# Patient Record
Sex: MALE | Race: WHITE | Employment: OTHER | URBAN - METROPOLITAN AREA
[De-identification: names, ages, dates, MRNs, and addresses within clinical notes are randomized per-mention and may not be internally consistent; named-entity substitution may affect disease eponyms.]

---

## 2017-01-10 ENCOUNTER — ALLIED HEALTH/NURSE VISIT (OUTPATIENT)
Dept: CARDIOLOGY | Facility: CLINIC | Age: 72
End: 2017-01-10
Payer: COMMERCIAL

## 2017-01-10 DIAGNOSIS — Z95.0 CARDIAC PACEMAKER IN SITU: Primary | ICD-10-CM

## 2017-01-10 DIAGNOSIS — I10 BENIGN HYPERTENSION: ICD-10-CM

## 2017-01-10 PROCEDURE — 93280 PM DEVICE PROGR EVAL DUAL: CPT | Performed by: INTERNAL MEDICINE

## 2017-01-10 RX ORDER — LISINOPRIL 20 MG/1
20 TABLET ORAL DAILY
Qty: 90 TABLET | Refills: 3 | Status: SHIPPED | OUTPATIENT
Start: 2017-01-10 | End: 2017-12-04

## 2017-01-10 NOTE — PROGRESS NOTES
Cobb Accolade (D) 6 weekPost Pacemaker Device Check  AP: 4 % : <1 %  Mode: DDD        Underlying Rhythm: SR  Heart Rate: Stable histogram with adequate distribution  Sensing: Stable    Pacing Threshold: Stable    Impedance: WNL  Battery Status: 15 years  Incision: has healed well  Atrial Arrhythmia: NONE  Ventricular Arrhythmia: NONE  Setting Change: NONE    Care Plan: Remote transmission in 3 months.

## 2017-02-01 DIAGNOSIS — E78.2 MIXED HYPERLIPIDEMIA: ICD-10-CM

## 2017-02-01 DIAGNOSIS — E61.1 IRON DEFICIENCY: ICD-10-CM

## 2017-02-01 LAB
ERYTHROCYTE [DISTWIDTH] IN BLOOD BY AUTOMATED COUNT: 17 % (ref 10–15)
HCT VFR BLD AUTO: 47.7 % (ref 40–53)
HGB BLD-MCNC: 16.2 G/DL (ref 13.3–17.7)
MCH RBC QN AUTO: 29.9 PG (ref 26.5–33)
MCHC RBC AUTO-ENTMCNC: 34 G/DL (ref 31.5–36.5)
MCV RBC AUTO: 88 FL (ref 78–100)
PLATELET # BLD AUTO: 179 10E9/L (ref 150–450)
RBC # BLD AUTO: 5.41 10E12/L (ref 4.4–5.9)
WBC # BLD AUTO: 8.1 10E9/L (ref 4–11)

## 2017-02-01 PROCEDURE — 82728 ASSAY OF FERRITIN: CPT | Performed by: INTERNAL MEDICINE

## 2017-02-01 PROCEDURE — 80053 COMPREHEN METABOLIC PANEL: CPT | Performed by: INTERNAL MEDICINE

## 2017-02-01 PROCEDURE — 85027 COMPLETE CBC AUTOMATED: CPT | Performed by: INTERNAL MEDICINE

## 2017-02-01 PROCEDURE — 36415 COLL VENOUS BLD VENIPUNCTURE: CPT | Performed by: INTERNAL MEDICINE

## 2017-02-01 PROCEDURE — 80061 LIPID PANEL: CPT | Performed by: INTERNAL MEDICINE

## 2017-02-02 DIAGNOSIS — R73.09 ELEVATED GLUCOSE: Primary | ICD-10-CM

## 2017-02-02 LAB
ALBUMIN SERPL-MCNC: 3.8 G/DL (ref 3.4–5)
ALP SERPL-CCNC: 58 U/L (ref 40–150)
ALT SERPL W P-5'-P-CCNC: 61 U/L (ref 0–70)
ANION GAP SERPL CALCULATED.3IONS-SCNC: 10 MMOL/L (ref 3–14)
AST SERPL W P-5'-P-CCNC: 28 U/L (ref 0–45)
BILIRUB SERPL-MCNC: 0.7 MG/DL (ref 0.2–1.3)
BUN SERPL-MCNC: 21 MG/DL (ref 7–30)
CALCIUM SERPL-MCNC: 9 MG/DL (ref 8.5–10.1)
CHLORIDE SERPL-SCNC: 109 MMOL/L (ref 94–109)
CHOLEST SERPL-MCNC: 151 MG/DL
CO2 SERPL-SCNC: 23 MMOL/L (ref 20–32)
CREAT SERPL-MCNC: 1.23 MG/DL (ref 0.66–1.25)
FERRITIN SERPL-MCNC: 48 NG/ML (ref 26–388)
GFR SERPL CREATININE-BSD FRML MDRD: 58 ML/MIN/1.7M2
GLUCOSE SERPL-MCNC: 203 MG/DL (ref 70–99)
HDLC SERPL-MCNC: 46 MG/DL
LDLC SERPL CALC-MCNC: 70 MG/DL
NONHDLC SERPL-MCNC: 105 MG/DL
POTASSIUM SERPL-SCNC: 4 MMOL/L (ref 3.4–5.3)
PROT SERPL-MCNC: 7.3 G/DL (ref 6.8–8.8)
SODIUM SERPL-SCNC: 142 MMOL/L (ref 133–144)
TRIGL SERPL-MCNC: 173 MG/DL

## 2017-02-03 NOTE — PROGRESS NOTES
Quick Note:    This is to inform you regarding your test result.    Ferritin which is iron stores in the body is improving compare to before.  Cholesterol numbers are improving.  Glucose is very high  Check fasting blood sugar and Hba1c   Orders are placed.  Make lab appointment   CBC result which includes white count Hemoglobin and Platelet Counts is normal.         Dr.Nasima Thom MD,FACP      ______

## 2017-02-07 DIAGNOSIS — R73.09 ELEVATED GLUCOSE: ICD-10-CM

## 2017-02-07 LAB
GLUCOSE SERPL-MCNC: 114 MG/DL (ref 70–99)
HBA1C MFR BLD: 6.2 % (ref 4.3–6)

## 2017-02-07 PROCEDURE — 36415 COLL VENOUS BLD VENIPUNCTURE: CPT | Performed by: INTERNAL MEDICINE

## 2017-02-07 PROCEDURE — 83036 HEMOGLOBIN GLYCOSYLATED A1C: CPT | Performed by: INTERNAL MEDICINE

## 2017-02-07 PROCEDURE — 82947 ASSAY GLUCOSE BLOOD QUANT: CPT | Performed by: INTERNAL MEDICINE

## 2017-02-08 NOTE — PROGRESS NOTES
Quick Note:    This is to inform you regarding your test result.    Glucose which is your blood sugar is elevated.  HbA1c which is average glucose during last 3 months is 6.2%.  You are considered prediabetic means at risk of developing diabetes.  Eat healthy diet and do regular physical activity. Avoid high sugar containing food.  If you want we can refer you to dietician      Dr.Nasima Thom MD,FACP      ______

## 2017-02-09 ENCOUNTER — OFFICE VISIT (OUTPATIENT)
Dept: FAMILY MEDICINE | Facility: CLINIC | Age: 72
End: 2017-02-09
Payer: COMMERCIAL

## 2017-02-09 VITALS
HEIGHT: 70 IN | TEMPERATURE: 98.6 F | BODY MASS INDEX: 33.41 KG/M2 | WEIGHT: 233.4 LBS | HEART RATE: 76 BPM | OXYGEN SATURATION: 93 % | DIASTOLIC BLOOD PRESSURE: 83 MMHG | SYSTOLIC BLOOD PRESSURE: 133 MMHG

## 2017-02-09 DIAGNOSIS — N52.9 ERECTILE DYSFUNCTION, UNSPECIFIED ERECTILE DYSFUNCTION TYPE: ICD-10-CM

## 2017-02-09 DIAGNOSIS — E78.5 HYPERLIPIDEMIA, UNSPECIFIED HYPERLIPIDEMIA TYPE: ICD-10-CM

## 2017-02-09 DIAGNOSIS — R73.09 ELEVATED HEMOGLOBIN A1C: ICD-10-CM

## 2017-02-09 DIAGNOSIS — E61.1 IRON DEFICIENCY: ICD-10-CM

## 2017-02-09 DIAGNOSIS — K22.70 BARRETT'S ESOPHAGUS WITHOUT DYSPLASIA: ICD-10-CM

## 2017-02-09 DIAGNOSIS — Z01.818 PREOP GENERAL PHYSICAL EXAM: Primary | ICD-10-CM

## 2017-02-09 PROCEDURE — 99215 OFFICE O/P EST HI 40 MIN: CPT | Performed by: INTERNAL MEDICINE

## 2017-02-09 RX ORDER — PANTOPRAZOLE SODIUM 40 MG/1
40 TABLET, DELAYED RELEASE ORAL DAILY
Qty: 90 TABLET | Refills: 3 | Status: SHIPPED | OUTPATIENT
Start: 2017-02-09 | End: 2017-06-02

## 2017-02-09 RX ORDER — SILDENAFIL CITRATE 20 MG/1
TABLET ORAL
Qty: 30 TABLET | Refills: 3 | Status: SHIPPED | OUTPATIENT
Start: 2017-02-09 | End: 2017-12-04

## 2017-02-09 RX ORDER — ATORVASTATIN CALCIUM 10 MG/1
10 TABLET, FILM COATED ORAL DAILY
Qty: 90 TABLET | Refills: 3 | Status: SHIPPED | OUTPATIENT
Start: 2017-02-09 | End: 2017-12-04

## 2017-02-09 NOTE — PROGRESS NOTES
[address] [address]  [phone]  Dept: [phone]    PRE-OP EVALUATION:  Today's date: 2017    Estrada Oseguera (: 1945) presents for pre-operative evaluation assessment as requested by Deondre Do MD.  He requires evaluation and anesthesia risk assessment prior to undergoing surgery/procedure for treatment of esophageal stricture, GERD, Jesus's esophagus.  Proposed procedure: COMBINED ESOPHAGOSCOPY, GASTROSCOPY, DUODENOSCOPY (EGD)    Date of Surgery/ Procedure: 3-2-2017  Time of Surgery/ Procedure: 8:30am  Hospital/Surgical Facility:     Primary Physician: Zoe Tomas  Type of Anesthesia Anticipated: Conscious sedation    Patient has a Health Care Directive or Living Will:  YES , will bring in to be scanned    1. NO - Do you have a history of heart attack, stroke, stent, bypass or surgery on an artery in the head, neck, heart or legs?  2. NO - Do you ever have any pain or discomfort in your chest?  3. NO - Do you have a history of  Heart Failure?  4. NO - Are you troubled by shortness of breath when: walking on the level, up a slight hill or at night?  5. NO - Do you currently have a cold, bronchitis or other respiratory infection?  6. NO - Do you have a cough, shortness of breath or wheezing?  7. NO - Do you sometimes get pains in the calves of your legs when you walk?  8. NO - Do you or anyone in your family have previous history of blood clots?  9. NO - Do you or does anyone in your family have a serious bleeding problem such as prolonged bleeding following surgeries or cuts?  10. yes - Have you ever had problems with anemia or been told to take iron pills? Yes, in the past,doing much better compared to previous, within normal range,  11. NO - Have you had any abnormal blood loss such as black, tarry or bloody stools, or abnormal vaginal bleeding?  12. NO - Have you ever had a blood transfusion?  13. NO - Have you or any of  your relatives ever had problems with anesthesia?  14. NO - Do you have sleep apnea, excessive snoring or daytime drowsiness?  15. NO - Do you have any prosthetic heart valves?  16. NO - Do you have prosthetic joints?  17. NO - Is there any chance that you may be pregnant?      HPI:                                                      Brief HPI related to upcoming procedure: Has chronic hx of GERD, Jesus's esophagus and esophageal stricture.  Had EGD and dilatation 3 months ago. Per GI, needed to repeat EGD in 3 months for retreatment.      See problem list for active medical problems.  Problems all longstanding and stable, except as noted/documented.  See ROS for pertinent symptoms related to these conditions.                                                                                                  .    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     Priority: Medium     seen on EGD dated 11/3/16       Chacon's esophagus without dysplasia 11/28/2016     Priority: Medium     seen on EGD dated 11/3/16       Cardiac pacemaker 11/28/2016     Priority: Medium     See note by cardiology dated 11/23/2016       Tubular adenoma 11/05/2016     Priority: Medium     Seen on colonoscopy dated 11/2016       S/p nephrectomy 10/27/2016     Priority: Medium     Benign hypertension      Priority: Medium     GERD (gastroesophageal reflux disease)      Priority: Medium     CKD (chronic kidney disease)      Priority: Medium     S/P colon resection      Priority: Medium     Solitary kidney, acquired 10/13/2016     Priority: Medium     Benign essential hypertension 10/13/2016     Priority: Medium     Chronic kidney disease, stage III  (moderate) 10/13/2016     Priority: Medium     Hiatal hernia 10/13/2016     Priority: Medium      Past Medical History   Diagnosis Date     Hypertension      GERD (gastroesophageal reflux disease)      CKD (chronic kidney disease)      Hiatal hernia      S/P colon resection      Pericarditis      Cancer (H)      skin     Past Surgical History   Procedure Laterality Date     Cholecystectomy       Combined colonoscopy, mucosal resection  1993     ?diverticulitis      Nephrectomy rt/lt       right     Colonoscopy       Esophagoscopy, gastroscopy, duodenoscopy (egd), combined N/A 11/3/2016     Procedure: COMBINED ESOPHAGOSCOPY, GASTROSCOPY, DUODENOSCOPY (EGD), BIOPSY SINGLE OR MULTIPLE;  Surgeon: Deondre Meek MD;  Location:  GI     Pace maker       Esophagoscopy, gastroscopy, duodenoscopy (egd), dilatation, combined N/A 12/1/2016     Procedure: COMBINED ESOPHAGOSCOPY, GASTROSCOPY, DUODENOSCOPY (EGD), DILATATION;  Surgeon: Deondre Meek MD;  Location:  GI     Current Outpatient Prescriptions   Medication Sig Dispense Refill     lisinopril (PRINIVIL/ZESTRIL) 20 MG tablet Take 1 tablet (20 mg) by mouth daily 90 tablet 3     atorvastatin (LIPITOR) 10 MG tablet Take 1 tablet (10 mg) by mouth daily 90 tablet 0     pantoprazole (PROTONIX) 40 MG EC tablet Take 1 tablet (40 mg) by mouth daily Take 30-60 minutes before a meal. 90 tablet 0     Ferrous Gluconate (IRON) 240 (27 FE) MG TABS Take 1 tablet by mouth daily       OTC products: None, except as noted above    No Known Allergies   Latex Allergy: NO    Social History   Substance Use Topics     Smoking status: Former Smoker     Types: Pipe     Smokeless tobacco: Never Used     Alcohol Use: 0.0 oz/week     0 Standard drinks or equivalent per week      Comment: 3-4 times a week     History   Drug Use No       REVIEW OF SYSTEMS:                                                    C: NEGATIVE for fever, chills, change in weight  E/M: NEGATIVE for ear, mouth  "and throat problems  R: NEGATIVE for significant cough or SOB  CV: NEGATIVE for chest pain, palpitations or peripheral edema  Drink plenty of fluids.  Take extra rest.  Use saline nasal spray.  Take Tylenol as needed for pain    This document serves as a record of the services and decisions personally performed and made by Zoe Tomas MD. It was created on her behalf by Ness Arshad, a trained medical scribe. The creation of this document is based the provider's statements to the medical scribe.    Scribe Ness Arshad 11:51 AM, February 9, 2017    EXAM:                                                    /83 mmHg  Pulse 76  Temp(Src) 98.6  F (37  C) (Oral)  Ht 5' 9.5\" (1.765 m)  Wt 233 lb 6.4 oz (105.87 kg)  BMI 33.98 kg/m2  SpO2 93%  GENERAL APPEARANCE: healthy, alert and no distress  HENT: ear canals and TM's normal and nose and mouth without ulcers or lesions  RESP: lungs clear to auscultation - no rales, rhonchi or wheezes.  CV: regular rate and rhythm, normal S1 S2, no S3 or S4 and no murmur, click or rub   ABDOMEN: soft, nontender, no HSM or masses and bowel sounds normal  NEURO: Normal strength and tone, sensory exam grossly normal, mentation intact and speech normal    DIAGNOSTICS:                                                    No labs or EKG required for low risk surgery (cataract, skin procedure, breast biopsy, etc)    Recent Labs   Lab Test  02/07/17   0734  02/01/17   1307  11/23/16   1134   HGB   --   16.2  14.0   PLT   --   179  203   INR   --    --   0.98   NA   --   142  142   POTASSIUM   --   4.0  4.2   CR   --   1.23  1.23   A1C  6.2*   --    --     Reviewed 2/7/17 labs    IMPRESSION:                                                    Reason for surgery/procedure: hiatal hernia + GERD  Diagnosis/reason for consult: preop clearance     The proposed surgical procedure is considered LOW risk.    REVISED CARDIAC RISK INDEX  The patient has the following serious cardiovascular " risks for perioperative complications such as (MI, PE, VFib and 3  AV Block):  No serious cardiac risks  INTERPRETATION: 1 risks: Class II (low risk - 0.9% complication rate)    The patient has the following additional risks for perioperative complications:  No identified additional risks    Estrada was seen today for pre-op exam.    Diagnoses and all orders for this visit:    Preop general physical exam  -     CBC with platelets; Future    Chacon's esophagus without dysplasia  Comments:  seen on EGD dated 11/3/16  Orders:  -     pantoprazole (PROTONIX) 40 MG EC tablet; Take 1 tablet (40 mg) by mouth daily Take 30-60 minutes before a meal.    Hyperlipidemia, unspecified hyperlipidemia type  Doing well with  medication  -     atorvastatin (LIPITOR) 10 MG tablet; Take 1 tablet (10 mg) by mouth daily    Elevated hemoglobin A1c  A1C was 6.2 2/7/2017  We had lengthy discussion about pre diabetes and appropriate measurements to prevent diabetes   Counseled over diet, exercise  He will see diabetes educator and dietitian  -     NUTRITION REFERRAL  -     Hemoglobin A1c; Future  -     Basic metabolic panel; Future    Iron deficiency  Improved.  -     Ferritin; Future    Erectile dysfunction, unspecified erectile dysfunction type  Needs refill. We discussed getting generic as it is more affordable.  -     sildenafil (REVATIO/VIAGRA) 20 MG tablet; Take 1-2 tablet (20 mg) by mouth half to one hour before sexual intercourse .  Never use with nitroglycerin, terazosin or doxazosin.      RECOMMENDATIONS:                                                    --Patient is to take all scheduled medications on the day of surgery EXCEPT for modifications listed below.    Optimal for the proposed procedure      The total visit time was 40 minutes more  than 50% was spent in counseling and coordination of care as discussed above.    The information in this document, created by the medical scribe for me, accurately reflects the services I  personally performed and the decisions made by me. I have reviewed and approved this document for accuracy prior to leaving the patient care area.  Zoe Tomas MD  12:01 PM, 02/09/2017    Signed Electronically by: Zoe Tomas MD    Copy of this evaluation report is provided to requesting physician.    [facility] Preop Guidelines

## 2017-02-09 NOTE — NURSING NOTE
"Chief Complaint   Patient presents with     Pre-Op Exam       Initial /83 mmHg  Pulse 76  Temp(Src) 98.6  F (37  C) (Oral)  Ht 5' 9.5\" (1.765 m)  Wt 233 lb 6.4 oz (105.87 kg)  BMI 33.98 kg/m2  SpO2 93% Estimated body mass index is 33.98 kg/(m^2) as calculated from the following:    Height as of this encounter: 5' 9.5\" (1.765 m).    Weight as of this encounter: 233 lb 6.4 oz (105.87 kg).  Medication Reconciliation: complete, Left arm, Large cuff  Sakshi Cross MA  "

## 2017-02-09 NOTE — PATIENT INSTRUCTIONS
Before Your Surgery      Call your surgeon if there is any change in your health. This includes signs of a cold or flu (such as a sore throat, runny nose, cough, rash or fever).    Do not smoke, drink alcohol or take over the counter medicine (unless your surgeon or primary care doctor tells you to) for the 24 hours before and after surgery.    If you take prescribed drugs: Follow your doctor s orders about which medicines to take and which to stop until after surgery.    Eating and drinking prior to surgery: follow the instructions from your surgeon    Take a shower or bath the night before surgery. Use the soap your surgeon gave you to gently clean your skin. If you do not have soap from your surgeon, use your regular soap. Do not shave  scrub the surgery site.  Wear clean pajamas and have clean sheets on your bed.   Discontinue iron 7-10 days before your procedure   Avoid aspirin 10 days before the surgery. Avoid nonsteroidal anti-inflammatory pain medication like ibuprofen, Motrin, or Aleve 3 days before the surgery.  Tylenol is okay to use for pain.  Avoid any OTC multivitamins or herbal supplement 7 days before surgery   Resume after surgery    Get future labs 2-3 days before the appt  Follow up in 6 months   Seek sooner medical attention if there is any new or worsening symptoms  Check with pharmacy about coverage for shingle vaccine.  Ok for the pharmacy to give shingle vaccine if it is covered.

## 2017-02-09 NOTE — MR AVS SNAPSHOT
After Visit Summary   2/9/2017    Estrada Oseguera    MRN: [id number]           Patient Information     Date Of Birth          1945        Visit Information        Provider Department      2/9/2017 11:30 AM Zoe Tomas MD [facility]        Today's Diagnoses     Preop general physical exam    -  1     Chacon's esophagus without dysplasia         Hyperlipidemia, unspecified hyperlipidemia type         Elevated hemoglobin A1c         Iron deficiency         Erectile dysfunction, unspecified erectile dysfunction type           Care Instructions      Before Your Surgery      Call your surgeon if there is any change in your health. This includes signs of a cold or flu (such as a sore throat, runny nose, cough, rash or fever).    Do not smoke, drink alcohol or take over the counter medicine (unless your surgeon or primary care doctor tells you to) for the 24 hours before and after surgery.    If you take prescribed drugs: Follow your doctor s orders about which medicines to take and which to stop until after surgery.    Eating and drinking prior to surgery: follow the instructions from your surgeon    Take a shower or bath the night before surgery. Use the soap your surgeon gave you to gently clean your skin. If you do not have soap from your surgeon, use your regular soap. Do not shave  scrub the surgery site.  Wear clean pajamas and have clean sheets on your bed.   Discontinue iron 7-10 days before your procedure   Avoid aspirin 10 days before the surgery. Avoid nonsteroidal anti-inflammatory pain medication like ibuprofen, Motrin, or Aleve 3 days before the surgery.  Tylenol is okay to use for pain.  Avoid any OTC multivitamins or herbal supplement 7 days before surgery   Resume after surgery    Get future labs 2-3 days before the appt  Follow up in 6 months   Seek sooner medical attention if there is any new or worsening symptoms  Check with pharmacy about coverage for shingle  vaccine.  Ok for the pharmacy to give shingle vaccine if it is covered.        Follow-ups after your visit        Additional Services     NUTRITION REFERRAL       Your provider has referred you to: [facility] [place] [phone]   [email or web address]    Please be aware that coverage of these services is subject to the terms and limitations of your health insurance plan.  Call member services at your health plan with any benefit or coverage questions.      Please bring the following with you to your appointment:    (1) This referral request  (2) Any documents given to you regarding this referral  (3) Any specific questions you have about diet and/or food choices                  Your next 10 appointments already scheduled     Mar 02, 2017   Procedure with Deondre Meek MD   [facility] ([facility])    [address] [address]   [phone]           [facility] is located at [address] KATHY Daniel            Mar 09, 2017 10:50 AM   [facility] EP RETURN with Cierra Stallings PA-C   [facility] [place] ([facility])    [address]  [place] [address] [phone]            Apr 18, 2017  1:30 PM   Remote PPM Check with HEARD TECH1   [facility] [place] ([facility])    [address]  [place] [address]   [phone]           This appointment is for a remote check of your pacemaker.  This is not an appointment at the office.              Future tests that were ordered for you today     Open Future Orders        Priority Expected Expires Ordered    CBC with platelets Routine 8/1/2017 12/29/2017 2/9/2017    Ferritin Routine 8/1/2017 12/29/2017 2/9/2017    Hemoglobin A1c Routine 8/1/2017 12/29/2017 2/9/2017    Basic metabolic panel Routine 8/1/2017 12/29/2017 2/9/2017            Who to  "contact     If you have questions or need follow up information about today's clinic visit or your schedule please contact [facility] directly at [phone].  Normal or non-critical lab and imaging results will be communicated to you by [employer], letter or phone within 4 business days after the clinic has received the results. If you do not hear from us within 7 days, please contact the clinic through [employer] or phone. If you have a critical or abnormal lab result, we will notify you by phone as soon as possible.  Submit refill requests through [employer] or call your pharmacy and they will forward the refill request to us. Please allow 3 business days for your refill to be completed.          Additional Information About Your Visit        [employer] Information     [employer] gives you secure access to your electronic health record. If you see a primary care provider, you can also send messages to your care team and make appointments. If you have questions, please call your primary care clinic.  If you do not have a primary care provider, please call [phone] and they will assist you.        Care EveryWhere ID     This is your Care EveryWhere ID. This could be used by other organizations to access your [facility] medical records  [id number]        Your Vitals Were     Pulse Temperature Height BMI (Body Mass Index) Pulse Oximetry       76 98.6  F (37  C) (Oral) 5' 9.5\" (1.765 m) 33.98 kg/m2 93%        Blood Pressure from Last 3 Encounters:   02/09/17 133/83   12/01/16 135/90   11/28/16 126/80    Weight from Last 3 Encounters:   02/09/17 233 lb 6.4 oz (105.87 kg)   11/28/16 231 lb (104.781 kg)   11/23/16 227 lb 12.8 oz (103.329 kg)              We Performed the Following     NUTRITION REFERRAL          Today's Medication Changes          These changes are accurate as of: 2/9/17 12:01 PM.  If you have any questions, ask your nurse or doctor.               Start taking these medicines.        Dose/Directions    " sildenafil 20 MG tablet   Commonly known as:  REVATIO/VIAGRA   Used for:  Erectile dysfunction, unspecified erectile dysfunction type   Started by:  Zoe Tomas MD        Take 1-2 tablet (20 mg) by mouth half to one hour before sexual intercourse .  Never use with nitroglycerin, terazosin or doxazosin.   Quantity:  30 tablet   Refills:  3            Where to get your medicines      These medications were sent to [facility] - [place], [place] - [address]  [address] [facility] [address]     Phone:  [phone]    - atorvastatin 10 MG tablet  - pantoprazole 40 MG EC tablet      Some of these will need a paper prescription and others can be bought over the counter.  Ask your nurse if you have questions.     Bring a paper prescription for each of these medications    - sildenafil 20 MG tablet             Primary Care Provider Office Phone # Fax #    Zoe Tomas [phone]       [facility]    [address]  [place] [facility] [place] [address]        Thank you!     Thank you for choosing [facility]  for your care. Our goal is always to provide you with excellent care. Hearing back from our patients is one way we can continue to improve our services. Please take a few minutes to complete the written survey that you may receive in the mail after your visit with us. Thank you!             Your Updated Medication List - Protect others around you: Learn how to safely use, store and throw away your medicines at www.disposemymeds.org.          This list is accurate as of: 2/9/17 12:01 PM.  Always use your most recent med list.                   Brand Name Dispense Instructions for use    atorvastatin 10 MG tablet    LIPITOR    90 tablet    Take 1 tablet (10 mg) by mouth daily       Iron 240 (27 FE) MG Tabs      Take 1 tablet by mouth daily       lisinopril 20 MG tablet    PRINIVIL/ZESTRIL    90 tablet    Take 1 tablet (20 mg) by mouth  daily       pantoprazole 40 MG EC tablet    PROTONIX    90 tablet    Take 1 tablet (40 mg) by mouth daily Take 30-60 minutes before a meal.       sildenafil 20 MG tablet    REVATIO/VIAGRA    30 tablet    Take 1-2 tablet (20 mg) by mouth half to one hour before sexual intercourse .  Never use with nitroglycerin, terazosin or doxazosin.

## 2017-02-23 DIAGNOSIS — E78.5 HYPERLIPIDEMIA, UNSPECIFIED HYPERLIPIDEMIA TYPE: ICD-10-CM

## 2017-02-23 RX ORDER — ATORVASTATIN CALCIUM 10 MG/1
TABLET, FILM COATED ORAL
Start: 2017-02-23

## 2017-03-09 ENCOUNTER — OFFICE VISIT (OUTPATIENT)
Dept: CARDIOLOGY | Facility: CLINIC | Age: 72
End: 2017-03-09
Attending: INTERNAL MEDICINE
Payer: COMMERCIAL

## 2017-03-09 VITALS
BODY MASS INDEX: 33.14 KG/M2 | HEIGHT: 70 IN | HEART RATE: 76 BPM | DIASTOLIC BLOOD PRESSURE: 74 MMHG | WEIGHT: 231.5 LBS | SYSTOLIC BLOOD PRESSURE: 120 MMHG

## 2017-03-09 DIAGNOSIS — I10 BENIGN HYPERTENSION: Primary | ICD-10-CM

## 2017-03-09 DIAGNOSIS — I10 BENIGN ESSENTIAL HYPERTENSION: ICD-10-CM

## 2017-03-09 DIAGNOSIS — Z95.0 CARDIAC PACEMAKER: ICD-10-CM

## 2017-03-09 DIAGNOSIS — I49.3 PVC'S (PREMATURE VENTRICULAR CONTRACTIONS): ICD-10-CM

## 2017-03-09 DIAGNOSIS — Z95.0 CARDIAC PACEMAKER IN SITU: ICD-10-CM

## 2017-03-09 PROCEDURE — 99214 OFFICE O/P EST MOD 30 MIN: CPT | Performed by: PHYSICIAN ASSISTANT

## 2017-03-09 NOTE — PATIENT INSTRUCTIONS
1. Pacemaker check in January looked great    2. No changes; continue to monitor for dizziness, lightheadedness, etc    3. Call my nurses if any issues: [phone]    4. See Dr. Valle in 1 year with Echo (see if pumping strength better), Holter (to quantify PVCs) and annual pacemaker check but call if need to be seen sooner!

## 2017-03-09 NOTE — LETTER
3/9/2017    Zoe Tomas MD  [facility]      [address]  [place] [address]    RE: Estrada Oseguera       Dear Colleague,    I had the pleasure of seeing Estrada today when he came accompanied by his wife for followup after recent pacemaker implantation.  He is a very pleasant 72-year-old who noted frequent episodes of dizziness in early October.  He went to the Emergency Department, and he was noted to have frequent PVCs.  He saw his primary care physician, who also recommended significant iron deficiency (though apparently hemoglobin never dropped dramatically).  He was started on iron, and a 30-day event monitor was placed.  This showed multiple episodes of sinus asystole with the longest being up to 7 seconds.  Most of the episodes were detected by sleep, but he did have some sinus bradycardia even during the day.  Finally, he also reviewed an echocardiogram done 10/19/2016 that showed an EF of just 45%-50%.      Dr. Ghosh saw him in November, at which time he recommended placement of a dual-chamber pacemaker given these significant abnormalities of his event monitor.      This was done on 11/23/2016 by Dr. Valle.  He had a dual-chamber [place] Accolade MRI-compatible pacemaker placed, set at 55/130.  He has had routine device checks through our office, the most recent being in 01/2017 that showed he was 4% A paced and less than 1% V paced with stable histogram and adequate distribution.  He had no significant atrial or ventricular arrhythmia.      He states that since his pacemaker was placed and he started taking iron, he has had no further episodes of dizziness, we reviewed his history of bleeding polyps in the stomach for which he has undergone 3 endoscopies.  He sees Dr. Meek for this from [facility].     Outpatient Encounter Prescriptions as of 3/9/2017   Medication Sig Dispense Refill     ranitidine (ZANTAC) 150 MG tablet Take by mouth At Bedtime        pantoprazole (PROTONIX) 40 MG EC tablet Take 1 tablet (40 mg) by mouth daily Take 30-60 minutes before a meal. 90 tablet 3     atorvastatin (LIPITOR) 10 MG tablet Take 1 tablet (10 mg) by mouth daily 90 tablet 3     sildenafil (REVATIO/VIAGRA) 20 MG tablet Take 1-2 tablet (20 mg) by mouth half to one hour before sexual intercourse .  Never use with nitroglycerin, terazosin or doxazosin. 30 tablet 3     lisinopril (PRINIVIL/ZESTRIL) 20 MG tablet Take 1 tablet (20 mg) by mouth daily 90 tablet 3     Ferrous Gluconate (IRON) 240 (27 FE) MG TABS Take 1 tablet by mouth daily       No facility-administered encounter medications on file as of 3/9/2017.       ASSESSMENT AND PLAN:     1.  Sinus node dysfunction.  He had episodes of 7-second pauses and is now status post a [employer] dual-chamber MRI compatible pacemaker.  He has had minimal use of this, but he is utilizing the pacing capabilities occasionally.      At this time, he will continue routine device checks.      2.  Cardiomyopathy.  EF was 45%-50%.  He does remain on lisinopril.  He is not fluid overloaded on exam.      At this time, we will have him continue lisinopril and repeat an echocardiogram in about 1 year to check on his ejection fraction.      3.  PVCs.  He had very frequent PVCs during his ER visit.  His most recent pacemaker check indicated he had had no ventricular arrhythmia, but his first check (7-day) on 11/29 showed he had had 7000 PVCs detected.  We will continue to monitor his PVCs through his device interrogation, as he may require beta blockade.  We will get a Holter monitor when he is seen again in a year.      They would like to stick with the implanting physician (Dr. Valle) for followup.  We will continue to monitor this carefully down the road.     Again, thank you for allowing me to participate in the care of your patient.      Sincerely,    Cierra Stallings PA-C     [facility]

## 2017-03-09 NOTE — MR AVS SNAPSHOT
After Visit Summary   3/9/2017    Estrada Oseguera    MRN: [id number]           Patient Information     Date Of Birth          1945        Visit Information        Provider Department      3/9/2017 10:50 AM Cierra Stallings PA-C [facility]        Today's Diagnoses     Benign hypertension    -  1    Cardiac pacemaker in situ        Benign essential hypertension        Cardiac pacemaker        PVC's (premature ventricular contractions)          Care Instructions    1. Pacemaker check in January looked great    2. No changes; continue to monitor for dizziness, lightheadedness, etc    3. Call my nurses if any issues: [phone]    4. See Dr. Valle in 1 year with Echo (see if pumping strength better), Holter (to quantify PVCs) and annual pacemaker check but call if need to be seen sooner!        Follow-ups after your visit        Additional Services     Follow-Up with Device Clinic           Follow-Up with Electrophysiologist           Follow-Up with Electrophysiologist                 Your next 10 appointments already scheduled     Apr 18, 2017  1:30 PM CDT   Remote PPM Check with HEARD TECH1   [facility] ([facility])    [address] [place] [id number] [phone]           This appointment is for a remote check of your pacemaker.  This is not an appointment at the office.              Future tests that were ordered for you today     Open Future Orders        Priority Expected Expires Ordered    Follow-Up with Electrophysiologist Routine 3/9/2018 7/22/2018 3/9/2017    Follow-Up with Device Clinic Routine 3/9/2017 3/9/2018 3/9/2017    Holter Monitor 24 hour - Adult Routine 3/9/2018 4/13/2018 3/9/2017    Echocardiogram Routine 3/9/2018 4/13/2018 3/9/2017    Follow-Up with Electrophysiologist Routine 3/9/2018 7/22/2018 3/9/2017            Who to contact     If you have questions  "or need follow up information about today's clinic visit or your schedule please contact [facility] [place] directly at [phone].  Normal or non-critical lab and imaging results will be communicated to you by MyChart, letter or phone within 4 business days after the clinic has received the results. If you do not hear from us within 7 days, please contact the clinic through MyChart or phone. If you have a critical or abnormal lab result, we will notify you by phone as soon as possible.  Submit refill requests through [employer] or call your pharmacy and they will forward the refill request to us. Please allow 3 business days for your refill to be completed.          Additional Information About Your Visit        [employer] Information     [employer] gives you secure access to your electronic health record. If you see a primary care provider, you can also send messages to your care team and make appointments. If you have questions, please call your primary care clinic.  If you do not have a primary care provider, please call [phone] and they will assist you.        Care EveryWhere ID     This is your Care EveryWhere ID. This could be used by other organizations to access your [facility] medical records  [id number]        Your Vitals Were     Pulse Height BMI (Body Mass Index)             76 1.765 m (5' 9.5\") 33.7 kg/m2          Blood Pressure from Last 3 Encounters:   03/09/17 120/74   03/02/17 132/85   02/09/17 133/83    Weight from Last 3 Encounters:   03/09/17 105 kg (231 lb 8 oz)   03/02/17 105.7 kg (233 lb)   02/09/17 105.9 kg (233 lb 6.4 oz)              We Performed the Following     Follow-Up with Cardiac Advanced Practice Provider        Primary Care Provider Office Phone # Fax #    oZe Tomas [phone]       [facility] [place]    [address]  [place]                [place] [address]        Thank you!     Thank you for choosing [facility]" [facility] AT [place]  for your care. Our goal is always to provide you with excellent care. Hearing back from our patients is one way we can continue to improve our services. Please take a few minutes to complete the written survey that you may receive in the mail after your visit with us. Thank you!             Your Updated Medication List - Protect others around you: Learn how to safely use, store and throw away your medicines at www.disposemymeds.org.          This list is accurate as of: 3/9/17 11:21 AM.  Always use your most recent med list.                   Brand Name Dispense Instructions for use    atorvastatin 10 MG tablet    LIPITOR    90 tablet    Take 1 tablet (10 mg) by mouth daily       Iron 240 (27 FE) MG Tabs      Take 1 tablet by mouth daily       lisinopril 20 MG tablet    PRINIVIL/ZESTRIL    90 tablet    Take 1 tablet (20 mg) by mouth daily       pantoprazole 40 MG EC tablet    PROTONIX    90 tablet    Take 1 tablet (40 mg) by mouth daily Take 30-60 minutes before a meal.       sildenafil 20 MG tablet    REVATIO/VIAGRA    30 tablet    Take 1-2 tablet (20 mg) by mouth half to one hour before sexual intercourse .  Never use with nitroglycerin, terazosin or doxazosin.       ZANTAC 150 MG tablet   Generic drug:  ranitidine      Take by mouth At Bedtime

## 2017-03-09 NOTE — PROGRESS NOTES
HPI and Plan:   See dictation #[id number]    Orders Placed This Encounter   Procedures     Follow-Up with Electrophysiologist     Follow-Up with Device Clinic     Follow-Up with Electrophysiologist     Holter Monitor 24 hour - Adult     Echocardiogram       Orders Placed This Encounter   Medications     ranitidine (ZANTAC) 150 MG tablet     Sig: Take by mouth At Bedtime       There are no discontinued medications.      Encounter Diagnoses   Name Primary?     Cardiac pacemaker in situ      Benign hypertension Yes     Benign essential hypertension      Cardiac pacemaker      PVC's (premature ventricular contractions)        CURRENT MEDICATIONS:  Current Outpatient Prescriptions   Medication Sig Dispense Refill     ranitidine (ZANTAC) 150 MG tablet Take by mouth At Bedtime       pantoprazole (PROTONIX) 40 MG EC tablet Take 1 tablet (40 mg) by mouth daily Take 30-60 minutes before a meal. 90 tablet 3     atorvastatin (LIPITOR) 10 MG tablet Take 1 tablet (10 mg) by mouth daily 90 tablet 3     sildenafil (REVATIO/VIAGRA) 20 MG tablet Take 1-2 tablet (20 mg) by mouth half to one hour before sexual intercourse .  Never use with nitroglycerin, terazosin or doxazosin. 30 tablet 3     lisinopril (PRINIVIL/ZESTRIL) 20 MG tablet Take 1 tablet (20 mg) by mouth daily 90 tablet 3     Ferrous Gluconate (IRON) 240 (27 FE) MG TABS Take 1 tablet by mouth daily         ALLERGIES   No Known Allergies    PAST MEDICAL HISTORY:  Past Medical History   Diagnosis Date     Cancer (H)      skin     CKD (chronic kidney disease)      GERD (gastroesophageal reflux disease)      Hiatal hernia      Hypertension      Pericarditis      S/P colon resection        PAST SURGICAL HISTORY:  Past Surgical History   Procedure Laterality Date     Cholecystectomy       Combined colonoscopy, mucosal resection  1993     ?diverticulitis      Nephrectomy rt/lt       right     Colonoscopy       Esophagoscopy, gastroscopy, duodenoscopy (egd), combined N/A 11/3/2016      Procedure: COMBINED ESOPHAGOSCOPY, GASTROSCOPY, DUODENOSCOPY (EGD), BIOPSY SINGLE OR MULTIPLE;  Surgeon: Deondre Meek MD;  Location:  GI     Pace maker       Esophagoscopy, gastroscopy, duodenoscopy (egd), dilatation, combined N/A 12/1/2016     Procedure: COMBINED ESOPHAGOSCOPY, GASTROSCOPY, DUODENOSCOPY (EGD), DILATATION;  Surgeon: Deondre Meek MD;  Location:  GI     Esophagoscopy, gastroscopy, duodenoscopy (egd), dilatation, combined N/A 3/2/2017     Procedure: COMBINED ESOPHAGOSCOPY, GASTROSCOPY, DUODENOSCOPY (EGD), DILATATION;  Surgeon: Deondre Meek MD;  Location:  GI       FAMILY HISTORY:  Family History   Problem Relation Age of Onset     Hyperlipidemia Mother      CEREBROVASCULAR DISEASE Father      Prostate Cancer Father      Pulmonary Hypertension Brother      Breast Cancer Sister      Parkinsonism Sister      Colon Cancer No family hx of        SOCIAL HISTORY:  Social History     Social History     Marital status:      Spouse name: N/A     Number of children: N/A     Years of education: N/A     Social History Main Topics     Smoking status: Former Smoker     Types: Pipe     Smokeless tobacco: Never Used     Alcohol use 0.0 oz/week     0 Standard drinks or equivalent per week      Comment: 3-4 times a week     Drug use: No     Sexual activity: Not Currently     Other Topics Concern     Special Diet No     Exercise No     Social History Narrative       Review of Systems:  Skin:  Negative       Eyes:  Positive for glasses    ENT:  Negative      Respiratory:  Negative for shortness of breath;dyspnea on exertion;cough     Cardiovascular:  Negative for;palpitations;chest pain;edema;syncope or near-syncope;lightheadedness;dizziness;fatigue      Gastroenterology: Positive for   has had gastric bleeding s/p endoscopy (Dr. Meek)  Genitourinary:  Negative      Musculoskeletal:  Negative      Neurologic:  Negative      Psychiatric:  Negative      Heme/Lymph/Imm:  Negative    "   Endocrine:  Negative        Physical Exam:  Vitals: /74  Pulse 76  Ht 1.765 m (5' 9.5\")  Wt 105 kg (231 lb 8 oz)  BMI 33.7 kg/m2    Constitutional:  cooperative, alert and oriented, well developed, well nourished, in no acute distress        Skin:  warm and dry to the touch, no apparent skin lesions or masses noted        Head:  normocephalic, no masses or lesions        Eyes:  pupils equal and round;conjunctivae and lids unremarkable;sclera white;no xanthalasma        ENT:  no pallor or cyanosis, dentition good        Neck:  JVP normal;no carotid bruit        Chest:  normal breath sounds, clear to auscultation, normal A-P diameter, normal symmetry, normal respiratory excursion, no use of accessory muscles   pacemaker incision in the right infraclavicular area was well-healed      Cardiac: regular rhythm;no murmurs, gallops or rubs detected                  Abdomen:  abdomen soft        Vascular: pulses full and equal                                        Extremities and Back:  no deformities, clubbing, cyanosis, erythema observed;no edema              Neurological:  affect appropriate, oriented to time, person and place                  "

## 2017-03-10 NOTE — PROGRESS NOTES
HISTORY OF PRESENT ILLNESS:  I had the pleasure of seeing Estrada today when he came accompanied by his wife for followup after recent pacemaker implantation.  He is a very pleasant 72-year-old who noted frequent episodes of dizziness in early October.  He went to the Emergency Department, and he was noted to have frequent PVCs.  He saw his primary care physician, who also recommended significant iron deficiency (though apparently hemoglobin never dropped dramatically).  He was started on iron, and a 30-day event monitor was placed.  This showed multiple episodes of sinus asystole with the longest being up to 7 seconds.  Most of the episodes were detected by sleep, but he did have some sinus bradycardia even during the day.  Finally, he also reviewed an echocardiogram done 10/19/2016 that showed an EF of just 45%-50%.      Dr. Ghosh saw him in November, at which time he recommended placement of a dual-chamber pacemaker given these significant abnormalities of his event monitor.      This was done on 11/23/2016 by Dr. Valle.  He had a dual-chamber [place] Accolade MRI-compatible pacemaker placed, set at 55/130.  He has had routine device checks through our office, the most recent being in 01/2017 that showed he was 4% A paced and less than 1% V paced with stable histogram and adequate distribution.  He had no significant atrial or ventricular arrhythmia.      He states that since his pacemaker was placed and he started taking iron, he has had no further episodes of dizziness, we reviewed his history of bleeding polyps in the stomach for which he has undergone 3 endoscopies.  He sees Dr. Meek for this from [facility].        ASSESSMENT AND PLAN:     1.  Sinus node dysfunction.  He had episodes of 7-second pauses and is now status post a [employer] dual-chamber MRI compatible pacemaker.  He has had minimal use of this, but he is utilizing the pacing capabilities occasionally.      At this time, he  will continue routine device checks.      2.  Cardiomyopathy.  EF was 45%-50%.  He does remain on lisinopril.  He is not fluid overloaded on exam.      At this time, we will have him continue lisinopril and repeat an echocardiogram in about 1 year to check on his ejection fraction.      3.  PVCs.  He had very frequent PVCs during his ER visit.  His most recent pacemaker check indicated he had had no ventricular arrhythmia, but his first check (7-day) on  showed he had had 7000 PVCs detected.  We will continue to monitor his PVCs through his device interrogation, as he may require beta blockade.  We will get a Holter monitor when he is seen again in a year.      They would like to stick with the implanting physician (Dr. Valle) for followup.  We will continue to monitor this carefully down the road.         MELLY MCKEE PA-C             D: 2017 11:40   T: 03/10/2017 00:18   MT: [id number]      Name:     FRANCISCO SUN   MRN:      [id number]        Account:      [id number]   :      1945           Service Date: 2017      Document: [id number]

## 2017-03-25 DIAGNOSIS — K22.70 BARRETT'S ESOPHAGUS WITHOUT DYSPLASIA: ICD-10-CM

## 2017-03-27 RX ORDER — PANTOPRAZOLE SODIUM 40 MG/1
TABLET, DELAYED RELEASE ORAL
Qty: 90 TABLET | Refills: 0
Start: 2017-03-27

## 2017-03-27 NOTE — TELEPHONE ENCOUNTER
Left message for patient to contact pharmacy as he has available refills on this script order that was just filled in in Feb 2017  María Cartagena CMA

## 2017-04-18 ENCOUNTER — ALLIED HEALTH/NURSE VISIT (OUTPATIENT)
Dept: CARDIOLOGY | Facility: CLINIC | Age: 72
End: 2017-04-18
Payer: COMMERCIAL

## 2017-04-18 ENCOUNTER — TELEPHONE (OUTPATIENT)
Dept: CARDIOLOGY | Facility: CLINIC | Age: 72
End: 2017-04-18

## 2017-04-18 DIAGNOSIS — Z95.0 CARDIAC PACEMAKER IN SITU: Primary | ICD-10-CM

## 2017-04-18 PROCEDURE — 93296 REM INTERROG EVL PM/IDS: CPT | Performed by: INTERNAL MEDICINE

## 2017-04-18 PROCEDURE — 93294 REM INTERROG EVL PM/LDLS PM: CPT | Performed by: INTERNAL MEDICINE

## 2017-04-18 NOTE — TELEPHONE ENCOUNTER
Lise,    Your patient has had 212,981 total PVCs since last check on 1/10/17.    Thanks,    Jessica

## 2017-04-18 NOTE — MR AVS SNAPSHOT
After Visit Summary   4/18/2017    Estrada Oseguera    MRN: [id number]           Patient Information     Date Of Birth          1945        Visit Information        Provider Department      4/18/2017 1:30 PM HEARD TECH1 [facility]        Today's Diagnoses     Cardiac pacemaker in situ    -  1       Follow-ups after your visit        Your next 10 appointments already scheduled     Apr 18, 2017  1:30 PM CDT   Remote PPM Check with HEARD TECH1   [facility] ([facility])    [address] [place] [address] [phone]           This appointment is for a remote check of your pacemaker.  This is not an appointment at the office.              Who to contact     If you have questions or need follow up information about today's clinic visit or your schedule please contact [facility] directly at [phone].  Normal or non-critical lab and imaging results will be communicated to you by [employer], letter or phone within 4 business days after the clinic has received the results. If you do not hear from us within 7 days, please contact the clinic through [employer] or phone. If you have a critical or abnormal lab result, we will notify you by phone as soon as possible.  Submit refill requests through [employer] or call your pharmacy and they will forward the refill request to us. Please allow 3 business days for your refill to be completed.          Additional Information About Your Visit        MyChart Information     [employer] gives you secure access to your electronic health record. If you see a primary care provider, you can also send messages to your care team and make appointments. If you have questions, please call your primary care clinic.  If you do not have a primary care provider, please call [phone] and they will assist you.        Care EveryWhere ID      This is your Care EveryWhere ID. This could be used by other organizations to access your [facility] medical records  [id number]         Blood Pressure from Last 3 Encounters:   03/09/17 120/74   03/02/17 132/85   02/09/17 133/83    Weight from Last 3 Encounters:   03/09/17 105 kg (231 lb 8 oz)   03/02/17 105.7 kg (233 lb)   02/09/17 105.9 kg (233 lb 6.4 oz)              We Performed the Following     INTERROGATION DEVICE EVAL REMOTE, PACER/ICD (37519)     PM DEVICE INTERROGATE REMOTE (79812)        Primary Care Provider Office Phone # Fax #    Zoe Tomas [phone]       [facility]    [address] [facility] [address]  [facility] [address]        Thank you!     Thank you for choosing [facility] [place]  for your care. Our goal is always to provide you with excellent care. Hearing back from our patients is one way we can continue to improve our services. Please take a few minutes to complete the written survey that you may receive in the mail after your visit with us. Thank you!             Your Updated Medication List - Protect others around you: Learn how to safely use, store and throw away your medicines at www.disposemymeds.org.          This list is accurate as of: 4/18/17  9:41 AM.  Always use your most recent med list.                   Brand Name Dispense Instructions for use    atorvastatin 10 MG tablet    LIPITOR    90 tablet    Take 1 tablet (10 mg) by mouth daily       Iron 240 (27 FE) MG Tabs      Take 1 tablet by mouth daily       lisinopril 20 MG tablet    PRINIVIL/ZESTRIL    90 tablet    Take 1 tablet (20 mg) by mouth daily       pantoprazole 40 MG EC tablet    PROTONIX    90 tablet    Take 1 tablet (40 mg) by mouth daily Take 30-60 minutes before a meal.       sildenafil 20 MG tablet    REVATIO/VIAGRA    30 tablet    Take 1-2 tablet (20 mg) by mouth half to one hour before sexual intercourse .  Never use with nitroglycerin,  terazosin or doxazosin.       ZANTAC 150 MG tablet   Generic drug:  ranitidine      Take by mouth At Bedtime

## 2017-04-18 NOTE — TELEPHONE ENCOUNTER
212.981 PVCs in 90 days is roughly 2300 PVCs daily. Will not plan to start BB or get Holter earlier, but please continue to let me know PVC burden at his checks.    Thx!

## 2017-04-18 NOTE — PROGRESS NOTES
[employer] Accolade (D) Remote PPM Device Check  AP: 10 % : 0 %  Mode: DDD        Presenting Rhythm: AP/VS  Heart Rate: Adequate rates per histogram  Sensing: Stable    Pacing Threshold: Stable    Impedance: Stable  Battery Status: 14.5 years  Atrial Arrhythmia: 1 brief mode switch episode, EGM shows As=Vs for PAT lasting 10 seconds, average ventricular rate 170bpm.   Ventricular Arrhythmia: 14 ventricular high rates. 6 EGMs show As=Vs for PAT lasting 19 seconds to 3 minutes. Average ventricular rates in the 160s. Reviewed with MONICO Beckman.       Care Plan: F/u PPM Latitude q 3 months. LM with results. BRANDEE AsherT

## 2017-04-23 DIAGNOSIS — E78.5 HYPERLIPIDEMIA, UNSPECIFIED HYPERLIPIDEMIA TYPE: ICD-10-CM

## 2017-04-24 RX ORDER — ATORVASTATIN CALCIUM 10 MG/1
TABLET, FILM COATED ORAL
Start: 2017-04-24

## 2017-04-24 NOTE — TELEPHONE ENCOUNTER
Denied   Refill request too early; Rx sent 2/9/2017 for 1 year supply  Minnie MONTAÑO RN    Pending Prescriptions:                       Disp   Refills    atorvastatin (LIPITOR) 10 MG tablet [Pharm*90 tab*0        Sig: TAKE 1 TABLET EVERY DAY         Last Written Prescription Date: 2/9/2017  Last Fill Quantity: 90, # refills: 3    Last Office Visit with [facility] or [facility] prescribing provider:     Future Office Visit:      Cholesterol   Date Value Ref Range Status   02/01/2017 151 <200 mg/dL Final     HDL Cholesterol   Date Value Ref Range Status   02/01/2017 46 >39 mg/dL Final     LDL Cholesterol Calculated   Date Value Ref Range Status   02/01/2017 70 <100 mg/dL Final     Comment:     Desirable:       <100 mg/dl     Triglycerides   Date Value Ref Range Status   02/01/2017 173 (H) <150 mg/dL Final     Comment:     Borderline high:  150-199 mg/dl   High:             200-499 mg/dl   Very high:       >499 mg/dl   Non Fasting       ALT   Date Value Ref Range Status   02/01/2017 61 0 - 70 U/L Final

## 2017-06-02 ENCOUNTER — TELEPHONE (OUTPATIENT)
Dept: FAMILY MEDICINE | Facility: CLINIC | Age: 72
End: 2017-06-02

## 2017-06-02 DIAGNOSIS — K22.70 BARRETT'S ESOPHAGUS WITHOUT DYSPLASIA: ICD-10-CM

## 2017-06-02 RX ORDER — PANTOPRAZOLE SODIUM 40 MG/1
TABLET, DELAYED RELEASE ORAL
Qty: 90 TABLET | Refills: 1 | Status: SHIPPED | OUTPATIENT
Start: 2017-06-02 | End: 2017-06-02

## 2017-06-02 RX ORDER — PANTOPRAZOLE SODIUM 40 MG/1
TABLET, DELAYED RELEASE ORAL
Qty: 90 TABLET | Refills: 0 | Status: SHIPPED | OUTPATIENT
Start: 2017-06-02 | End: 2017-12-04

## 2017-06-02 NOTE — TELEPHONE ENCOUNTER
Called pt to clarify. Sending 90 days to [facility], and 180 days to [facility] on profile.  Iris Dennis RN

## 2017-06-02 NOTE — TELEPHONE ENCOUNTER
pantoprazole (PROTONIX) 40 MG EC tablet 90 tablet 3 2/9/2017        Last Written Prescription Date: 2-9-2017  Last Fill Quantity: 90,  # refills: 3   Last Office Visit with [facility], P or [facility] prescribing provider: 2-9-2017                                             Patient requesting refill at [facility] [address] as he was late requesting this from [facility] and will be out of meds.  Sakshi Cross MA

## 2017-06-02 NOTE — TELEPHONE ENCOUNTER
Reason for Call:  Medication or medication refill:    Do you use a [facility]?  Name of the pharmacy and phone number for the current request:       [id number] [place] [facility], [address]      Name of the medication requested: pantoprazole (PROTONIX) 40 MG EC tablet    Other request: pt forgot to call [employer] to get it in time. Pt has one med left.    Can we leave a detailed message on this number? YES    Phone number patient can be reached at: Home number on file [phone] (home)    Best Time: any    Call taken on 6/2/2017 at 9:30 AM by Dali Santana

## 2017-07-25 ENCOUNTER — ALLIED HEALTH/NURSE VISIT (OUTPATIENT)
Dept: CARDIOLOGY | Facility: CLINIC | Age: 72
End: 2017-07-25
Payer: COMMERCIAL

## 2017-07-25 ENCOUNTER — TELEPHONE (OUTPATIENT)
Dept: CARDIOLOGY | Facility: CLINIC | Age: 72
End: 2017-07-25

## 2017-07-25 DIAGNOSIS — Z95.0 CARDIAC PACEMAKER IN SITU: Primary | ICD-10-CM

## 2017-07-25 PROCEDURE — 93294 REM INTERROG EVL PM/LDLS PM: CPT | Performed by: INTERNAL MEDICINE

## 2017-07-25 PROCEDURE — 93296 REM INTERROG EVL PM/IDS: CPT | Performed by: INTERNAL MEDICINE

## 2017-07-25 NOTE — TELEPHONE ENCOUNTER
Lise,     Your patient has had 577, 910 total PVCs since last in clinic check on 1/10/17.     Thanks,     Jessica

## 2017-07-25 NOTE — MR AVS SNAPSHOT
After Visit Summary   7/25/2017    Estrada Oseguera    MRN: [id number]           Patient Information     Date Of Birth          1945        Visit Information        Provider Department      7/25/2017 1:15 PM FÉLIX BLAIR [facility]        Today's Diagnoses     Cardiac pacemaker in situ    -  1       Follow-ups after your visit        Your next 10 appointments already scheduled     Jul 25, 2017  1:15 PM CDT   Remote PPM Check with HEARD TECH1   [facility] ([facility])    [address] [address] [phone]           This appointment is for a remote check of your pacemaker.  This is not an appointment at the office.            Aug 15, 2017  3:00 PM CDT   Office Visit with Zoe Tomas MD   [facility]    [address] [facility] [address] [phone]           Bring a current list of meds and any records pertaining to this visit.  For Physicals, please bring immunization records and any forms needing to be filled out.  Please arrive 10 minutes early to complete paperwork.              Who to contact     If you have questions or need follow up information about today's clinic visit or your schedule please contact [facility] directly at [phone].  Normal or non-critical lab and imaging results will be communicated to you by MyChart, letter or phone within 4 business days after the clinic has received the results. If you do not hear from us within 7 days, please contact the clinic through MyChart or phone. If you have a critical or abnormal lab result, we will notify you by phone as soon as possible.  Submit refill requests through [employer] or call your pharmacy and they will forward the refill request to us. Please allow 3 business days for your refill to be completed.          Additional  Information About Your Visit        MyChart Information     [employer] gives you secure access to your electronic health record. If you see a primary care provider, you can also send messages to your care team and make appointments. If you have questions, please call your primary care clinic.  If you do not have a primary care provider, please call [phone] and they will assist you.        Care EveryWhere ID     This is your Care EveryWhere ID. This could be used by other organizations to access your [place] medical records  [id number]         Blood Pressure from Last 3 Encounters:   03/09/17 120/74   03/02/17 132/85   02/09/17 133/83    Weight from Last 3 Encounters:   03/09/17 105 kg (231 lb 8 oz)   03/02/17 105.7 kg (233 lb)   02/09/17 105.9 kg (233 lb 6.4 oz)              We Performed the Following     INTERROGATION DEVICE EVAL REMOTE, PACER/ICD (98570)     PM DEVICE INTERROGATE REMOTE (98023)        Primary Care Provider Office Phone # Fax #    Zoe GAUTAM MD Thom [phone]       [facility]    [address]  [place]                [place] [address]        Equal Access to Services     OMEGA DODGE AH: Hadii aad ku hadlandono Ryland, waaxda luqadaha, qaybta kaalmada adeegyada, mckenzie valdivia. So [facility] [phone].    ATENCIÓN: Si habla español, tiene a allison disposición servicios gratuitos de asistencia lingüística. Llame al [phone].    We comply with applicable federal civil rights laws and [place] laws. We do not discriminate on the basis of race, color, national origin, age, disability sex, sexual orientation or gender identity.            Thank you!     Thank you for choosing [facility] [place]  for your care. Our goal is always to provide you with excellent care. Hearing back from our patients is one way we can continue to improve our services. Please take a few minutes to complete the written survey that you may  receive in the mail after your visit with us. Thank you!             Your Updated Medication List - Protect others around you: Learn how to safely use, store and throw away your medicines at www.disposemymeds.org.          This list is accurate as of: 7/25/17  8:36 AM.  Always use your most recent med list.                   Brand Name Dispense Instructions for use Diagnosis    atorvastatin 10 MG tablet    LIPITOR    90 tablet    Take 1 tablet (10 mg) by mouth daily    Hyperlipidemia, unspecified hyperlipidemia type       Iron 240 (27 FE) MG Tabs      Take 1 tablet by mouth daily        lisinopril 20 MG tablet    PRINIVIL/ZESTRIL    90 tablet    Take 1 tablet (20 mg) by mouth daily    Benign hypertension       pantoprazole 40 MG EC tablet    PROTONIX    90 tablet    TAKE 1 TABLET EVERY DAY  30  TO  60  MINUTES BEFORE A MEAL    Chacon's esophagus without dysplasia       sildenafil 20 MG tablet    REVATIO/VIAGRA    30 tablet    Take 1-2 tablet (20 mg) by mouth half to one hour before sexual intercourse .  Never use with nitroglycerin, terazosin or doxazosin.    Erectile dysfunction, unspecified erectile dysfunction type       ZANTAC 150 MG tablet   Generic drug:  ranitidine      Take by mouth At Bedtime

## 2017-07-25 NOTE — TELEPHONE ENCOUNTER
Reviewed.     577,910 PVCs January -July 2017  Of these 212, 981 were from January -April (last device check)    Therefore, 364,929 PVCs seen from April - July.  This is roughly 4100 PVCS/day.    [place]ener still low and as asx'c, will not make changes. Has EP FU, echo and Holter coming in March but will assess PVC burden at Oct Device check as well.

## 2017-07-25 NOTE — PROGRESS NOTES
[employer] (D) Remote PPM Device Check  AP: 10 % : 0 %  Mode: DDD        Presenting Rhythm: AP/VS, AS/VS  Heart Rate: Adequate rates per histogram  Sensing: Stable    Pacing Threshold: Stable    Impedance: Stable  Battery Status: 14.5 years  Atrial Arrhythmia: None  Ventricular Arrhythmia: 8 ventricular high rates. 7 EGMs show As=Vs for PAT lasting 12 seconds to 1 minute 34 seconds. Ventricular rates in the 160's. Reviewed with MONICO Reyes.      Care Plan: F/u PPM [place] q 3 months. Sent letter with results. CHUN Asher

## 2017-08-11 DIAGNOSIS — Z01.818 PREOP GENERAL PHYSICAL EXAM: ICD-10-CM

## 2017-08-11 DIAGNOSIS — E61.1 IRON DEFICIENCY: ICD-10-CM

## 2017-08-11 DIAGNOSIS — R73.09 ELEVATED HEMOGLOBIN A1C: ICD-10-CM

## 2017-08-11 LAB
ANION GAP SERPL CALCULATED.3IONS-SCNC: 8 MMOL/L (ref 3–14)
BUN SERPL-MCNC: 21 MG/DL (ref 7–30)
CALCIUM SERPL-MCNC: 9.1 MG/DL (ref 8.5–10.1)
CHLORIDE SERPL-SCNC: 108 MMOL/L (ref 94–109)
CO2 SERPL-SCNC: 26 MMOL/L (ref 20–32)
CREAT SERPL-MCNC: 1.33 MG/DL (ref 0.66–1.25)
ERYTHROCYTE [DISTWIDTH] IN BLOOD BY AUTOMATED COUNT: 13.1 % (ref 10–15)
FERRITIN SERPL-MCNC: 25 NG/ML (ref 26–388)
GFR SERPL CREATININE-BSD FRML MDRD: 53 ML/MIN/1.7M2
GLUCOSE SERPL-MCNC: 114 MG/DL (ref 70–99)
HBA1C MFR BLD: 6.2 % (ref 4.3–6)
HCT VFR BLD AUTO: 45 % (ref 40–53)
HGB BLD-MCNC: 15 G/DL (ref 13.3–17.7)
MCH RBC QN AUTO: 31.1 PG (ref 26.5–33)
MCHC RBC AUTO-ENTMCNC: 33.3 G/DL (ref 31.5–36.5)
MCV RBC AUTO: 93 FL (ref 78–100)
PLATELET # BLD AUTO: 187 10E9/L (ref 150–450)
POTASSIUM SERPL-SCNC: 4.5 MMOL/L (ref 3.4–5.3)
RBC # BLD AUTO: 4.83 10E12/L (ref 4.4–5.9)
SODIUM SERPL-SCNC: 142 MMOL/L (ref 133–144)
WBC # BLD AUTO: 6.1 10E9/L (ref 4–11)

## 2017-08-11 PROCEDURE — 82728 ASSAY OF FERRITIN: CPT | Performed by: INTERNAL MEDICINE

## 2017-08-11 PROCEDURE — 36415 COLL VENOUS BLD VENIPUNCTURE: CPT | Performed by: INTERNAL MEDICINE

## 2017-08-11 PROCEDURE — 80048 BASIC METABOLIC PNL TOTAL CA: CPT | Performed by: INTERNAL MEDICINE

## 2017-08-11 PROCEDURE — 83036 HEMOGLOBIN GLYCOSYLATED A1C: CPT | Performed by: INTERNAL MEDICINE

## 2017-08-11 PROCEDURE — 85027 COMPLETE CBC AUTOMATED: CPT | Performed by: INTERNAL MEDICINE

## 2017-08-12 NOTE — PROGRESS NOTES
This is to inform you regarding your test result.    Ferritin which is iron stores in the body is low.  We want Ferritin level greater than 50  Take OTC Ferrous Sulfate 325 mg po once or twice daily if you tolerate.  Take with vit C as vit C helps to absorb iron.  Iron can make you constipated so take stool softener.  Recheck ferritin in 4 months  Basic metabolic panel which includes electrolytes and kidney fucntion is satisfactory .  CBC result which includes Hemoglobin and  Platelet Counts is satisfactory.  HbA1c which is average glucose during last 3 months is 6.2%.        Dr.Nasima Thom MD,FACP

## 2017-08-15 ENCOUNTER — OFFICE VISIT (OUTPATIENT)
Dept: FAMILY MEDICINE | Facility: CLINIC | Age: 72
End: 2017-08-15
Payer: COMMERCIAL

## 2017-08-15 VITALS
WEIGHT: 230.5 LBS | SYSTOLIC BLOOD PRESSURE: 128 MMHG | BODY MASS INDEX: 33 KG/M2 | HEIGHT: 70 IN | OXYGEN SATURATION: 96 % | HEART RATE: 55 BPM | DIASTOLIC BLOOD PRESSURE: 82 MMHG | TEMPERATURE: 97.6 F

## 2017-08-15 DIAGNOSIS — N18.30 STAGE 3 CHRONIC KIDNEY DISEASE (H): ICD-10-CM

## 2017-08-15 DIAGNOSIS — I10 BENIGN ESSENTIAL HYPERTENSION: ICD-10-CM

## 2017-08-15 DIAGNOSIS — I49.3 PVC (PREMATURE VENTRICULAR CONTRACTION): Primary | ICD-10-CM

## 2017-08-15 DIAGNOSIS — E61.1 IRON DEFICIENCY: ICD-10-CM

## 2017-08-15 DIAGNOSIS — Z13.6 SCREENING FOR AAA (ABDOMINAL AORTIC ANEURYSM): ICD-10-CM

## 2017-08-15 DIAGNOSIS — Z90.5 SOLITARY KIDNEY, ACQUIRED: ICD-10-CM

## 2017-08-15 PROCEDURE — 99214 OFFICE O/P EST MOD 30 MIN: CPT | Performed by: INTERNAL MEDICINE

## 2017-08-15 NOTE — MR AVS SNAPSHOT
After Visit Summary   8/15/2017    Estrada Oseguera    MRN: [id number]           Patient Information     Date Of Birth          1945        Visit Information        Provider Department      8/15/2017 8:00 AM Zoe Tomas MD [facility]        Today's Diagnoses     PVC (premature ventricular contraction)    -  1    Iron deficiency        Screening for AAA (abdominal aortic aneurysm)        Benign essential hypertension        Solitary kidney, acquired          Care Instructions    Follow up in 4 months  Labs 2-3 days before the appointment   Please call [place] radiology to schedule your ultrasound for aneurysm screening [phone]    Iron-Deficiency Anemia (Adult)  Red blood cells carry oxygen to the tissues of your body. Anemia is a condition in which you have too few red blood cells. You need iron to make red cells. Anemia makes you feel tired and run down. When anemia becomes severe, your skin becomes pale. You may feel short of breath after physical activity. Other symptoms include:    Headaches    Dizziness    Leg cramps with physical activity    Drowsiness    Restless legs  Your anemia is caused by not having enough iron in your body. This may be because of:    Loss of blood. This can be caused by heavy menstrual periods. It can also be caused by bleeding from the stomach or intestines.    Poor diet. You may not be eating enough foods that contain iron.    Inability to absorb iron from the foods you eat    Pregnancy  If your blood count is low enough, your healthcare provider may prescribe an iron supplement. It usually takes about 2 to 3 months of treatment with iron supplements to correct anemia. Severe cases of anemia need a blood transfusion to quickly ease symptoms and deliver more oxygen to the cells.  Home care  Follow these guidelines when caring for yourself at home:    Eat foods high in iron. This will boost the amount of iron stored in your body. It is a  natural way to build up the number of blood cells. Good sources of iron include beef, liver, spinach and other dark green leafy vegetables, whole grains, beans, and nuts.    Do not overexert yourself.    Talk with your healthcare provider before traveling by air or traveling to high altitudes.  Follow-up care  Follow up with your healthcare provider in 2 months, or as advised. This is to have another red blood cell count to be sure your anemia has been fixed.  When to seek medical advice  Call your healthcare provider right away if any of these occur:    Shortness of breath or chest pain    Dizziness or fainting    Vomiting blood or passing red or black-colored stool   Date Last Reviewed: 2/25/2016 2000-2017 [employer]. [address] [place] [address], [place] [address]. All rights reserved. This information is not intended as a substitute for professional medical care. Always follow your healthcare professional's instructions.                Follow-ups after your visit        Your next 10 appointments already scheduled     Oct 31, 2017  1:15 PM CDT   Remote PPM Check with HEARD TECH1   [facility] [place] ([facility])    [address] [place] [address] [phone]           This appointment is for a remote check of your pacemaker.  This is not an appointment at the office.              Future tests that were ordered for you today     Open Future Orders        Priority Expected Expires Ordered    US abdominal aorta limited Routine  8/15/2018 8/15/2017            Who to contact     If you have questions or need follow up information about today's clinic visit or your schedule please contact [facility] directly at [phone].  Normal or non-critical lab and imaging results will be communicated to you by MyChart, letter or phone within 4 business days after the clinic has received the results. If you do not hear from us within 7 days,  "please contact the clinic through [employer] or phone. If you have a critical or abnormal lab result, we will notify you by phone as soon as possible.  Submit refill requests through [employer] or call your pharmacy and they will forward the refill request to us. Please allow 3 business days for your refill to be completed.          Additional Information About Your Visit        [employer] Information     [employer] gives you secure access to your electronic health record. If you see a primary care provider, you can also send messages to your care team and make appointments. If you have questions, please call your primary care clinic.  If you do not have a primary care provider, please call [phone] and they will assist you.        Care EveryWhere ID     This is your Care EveryWhere ID. This could be used by other organizations to access your [place] medical records  [id number]        Your Vitals Were     Pulse Temperature Height Pulse Oximetry BMI (Body Mass Index)       55 97.6  F (36.4  C) (Tympanic) 5' 9.5\" (1.765 m) 96% 33.55 kg/m2        Blood Pressure from Last 3 Encounters:   08/15/17 128/82   03/09/17 120/74   03/02/17 132/85    Weight from Last 3 Encounters:   08/15/17 230 lb 8 oz (104.6 kg)   03/09/17 231 lb 8 oz (105 kg)   03/02/17 233 lb (105.7 kg)               Primary Care Provider Office Phone # Fax #    Zoe GAUTAM MD Thom [phone] [address] [address]        Equal Access to Services     [facility] FarmDAIN : Hadii jessica poole hadasho Ryland, waaxda luqadaha, qaybta kaalmada reynaldo, mckenzie valdivia. So [facility] [phone].    ATENCIÓN: Si habla español, tiene a allison disposición servicios gratuitos de asistencia lingüística. Llame al [phone].    We comply with applicable federal civil rights laws and [place] laws. We do not discriminate on the basis of race, color, national origin, age, disability sex, sexual orientation or " gender identity.            Thank you!     Thank you for choosing [facility]  for your care. Our goal is always to provide you with excellent care. Hearing back from our patients is one way we can continue to improve our services. Please take a few minutes to complete the written survey that you may receive in the mail after your visit with us. Thank you!             Your Updated Medication List - Protect others around you: Learn how to safely use, store and throw away your medicines at www.disposemymeds.org.          This list is accurate as of: 8/15/17  8:28 AM.  Always use your most recent med list.                   Brand Name Dispense Instructions for use Diagnosis    atorvastatin 10 MG tablet    LIPITOR    90 tablet    Take 1 tablet (10 mg) by mouth daily    Hyperlipidemia, unspecified hyperlipidemia type       Iron 240 (27 FE) MG Tabs      Take 1 tablet by mouth daily        lisinopril 20 MG tablet    PRINIVIL/ZESTRIL    90 tablet    Take 1 tablet (20 mg) by mouth daily    Benign hypertension       pantoprazole 40 MG EC tablet    PROTONIX    90 tablet    TAKE 1 TABLET EVERY DAY  30  TO  60  MINUTES BEFORE A MEAL    Chacon's esophagus without dysplasia       sildenafil 20 MG tablet    REVATIO/VIAGRA    30 tablet    Take 1-2 tablet (20 mg) by mouth half to one hour before sexual intercourse .  Never use with nitroglycerin, terazosin or doxazosin.    Erectile dysfunction, unspecified erectile dysfunction type       ZANTAC 150 MG tablet   Generic drug:  ranitidine      Take by mouth At Bedtime

## 2017-08-15 NOTE — PATIENT INSTRUCTIONS
Follow up in 4 months  Labs 2-3 days before the appointment   Please call [place] radiology to schedule your ultrasound for aneurysm screening [phone]    Iron-Deficiency Anemia (Adult)  Red blood cells carry oxygen to the tissues of your body. Anemia is a condition in which you have too few red blood cells. You need iron to make red cells. Anemia makes you feel tired and run down. When anemia becomes severe, your skin becomes pale. You may feel short of breath after physical activity. Other symptoms include:    Headaches    Dizziness    Leg cramps with physical activity    Drowsiness    Restless legs  Your anemia is caused by not having enough iron in your body. This may be because of:    Loss of blood. This can be caused by heavy menstrual periods. It can also be caused by bleeding from the stomach or intestines.    Poor diet. You may not be eating enough foods that contain iron.    Inability to absorb iron from the foods you eat    Pregnancy  If your blood count is low enough, your healthcare provider may prescribe an iron supplement. It usually takes about 2 to 3 months of treatment with iron supplements to correct anemia. Severe cases of anemia need a blood transfusion to quickly ease symptoms and deliver more oxygen to the cells.  Home care  Follow these guidelines when caring for yourself at home:    Eat foods high in iron. This will boost the amount of iron stored in your body. It is a natural way to build up the number of blood cells. Good sources of iron include beef, liver, spinach and other dark green leafy vegetables, whole grains, beans, and nuts.    Do not overexert yourself.    Talk with your healthcare provider before traveling by air or traveling to high altitudes.  Follow-up care  Follow up with your healthcare provider in 2 months, or as advised. This is to have another red blood cell count to be sure your anemia has been fixed.  When to seek medical advice  Call your healthcare provider  right away if any of these occur:    Shortness of breath or chest pain    Dizziness or fainting    Vomiting blood or passing red or black-colored stool   Date Last Reviewed: 2/25/2016 2000-2017 [employer]. [address] [place] [address], [place] [address]. All rights reserved. This information is not intended as a substitute for professional medical care. Always follow your healthcare professional's instructions.

## 2017-08-15 NOTE — PROGRESS NOTES
SUBJECTIVE:                                                    Estrada Oseguera is a 72 year old male who presents to clinic today for the following health issues:      Hyperlipidemia Follow-Up      Rate your low fat/cholesterol diet?: good    Taking statin?  Yes, no muscle aches from statin    Other lipid medications/supplements?:  none    Hypertension Follow-up      Outpatient blood pressures are not being checked.    Low Salt Diet: no added salt    S/p pacemaker implantation   Reviewed previous cardiology notes  Per note, his routine device checks show frequent PVC's   Has EP f/up, echo and Holter monitoring scheduled for March 2018, also, cardio will reassess PVC in October routine device check.    Hx. Of Iron deficiency  Patient discontinued oral iron due to constipation   His labs showed some improvement while he was taking it regularly but then levels went back down  He did not try taking stool softener at the time       Amount of exercise or physical activity: 6-7 days/week for an average of 30-45 minutes    Problems taking medications regularly: No    Medication side effects: none    Diet: regular (no restrictions)      Problem list and histories reviewed & adjusted, as indicated.  Additional history: as documented    Patient Active Problem List   Diagnosis     Solitary kidney, acquired     Benign essential hypertension     Chronic kidney disease, stage III (moderate)     Hiatal hernia     Benign hypertension     GERD (gastroesophageal reflux disease)     CKD (chronic kidney disease)     S/P colon resection     S/p nephrectomy     Tubular adenoma     Iron deficiency     Stricture esophagus     Chacon's esophagus without dysplasia     Cardiac pacemaker     Mixed hyperlipidemia     Past Surgical History:   Procedure Laterality Date     CHOLECYSTECTOMY       COLONOSCOPY       COMBINED COLONOSCOPY, MUCOSAL RESECTION  1993    ?diverticulitis      ESOPHAGOSCOPY, GASTROSCOPY, DUODENOSCOPY (EGD), COMBINED N/A  11/3/2016    Procedure: COMBINED ESOPHAGOSCOPY, GASTROSCOPY, DUODENOSCOPY (EGD), BIOPSY SINGLE OR MULTIPLE;  Surgeon: Deondre Meek MD;  Location:  GI     ESOPHAGOSCOPY, GASTROSCOPY, DUODENOSCOPY (EGD), DILATATION, COMBINED N/A 12/1/2016    Procedure: COMBINED ESOPHAGOSCOPY, GASTROSCOPY, DUODENOSCOPY (EGD), DILATATION;  Surgeon: Deondre Meek MD;  Location:  GI     ESOPHAGOSCOPY, GASTROSCOPY, DUODENOSCOPY (EGD), DILATATION, COMBINED N/A 3/2/2017    Procedure: COMBINED ESOPHAGOSCOPY, GASTROSCOPY, DUODENOSCOPY (EGD), DILATATION;  Surgeon: Deondre Meek MD;  Location:  GI     NEPHRECTOMY RT/LT      right     pace maker         Social History   Substance Use Topics     Smoking status: Former Smoker     Types: Pipe     Smokeless tobacco: Never Used     Alcohol use 0.0 oz/week     0 Standard drinks or equivalent per week      Comment: 3-4 times a week     Family History   Problem Relation Age of Onset     Hyperlipidemia Mother      CEREBROVASCULAR DISEASE Father      Prostate Cancer Father      Pulmonary Hypertension Brother      Breast Cancer Sister      Parkinsonism Sister      Colon Cancer No family hx of          Current Outpatient Prescriptions   Medication Sig Dispense Refill     pantoprazole (PROTONIX) 40 MG EC tablet TAKE 1 TABLET EVERY DAY  30  TO  60  MINUTES BEFORE A MEAL 90 tablet 0     ranitidine (ZANTAC) 150 MG tablet Take by mouth At Bedtime       atorvastatin (LIPITOR) 10 MG tablet Take 1 tablet (10 mg) by mouth daily 90 tablet 3     sildenafil (REVATIO/VIAGRA) 20 MG tablet Take 1-2 tablet (20 mg) by mouth half to one hour before sexual intercourse .  Never use with nitroglycerin, terazosin or doxazosin. 30 tablet 3     lisinopril (PRINIVIL/ZESTRIL) 20 MG tablet Take 1 tablet (20 mg) by mouth daily 90 tablet 3     Ferrous Gluconate (IRON) 240 (27 FE) MG TABS Take 1 tablet by mouth daily       No Known Allergies      Reviewed and updated as needed this visit by clinical  "staff     Reviewed and updated as needed this visit by Provider         ROS:  Constitutional, neuro, ENT, endocrine, pulmonary, cardiac, gastrointestinal, genitourinary, musculoskeletal, integument and psychiatric systems are negative, except as otherwise noted.    This document serves as a record of the services and decisions personally performed and made by Zoe Tomas MD. It was created on her behalf by Ness Arshad, a trained medical scribe. The creation of this document is based the provider's statements to the medical scribe.    Scribcarlos Arshad 8:26 AM, August 15, 2017    OBJECTIVE:                                                    /82 (BP Location: Left arm, Patient Position: Chair, Cuff Size: Adult Large)  Pulse 55  Temp 97.6  F (36.4  C) (Tympanic)  Ht 5' 9.5\" (1.765 m)  Wt 230 lb 8 oz (104.6 kg)  SpO2 96%  BMI 33.55 kg/m2  Body mass index is 33.55 kg/(m^2).    GENERAL APPEARANCE: healthy, alert and no distress  EYES: Eyes grossly normal to inspection, PERRL and conjunctivae and sclerae normal  HENT: ear canals and TM's normal and nose and mouth without ulcers or lesions  NECK: no adenopathy  RESP: lungs clear to auscultation - no rales, rhonchi or wheezes  CV: regular rates and rhythm, normal S1 S2, no S3    Reviewed labs with patient      ASSESSMENT/PLAN:                                                    Estrada was seen today for hypertension, lipids and glucose.    Diagnoses and all orders for this visit:    PVC (premature ventricular contraction)  S/p pacemaker implantation   Routine device checks show frequent PVC's   Has EP f/up, echo and Holter monitoring scheduled for March 2018, also, cardio will reassess PVC in October routine device check  At present patient has low burden of PVC's and ay    Iron deficiency  Has not been taking oral iron due to constipation  Offered IV iron but patient declined for now   He will try taking stool softener   If constipation persists or " levels fail to improve, he will consider IV iron  He had work up done    Screening for AAA (abdominal aortic aneurysm)  -     US abdominal aorta limited; Future  Discussed with patient and he agreed.    Benign essential hypertension  Well controlled   Continue present management    Solitary kidney, acquired  Stable  Will continue to monitor creatinine       Patient Instructions   Follow up in 4 months  Labs 2-3 days before the appointment   Please call [facility] to schedule your ultrasound for aneurysm screening [phone]    Zoe Tomas MD  [facility]

## 2017-08-15 NOTE — NURSING NOTE
"Chief Complaint   Patient presents with     Hypertension     Lipids     Glucose        Initial /82 (BP Location: Left arm, Patient Position: Chair, Cuff Size: Adult Large)  Pulse 55  Temp 97.6  F (36.4  C) (Tympanic)  Ht 5' 9.5\" (1.765 m)  Wt 230 lb 8 oz (104.6 kg)  SpO2 96%  BMI 33.55 kg/m2 Estimated body mass index is 33.55 kg/(m^2) as calculated from the following:    Height as of this encounter: 5' 9.5\" (1.765 m).    Weight as of this encounter: 230 lb 8 oz (104.6 kg)..    BP completed using cuff size: regular  MEDICATIONS REVIEWED  SOCIAL AND FAMILY HX REVIEWED  Chantal Poole CMA  "

## 2017-09-20 ENCOUNTER — OFFICE VISIT (OUTPATIENT)
Dept: URGENT CARE | Facility: URGENT CARE | Age: 72
End: 2017-09-20
Payer: COMMERCIAL

## 2017-09-20 VITALS
OXYGEN SATURATION: 95 % | SYSTOLIC BLOOD PRESSURE: 139 MMHG | HEART RATE: 59 BPM | DIASTOLIC BLOOD PRESSURE: 85 MMHG | TEMPERATURE: 99.2 F

## 2017-09-20 DIAGNOSIS — H69.92 DYSFUNCTION OF EUSTACHIAN TUBE, LEFT: ICD-10-CM

## 2017-09-20 DIAGNOSIS — H66.002 ACUTE SUPPURATIVE OTITIS MEDIA OF LEFT EAR WITHOUT SPONTANEOUS RUPTURE OF TYMPANIC MEMBRANE, RECURRENCE NOT SPECIFIED: Primary | ICD-10-CM

## 2017-09-20 PROCEDURE — 99213 OFFICE O/P EST LOW 20 MIN: CPT | Performed by: PHYSICIAN ASSISTANT

## 2017-09-20 RX ORDER — AMOXICILLIN 875 MG
875 TABLET ORAL 2 TIMES DAILY
Qty: 20 TABLET | Refills: 0 | Status: SHIPPED | OUTPATIENT
Start: 2017-09-20 | End: 2017-12-04

## 2017-09-20 RX ORDER — FLUTICASONE PROPIONATE 50 MCG
1-2 SPRAY, SUSPENSION (ML) NASAL DAILY
Qty: 1 BOTTLE | Refills: 0 | Status: SHIPPED | OUTPATIENT
Start: 2017-09-20 | End: 2017-12-04

## 2017-09-20 NOTE — PROGRESS NOTES
SUBJECTIVE:  Estrada Oseguera is a 72 year old male who presents with left ear pain, hearing loss and pressure for 1.5 week(s).   Severity: moderate   Timing:sudden onset and still present  Additional symptoms include congestion.      History of recurrent otitis: no    Patient states that he is feels very congestion on the left side and when he pulls the skin behind his ear he is able to relieve congestion.     Past Medical History:   Diagnosis Date     Cancer (H)     skin     CKD (chronic kidney disease)      GERD (gastroesophageal reflux disease)      Hiatal hernia      Hypertension      Pericarditis      S/P colon resection      Current Outpatient Prescriptions   Medication Sig Dispense Refill     amoxicillin (AMOXIL) 875 MG tablet Take 1 tablet (875 mg) by mouth 2 times daily 20 tablet 0     fluticasone (FLONASE) 50 MCG/ACT spray Spray 1-2 sprays into both nostrils daily 1 Bottle 0     pantoprazole (PROTONIX) 40 MG EC tablet TAKE 1 TABLET EVERY DAY  30  TO  60  MINUTES BEFORE A MEAL 90 tablet 0     ranitidine (ZANTAC) 150 MG tablet Take by mouth At Bedtime       atorvastatin (LIPITOR) 10 MG tablet Take 1 tablet (10 mg) by mouth daily 90 tablet 3     sildenafil (REVATIO/VIAGRA) 20 MG tablet Take 1-2 tablet (20 mg) by mouth half to one hour before sexual intercourse .  Never use with nitroglycerin, terazosin or doxazosin. 30 tablet 3     lisinopril (PRINIVIL/ZESTRIL) 20 MG tablet Take 1 tablet (20 mg) by mouth daily 90 tablet 3     Ferrous Gluconate (IRON) 240 (27 FE) MG TABS Take 1 tablet by mouth daily       Social History   Substance Use Topics     Smoking status: Former Smoker     Types: Pipe     Smokeless tobacco: Never Used     Alcohol use 0.0 oz/week     0 Standard drinks or equivalent per week      Comment: 3-4 times a week       ROS:   CONSTITUTIONAL:NEGATIVE for fever, chills, change in weight  INTEGUMENTARY/SKIN: NEGATIVE for worrisome rashes, moles or lesions  EYES: NEGATIVE for vision changes or  irritation  ENT/MOUTH: POSITIVE for earache left and congestion  RESP:NEGATIVE for significant cough or SOB  CV: NEGATIVE for chest pain, palpitations or peripheral edema  GI: NEGATIVE for nausea, abdominal pain, heartburn, or change in bowel habits  MUSCULOSKELETAL: NEGATIVE for significant arthralgias or myalgia    OBJECTIVE:  /85 (BP Location: Left arm, Cuff Size: Adult Large)  Pulse 59  Temp 99.2  F (37.3  C) (Oral)  SpO2 95%   EXAM:  The right TM is normal: no effusions, no erythema, and normal landmarks     The right auditory canal is normal and without drainage, edema or erythema  The left TM is erythematous, obscured landmarks withOUT bulging and purulence noted behind TM.   The left auditory canal is normal and without drainage, edema or erythema  Oropharynx exam is normal: no lesions, erythema, adenopathy or exudate.  GENERAL: no acute distress  EYES: EOMI,  PERRL, conjunctiva clear  NECK: supple, non-tender to palpation, no adenopathy noted  RESP: lungs clear to auscultation - no rales, rhonchi or wheezes  CV: regular rates and rhythm, normal S1 S2, no murmur noted  SKIN: no suspicious lesions or rashes     ASSESSMENT/PLAN:  1. Acute suppurative otitis media of left ear without spontaneous rupture of tympanic membrane, recurrence not specified  TM is erythematous with purulence noted behind the drum, landmarks not visible. NO bulging. Mild infection compared with R ear.   If ear remains plugged following course of ABX, follow up with audiologist.   - amoxicillin (AMOXIL) 875 MG tablet; Take 1 tablet (875 mg) by mouth 2 times daily  Dispense: 20 tablet; Refill: 0    2. Dysfunction of eustachian tube, left  - fluticasone (FLONASE) 50 MCG/ACT spray; Spray 1-2 sprays into both nostrils daily  Dispense: 1 Bottle; Refill: 0    Karen Phoenix PA-C

## 2017-09-20 NOTE — MR AVS SNAPSHOT
After Visit Summary   9/20/2017    Estrada Oseguera    MRN: [id number]           Patient Information     Date Of Birth          1945        Visit Information        Provider Department      9/20/2017 5:05 PM Karen Phoenix PA-C [facility]        Today's Diagnoses     Acute suppurative otitis media of left ear without spontaneous rupture of tympanic membrane, recurrence not specified    -  1    Dysfunction of eustachian tube, left          Care Instructions      Otitis Media (Middle-Ear Infection) in Adults  Otitis media is another name for a middle-ear infection. It means an infection behind your eardrum. This kind of ear infection can happen after any condition that keeps fluid from draining from the middle ear. These conditions include allergies, a cold, a sore throat, or a respiratory infection.  Middle-ear infections are common in children, but they can also happen in adults. An ear infection in an adult may mean a more serious problem than in a child. So you may need additional tests. If you have an ear infection, you should see your health care provider for treatment.  What are the types of middle-ear infections?  Infections can affect the middle ear in several ways. They are:    Acute otitis media. This middle-ear infection occurs suddenly. It causes swelling and redness. Fluid and mucus become trapped inside the ear. You can have a fever and ear pain.    Otitis media with effusion. Fluid (effusion) and mucus build up in the middle ear after the infection goes away. You may feel like your middle ear is full. This can continue for months and may affect your hearing.    Chronic otitis media with effusion. Fluid (effusion) remains in the middle ear for a long time. Or it builds up again and again, even though there is no infection. This type of middle-ear infection may be hard to treat. It may also affect your hearing.  Who is more likely to get a middle-ear  infection?  You are more likely to get an ear infection if you:    Smoke or are around someone who smokes    Have seasonal or year-round allergy symptoms    Have a cold or other upper respiratory infection  What causes a middle-ear infection?  The middle ear connects to the throat by a canal called the eustachian tube. This tube helps even out the pressure between the outer ear and the inner ear. A cold or allergy can irritate the tube or cause the area around it to swell. This can keep fluid from draining from the middle ear. The fluid builds up behind the eardrum. Bacteria and viruses can grow in this fluid. The bacteria and viruses cause the middle-ear infection.  What are the symptoms of a middle-ear infection?  Common symptoms of a middle-ear infection in adults are:    Pain in 1 or both ears    Drainage from the ear    Muffled hearing    Sore throat   You may also have a fever. Rarely, your balance can be affected.  These symptoms may be the same as for other conditions. It s important to talk with your health care provider if you think you have a middle-ear infection. If you have a high fever, severe pain behind your ear, or paralysis in your face, see your provider as soon as you can.  How is a middle-ear infection diagnosed?  Your health care provider will take a medical history and do a physical exam. He or she will look at the outer ear and eardrum with an otoscope. The otoscope is a lighted tool that lets your provider see inside the ear. A pneumatic otoscope blows a puff of air into the ear to check how well your eardrum moves. If you eardrum doesn t move well, it may mean you have fluid behind it.  Your provider may also do a test called tympanometry. This test tells how well the middle ear is working. It can find any changes in pressure in the middle ear. Your provider may test your hearing with a tuning fork.  How is a middle-ear infection treated?  A middle-ear infection may be treated  with:    Antibiotics, taken by mouth or as ear drops    Medication for pain    Decongestants, antihistamines, or nasal steroids  Your health care provider may also have you try autoinsufflation. This helps adjust the air pressure in your ear. For this, you pinch your nose and gently exhale. This forces air back through the eustachian tube.  The exact treatment for your ear infection will depend on the type of infection you have. In general, if your symptoms don t get better in 48 to 72 hours, contact your health care provider.  Middle-ear infections can cause long-term problems if not treated. They can lead to:    Infection in other parts of the head    Permanent hearing loss    Paralysis of a nerve in your face  If you have a middle-ear infection that doesn t get better, you may need to see an ear, nose, and throat specialist (otolaryngologist). You may need a CT scan or MRI to check for head and neck cancer.  Ear tubes  Sometimes fluid stays in the middle ear even after you take antibiotics and the infection goes away. In this case, your health care provider may suggest that a small tube be placed in your ear. The tube is put at the opening of the eardrum. The tube keeps fluid from building up and relieves pressure in the middle ear. It can also help you hear better. This surgery is called myringotomy. It is not often done in adults.  The tubes usually fall out on their own after 6 months to a year.    8368-2239 [employer]. [address] [place] [address]. All rights reserved. This information is not intended as a substitute for professional medical care. Always follow your healthcare professional's instructions.                Follow-ups after your visit        Your next 10 appointments already scheduled     Oct 31, 2017  1:15 PM CDT   Remote PPM Check with HEARD TECH1   [facility] PHYSICIANS [facility] AT [place] ([facility])    [address]  [id number] [phone]           This appointment is for a remote check of your pacemaker.  This is not an appointment at the office.            Dec 15, 2017  8:30 AM CST   [facility]    [address] [facility] [address] [phone]           Bring a current list of meds and any records pertaining to this visit. For Physicals, please bring immunization records and any forms needing to be filled out. Please arrive 10 minutes early to complete paperwork.              Who to contact     If you have questions or need follow up information about today's clinic visit or your schedule please contact [facility] directly at [phone].  Normal or non-critical lab and imaging results will be communicated to you by [employer], letter or phone within 4 business days after the clinic has received the results. If you do not hear from us within 7 days, please contact the clinic through [employer] or phone. If you have a critical or abnormal lab result, we will notify you by phone as soon as possible.  Submit refill requests through [email or web address] or call your pharmacy and they will forward the refill request to us. Please allow 3 business days for your refill to be completed.          Additional Information About Your Visit        [employer] Information     [email or web address] gives you secure access to your electronic health record. If you see a primary care provider, you can also send messages to your care team and make appointments. If you have questions, please call your primary care clinic.  If you do not have a primary care provider, please call [phone] and they will assist you.        Care EveryWhere ID     This is your Care EveryWhere ID. This could be used by other organizations to access your [place] medical records  [id number]        Your Vitals Were     Pulse Temperature Pulse Oximetry             59 99.2  F (37.3  C)  (Oral) 95%          Blood Pressure from Last 3 Encounters:   09/20/17 139/85   08/15/17 128/82   03/09/17 120/74    Weight from Last 3 Encounters:   08/15/17 230 lb 8 oz (104.6 kg)   03/09/17 231 lb 8 oz (105 kg)   03/02/17 233 lb (105.7 kg)              Today, you had the following     No orders found for display         Today's Medication Changes          These changes are accurate as of: 9/20/17  5:31 PM.  If you have any questions, ask your nurse or doctor.               Start taking these medicines.        Dose/Directions    amoxicillin 875 MG tablet   Commonly known as:  AMOXIL   Used for:  Acute suppurative otitis media of left ear without spontaneous rupture of tympanic membrane, recurrence not specified   Started by:  Karen Phoenix PA-C        Dose:  875 mg   Take 1 tablet (875 mg) by mouth 2 times daily   Quantity:  20 tablet   Refills:  0       fluticasone 50 MCG/ACT spray   Commonly known as:  FLONASE   Used for:  Dysfunction of eustachian tube, left   Started by:  Karen Phoenix PA-C        Dose:  1-2 spray   Spray 1-2 sprays into both nostrils daily   Quantity:  1 Bottle   Refills:  0            Where to get your medicines      These medications were sent to [facility] [address] [facility] [place] - [address]  [address] [address]     Phone:  [phone]     amoxicillin 875 MG tablet    fluticasone 50 MCG/ACT spray                Primary Care Provider Office Phone # Fax #    Zoe Tomas [phone] [address] [facility] [address]  [facility] [address]        Equal Access to Services     [facility] : Hadjojo Marcelino, rodger dupont, mckenzie heard. So [facility] [phone].    ATENCIÓN: Si habla español, tiene a allison disposición servicios gratuitos de asistencia lingüística. Llame al [phone].    We comply with applicable federal civil rights laws and [place] laws. We do not  discriminate on the basis of race, color, national origin, age, disability sex, sexual orientation or gender identity.            Thank you!     Thank you for choosing [facility]  for your care. Our goal is always to provide you with excellent care. Hearing back from our patients is one way we can continue to improve our services. Please take a few minutes to complete the written survey that you may receive in the mail after your visit with us. Thank you!             Your Updated Medication List - Protect others around you: Learn how to safely use, store and throw away your medicines at www.disposemymeds.org.          This list is accurate as of: 9/20/17  5:31 PM.  Always use your most recent med list.                   Brand Name Dispense Instructions for use Diagnosis    amoxicillin 875 MG tablet    AMOXIL    20 tablet    Take 1 tablet (875 mg) by mouth 2 times daily    Acute suppurative otitis media of left ear without spontaneous rupture of tympanic membrane, recurrence not specified       atorvastatin 10 MG tablet    LIPITOR    90 tablet    Take 1 tablet (10 mg) by mouth daily    Hyperlipidemia, unspecified hyperlipidemia type       fluticasone 50 MCG/ACT spray    FLONASE    1 Bottle    Spray 1-2 sprays into both nostrils daily    Dysfunction of eustachian tube, left       Iron 240 (27 FE) MG Tabs      Take 1 tablet by mouth daily        lisinopril 20 MG tablet    PRINIVIL/ZESTRIL    90 tablet    Take 1 tablet (20 mg) by mouth daily    Benign hypertension       pantoprazole 40 MG EC tablet    PROTONIX    90 tablet    TAKE 1 TABLET EVERY DAY  30  TO  60  MINUTES BEFORE A MEAL    Chacon's esophagus without dysplasia       sildenafil 20 MG tablet    REVATIO    30 tablet    Take 1-2 tablet (20 mg) by mouth half to one hour before sexual intercourse .  Never use with nitroglycerin, terazosin or doxazosin.    Erectile dysfunction, unspecified erectile dysfunction type       ZANTAC 150 MG  tablet   Generic drug:  ranitidine      Take by mouth At Bedtime

## 2017-09-20 NOTE — NURSING NOTE
"Chief Complaint   Patient presents with     Urgent Care     Ear Problem     Left plugged ear that has been ongoing for 1.5 weeks.        Initial /85 (BP Location: Left arm, Cuff Size: Adult Large)  Pulse 59  Temp 99.2  F (37.3  C) (Oral)  SpO2 95% Estimated body mass index is 33.55 kg/(m^2) as calculated from the following:    Height as of 8/15/17: 5' 9.5\" (1.765 m).    Weight as of 8/15/17: 230 lb 8 oz (104.6 kg).  Medication Reconciliation: complete.  ZELDA Shankar      "

## 2017-09-20 NOTE — PATIENT INSTRUCTIONS
Otitis Media (Middle-Ear Infection) in Adults  Otitis media is another name for a middle-ear infection. It means an infection behind your eardrum. This kind of ear infection can happen after any condition that keeps fluid from draining from the middle ear. These conditions include allergies, a cold, a sore throat, or a respiratory infection.  Middle-ear infections are common in children, but they can also happen in adults. An ear infection in an adult may mean a more serious problem than in a child. So you may need additional tests. If you have an ear infection, you should see your health care provider for treatment.  What are the types of middle-ear infections?  Infections can affect the middle ear in several ways. They are:    Acute otitis media. This middle-ear infection occurs suddenly. It causes swelling and redness. Fluid and mucus become trapped inside the ear. You can have a fever and ear pain.    Otitis media with effusion. Fluid (effusion) and mucus build up in the middle ear after the infection goes away. You may feel like your middle ear is full. This can continue for months and may affect your hearing.    Chronic otitis media with effusion. Fluid (effusion) remains in the middle ear for a long time. Or it builds up again and again, even though there is no infection. This type of middle-ear infection may be hard to treat. It may also affect your hearing.  Who is more likely to get a middle-ear infection?  You are more likely to get an ear infection if you:    Smoke or are around someone who smokes    Have seasonal or year-round allergy symptoms    Have a cold or other upper respiratory infection  What causes a middle-ear infection?  The middle ear connects to the throat by a canal called the eustachian tube. This tube helps even out the pressure between the outer ear and the inner ear. A cold or allergy can irritate the tube or cause the area around it to swell. This can keep fluid from draining from  the middle ear. The fluid builds up behind the eardrum. Bacteria and viruses can grow in this fluid. The bacteria and viruses cause the middle-ear infection.  What are the symptoms of a middle-ear infection?  Common symptoms of a middle-ear infection in adults are:    Pain in 1 or both ears    Drainage from the ear    Muffled hearing    Sore throat   You may also have a fever. Rarely, your balance can be affected.  These symptoms may be the same as for other conditions. It s important to talk with your health care provider if you think you have a middle-ear infection. If you have a high fever, severe pain behind your ear, or paralysis in your face, see your provider as soon as you can.  How is a middle-ear infection diagnosed?  Your health care provider will take a medical history and do a physical exam. He or she will look at the outer ear and eardrum with an otoscope. The otoscope is a lighted tool that lets your provider see inside the ear. A pneumatic otoscope blows a puff of air into the ear to check how well your eardrum moves. If you eardrum doesn t move well, it may mean you have fluid behind it.  Your provider may also do a test called tympanometry. This test tells how well the middle ear is working. It can find any changes in pressure in the middle ear. Your provider may test your hearing with a tuning fork.  How is a middle-ear infection treated?  A middle-ear infection may be treated with:    Antibiotics, taken by mouth or as ear drops    Medication for pain    Decongestants, antihistamines, or nasal steroids  Your health care provider may also have you try autoinsufflation. This helps adjust the air pressure in your ear. For this, you pinch your nose and gently exhale. This forces air back through the eustachian tube.  The exact treatment for your ear infection will depend on the type of infection you have. In general, if your symptoms don t get better in 48 to 72 hours, contact your health care  provider.  Middle-ear infections can cause long-term problems if not treated. They can lead to:    Infection in other parts of the head    Permanent hearing loss    Paralysis of a nerve in your face  If you have a middle-ear infection that doesn t get better, you may need to see an ear, nose, and throat specialist (otolaryngologist). You may need a CT scan or MRI to check for head and neck cancer.  Ear tubes  Sometimes fluid stays in the middle ear even after you take antibiotics and the infection goes away. In this case, your health care provider may suggest that a small tube be placed in your ear. The tube is put at the opening of the eardrum. The tube keeps fluid from building up and relieves pressure in the middle ear. It can also help you hear better. This surgery is called myringotomy. It is not often done in adults.  The tubes usually fall out on their own after 6 months to a year.    1225-2969 [employer]. [address] [place] [address], [place] [address]. All rights reserved. This information is not intended as a substitute for professional medical care. Always follow your healthcare professional's instructions.

## 2017-10-31 ENCOUNTER — TELEPHONE (OUTPATIENT)
Dept: CARDIOLOGY | Facility: CLINIC | Age: 72
End: 2017-10-31

## 2017-10-31 ENCOUNTER — ALLIED HEALTH/NURSE VISIT (OUTPATIENT)
Dept: CARDIOLOGY | Facility: CLINIC | Age: 72
End: 2017-10-31
Payer: COMMERCIAL

## 2017-10-31 DIAGNOSIS — Z95.0 CARDIAC PACEMAKER IN SITU: Primary | ICD-10-CM

## 2017-10-31 PROCEDURE — 93296 REM INTERROG EVL PM/IDS: CPT | Performed by: INTERNAL MEDICINE

## 2017-10-31 PROCEDURE — 93294 REM INTERROG EVL PM/LDLS PM: CPT | Performed by: INTERNAL MEDICINE

## 2017-10-31 NOTE — PROGRESS NOTES
[employer] (D) Remote PPM Device Check  AP: 10 % : 0 %  Mode: DDD        Presenting Rhythm: AP/VS  Heart Rate: Adequate rates per histogram  Sensing: Stable    Pacing Threshold: Stable    Impedance: Stable  Battery Status: 14 years  Atrial Arrhythmia: None  Ventricular Arrhythmia: 7 ventricular high rates. EGMs show As=Vs for PAT, longest episode lasted 2 minutes 56 seconds, rates in the 160's. Reviewed with MONICO Beckman. PVC burden since 1/10/17: 956,517 total PVCs. Will message Lise Stallings with findings.     Care Plan: F/u annual threshold in 3 months, order placed. LM with results.  ELorena,CVT

## 2017-10-31 NOTE — TELEPHONE ENCOUNTER
LOU Lezama: Your patient had a PVC burden of 956,517 total PVCs since 1/10/17.      [employer] (D) Remote PPM Device Check  AP: 10 % : 0 %  Mode: DDD        Presenting Rhythm: AP/VS  Heart Rate: Adequate rates per histogram  Sensing: Stable    Pacing Threshold: Stable    Impedance: Stable  Battery Status: 14 years  Atrial Arrhythmia: None  Ventricular Arrhythmia: 7 ventricular high rates. EGMs show As=Vs for PAT, longest episode lasted 2 minutes 56 seconds, rates in the 160's. Reviewed with MONICO Beckman. PVC burden since 1/10/17: 956,517 total PVCs. Will message Lise Stallings with findings.     Care Plan: F/u annual threshold in 3 months, order placed. LM with results.  CHUN Asher

## 2017-10-31 NOTE — MR AVS SNAPSHOT
After Visit Summary   10/31/2017    Estrada Oseguera    MRN: [id number]           Patient Information     Date Of Birth          1945        Visit Information        Provider Department      10/31/2017 1:15 PM FÉLIX BLAIR [facility]        Today's Diagnoses     Cardiac pacemaker in situ    -  1       Follow-ups after your visit        Additional Services     Follow-Up with Device Clinic                 Your next 10 appointments already scheduled     Oct 31, 2017  1:15 PM CDT   Remote PPM Check with HEARD TECH1   [facility] ([facility])    [id number]  [facility] [address] [phone]           This appointment is for a remote check of your pacemaker.  This is not an appointment at the office.            Dec 15, 2017  8:30 AM CST   Office Visit with Zoe Tomas MD   [facility]    [address] [facility] [facility] [address] [phone]           Bring a current list of meds and any records pertaining to this visit. For Physicals, please bring immunization records and any forms needing to be filled out. Please arrive 10 minutes early to complete paperwork.              Future tests that were ordered for you today     Open Future Orders        Priority Expected Expires Ordered    Follow-Up with Device Clinic Routine 1/28/2018 10/31/2018 10/31/2017            Who to contact     If you have questions or need follow up information about today's clinic visit or your schedule please contact [facility] directly at [phone].  Normal or non-critical lab and imaging results will be communicated to you by MyChart, letter or phone within 4 business days after the clinic has received the results. If you do not hear from us within 7 days, please contact the clinic through MyChart or phone. If you have a critical or  abnormal lab result, we will notify you by phone as soon as possible.  Submit refill requests through [employer] or call your pharmacy and they will forward the refill request to us. Please allow 3 business days for your refill to be completed.          Additional Information About Your Visit        [employer] Information     [employer] gives you secure access to your electronic health record. If you see a primary care provider, you can also send messages to your care team and make appointments. If you have questions, please call your primary care clinic.  If you do not have a primary care provider, please call [phone] and they will assist you.        Care EveryWhere ID     This is your Care EveryWhere ID. This could be used by other organizations to access your [place] medical records  [id number]         Blood Pressure from Last 3 Encounters:   09/20/17 139/85   08/15/17 128/82   03/09/17 120/74    Weight from Last 3 Encounters:   08/15/17 104.6 kg (230 lb 8 oz)   03/09/17 105 kg (231 lb 8 oz)   03/02/17 105.7 kg (233 lb)              We Performed the Following     INTERROGATION DEVICE EVAL REMOTE, PACER/ICD (49260)     PM DEVICE INTERROGATE REMOTE (94694)        Primary Care Provider Office Phone # Fax #    Zoe GAUTAM MD Thom [phone] [address] [facility] [address]  [facility] [address]        Equal Access to Services     JOSH DODGE : Hadii aad ku hadlandono Ryland, waaxda luqadaha, qaybta kaalmada reynaldo, mckenzie zhu . So [facility] [phone].    ATENCIÓN: Si habla español, tiene a allison disposición servicios gratuitos de asistencia lingüística. Laci al [phone].    We comply with applicable federal civil rights laws and [place] laws. We do not discriminate on the basis of race, color, national origin, age, disability, sex, sexual orientation, or gender identity.            Thank you!     Thank you for choosing [facility]  AT [place]  for your care. Our goal is always to provide you with excellent care. Hearing back from our patients is one way we can continue to improve our services. Please take a few minutes to complete the written survey that you may receive in the mail after your visit with us. Thank you!             Your Updated Medication List - Protect others around you: Learn how to safely use, store and throw away your medicines at www.disposemymeds.org.          This list is accurate as of: 10/31/17  8:21 AM.  Always use your most recent med list.                   Brand Name Dispense Instructions for use Diagnosis    amoxicillin 875 MG tablet    AMOXIL    20 tablet    Take 1 tablet (875 mg) by mouth 2 times daily    Acute suppurative otitis media of left ear without spontaneous rupture of tympanic membrane, recurrence not specified       atorvastatin 10 MG tablet    LIPITOR    90 tablet    Take 1 tablet (10 mg) by mouth daily    Hyperlipidemia, unspecified hyperlipidemia type       fluticasone 50 MCG/ACT spray    FLONASE    1 Bottle    Spray 1-2 sprays into both nostrils daily    Dysfunction of Eustachian tube, left       Iron 240 (27 FE) MG Tabs      Take 1 tablet by mouth daily        lisinopril 20 MG tablet    PRINIVIL/ZESTRIL    90 tablet    Take 1 tablet (20 mg) by mouth daily    Benign hypertension       pantoprazole 40 MG EC tablet    PROTONIX    90 tablet    TAKE 1 TABLET EVERY DAY  30  TO  60  MINUTES BEFORE A MEAL    Chacon's esophagus without dysplasia       sildenafil 20 MG tablet    REVATIO    30 tablet    Take 1-2 tablet (20 mg) by mouth half to one hour before sexual intercourse .  Never use with nitroglycerin, terazosin or doxazosin.    Erectile dysfunction, unspecified erectile dysfunction type       ZANTAC 150 MG tablet   Generic drug:  ranitidine      Take by mouth At Bedtime

## 2017-10-31 NOTE — TELEPHONE ENCOUNTER
Reviewed PVC burden of 956,517 since 1/2017.  Had 577,910 of these between January and July 2017 (see note 7/25/17).  Therefore, has had 378,607 PVCs between July and October 2017.      This again is roughly 4200 PVCs/day.    No changes needed. Has EP FU, echo and Holter ordered for 3/2018.

## 2017-11-21 DIAGNOSIS — E61.1 IRON DEFICIENCY: ICD-10-CM

## 2017-11-21 DIAGNOSIS — N18.30 STAGE 3 CHRONIC KIDNEY DISEASE (H): ICD-10-CM

## 2017-11-21 LAB
ANION GAP SERPL CALCULATED.3IONS-SCNC: 11 MMOL/L (ref 3–14)
BUN SERPL-MCNC: 22 MG/DL (ref 7–30)
CALCIUM SERPL-MCNC: 9.7 MG/DL (ref 8.5–10.1)
CHLORIDE SERPL-SCNC: 106 MMOL/L (ref 94–109)
CO2 SERPL-SCNC: 21 MMOL/L (ref 20–32)
CREAT SERPL-MCNC: 1.39 MG/DL (ref 0.66–1.25)
ERYTHROCYTE [DISTWIDTH] IN BLOOD BY AUTOMATED COUNT: 14.1 % (ref 10–15)
FERRITIN SERPL-MCNC: 65 NG/ML (ref 26–388)
GFR SERPL CREATININE-BSD FRML MDRD: 50 ML/MIN/1.7M2
GLUCOSE SERPL-MCNC: 115 MG/DL (ref 70–99)
HCT VFR BLD AUTO: 47.9 % (ref 40–53)
HGB BLD-MCNC: 16.1 G/DL (ref 13.3–17.7)
MCH RBC QN AUTO: 31 PG (ref 26.5–33)
MCHC RBC AUTO-ENTMCNC: 33.6 G/DL (ref 31.5–36.5)
MCV RBC AUTO: 92 FL (ref 78–100)
PLATELET # BLD AUTO: 167 10E9/L (ref 150–450)
POTASSIUM SERPL-SCNC: 4.1 MMOL/L (ref 3.4–5.3)
RBC # BLD AUTO: 5.19 10E12/L (ref 4.4–5.9)
SODIUM SERPL-SCNC: 138 MMOL/L (ref 133–144)
WBC # BLD AUTO: 8.7 10E9/L (ref 4–11)

## 2017-11-21 PROCEDURE — 80048 BASIC METABOLIC PNL TOTAL CA: CPT | Performed by: INTERNAL MEDICINE

## 2017-11-21 PROCEDURE — 36415 COLL VENOUS BLD VENIPUNCTURE: CPT | Performed by: INTERNAL MEDICINE

## 2017-11-21 PROCEDURE — 82728 ASSAY OF FERRITIN: CPT | Performed by: INTERNAL MEDICINE

## 2017-11-21 PROCEDURE — 85027 COMPLETE CBC AUTOMATED: CPT | Performed by: INTERNAL MEDICINE

## 2017-11-22 NOTE — PROGRESS NOTES
Please notify patient by sending following letter with copy of test results      Woody Dorado,    This is to inform you regarding your test result.    CBC result which includes Hemoglobin and  Platelet Counts is satisfactory.  Ferritin which is iron stores in the body is normal.  Chronic kidney disease is stable.    Sincerely,      Dr.Nasima Thom MD,FACP

## 2017-12-04 ENCOUNTER — OFFICE VISIT (OUTPATIENT)
Dept: FAMILY MEDICINE | Facility: CLINIC | Age: 72
End: 2017-12-04
Payer: COMMERCIAL

## 2017-12-04 VITALS
DIASTOLIC BLOOD PRESSURE: 82 MMHG | WEIGHT: 229 LBS | HEART RATE: 64 BPM | SYSTOLIC BLOOD PRESSURE: 114 MMHG | BODY MASS INDEX: 32.78 KG/M2 | HEIGHT: 70 IN

## 2017-12-04 DIAGNOSIS — E78.5 HYPERLIPIDEMIA, UNSPECIFIED HYPERLIPIDEMIA TYPE: Primary | ICD-10-CM

## 2017-12-04 DIAGNOSIS — I10 BENIGN HYPERTENSION: ICD-10-CM

## 2017-12-04 DIAGNOSIS — N18.30 CHRONIC KIDNEY DISEASE, STAGE III (MODERATE) (H): ICD-10-CM

## 2017-12-04 DIAGNOSIS — N52.9 ERECTILE DYSFUNCTION, UNSPECIFIED ERECTILE DYSFUNCTION TYPE: ICD-10-CM

## 2017-12-04 DIAGNOSIS — K22.70 BARRETT'S ESOPHAGUS WITHOUT DYSPLASIA: ICD-10-CM

## 2017-12-04 DIAGNOSIS — E61.1 IRON DEFICIENCY: ICD-10-CM

## 2017-12-04 DIAGNOSIS — R73.03 PREDIABETES: ICD-10-CM

## 2017-12-04 DIAGNOSIS — Z13.6 SCREENING FOR ABDOMINAL AORTIC ANEURYSM: ICD-10-CM

## 2017-12-04 DIAGNOSIS — Z90.5 SOLITARY KIDNEY, ACQUIRED: ICD-10-CM

## 2017-12-04 DIAGNOSIS — Z95.0 CARDIAC PACEMAKER: ICD-10-CM

## 2017-12-04 PROCEDURE — 99214 OFFICE O/P EST MOD 30 MIN: CPT | Performed by: INTERNAL MEDICINE

## 2017-12-04 RX ORDER — LISINOPRIL 20 MG/1
20 TABLET ORAL DAILY
Qty: 90 TABLET | Refills: 3 | Status: SHIPPED | OUTPATIENT
Start: 2017-12-04 | End: 2017-12-21

## 2017-12-04 RX ORDER — PANTOPRAZOLE SODIUM 40 MG/1
40 TABLET, DELAYED RELEASE ORAL DAILY
Qty: 90 TABLET | Refills: 3 | Status: SHIPPED | OUTPATIENT
Start: 2017-12-04 | End: 2018-09-21

## 2017-12-04 RX ORDER — SILDENAFIL CITRATE 20 MG/1
TABLET ORAL
Qty: 30 TABLET | Refills: 5 | Status: SHIPPED | OUTPATIENT
Start: 2017-12-04 | End: 2022-08-05

## 2017-12-04 RX ORDER — ATORVASTATIN CALCIUM 10 MG/1
10 TABLET, FILM COATED ORAL DAILY
Qty: 90 TABLET | Refills: 3 | Status: SHIPPED | OUTPATIENT
Start: 2017-12-04 | End: 2018-09-21

## 2017-12-04 NOTE — MR AVS SNAPSHOT
"              After Visit Summary   12/4/2017    Estrada Oseguera    MRN: [id number]           Patient Information     Date Of Birth          1945        Visit Information        Provider Department      12/4/2017 8:30 AM Zoe Tomas MD [facility]        Today's Diagnoses     Hyperlipidemia, unspecified hyperlipidemia type    -  1    Erectile dysfunction, unspecified erectile dysfunction type        Benign hypertension        Chacon's esophagus without dysplasia        Prediabetes        Screening for abdominal aortic aneurysm          Care Instructions    With long-term use of the pantoprazole, it is important to take a daily calcium with vitamin D supplement. Make sure your current calcium supplement also contains vitamin D as the vitamin D allows your body to absorb the calcium.    For your kidney, it is important to drink plenty of fluids to stay well-hydrated    You are considered pre-diabetic. It is important to eat a healthy, low-carb diet and get regular exercise.    Return for fasting labs - call to make a \"lab only\" appointment, you can have labs drawn on the 5th floor    Please call [facility] to schedule your uls [phone]      Follow up in 6 months     seek sooner medical attention if there is any worsening of symptoms or problems.            Follow-ups after your visit        Your next 10 appointments already scheduled     Jan 02, 2018 10:30 AM CST   Pacemaker Check with FÉLIX CABAN   [facility] ([facility])    [address] [place] [address] [phone]              Future tests that were ordered for you today     Open Future Orders        Priority Expected Expires Ordered    US Aorta Medicare AAA Screening Routine  12/4/2018 12/4/2017    Glucose Routine 12/5/2017 12/30/2017 12/4/2017    Hemoglobin A1c Routine 12/5/2017 12/30/2017 12/4/2017    Lipid panel reflex to direct LDL Fasting Routine " "12/5/2017 12/30/2017 12/4/2017            Who to contact     If you have questions or need follow up information about today's clinic visit or your schedule please contact [facility] directly at [phone].  Normal or non-critical lab and imaging results will be communicated to you by [employer], letter or phone within 4 business days after the clinic has received the results. If you do not hear from us within 7 days, please contact the clinic through [employer] or phone. If you have a critical or abnormal lab result, we will notify you by phone as soon as possible.  Submit refill requests through [employer] or call your pharmacy and they will forward the refill request to us. Please allow 3 business days for your refill to be completed.          Additional Information About Your Visit        [employer] Information     [employer] gives you secure access to your electronic health record. If you see a primary care provider, you can also send messages to your care team and make appointments. If you have questions, please call your primary care clinic.  If you do not have a primary care provider, please call [phone] and they will assist you.        Care EveryWhere ID     This is your Care EveryWhere ID. This could be used by other organizations to access your [place] medical records  [id number]        Your Vitals Were     Pulse Height BMI (Body Mass Index)             64 5' 9.5\" (1.765 m) 33.33 kg/m2          Blood Pressure from Last 3 Encounters:   12/04/17 114/82   09/20/17 139/85   08/15/17 128/82    Weight from Last 3 Encounters:   12/04/17 229 lb (103.9 kg)   08/15/17 230 lb 8 oz (104.6 kg)   03/09/17 231 lb 8 oz (105 kg)                 Today's Medication Changes          These changes are accurate as of: 12/4/17  8:44 AM.  If you have any questions, ask your nurse or doctor.               These medicines have changed or have updated prescriptions.        Dose/Directions    pantoprazole 40 MG EC tablet "   Commonly known as:  PROTONIX   This may have changed:    - how much to take  - how to take this  - when to take this   Used for:  Chacon's esophagus without dysplasia   Changed by:  Zoe Tomas MD        Dose:  40 mg   Take 1 tablet (40 mg) by mouth daily TAKE 1 TABLET EVERY DAY  30  TO  60  MINUTES BEFORE A MEAL   Quantity:  90 tablet   Refills:  3         Stop taking these medicines if you haven't already. Please contact your care team if you have questions.     amoxicillin 875 MG tablet   Commonly known as:  AMOXIL   Stopped by:  Zoe Tomas MD           fluticasone 50 MCG/ACT spray   Commonly known as:  FLONASE   Stopped by:  Zoe Tomas MD                Where to get your medicines      These medications were sent to [facility] - [facility] [address] [facility] [address]  [address], [facility] [address]     Phone:  [phone]     atorvastatin 10 MG tablet    lisinopril 20 MG tablet    pantoprazole 40 MG EC tablet    sildenafil 20 MG tablet                Primary Care Provider Office Phone # Fax #    Zoe Tomas [phone] [address] [facility] [address]  [facility] [address]        Equal Access to Services     JOSH DODGE AH: Hadii aad ku hadlandono Ryland, waaxda luqadaha, qaybta kaalmada adevaishaliyada, mckenzie valdivia. So [facility] [phone].    ATENCIÓN: Si habla español, tiene a allison disposición servicios gratuitos de asistencia lingüística. [facility] [phone].    We comply with applicable federal civil rights laws and [place] laws. We do not discriminate on the basis of race, color, national origin, age, disability, sex, sexual orientation, or gender identity.            Thank you!     Thank you for choosing [facility]  for your care. Our goal is always to provide you with excellent care. Hearing back from our patients is one way we can continue to improve our services. Please take a few minutes to  complete the written survey that you may receive in the mail after your visit with us. Thank you!             Your Updated Medication List - Protect others around you: Learn how to safely use, store and throw away your medicines at www.disposemymeds.org.          This list is accurate as of: 12/4/17  8:44 AM.  Always use your most recent med list.                   Brand Name Dispense Instructions for use Diagnosis    atorvastatin 10 MG tablet    LIPITOR    90 tablet    Take 1 tablet (10 mg) by mouth daily    Hyperlipidemia, unspecified hyperlipidemia type       Iron 240 (27 FE) MG Tabs      Take 1 tablet by mouth daily        lisinopril 20 MG tablet    PRINIVIL/ZESTRIL    90 tablet    Take 1 tablet (20 mg) by mouth daily    Benign hypertension       pantoprazole 40 MG EC tablet    PROTONIX    90 tablet    Take 1 tablet (40 mg) by mouth daily TAKE 1 TABLET EVERY DAY  30  TO  60  MINUTES BEFORE A MEAL    Chacon's esophagus without dysplasia       sildenafil 20 MG tablet    REVATIO    30 tablet    Take 1-2 tablet (20 mg) by mouth half to one hour before sexual intercourse .  Never use with nitroglycerin, terazosin or doxazosin.    Erectile dysfunction, unspecified erectile dysfunction type       ZANTAC 150 MG tablet   Generic drug:  ranitidine      Take by mouth At Bedtime

## 2017-12-04 NOTE — PROGRESS NOTES
SUBJECTIVE:   Estrada Oseguera is a 72 year old male who presents to clinic today for the following health issues:  He is accompanied by his wife      Hyperlipidemia Follow-Up    Rate your low fat/cholesterol diet?: fair    Taking statin?  Yes, no muscle aches from statin    Other lipid medications/supplements?:  none    Hypertension Follow-up    Outpatient blood pressures are not being checked.    Low Salt Diet: no added salt      S/P pacemaker implantation   Frequent PVCs previously noted by [facility]  He will be undergoing holter monitor, echo, and CV reassessment per [facility] in 3 months  He is no longer experiencing any lightheadedness or dizziness  He feels like he is cardiovascularily stable    Hx of acquired solitary kidney and CKD stage III  He denies NSAID use    Hx. of iron deficiency  Not currently on iron supplement  He was on this in the past but experienced constipation    Hx of Chacon's esophagus without dysplasia  Symptoms improved and controlled with pantoprazole    Hx of erectile dysfunction  He has been responding well to sildenafil  No concerns regarding this medication    AAA screening  He was given scheduling information and referral, but has not yet scheduled  Former smoker      He denies any changes in his history since last visit        Amount of exercise or physical activity: 6-7 days/week for an average of 45-60 minutes    Problems taking medications regularly: No, taking all meds as prescribed    Medication side effects: none    Diet: regular, except low fat/cholesterol      Problem list, Medication list, Allergies, and Medical/Social/Surgical histories reviewed in [facility] and updated as appropriate.    Labs reviewed in [facility]      ROS:  Constitutional, HEENT, cardiovascular, pulmonary, GI, , musculoskeletal, neuro, skin, endocrine and psych systems are negative, except as otherwise noted.      This document serves as a record of the services and decisions personally performed  "and made by Zoe Tomas MD. It was created on her behalf by Isa Priest, a trained medical scribe. The creation of this document is based the provider's statements to the medical scribe.  Isa Priest 8:26 AM December 4, 2017  OBJECTIVE:     /82  Pulse 64  Ht 5' 9.5\" (1.765 m)  Wt 229 lb (103.9 kg)  BMI 33.33 kg/m2  Body mass index is 33.33 kg/(m^2).     GENERAL APPEARANCE: healthy, alert and no distress  EYES: Eyes grossly normal to inspection, PERRL and conjunctivae and sclerae normal  HENT: ear canals and TM's normal and nose and mouth without ulcers or lesions  NECK: no adenopathy  RESP: lungs clear to auscultation - no rales, rhonchi or wheezes  CV: regular rates and rhythm, normal S1 S2, no S3      Reviewed labs  BMP (11/21/2017): GFR 50 (L), glucose 115 (H) ,creatinine 1.39 (H), otherwise WNL  Ferritin (11/21/2017): 65, improved from 25 (L) on 8/11/2017   CBC w/ platelets (11/21/2017): WNL, hgb was 16.1, improved from 15.0 on 8/11/2017    Lab Results   Component Value Date    A1C 6.2 08/11/2017    A1C 6.2 02/07/2017       Reviewed immunizations - due for shingles vaccine, otherwise up to date    ASSESSMENT/PLAN:     Estrada was seen today for recheck.    Diagnoses and all orders for this visit:    Hyperlipidemia, unspecified hyperlipidemia type  Tolerating statin therapy well  He is not fasting today and will return for fasting labs  -     atorvastatin (LIPITOR) 10 MG tablet; Take 1 tablet (10 mg) by mouth daily  -     Lipid panel reflex to direct LDL Fasting; Future    Erectile dysfunction, unspecified erectile dysfunction type  Responding well to the medication  -     sildenafil (REVATIO) 20 MG tablet; Take 1-2 tablet (20 mg) by mouth half to one hour before sexual intercourse .  Never use with nitroglycerin, terazosin or doxazosin.    Benign hypertension  Well-controlled  Continue present management  -     lisinopril (PRINIVIL/ZESTRIL) 20 MG tablet; Take 1 tablet (20 mg) by mouth " daily    Chacon's esophagus without dysplasia  Symptoms improved and stable with daily PPI  Discussed that long-term PPI use can affect calcium absorption  Advised to ensure adequate calcium and vitamin D intake  Comments:  seen on EGD dated 11/3/16  Orders:  -     pantoprazole (PROTONIX) 40 MG EC tablet; Take 1 tablet (40 mg) by mouth daily TAKE 1 TABLET EVERY DAY  30  TO  60  MINUTES BEFORE A MEAL    Prediabetes  Educated him on this condition  Advised to abide by low-carb diet and get regular exercise  He is not fasting today and will return for fasting labs  -     Glucose; Future  -     Hemoglobin A1c; Future    Screening for abdominal aortic aneurysm  Former smoker  Order previously placed, but patient never scheduled  He agrees to schedule this screening  -     US Aorta Medicare AAA Screening; Future    Chronic kidney disease, stage III (moderate)  Solitary kidney, acquired  Educated on importance of adequate hydration and avoiding NSAIDs  Will continue to monitor renal function    Iron deficiency  Ferritin still low, but improving  Hemoglobin stable and improving  Not currently on iron supplement due to side effect of constipation  Will continue to monitor, consider IV hydration if levels worsen in the future    Cardiac pacemaker  Frequent PVCs previously noted by [facility]  He will be undergoing holter monitor, echo, and CV reassessment per [facility] in 3 months  Patient has a very low burden of PVCs        I advised him to wait to receive the shingles vaccine until the new shingles vaccine is available      Follow-up with Provider - 6 months    Patient Instructions   With long-term use of the pantoprazole, it is important to take a daily calcium with vitamin D supplement. Make sure your current calcium supplement also contains vitamin D as the vitamin D allows your body to absorb the calcium.    For your kidney, it is important to drink plenty of fluids to stay well-hydrated    You are considered  "pre-diabetic. It is important to eat a healthy, low-carb diet and get regular exercise.    Return for fasting labs - call to make a \"lab only\" appointment, you can have labs drawn on the 5th floor    Please call [place] [facility] to schedule your uls [phone]      Follow up in 6 months     seek sooner medical attention if there is any worsening of symptoms or problems.          The information in this document, created by a scribe for me, accurately reflects the services I personally performed and the decisions made by me. I have reviewed and approved this document for accuracy.  8:48 AM December 4, 2017    Zoe Tomas MD  [facility]    "

## 2017-12-04 NOTE — PATIENT INSTRUCTIONS
"With long-term use of the pantoprazole, it is important to take a daily calcium with vitamin D supplement. Make sure your current calcium supplement also contains vitamin D as the vitamin D allows your body to absorb the calcium.    For your kidney, it is important to drink plenty of fluids to stay well-hydrated    You are considered pre-diabetic. It is important to eat a healthy, low-carb diet and get regular exercise.    Return for fasting labs - call to make a \"lab only\" appointment, you can have labs drawn on the 5th floor    Please call [facility] to schedule your uls [phone]      Follow up in 6 months     seek sooner medical attention if there is any worsening of symptoms or problems.    "

## 2017-12-04 NOTE — NURSING NOTE
"Chief Complaint   Patient presents with     RECHECK       Initial /82  Pulse 64  Ht 5' 9.5\" (1.765 m)  Wt 229 lb (103.9 kg)  BMI 33.33 kg/m2 Estimated body mass index is 33.33 kg/(m^2) as calculated from the following:    Height as of this encounter: 5' 9.5\" (1.765 m).    Weight as of this encounter: 229 lb (103.9 kg).  Medication Reconciliation: complete    "

## 2017-12-21 DIAGNOSIS — I10 BENIGN HYPERTENSION: ICD-10-CM

## 2017-12-21 RX ORDER — LISINOPRIL 20 MG/1
20 TABLET ORAL DAILY
Qty: 90 TABLET | Refills: 0 | Status: SHIPPED | OUTPATIENT
Start: 2017-12-21 | End: 2018-02-28

## 2018-01-02 ENCOUNTER — TELEPHONE (OUTPATIENT)
Dept: CARDIOLOGY | Facility: CLINIC | Age: 73
End: 2018-01-02

## 2018-01-02 ENCOUNTER — ALLIED HEALTH/NURSE VISIT (OUTPATIENT)
Dept: CARDIOLOGY | Facility: CLINIC | Age: 73
End: 2018-01-02
Payer: COMMERCIAL

## 2018-01-02 DIAGNOSIS — I42.9 CARDIOMYOPATHY (H): ICD-10-CM

## 2018-01-02 DIAGNOSIS — I47.29 NSVT (NONSUSTAINED VENTRICULAR TACHYCARDIA) (H): Primary | ICD-10-CM

## 2018-01-02 DIAGNOSIS — Z95.0 CARDIAC PACEMAKER IN SITU: ICD-10-CM

## 2018-01-02 DIAGNOSIS — I49.3 PVC'S (PREMATURE VENTRICULAR CONTRACTIONS): ICD-10-CM

## 2018-01-02 PROCEDURE — 93280 PM DEVICE PROGR EVAL DUAL: CPT | Performed by: INTERNAL MEDICINE

## 2018-01-02 NOTE — PROGRESS NOTES
[employer] Accolade MRI EL L331 Pacemaker Device Check  AP: 11 % : <1 %  Mode: DDD  bpm        Underlying Rhythm: NSR  Heart Rate: Stable with excellent variability.  Sensing: WNL    Pacing Threshold: WNL   Impedance: WNL  Battery Status: Approximately 14 years longevity.  Device Site: Good  Atrial Arrhythmia: 1 mode switch, no EGM, lasted <1 minute  Ventricular Arrhythmia: 2 episodes logged,one as NSVT, EGM showed 7 seconds of NSVT, rate 200 bpm. Patient denies any symptoms. The other one logged  as a SVT, EGM showed A=V tachycardia, rate 163 bpm, lasting 47 seconds. In 2016 EF 45-50%.  Setting Change: 0    Care Plan: In March will have holter monitor, echo and seeing Dr. Valle. Follow up with Q 3 month remote checks. MONICO Potts

## 2018-01-02 NOTE — TELEPHONE ENCOUNTER
[employer] Accolade MRI EL L331 Pacemaker Device Check  AP: 11 % : <1 %  Mode: DDD  bpm        Underlying Rhythm: NSR  Heart Rate: Stable with excellent variability.  Sensing: WNL    Pacing Threshold: WNL   Impedance: WNL  Battery Status: Approximately 14 years longevity.  Device Site: Good  Atrial Arrhythmia: 1 mode switch, no EGM, lasted <1 minute  Ventricular Arrhythmia: 2 episodes logged,one as NSVT, EGM showed 7 seconds of NSVT, rate 200 bpm. Patient denies any symptoms. The other one logged  as a SVT, EGM showed A=V tachycardia, rate 163 bpm, lasting 47 seconds. In 2016 EF 45-50%.  Setting Change: 0    Dr Valle, Just a FYI, this patient has an episode of NSVT for 7 seconds, rate 200 bpm. Patient denies any symptoms. In March he will be having an Holter Monitor, echo and an appt with you. Do you think he needs to seen earlier. Thanks Kathryn

## 2018-01-02 NOTE — MR AVS SNAPSHOT
After Visit Summary   1/2/2018    Estrada Oseguera    MRN: [id number]           Patient Information     Date Of Birth          1945        Visit Information        Provider Department      1/2/2018 10:30 AM FÉLIX CABAN [facility]        Today's Diagnoses     Cardiac pacemaker in situ           Follow-ups after your visit        Your next 10 appointments already scheduled     Mar 08, 2018  8:45 AM CST   Ech Complete with [id number]   [facility] CV Echocardiography (Cardiovascular Imaging at [facility])    [address]  [facility] [address] [phone]           1. Please bring or wear a comfortable two-piece outfit. 2. You may eat, drink and take your normal medicines. 3. For any questions that cannot be answered, please contact the ordering physician            Mar 08, 2018 10:00 AM CST   Holter Monitor with MARIETTA   [facility] Radiology - [facility] Heart Imaging ([facility])    [address]  [facility] [address] [phone]            Mar 29, 2018  2:15 PM CDT   P EP RETURN with Lynn Valle MD   [facility] ([facility])    [address]  [facility] [place] [facility] [id number] [phone]            Apr 10, 2018  1:00 PM CDT   Remote PPM Check with FÉLIX TECH1   [facility] ([facility])    [address]  [facility] [id number] [phone]           This appointment is for a remote check of your pacemaker.  This is not an appointment at the office.              Who to contact     If you have questions or need follow up information about today's clinic visit or your schedule please contact [facility] directly at [phone].  Normal or non-critical lab and imaging results will be communicated to you by MyChart, letter or  phone within 4 business days after the clinic has received the results. If you do not hear from us within 7 days, please contact the clinic through [employer] or phone. If you have a critical or abnormal lab result, we will notify you by phone as soon as possible.  Submit refill requests through [employer] or call your pharmacy and they will forward the refill request to us. Please allow 3 business days for your refill to be completed.          Additional Information About Your Visit        [employer] Information     [employer] gives you secure access to your electronic health record. If you see a primary care provider, you can also send messages to your care team and make appointments. If you have questions, please call your primary care clinic.  If you do not have a primary care provider, please call [phone] and they will assist you.        Care EveryWhere ID     This is your Care EveryWhere ID. This could be used by other organizations to access your [place] medical records  [id number]         Blood Pressure from Last 3 Encounters:   12/04/17 114/82   09/20/17 139/85   08/15/17 128/82    Weight from Last 3 Encounters:   12/04/17 103.9 kg (229 lb)   08/15/17 104.6 kg (230 lb 8 oz)   03/09/17 105 kg (231 lb 8 oz)              We Performed the Following     Follow-Up with Device Clinic     PM DEVICE PROGRAMMING EVAL, DUAL LEAD PACER ([id number])        Primary Care Provider Office Phone # Fax #    Zoe DAIN Tomas [phone] [address]  [facility] [address]        Equal Access to Services     [facility]: Hadii aad ku hadasho Sosarah, waaxda luqadaha, qaybta kaalmada adeegyada, mckenzie zhu . So [facility] [phone].    ATENCIÓN: Si habla español, tiene a allison disposición servicios gratuitos de asistencia lingüística. Llame al [phone].    We comply with applicable federal civil rights laws and [place] laws. We do not discriminate on the basis of  race, color, national origin, age, disability, sex, sexual orientation, or gender identity.            Thank you!     Thank you for choosing [facility]  for your care. Our goal is always to provide you with excellent care. Hearing back from our patients is one way we can continue to improve our services. Please take a few minutes to complete the written survey that you may receive in the mail after your visit with us. Thank you!             Your Updated Medication List - Protect others around you: Learn how to safely use, store and throw away your medicines at www.disposemymeds.org.          This list is accurate as of: 1/2/18 11:33 AM.  Always use your most recent med list.                   Brand Name Dispense Instructions for use Diagnosis    atorvastatin 10 MG tablet    LIPITOR    90 tablet    Take 1 tablet (10 mg) by mouth daily    Hyperlipidemia, unspecified hyperlipidemia type       Iron 240 (27 FE) MG Tabs      Take 1 tablet by mouth daily        lisinopril 20 MG tablet    PRINIVIL/ZESTRIL    90 tablet    Take 1 tablet (20 mg) by mouth daily    Benign hypertension       pantoprazole 40 MG EC tablet    PROTONIX    90 tablet    Take 1 tablet (40 mg) by mouth daily TAKE 1 TABLET EVERY DAY  30  TO  60  MINUTES BEFORE A MEAL    Chacon's esophagus without dysplasia       sildenafil 20 MG tablet    REVATIO    30 tablet    Take 1-2 tablet (20 mg) by mouth half to one hour before sexual intercourse .  Never use with nitroglycerin, terazosin or doxazosin.    Erectile dysfunction, unspecified erectile dysfunction type       ZANTAC 150 MG tablet   Generic drug:  ranitidine      Take by mouth At Bedtime

## 2018-01-03 NOTE — TELEPHONE ENCOUNTER
Recommend a cardiac nuclear scan for assessment of EF and possible myocardial scar.   Consider noninvasive EP study by using the pacemaker if the EF is <50% on nuclear scan. Otherwise, consider Toprol XL for nonsustained VT.

## 2018-01-03 NOTE — TELEPHONE ENCOUNTER
Entered order for nuclear stress test due to NSVT and previous reduced EF.     Called pt and gave him Dr. Ghosh's recommendation for a stress test. Pt states no questions. Transferred pt to scheduling to set this up.

## 2018-01-05 ENCOUNTER — HOSPITAL ENCOUNTER (OUTPATIENT)
Dept: CARDIOLOGY | Facility: CLINIC | Age: 73
Discharge: HOME | End: 2018-01-05
Attending: INTERNAL MEDICINE | Admitting: INTERNAL MEDICINE
Payer: MEDICARE

## 2018-01-05 DIAGNOSIS — I42.9 CARDIOMYOPATHY (H): ICD-10-CM

## 2018-01-05 PROCEDURE — A9502 TC99M TETROFOSMIN: HCPCS | Performed by: INTERNAL MEDICINE

## 2018-01-05 PROCEDURE — 93017 CV STRESS TEST TRACING ONLY: CPT

## 2018-01-05 PROCEDURE — 34300033 ZZH RX 343: Performed by: INTERNAL MEDICINE

## 2018-01-05 PROCEDURE — 78452 HT MUSCLE IMAGE SPECT MULT: CPT | Mod: 26 | Performed by: INTERNAL MEDICINE

## 2018-01-05 PROCEDURE — 93018 CV STRESS TEST I&R ONLY: CPT | Performed by: INTERNAL MEDICINE

## 2018-01-05 PROCEDURE — 93016 CV STRESS TEST SUPVJ ONLY: CPT | Performed by: INTERNAL MEDICINE

## 2018-01-05 RX ADMIN — TETROFOSMIN 18.83 MCI.: 1.38 INJECTION, POWDER, LYOPHILIZED, FOR SOLUTION INTRAVENOUS at 12:55

## 2018-01-05 RX ADMIN — TETROFOSMIN 6.01 MCI.: 1.38 INJECTION, POWDER, LYOPHILIZED, FOR SOLUTION INTRAVENOUS at 11:35

## 2018-01-09 ENCOUNTER — TELEPHONE (OUTPATIENT)
Dept: CARDIOLOGY | Facility: CLINIC | Age: 73
End: 2018-01-09

## 2018-01-09 DIAGNOSIS — I47.29 NSVT (NONSUSTAINED VENTRICULAR TACHYCARDIA) (H): Primary | ICD-10-CM

## 2018-01-09 RX ORDER — METOPROLOL SUCCINATE 50 MG/1
50 TABLET, EXTENDED RELEASE ORAL DAILY
Qty: 30 TABLET | Refills: 3 | Status: SHIPPED | OUTPATIENT
Start: 2018-01-09 | End: 2018-03-14

## 2018-01-09 NOTE — TELEPHONE ENCOUNTER
Nuclear stress test was negative with EF 63%. Per Dr Pearson' note, consider starting toprol if EF > 63%. Will review staring toprol and how much with DR Ghosh.

## 2018-01-09 NOTE — TELEPHONE ENCOUNTER
Called pt with nuclear results and Dr Pearson' recommendation to start toprol XL 50 mg. Pt agreed and Rx sent to [facility] pharmacy.

## 2018-01-31 ENCOUNTER — ALLIED HEALTH/NURSE VISIT (OUTPATIENT)
Dept: CARDIOLOGY | Facility: CLINIC | Age: 73
End: 2018-01-31
Payer: MEDICARE

## 2018-01-31 DIAGNOSIS — Z95.0 CARDIAC PACEMAKER IN SITU: Primary | ICD-10-CM

## 2018-01-31 PROCEDURE — 99207 ZZC NO CHARGE LOS: CPT | Performed by: INTERNAL MEDICINE

## 2018-01-31 NOTE — MR AVS SNAPSHOT
After Visit Summary   1/31/2018    Estrada Oseguera    MRN: [id number]           Patient Information     Date Of Birth          1945        Visit Information        Provider Department      1/31/2018 4:30 PM HEARD RAVIN2 [facility]        Today's Diagnoses     Cardiac pacemaker in situ    -  1       Follow-ups after your visit        Your next 10 appointments already scheduled     Jan 31, 2018  4:30 PM CST   Courtesy Device Check with HEARD DCR2   [facility] ([facility])    [address]  [facility] [place] [address]   [phone]            Feb 02, 2018  1:30 PM CST   UMP EP RETURN with ROSEANN Milner CNP   [facility] ([facility])    [id number]  [facility] [address] [phone]            Mar 08, 2018  8:45 AM CST   Ech Complete with SHCVECHR3   [facility] CV Echocardiography (Cardiovascular Imaging at [facility])    [address]  [facility] [address] [phone]           1. Please bring or wear a comfortable two-piece outfit. 2. You may eat, drink and take your normal medicines. 3. For any questions that cannot be answered, please contact the ordering physician            Mar 08, 2018 10:00 AM CST   Holter Monitor with MARIETTA   [facility] Radiology - [facility] Heart Imaging ([facility])    [address]  [facility] [address] [phone]            Mar 29, 2018  2:15 PM CDT   UMP EP RETURN with Lynn Valle MD   [facility] ([facility])    [address]  [facility] [address] [phone]            Apr 10, 2018  1:00 PM CDT   Remote PPM Check with HEARD TECH1   [facility] ([facility])    [id number]  [facility]  [id number] [phone]           This appointment is for a remote check of your pacemaker.  This is not an appointment at the office.              Who to contact     If you have questions or need follow up information about today's clinic visit or your schedule please contact [facility]   [place] directly at [phone].  Normal or non-critical lab and imaging results will be communicated to you by MyChart, letter or phone within 4 business days after the clinic has received the results. If you do not hear from us within 7 days, please contact the clinic through [employer] or phone. If you have a critical or abnormal lab result, we will notify you by phone as soon as possible.  Submit refill requests through flaveit or call your pharmacy and they will forward the refill request to us. Please allow 3 business days for your refill to be completed.          Additional Information About Your Visit        [employer] Information     flaveit gives you secure access to your electronic health record. If you see a primary care provider, you can also send messages to your care team and make appointments. If you have questions, please call your primary care clinic.  If you do not have a primary care provider, please call [phone] and they will assist you.        Care EveryWhere ID     This is your Care EveryWhere ID. This could be used by other organizations to access your [place] medical records  [id number]         Blood Pressure from Last 3 Encounters:   12/04/17 114/82   09/20/17 139/85   08/15/17 128/82    Weight from Last 3 Encounters:   12/04/17 103.9 kg (229 lb)   08/15/17 104.6 kg (230 lb 8 oz)   03/09/17 105 kg (231 lb 8 oz)              Today, you had the following     No orders found for display       Primary Care Provider Office Phone # Fax #    Zoe Tomas [phone] [address]  [place]                [place] [address]        Equal Access to  Services     [facility]: Hadii aad ku hadlandonbuzz Rainasarah, waaxda luqadaha, qaybta kaalmada reynaldo, mckenzie zhu . So [facility] [phone].    ATENCIÓN: Si habla cheryl, tiene a allison disposición servicios gratuitos de asistencia lingüística. Llame al [phone].    We comply with applicable federal civil rights laws and [place] laws. We do not discriminate on the basis of race, color, national origin, age, disability, sex, sexual orientation, or gender identity.            Thank you!     Thank you for choosing [facility]  for your care. Our goal is always to provide you with excellent care. Hearing back from our patients is one way we can continue to improve our services. Please take a few minutes to complete the written survey that you may receive in the mail after your visit with us. Thank you!             Your Updated Medication List - Protect others around you: Learn how to safely use, store and throw away your medicines at www.disposemymeds.org.          This list is accurate as of 1/31/18  9:10 AM.  Always use your most recent med list.                   Brand Name Dispense Instructions for use Diagnosis    atorvastatin 10 MG tablet    LIPITOR    90 tablet    Take 1 tablet (10 mg) by mouth daily    Hyperlipidemia, unspecified hyperlipidemia type       Iron 240 (27 FE) MG Tabs      Take 1 tablet by mouth daily        lisinopril 20 MG tablet    PRINIVIL/ZESTRIL    90 tablet    Take 1 tablet (20 mg) by mouth daily    Benign hypertension       metoprolol succinate 50 MG 24 hr tablet    TOPROL-XL    30 tablet    Take 1 tablet (50 mg) by mouth daily    NSVT (nonsustained ventricular tachycardia) (H)       pantoprazole 40 MG EC tablet    PROTONIX    90 tablet    Take 1 tablet (40 mg) by mouth daily TAKE 1 TABLET EVERY DAY  30  TO  60  MINUTES BEFORE A MEAL    Chacon's esophagus without dysplasia       sildenafil 20 MG tablet    REVATIO    30  tablet    Take 1-2 tablet (20 mg) by mouth half to one hour before sexual intercourse .  Never use with nitroglycerin, terazosin or doxazosin.    Erectile dysfunction, unspecified erectile dysfunction type       ZANTAC 150 MG tablet   Generic drug:  ranitidine      Take by mouth At Bedtime

## 2018-01-31 NOTE — PROGRESS NOTES
Courtesy check : no charge. [employer] remote PPM alert for afib episode lasting > 30 min. He had an episode of afib lasting 2 hours on 1/30/18 and 12:06 am. He is not on OAC. He was supposed to start metoprolol per Dr Ghosh for NSVT found on device check,  but has not started it yet. He has echo and f/u with Dr Valle in March. He has seen Dr Ghosh in the past but perfers to f/u with Dr Valle. He will f/u with Airam to discuss OAC. He has iron deficiency. Perlan

## 2018-02-02 ENCOUNTER — OFFICE VISIT (OUTPATIENT)
Dept: CARDIOLOGY | Facility: CLINIC | Age: 73
End: 2018-02-02
Payer: COMMERCIAL

## 2018-02-02 ENCOUNTER — DOCUMENTATION ONLY (OUTPATIENT)
Dept: LAB | Facility: CLINIC | Age: 73
End: 2018-02-02

## 2018-02-02 VITALS
WEIGHT: 231 LBS | HEIGHT: 69 IN | DIASTOLIC BLOOD PRESSURE: 62 MMHG | BODY MASS INDEX: 34.21 KG/M2 | SYSTOLIC BLOOD PRESSURE: 112 MMHG | HEART RATE: 60 BPM

## 2018-02-02 DIAGNOSIS — Z13.0 SCREENING FOR DEFICIENCY ANEMIA: ICD-10-CM

## 2018-02-02 DIAGNOSIS — R73.09 ELEVATED GLUCOSE: ICD-10-CM

## 2018-02-02 DIAGNOSIS — R73.09 ELEVATED GLUCOSE: Primary | ICD-10-CM

## 2018-02-02 DIAGNOSIS — N18.30 CHRONIC KIDNEY DISEASE, STAGE III (MODERATE) (H): ICD-10-CM

## 2018-02-02 DIAGNOSIS — I10 BENIGN HYPERTENSION: ICD-10-CM

## 2018-02-02 DIAGNOSIS — I48.91 ATRIAL FIBRILLATION, UNSPECIFIED TYPE (H): Primary | ICD-10-CM

## 2018-02-02 LAB
ERYTHROCYTE [DISTWIDTH] IN BLOOD BY AUTOMATED COUNT: 14.2 % (ref 10–15)
HBA1C MFR BLD: 6.2 % (ref 4.3–6)
HCT VFR BLD AUTO: 44.9 % (ref 40–53)
HGB BLD-MCNC: 15.1 G/DL (ref 13.3–17.7)
MCH RBC QN AUTO: 31.2 PG (ref 26.5–33)
MCHC RBC AUTO-ENTMCNC: 33.6 G/DL (ref 31.5–36.5)
MCV RBC AUTO: 93 FL (ref 78–100)
PLATELET # BLD AUTO: 186 10E9/L (ref 150–450)
RBC # BLD AUTO: 4.84 10E12/L (ref 4.4–5.9)
WBC # BLD AUTO: 6 10E9/L (ref 4–11)

## 2018-02-02 PROCEDURE — 85027 COMPLETE CBC AUTOMATED: CPT | Performed by: INTERNAL MEDICINE

## 2018-02-02 PROCEDURE — 82728 ASSAY OF FERRITIN: CPT | Performed by: INTERNAL MEDICINE

## 2018-02-02 PROCEDURE — 83036 HEMOGLOBIN GLYCOSYLATED A1C: CPT | Performed by: INTERNAL MEDICINE

## 2018-02-02 PROCEDURE — 80053 COMPREHEN METABOLIC PANEL: CPT | Performed by: INTERNAL MEDICINE

## 2018-02-02 PROCEDURE — 93005 ELECTROCARDIOGRAM TRACING: CPT | Performed by: NURSE PRACTITIONER

## 2018-02-02 PROCEDURE — 36415 COLL VENOUS BLD VENIPUNCTURE: CPT | Performed by: INTERNAL MEDICINE

## 2018-02-02 PROCEDURE — 99214 OFFICE O/P EST MOD 30 MIN: CPT | Performed by: NURSE PRACTITIONER

## 2018-02-02 NOTE — LETTER
2/2/2018      Zoe Tomsa MD  [address]  [facility] [address]      RE: Estrada Oseguera       Dear Colleague,    I had the pleasure of seeing Estrada Oseguera in the [facility].    Service Date: 02/02/2018      HISTORY OF PRESENT ILLNESS:  Mr. Oseguera is a delightful 73-year-old gentleman that I am having the pleasure of meeting today.  He is here today with his wife.  Briefly, he has a history of sinus node dysfunction for which he underwent implantation of a dual-chamber [employer] pacemaker.  He also has a history of cardiomyopathy with an ejection fraction in the past of 45%-50%.  Device check on 01/02/2018 showed 2% a paced, less than 1% V paced.  He had 2 episodes of nonsustained VT noted at a rate of 200 beats per minute.  The patient denied any symptoms.  This was forwarded to Dr. Ghosh who recommended that the patient be started on metoprolol ER.  The patient just recently started this medication a few days ago.      He underwent a nuclear stress test which was negative for ischemia.  His ejection fraction was 63%.  On 01/31, the patient's remote device showed an alert showing atrial fibrillation lasting greater than 30 minutes.  This lasted 2 hours on 01/30 at 12:06 a.m.  He is not on oral anticoagulation.  Again, at that point he had not yet started his metoprolol for nonsustained VT.  The patient will set up with me to discuss oral anticoagulation.      Mr. Oseguera states that he is feels fine.  He is unware of the atrial fibrillation.  It appears that this episode was while sleeping at night.  His wife states that he does snore.  He does have some daytime somnolence which Mr. Oseguera thinks it mostly related to being 73 years of age.  His blood pressure today is stable at 112/62.  He has been trying to lose weight.  He states he is hopeful that this will help improve his snoring.      A BMP completed today shows a potassium of  4.2, creatinine of 1.3.  Hemoglobin was 15.1.  He does have a history of iron-deficiency anemia which has resolved with ferrous gluconate.        Currently, he denies chest pain, shortness of breath, lightheadedness or dizziness.        ASSESSMENT AND PLAN:    1.  Sick sinus syndrome.  The patient has a dual chamber pacemaker as outlined above.  He is scheduled to have a followup echocardiogram and visit with Dr. Valle.  Notes indicate that the patient and his wife have asked to follow with Dr. Valle.   2.  Paroxysmal atrial fibrillation.  This appears to happen during the night as outlined above.  The patient is not oral anticoagulation.  His current CHADS-VASc score is 2 (hypertension and cardiomyopathy).  In the interim, I do believe he should be placed on anticoagulation.  He initially was reluctant, but understands that he does have all small risk for stroke.  I reviewed warfarin versus the DOACs with him.  He has opted to go with Eliquis 5 mg b.i.d.  Samples were given to him as well as a prescription sent to his pharmacy.  If this is a higher tiered medication per his plan, I have asked that he please let us know and we will try to get prior authorization or change to a different medication that is lower in cost.   3.  Questionable sleep apnea.  He does have the body habitus as well as has a history of severe snoring per his wife.  I did offer a sleep studyconsult which the patient respectfully declined.  He is hopeful to lose weight.  This should be considered in the future, but the patient does not want to wear a CPAP.   4.  Cardiomyopathy.  Echo in 2016 was 45%-50%.  A repeat echo is scheduled for March prior to his visit with Dr. Valle.  He is currently on lisinopril as well as metoprolol ER.  He has some complaints of occasional dizziness.  I recommended dividing lisinopril if dizziness become an issue.  He could take this at b.i.d. dosing.   5.  Nonsustained VT/frequent PVCs.  This has been noted on his  device checks in the past.  Again, he was placed on metoprolol ER to help suppress his PVCs.      If there are questions or concerns, I have encouraged them to please contact us.  He is already scheduled to follow up with Dr. Valle next month.  Thank you for including us in his care         AIRAM ATKINSON NP             D: 2018   T: 2018   MT:       Name:     FRANCISCO SUN   MRN:      [id number]        Account:      [id number]   :      1945           Service Date: 2018      Document: [id number]           Outpatient Encounter Prescriptions as of 2018   Medication Sig Dispense Refill     apixaban ANTICOAGULANT (ELIQUIS) 5 MG tablet Take 1 tablet (5 mg) by mouth 2 times daily 60 tablet 3     metoprolol (TOPROL-XL) 50 MG 24 hr tablet Take 1 tablet (50 mg) by mouth daily 30 tablet 3     [DISCONTINUED] lisinopril (PRINIVIL/ZESTRIL) 20 MG tablet Take 1 tablet (20 mg) by mouth daily 90 tablet 0     atorvastatin (LIPITOR) 10 MG tablet Take 1 tablet (10 mg) by mouth daily 90 tablet 3     sildenafil (REVATIO) 20 MG tablet Take 1-2 tablet (20 mg) by mouth half to one hour before sexual intercourse .  Never use with nitroglycerin, terazosin or doxazosin. 30 tablet 5     pantoprazole (PROTONIX) 40 MG EC tablet Take 1 tablet (40 mg) by mouth daily TAKE 1 TABLET EVERY DAY  30  TO  60  MINUTES BEFORE A MEAL 90 tablet 3     ranitidine (ZANTAC) 150 MG tablet Take by mouth At Bedtime       Ferrous Gluconate (IRON) 240 (27 FE) MG TABS Take 1 tablet by mouth daily       No facility-administered encounter medications on file as of 2018.        Again, thank you for allowing me to participate in the care of your patient.      Sincerely,    Airam Atkinson NP, APRN [facility]

## 2018-02-02 NOTE — PROGRESS NOTES
"Patient has a lab only appointment today, 2/2/2018. According to his appointment notes, \"there are orders in my chart\". I am not finding any. There is a note in his chart from 12/4/2017 that he was to come back for fasting glucose and an A1C. Please let us know if you have an idea of what labs are needed.    Thanks!  María Lab  "

## 2018-02-02 NOTE — PATIENT INSTRUCTIONS
Eliquis 5 mg twice daily  Consider sleep study  OK to divide lisinopril to 1/2 tablet twice daily if you are dizzy

## 2018-02-02 NOTE — MR AVS SNAPSHOT
After Visit Summary   2/2/2018    Estrada Oseguera    MRN: [id number]           Patient Information     Date Of Birth          1945        Visit Information        Provider Department      2/2/2018 1:30 PM Airam Atkinson, ROSE[facility]        Today's Diagnoses     Atrial fibrillation, unspecified type (H)    -  1      Care Instructions    Eliquis 5 mg twice daily  Consider sleep study  OK to divide lisinopril to 1/2 tablet twice daily if you are dizzy          Follow-ups after your visit        Your next 10 appointments already scheduled     Mar 08, 2018  8:45 AM CST   Ech Complete with [id number]   [facility] CV Echocardiography (Cardiovascular Imaging at [facility])    [address]  [address]  [facility] [address] [phone]           1. Please bring or wear a comfortable two-piece outfit. 2. You may eat, drink and take your normal medicines. 3. For any questions that cannot be answered, please contact the ordering physician            Mar 08, 2018 10:00 AM CST   [facility] ([facility])    [address]  [facility] [address]   [phone]            Mar 29, 2018  2:15 PM CDT   P EP RETURN with Lynn Valle MD   [facility] ([facility])    [address]  [facility] [address] [phone]            Apr 10, 2018  1:00 PM CDT   Remote PPM Check with HEARD TECH1   [facility] ([facility])    [address]  [facility] [address] [phone]           This appointment is for a remote check of your pacemaker.  This is not an appointment at the office.              Who to contact     If you have questions or need follow up information about today's clinic visit or your schedule please  "contact [facility] directly at [phone].  Normal or non-critical lab and imaging results will be communicated to you by MyChart, letter or phone within 4 business days after the clinic has received the results. If you do not hear from us within 7 days, please contact the clinic through [facility] or phone. If you have a critical or abnormal lab result, we will notify you by phone as soon as possible.  Submit refill requests through [facility] or call your pharmacy and they will forward the refill request to us. Please allow 3 business days for your refill to be completed.          Additional Information About Your Visit        [employer] [facility] [employer] Information     [facility] gives you secure access to your electronic health record. If you see a primary care provider, you can also send messages to your care team and make appointments. If you have questions, please call your primary care clinic.  If you do not have a primary care provider, please call [phone] and they will assist you.        Care EveryWhere ID     This is your Care EveryWhere ID. This could be used by other organizations to access your [facility] medical records  [id number]        Your Vitals Were     Pulse Height BMI (Body Mass Index)             60 1.765 m (5' 9.49\") 33.64 kg/m2          Blood Pressure from Last 3 Encounters:   02/02/18 112/62   12/04/17 114/82   09/20/17 139/85    Weight from Last 3 Encounters:   02/02/18 104.8 kg (231 lb)   12/04/17 103.9 kg (229 lb)   08/15/17 104.6 kg (230 lb 8 oz)              We Performed the Following     EKG 12-lead complete w/read - Clinics (performed today)          Today's Medication Changes          These changes are accurate as of 2/2/18  2:02 PM.  If you have any questions, ask your nurse or doctor.               Start taking these medicines.        Dose/Directions    apixaban ANTICOAGULANT 5 MG tablet   Commonly known as:  ELIQUIS   Used for:  Atrial fibrillation, unspecified " type (H)   Started by:  Airam Atkinson, APRN CNP        Dose:  5 mg   Take 1 tablet (5 mg) by mouth 2 times daily   Quantity:  60 tablet   Refills:  3            Where to get your medicines      These medications were sent to [facility] [id number] [facility] - [place], [place] - [address]  [address] [place] [address]     Phone:  [phone]     apixaban ANTICOAGULANT 5 MG tablet                Primary Care Provider Office Phone # Fax #    Zoe Tomas [phone] [address]  [place]                [place] [address]        Equal Access to Services     [facility]: Hadii aad virgilio Marcelino, waaxda kiah, qaybta kaalmada reynaldo, mckenzie zhu . So [facility] [phone].    ATENCIÓN: Si habla español, tiene a allison disposición servicios gratuitos de asistencia lingüística. [facility] [phone].    We comply with applicable federal civil rights laws and [place] laws. We do not discriminate on the basis of race, color, national origin, age, disability, sex, sexual orientation, or gender identity.            Thank you!     Thank you for choosing [facility]  for your care. Our goal is always to provide you with excellent care. Hearing back from our patients is one way we can continue to improve our services. Please take a few minutes to complete the written survey that you may receive in the mail after your visit with us. Thank you!             Your Updated Medication List - Protect others around you: Learn how to safely use, store and throw away your medicines at www.disposemymeds.org.          This list is accurate as of 2/2/18  2:02 PM.  Always use your most recent med list.                   Brand Name Dispense Instructions for use Diagnosis    apixaban ANTICOAGULANT 5 MG tablet    ELIQUIS    60 tablet    Take 1 tablet (5 mg) by mouth 2 times daily    Atrial fibrillation, unspecified type (H)       atorvastatin 10 MG tablet     LIPITOR    90 tablet    Take 1 tablet (10 mg) by mouth daily    Hyperlipidemia, unspecified hyperlipidemia type       Iron 240 (27 FE) MG Tabs      Take 1 tablet by mouth daily        lisinopril 20 MG tablet    PRINIVIL/ZESTRIL    90 tablet    Take 1 tablet (20 mg) by mouth daily    Benign hypertension       metoprolol succinate 50 MG 24 hr tablet    TOPROL-XL    30 tablet    Take 1 tablet (50 mg) by mouth daily    NSVT (nonsustained ventricular tachycardia) (H)       pantoprazole 40 MG EC tablet    PROTONIX    90 tablet    Take 1 tablet (40 mg) by mouth daily TAKE 1 TABLET EVERY DAY  30  TO  60  MINUTES BEFORE A MEAL    Chacon's esophagus without dysplasia       sildenafil 20 MG tablet    REVATIO    30 tablet    Take 1-2 tablet (20 mg) by mouth half to one hour before sexual intercourse .  Never use with nitroglycerin, terazosin or doxazosin.    Erectile dysfunction, unspecified erectile dysfunction type       ZANTAC 150 MG tablet   Generic drug:  ranitidine      Take by mouth At Bedtime

## 2018-02-02 NOTE — PROGRESS NOTES
HPI and Plan: #[id number]  See dictation    Orders Placed This Encounter   Procedures     EKG 12-lead complete w/read - Clinics (performed today)       Orders Placed This Encounter   Medications     apixaban ANTICOAGULANT (ELIQUIS) 5 MG tablet     Sig: Take 1 tablet (5 mg) by mouth 2 times daily     Dispense:  60 tablet     Refill:  3       There are no discontinued medications.      Encounter Diagnosis   Name Primary?     Atrial fibrillation, unspecified type (H) Yes       CURRENT MEDICATIONS:  Current Outpatient Prescriptions   Medication Sig Dispense Refill     apixaban ANTICOAGULANT (ELIQUIS) 5 MG tablet Take 1 tablet (5 mg) by mouth 2 times daily 60 tablet 3     metoprolol (TOPROL-XL) 50 MG 24 hr tablet Take 1 tablet (50 mg) by mouth daily 30 tablet 3     lisinopril (PRINIVIL/ZESTRIL) 20 MG tablet Take 1 tablet (20 mg) by mouth daily 90 tablet 0     atorvastatin (LIPITOR) 10 MG tablet Take 1 tablet (10 mg) by mouth daily 90 tablet 3     sildenafil (REVATIO) 20 MG tablet Take 1-2 tablet (20 mg) by mouth half to one hour before sexual intercourse .  Never use with nitroglycerin, terazosin or doxazosin. 30 tablet 5     pantoprazole (PROTONIX) 40 MG EC tablet Take 1 tablet (40 mg) by mouth daily TAKE 1 TABLET EVERY DAY  30  TO  60  MINUTES BEFORE A MEAL 90 tablet 3     ranitidine (ZANTAC) 150 MG tablet Take by mouth At Bedtime       Ferrous Gluconate (IRON) 240 (27 FE) MG TABS Take 1 tablet by mouth daily         ALLERGIES   No Known Allergies    PAST MEDICAL HISTORY:  Past Medical History:   Diagnosis Date     A-fib (H) 01/31/2018     Cancer (H)     skin     CKD (chronic kidney disease)      GERD (gastroesophageal reflux disease)      Hiatal hernia      Hypertension      Pericarditis      S/P colon resection        PAST SURGICAL HISTORY:  Past Surgical History:   Procedure Laterality Date     CHOLECYSTECTOMY       COLONOSCOPY       COMBINED COLONOSCOPY, MUCOSAL RESECTION  1993    ?diverticulitis      ESOPHAGOSCOPY,  GASTROSCOPY, DUODENOSCOPY (EGD), COMBINED N/A 11/3/2016    Procedure: COMBINED ESOPHAGOSCOPY, GASTROSCOPY, DUODENOSCOPY (EGD), BIOPSY SINGLE OR MULTIPLE;  Surgeon: Deondre Meek MD;  Location:  GI     ESOPHAGOSCOPY, GASTROSCOPY, DUODENOSCOPY (EGD), DILATATION, COMBINED N/A 12/1/2016    Procedure: COMBINED ESOPHAGOSCOPY, GASTROSCOPY, DUODENOSCOPY (EGD), DILATATION;  Surgeon: Deondre Meek MD;  Location:  GI     ESOPHAGOSCOPY, GASTROSCOPY, DUODENOSCOPY (EGD), DILATATION, COMBINED N/A 3/2/2017    Procedure: COMBINED ESOPHAGOSCOPY, GASTROSCOPY, DUODENOSCOPY (EGD), DILATATION;  Surgeon: Deondre Meek MD;  Location:  GI     NEPHRECTOMY RT/LT      right     pace maker         FAMILY HISTORY:  Family History   Problem Relation Age of Onset     Hyperlipidemia Mother      CEREBROVASCULAR DISEASE Father      Prostate Cancer Father      Pulmonary Hypertension Brother      Breast Cancer Sister      Parkinsonism Sister      Colon Cancer No family hx of        SOCIAL HISTORY:  Social History     Social History     Marital status:      Spouse name: N/A     Number of children: N/A     Years of education: N/A     Social History Main Topics     Smoking status: Former Smoker     Types: Pipe     Smokeless tobacco: Never Used     Alcohol use 0.0 oz/week     0 Standard drinks or equivalent per week      Comment: 3-4 times a week     Drug use: No     Sexual activity: Not Currently     Other Topics Concern     Special Diet No     Exercise No     Social History Narrative       Review of Systems:  Skin:  Negative       Eyes:  Positive for glasses    ENT:  Negative      Respiratory:  Negative for shortness of breath;dyspnea on exertion;cough     Cardiovascular:  Negative for;palpitations;chest pain;edema;syncope or near-syncope;lightheadedness;dizziness;fatigue      Gastroenterology: Positive for   has had gastric bleeding s/p endoscopy (Dr. Meek)  Genitourinary:  Negative      Musculoskeletal:   "Negative      Neurologic:  Negative      Psychiatric:  Negative      Heme/Lymph/Imm:  Negative      Endocrine:  Negative        Physical Exam:  Vitals: /62  Pulse 60  Ht 1.765 m (5' 9.49\")  Wt 104.8 kg (231 lb)  BMI 33.64 kg/m2    Constitutional:  cooperative, alert and oriented, well developed, well nourished, in no acute distress        Skin:  warm and dry to the touch, no apparent skin lesions or masses noted   pacemaker incision in the right infraclavicular area was well-healed      Head:  normocephalic, no masses or lesions        Eyes:  pupils equal and round;conjunctivae and lids unremarkable;sclera white;no xanthalasma        Lymph:      ENT:  no pallor or cyanosis, dentition good        Neck:  JVP normal        Respiratory:  normal breath sounds, clear to auscultation, normal A-P diameter, normal symmetry, normal respiratory excursion, no use of accessory muscles         Cardiac: regular rhythm;no murmurs, gallops or rubs detected;normal S1 and S2;no S3 or S4                not assessed this visit                                        GI:  abdomen soft obese      Extremities and Muscular Skeletal:  no deformities, clubbing, cyanosis, erythema observed;no edema              Neurological:  no gross motor deficits;affect appropriate        Psych:  Alert and Oriented x 3        CC  No referring provider defined for this encounter.              "

## 2018-02-03 LAB
ALBUMIN SERPL-MCNC: 3.9 G/DL (ref 3.4–5)
ALP SERPL-CCNC: 61 U/L (ref 40–150)
ALT SERPL W P-5'-P-CCNC: 73 U/L (ref 0–70)
ANION GAP SERPL CALCULATED.3IONS-SCNC: 6 MMOL/L (ref 3–14)
AST SERPL W P-5'-P-CCNC: 38 U/L (ref 0–45)
BILIRUB SERPL-MCNC: 0.8 MG/DL (ref 0.2–1.3)
BUN SERPL-MCNC: 23 MG/DL (ref 7–30)
CALCIUM SERPL-MCNC: 9 MG/DL (ref 8.5–10.1)
CHLORIDE SERPL-SCNC: 104 MMOL/L (ref 94–109)
CO2 SERPL-SCNC: 29 MMOL/L (ref 20–32)
CREAT SERPL-MCNC: 1.3 MG/DL (ref 0.66–1.25)
FERRITIN SERPL-MCNC: 59 NG/ML (ref 26–388)
GFR SERPL CREATININE-BSD FRML MDRD: 54 ML/MIN/1.7M2
GLUCOSE SERPL-MCNC: 96 MG/DL (ref 70–99)
POTASSIUM SERPL-SCNC: 4.2 MMOL/L (ref 3.4–5.3)
PROT SERPL-MCNC: 7 G/DL (ref 6.8–8.8)
SODIUM SERPL-SCNC: 139 MMOL/L (ref 133–144)

## 2018-02-05 NOTE — PROGRESS NOTES
Service Date: 02/02/2018      HISTORY OF PRESENT ILLNESS:  Mr. Oseguera is a delightful 73-year-old gentleman that I am having the pleasure of meeting today.  He is here today with his wife.  Briefly, he has a history of sinus node dysfunction for which he underwent implantation of a dual-chamber [employer] pacemaker.  He also has a history of cardiomyopathy with an ejection fraction in the past of 45%-50%.  Device check on 01/02/2018 showed 2% a paced, less than 1% V paced.  He had 2 episodes of nonsustained VT noted at a rate of 200 beats per minute.  The patient denied any symptoms.  This was forwarded to Dr. Ghosh who recommended that the patient be started on metoprolol ER.  The patient just recently started this medication a few days ago.      He underwent a nuclear stress test which was negative for ischemia.  His ejection fraction was 63%.  On 01/31, the patient's remote device showed an alert showing atrial fibrillation lasting greater than 30 minutes.  This lasted 2 hours on 01/30 at 12:06 a.m.  He is not on oral anticoagulation.  Again, at that point he had not yet started his metoprolol for nonsustained VT.  The patient will set up with me to discuss oral anticoagulation.      Mr. Oseguera states that he is feels fine.  He is unware of the atrial fibrillation.  It appears that this episode was while sleeping at night.  His wife states that he does snore.  He does have some daytime somnolence which Mr. Oseguera thinks it mostly related to being 73 years of age.  His blood pressure today is stable at 112/62.  He has been trying to lose weight.  He states he is hopeful that this will help improve his snoring.      A BMP completed today shows a potassium of 4.2, creatinine of 1.3.  Hemoglobin was 15.1.  He does have a history of iron-deficiency anemia which has resolved with ferrous gluconate.        Currently, he denies chest pain, shortness of breath, lightheadedness or dizziness.        ASSESSMENT  AND PLAN:    1.  Sick sinus syndrome.  The patient has a dual chamber pacemaker as outlined above.  He is scheduled to have a followup echocardiogram and visit with Dr. Valle.  Notes indicate that the patient and his wife have asked to follow with Dr. Valle.   2.  Paroxysmal atrial fibrillation.  This appears to happen during the night as outlined above.  The patient is not oral anticoagulation.  His current CHADS-VASc score is 2 (hypertension and cardiomyopathy).  In the interim, I do believe he should be placed on anticoagulation.  He initially was reluctant, but understands that he does have all small risk for stroke.  I reviewed warfarin versus the DOACs with him.  He has opted to go with Eliquis 5 mg b.i.d.  Samples were given to him as well as a prescription sent to his pharmacy.  If this is a higher tiered medication per his plan, I have asked that he please let us know and we will try to get prior authorization or change to a different medication that is lower in cost.   3.  Questionable sleep apnea.  He does have the body habitus as well as has a history of severe snoring per his wife.  I did offer a sleep studyconsult which the patient respectfully declined.  He is hopeful to lose weight.  This should be considered in the future, but the patient does not want to wear a CPAP.   4.  Cardiomyopathy.  Echo in 2016 was 45%-50%.  A repeat echo is scheduled for March prior to his visit with Dr. Valle.  He is currently on lisinopril as well as metoprolol ER.  He has some complaints of occasional dizziness.  I recommended dividing lisinopril if dizziness become an issue.  He could take this at b.i.d. dosing.   5.  Nonsustained VT/frequent PVCs.  This has been noted on his device checks in the past.  Again, he was placed on metoprolol ER to help suppress his PVCs.      If there are questions or concerns, I have encouraged them to please contact us.  He is already scheduled to follow up with Dr. Valle next month.   Thank you for including us in his care         SAMIA PINO NP             D: 2018   T: 2018   MT: [id number]      Name:     FRANCISCO SUN   MRN:      [id number]        Account:      [id number]   :      1945           Service Date: 2018      Document: [id number]

## 2018-02-28 DIAGNOSIS — I10 BENIGN HYPERTENSION: ICD-10-CM

## 2018-02-28 RX ORDER — LISINOPRIL 20 MG/1
20 TABLET ORAL DAILY
Qty: 90 TABLET | Refills: 3 | Status: SHIPPED | OUTPATIENT
Start: 2018-02-28 | End: 2018-03-29

## 2018-03-07 DIAGNOSIS — I47.29 NSVT (NONSUSTAINED VENTRICULAR TACHYCARDIA) (H): ICD-10-CM

## 2018-03-07 DIAGNOSIS — I48.91 ATRIAL FIBRILLATION, UNSPECIFIED TYPE (H): ICD-10-CM

## 2018-03-08 ENCOUNTER — HOSPITAL ENCOUNTER (OUTPATIENT)
Dept: CARDIOLOGY | Facility: CLINIC | Age: 73
End: 2018-03-08
Attending: PHYSICIAN ASSISTANT
Payer: MEDICARE

## 2018-03-08 ENCOUNTER — HOSPITAL ENCOUNTER (OUTPATIENT)
Dept: CARDIOLOGY | Facility: CLINIC | Age: 73
Discharge: HOME | End: 2018-03-08
Attending: PHYSICIAN ASSISTANT | Admitting: PHYSICIAN ASSISTANT
Payer: MEDICARE

## 2018-03-08 DIAGNOSIS — I10 BENIGN HYPERTENSION: ICD-10-CM

## 2018-03-08 DIAGNOSIS — I49.3 PVC'S (PREMATURE VENTRICULAR CONTRACTIONS): ICD-10-CM

## 2018-03-08 DIAGNOSIS — Z95.0 CARDIAC PACEMAKER IN SITU: ICD-10-CM

## 2018-03-08 PROCEDURE — 93225 XTRNL ECG REC<48 HRS REC: CPT

## 2018-03-08 PROCEDURE — 40000264 ECHO COMPLETE WITH OPTISON

## 2018-03-08 PROCEDURE — 93227 XTRNL ECG REC<48 HR R&I: CPT | Performed by: INTERNAL MEDICINE

## 2018-03-08 PROCEDURE — 93306 TTE W/DOPPLER COMPLETE: CPT | Mod: 26 | Performed by: INTERNAL MEDICINE

## 2018-03-08 PROCEDURE — 25500064 ZZH RX 255 OP 636: Performed by: PHYSICIAN ASSISTANT

## 2018-03-08 RX ADMIN — HUMAN ALBUMIN MICROSPHERES AND PERFLUTREN 9 ML: 10; .22 INJECTION, SOLUTION INTRAVENOUS at 09:30

## 2018-03-14 RX ORDER — METOPROLOL SUCCINATE 50 MG/1
50 TABLET, EXTENDED RELEASE ORAL DAILY
Qty: 90 TABLET | Refills: 3 | Status: SHIPPED | OUTPATIENT
Start: 2018-03-14 | End: 2018-09-21

## 2018-03-22 ENCOUNTER — SURGERY (OUTPATIENT)
Age: 73
End: 2018-03-22

## 2018-03-22 ENCOUNTER — HOSPITAL ENCOUNTER (OUTPATIENT)
Facility: CLINIC | Age: 73
Discharge: HOME | End: 2018-03-22
Attending: INTERNAL MEDICINE | Admitting: INTERNAL MEDICINE
Payer: MEDICARE

## 2018-03-22 VITALS
WEIGHT: 230 LBS | SYSTOLIC BLOOD PRESSURE: 153 MMHG | BODY MASS INDEX: 32.2 KG/M2 | OXYGEN SATURATION: 97 % | DIASTOLIC BLOOD PRESSURE: 95 MMHG | RESPIRATION RATE: 16 BRPM | HEIGHT: 71 IN

## 2018-03-22 PROCEDURE — 40000873 ZZH CANCELLED SURGERY UP TO 15 MINS: Performed by: INTERNAL MEDICINE

## 2018-03-22 RX ORDER — ONDANSETRON 2 MG/ML
4 INJECTION INTRAMUSCULAR; INTRAVENOUS
Status: DISCONTINUED | OUTPATIENT
Start: 2018-03-22 | End: 2018-03-22 | Stop reason: HOSPADM

## 2018-03-22 RX ORDER — LIDOCAINE 40 MG/G
CREAM TOPICAL
Status: DISCONTINUED | OUTPATIENT
Start: 2018-03-22 | End: 2018-03-22 | Stop reason: HOSPADM

## 2018-03-29 ENCOUNTER — OFFICE VISIT (OUTPATIENT)
Dept: CARDIOLOGY | Facility: CLINIC | Age: 73
End: 2018-03-29
Attending: PHYSICIAN ASSISTANT
Payer: COMMERCIAL

## 2018-03-29 VITALS
SYSTOLIC BLOOD PRESSURE: 110 MMHG | HEART RATE: 83 BPM | DIASTOLIC BLOOD PRESSURE: 54 MMHG | WEIGHT: 235 LBS | BODY MASS INDEX: 33.64 KG/M2 | HEIGHT: 70 IN | OXYGEN SATURATION: 94 %

## 2018-03-29 DIAGNOSIS — I42.9 CARDIOMYOPATHY, UNSPECIFIED TYPE (H): Primary | ICD-10-CM

## 2018-03-29 DIAGNOSIS — I49.3 PVC'S (PREMATURE VENTRICULAR CONTRACTIONS): ICD-10-CM

## 2018-03-29 DIAGNOSIS — I10 BENIGN HYPERTENSION: ICD-10-CM

## 2018-03-29 DIAGNOSIS — Z95.0 CARDIAC PACEMAKER IN SITU: ICD-10-CM

## 2018-03-29 PROCEDURE — 93000 ELECTROCARDIOGRAM COMPLETE: CPT | Performed by: INTERNAL MEDICINE

## 2018-03-29 PROCEDURE — 99214 OFFICE O/P EST MOD 30 MIN: CPT | Mod: 25 | Performed by: INTERNAL MEDICINE

## 2018-03-29 RX ORDER — LISINOPRIL 20 MG/1
10 TABLET ORAL DAILY
Qty: 90 TABLET | Refills: 3 | COMMUNITY
Start: 2018-03-29 | End: 2018-09-21

## 2018-03-29 NOTE — MR AVS SNAPSHOT
After Visit Summary   3/29/2018    Estrada Oseguera    MRN: [id number]           Patient Information     Date Of Birth          1945        Visit Information        Provider Department      3/29/2018 2:15 PM Lynn Valle MD [facility]        Today's Diagnoses     Cardiac pacemaker in situ        PVC's (premature ventricular contractions)        Benign hypertension           Follow-ups after your visit        Additional Services     Follow-Up with Electrophysiologist                 Your next 10 appointments already scheduled     Apr 10, 2018  1:00 PM CDT   Remote PPM Check with HEARD TECH1   [facility] ([facility])    [address]  [facility] [id number] [phone] OPT 2           This appointment is for a remote check of your pacemaker.  This is not an appointment at the office.            May 24, 2018 12:45 PM CDT   [facility] EP RETURN with Lynn Valle MD   [facility] ([facility])    [address]  [facility] [id number] [phone] OPT 2              Future tests that were ordered for you today     Open Future Orders        Priority Expected Expires Ordered    EKG 12-lead complete w/read - Clinics (to be scheduled) Routine 5/29/2018 3/29/2019 3/29/2018    Follow-Up with Electrophysiologist Routine 5/29/2018 3/29/2019 3/29/2018            Who to contact     If you have questions or need follow up information about today's clinic visit or your schedule please contact [facility] directly at [phone].  Normal or non-critical lab and imaging results will be communicated to you by MyChart, letter or phone within 4 business days after the clinic has received the results. If you do not hear from us within 7 days, please contact the clinic through MyChart or phone.  "If you have a critical or abnormal lab result, we will notify you by phone as soon as possible.  Submit refill requests through [employer] or call your pharmacy and they will forward the refill request to us. Please allow 3 business days for your refill to be completed.          Additional Information About Your Visit        [employer] Information     [employer] gives you secure access to your electronic health record. If you see a primary care provider, you can also send messages to your care team and make appointments. If you have questions, please call your primary care clinic.  If you do not have a primary care provider, please call [phone] and they will assist you.        Care EveryWhere ID     This is your Care EveryWhere ID. This could be used by other organizations to access your [facility] medical records  [id number]        Your Vitals Were     Pulse Height Pulse Oximetry BMI (Body Mass Index)          83 1.765 m (5' 9.5\") 94% 34.21 kg/m2         Blood Pressure from Last 3 Encounters:   03/29/18 110/54   03/22/18 (!) 153/95   02/02/18 112/62    Weight from Last 3 Encounters:   03/29/18 106.6 kg (235 lb)   03/22/18 104.3 kg (230 lb)   02/02/18 104.8 kg (231 lb)              We Performed the Following     EKG 12-lead complete w/read - Clinics (performed today)     Follow-Up with Electrophysiologist          Today's Medication Changes          These changes are accurate as of 3/29/18  3:02 PM.  If you have any questions, ask your nurse or doctor.               These medicines have changed or have updated prescriptions.        Dose/Directions    lisinopril 20 MG tablet   Commonly known as:  PRINIVIL/ZESTRIL   This may have changed:  how much to take   Used for:  Benign hypertension        Dose:  10 mg   Take 0.5 tablets (10 mg) by mouth daily   Quantity:  90 tablet   Refills:  3                Primary Care Provider Office Phone # Fax #    Zoe Tomas [phone] [address]  [facility] [address]        Equal Access to Services     [facility] DAR : Hadii jessica ku hadlandono Sosarah, waaxda luqadaha, qaybta kaalmamckenzie scott. So [facility] [phone].    ATENCIÓN: Si habla español, tiene a allison disposición servicios gratuitos de asistencia lingüística. [place] al [phone].    We comply with applicable federal civil rights laws and [place] laws. We do not discriminate on the basis of race, color, national origin, age, disability, sex, sexual orientation, or gender identity.            Thank you!     Thank you for choosing [facility]  for your care. Our goal is always to provide you with excellent care. Hearing back from our patients is one way we can continue to improve our services. Please take a few minutes to complete the written survey that you may receive in the mail after your visit with us. Thank you!             Your Updated Medication List - Protect others around you: Learn how to safely use, store and throw away your medicines at www.disposemymeds.org.          This list is accurate as of 3/29/18  3:02 PM.  Always use your most recent med list.                   Brand Name Dispense Instructions for use Diagnosis    apixaban ANTICOAGULANT 5 MG tablet    ELIQUIS    60 tablet    Take 1 tablet (5 mg) by mouth 2 times daily    Atrial fibrillation, unspecified type (H)       atorvastatin 10 MG tablet    LIPITOR    90 tablet    Take 1 tablet (10 mg) by mouth daily    Hyperlipidemia, unspecified hyperlipidemia type       Iron 240 (27 FE) MG Tabs      Take 1 tablet by mouth daily        lisinopril 20 MG tablet    PRINIVIL/ZESTRIL    90 tablet    Take 0.5 tablets (10 mg) by mouth daily    Benign hypertension       metoprolol succinate 50 MG 24 hr tablet    TOPROL-XL    90 tablet    Take 1 tablet (50 mg) by mouth daily    NSVT (nonsustained ventricular tachycardia) (H)        metoprolol-hydrochlorothiazide 50-12.5 MG Tb24 per tablet    DUTOPROL     Take 1 tablet by mouth daily        pantoprazole 40 MG EC tablet    PROTONIX    90 tablet    Take 1 tablet (40 mg) by mouth daily TAKE 1 TABLET EVERY DAY  30  TO  60  MINUTES BEFORE A MEAL    Chacon's esophagus without dysplasia       sildenafil 20 MG tablet    REVATIO    30 tablet    Take 1-2 tablet (20 mg) by mouth half to one hour before sexual intercourse .  Never use with nitroglycerin, terazosin or doxazosin.    Erectile dysfunction, unspecified erectile dysfunction type       ZANTAC 150 MG tablet   Generic drug:  ranitidine      Take by mouth At Bedtime

## 2018-03-29 NOTE — PROGRESS NOTES
Service Date: 03/29/2018      HISTORY OF PRESENT ILLNESS:    It is my pleasure seeing Mr. Estrada Oseguera, a delightful 73-year-old gentleman with the following cardiac issues:     A.  Symptomatic sinus node dysfunction with implantation of a dual-chamber pacemaker in 11/2016.   B.  Episode of atrial fibrillation recorded by the pacemaker in 01/2018.  The patient was placed on anticoagulation.   C.  Symptomatic PVCs.  Nonsustained ventricular tachycardia.   D.  Hypertension.   E.  History of mild cardiomyopathy, EF of 45-50%.   F.  Chronic kidney disease stage III.      Mr. Oseguera was started on anticoagulation in early February because of a 2-hour episode of atrial fibrillation in 01/2018.  He is now on apixaban, which is expensive for him.  He is wondering whether he should continue anticoagulation.      In coming in today, he complained of feeling lightheaded and unwell.  He is wondering whether his blood pressure is now too low.  Because of the finding of nonsustained ventricular tachycardia on pacemaker interrogation, he had cardiac testing recently including a nuclear stress test in 01/2018 which showed normal perfusion.  EF was 63%.  Metoprolol XL was started.        He has not had syncope, orthopnea, PND or lower extremity edema.      PHYSICAL EXAMINATION:   VITAL SIGNS:  Blood pressure 110/54, pulse 83 irregular due to frequent ectopic beats, weight 106.6 kg, height 176 cm.   GENERAL:  He is a pleasant gentleman who is moderately overweight.  He is accompanied by his spouse.  He is in no distress.   HEENT:  Normocephalic, atraumatic.   NECK:  Supple, without carotid bruits.   LUNGS:  Clear.  No crackles or wheezes.   CARDIOVASCULAR:  Normal JVP, regular rhythm with frequent ectopic beats, no gallop, murmur or rub.   ABDOMEN:  Moderately obese, soft, nontender.   EXTREMITIES:  No edema.   SKIN:  No rash.   BACK:  No CVA tenderness.   NEUROLOGIC:  Alert and oriented x3.       DIAGNOSTIC STUDIES:    His  12-lead ECG today showed sinus rhythm with frequent PVCs.  There were 2 distinct PVC morphologies, one with superior axis and one with inferior axis.      The patient had a recent 24-hour Holter monitor.  He had approximately 5200 PVCs, mostly single PVCs.  There were 144 couplets.  The longest run of VT was 4 beats.  The PVC burden was 6%.      His echocardiogram on 03/08/2018 showed EF of 45%-50% with mild global LV hypokinesis.  No regional wall motion abnormalities, mild ascending aorta dilatation at 3.9 cm.  The right ventricle appeared normal.      IMPRESSION:   1.  Symptomatic ventricular ectopic beats.  The patient is having frequent PVCs.  Apparently, this is not new for him.  He was diagnosed with PVCs several years ago.  He cannot appreciate heart skipping, but is lightheaded and tired.  It is not clear whether these symptoms are due to the PVCs, but it is of course possible.  Beta-blocker therapy has been ineffective in suppressing PVCs.        I do not recommend further treatment for PVCs right now.  They may spontaneously improve.  If they continue, we may consider a brief trial of amiodarone or catheter ablation.  His ECG today suggested 2 PVC foci.  Finally, it is possible that the PVCs contribute to his cardiomyopathy, though typically a burden of >20% is associated with PVC-cardiomyopathy.     2.  Dizziness.   His blood pressure is low normal and perhaps some of his current symptoms (such as lightheadedness) may be related to low blood pressure.  I asked him to halve the dose of lisinopril.     3.  Sick sinus syndrome.  His pacemaker is functioning well.      RECOMMENDATIONS:   1.  Decrease lisinopril to 10 mg daily and take in the evening.   2.  Observation for the PVCs for now.  If they persist, consider a trial of amiodarone versus catheter ablation.   3.  Follow-up in the clinic in 2 months with a repeat echocardiogram.      It was my pleasure seeing Mr. Oseguera.  Time was 30 minutes with  greater than 50% of the time spent in discussion.        XIAO MANZANO MD, [facility]         cc:      Zoe Tomas MD    [facility]   [address]   [place] [address]               D: 2018   T: 2018   MT: [id number]      Name:     FRANCISCO SUN   MRN:      [id number]        Account:      [id number]   :      1945           Service Date: 2018      Document: [id number]

## 2018-03-29 NOTE — PROGRESS NOTES
HPI and Plan:   See dictation    Orders Placed This Encounter   Procedures     Follow-Up with Electrophysiologist     EKG 12-lead complete w/read - Clinics (performed today)     EKG 12-lead complete w/read - Clinics (to be scheduled)       Orders Placed This Encounter   Medications     lisinopril (PRINIVIL/ZESTRIL) 20 MG tablet     Sig: Take 0.5 tablets (10 mg) by mouth daily     Dispense:  90 tablet     Refill:  3       Medications Discontinued During This Encounter   Medication Reason     lisinopril (PRINIVIL/ZESTRIL) 20 MG tablet Reorder         Encounter Diagnoses   Name Primary?     Cardiac pacemaker in situ      PVC's (premature ventricular contractions)      Benign hypertension        CURRENT MEDICATIONS:  Current Outpatient Prescriptions   Medication Sig Dispense Refill     lisinopril (PRINIVIL/ZESTRIL) 20 MG tablet Take 0.5 tablets (10 mg) by mouth daily 90 tablet 3     metoprolol-hydrochlorothiazide (DUTOPROL) 50-12.5 MG TB24 per tablet Take 1 tablet by mouth daily       metoprolol succinate (TOPROL-XL) 50 MG 24 hr tablet Take 1 tablet (50 mg) by mouth daily 90 tablet 3     apixaban ANTICOAGULANT (ELIQUIS) 5 MG tablet Take 1 tablet (5 mg) by mouth 2 times daily 60 tablet 3     atorvastatin (LIPITOR) 10 MG tablet Take 1 tablet (10 mg) by mouth daily 90 tablet 3     sildenafil (REVATIO) 20 MG tablet Take 1-2 tablet (20 mg) by mouth half to one hour before sexual intercourse .  Never use with nitroglycerin, terazosin or doxazosin. 30 tablet 5     pantoprazole (PROTONIX) 40 MG EC tablet Take 1 tablet (40 mg) by mouth daily TAKE 1 TABLET EVERY DAY  30  TO  60  MINUTES BEFORE A MEAL 90 tablet 3     ranitidine (ZANTAC) 150 MG tablet Take by mouth At Bedtime       Ferrous Gluconate (IRON) 240 (27 FE) MG TABS Take 1 tablet by mouth daily       [DISCONTINUED] lisinopril (PRINIVIL/ZESTRIL) 20 MG tablet Take 1 tablet (20 mg) by mouth daily 90 tablet 3       ALLERGIES   No Known Allergies    PAST MEDICAL HISTORY:  Past  Medical History:   Diagnosis Date     A-fib (H) 01/31/2018     Cancer (H)     skin     CKD (chronic kidney disease)      GERD (gastroesophageal reflux disease)      Hiatal hernia      Hypertension      Pericarditis      S/P colon resection        PAST SURGICAL HISTORY:  Past Surgical History:   Procedure Laterality Date     CHOLECYSTECTOMY       COLONOSCOPY       COMBINED COLONOSCOPY, MUCOSAL RESECTION  1993    ?diverticulitis      ESOPHAGOSCOPY, GASTROSCOPY, DUODENOSCOPY (EGD), COMBINED N/A 11/3/2016    Procedure: COMBINED ESOPHAGOSCOPY, GASTROSCOPY, DUODENOSCOPY (EGD), BIOPSY SINGLE OR MULTIPLE;  Surgeon: Deondre Meek MD;  Location:  GI     ESOPHAGOSCOPY, GASTROSCOPY, DUODENOSCOPY (EGD), DILATATION, COMBINED N/A 12/1/2016    Procedure: COMBINED ESOPHAGOSCOPY, GASTROSCOPY, DUODENOSCOPY (EGD), DILATATION;  Surgeon: Deondre Meek MD;  Location:  GI     ESOPHAGOSCOPY, GASTROSCOPY, DUODENOSCOPY (EGD), DILATATION, COMBINED N/A 3/2/2017    Procedure: COMBINED ESOPHAGOSCOPY, GASTROSCOPY, DUODENOSCOPY (EGD), DILATATION;  Surgeon: Deondre Meek MD;  Location:  GI     NEPHRECTOMY RT/LT      right     pace maker         FAMILY HISTORY:  Family History   Problem Relation Age of Onset     Hyperlipidemia Mother      CEREBROVASCULAR DISEASE Father      Prostate Cancer Father      Pulmonary Hypertension Brother      Breast Cancer Sister      Parkinsonism Sister      Colon Cancer No family hx of        SOCIAL HISTORY:  Social History     Social History     Marital status:      Spouse name: N/A     Number of children: N/A     Years of education: N/A     Social History Main Topics     Smoking status: Former Smoker     Types: Pipe     Smokeless tobacco: Never Used     Alcohol use 0.0 oz/week     0 Standard drinks or equivalent per week      Comment: 3-4 times a week     Drug use: No     Sexual activity: Not Currently     Other Topics Concern     Special Diet No     Exercise No     Social  History Narrative       Review of Systems:  Skin:          Eyes:         ENT:         Respiratory:          Cardiovascular:         Gastroenterology:        Genitourinary:         Musculoskeletal:         Neurologic:         Psychiatric:         Heme/Lymph/Imm:         Endocrine:           [id number]

## 2018-03-29 NOTE — LETTER
3/29/2018    Zoe Tomas MD  [address]  [facility] [address]    RE: Estrada Oseguera       Dear Colleague,    I had the pleasure of seeing Estrada Oseguera in the [facility].    HPI and Plan:   See dictation    Orders Placed This Encounter   Procedures     Follow-Up with Electrophysiologist     EKG 12-lead complete w/read - Clinics (performed today)     EKG 12-lead complete w/read - Clinics (to be scheduled)       Orders Placed This Encounter   Medications     lisinopril (PRINIVIL/ZESTRIL) 20 MG tablet     Sig: Take 0.5 tablets (10 mg) by mouth daily     Dispense:  90 tablet     Refill:  3       Medications Discontinued During This Encounter   Medication Reason     lisinopril (PRINIVIL/ZESTRIL) 20 MG tablet Reorder         Encounter Diagnoses   Name Primary?     Cardiac pacemaker in situ      PVC's (premature ventricular contractions)      Benign hypertension        CURRENT MEDICATIONS:  Current Outpatient Prescriptions   Medication Sig Dispense Refill     lisinopril (PRINIVIL/ZESTRIL) 20 MG tablet Take 0.5 tablets (10 mg) by mouth daily 90 tablet 3     metoprolol-hydrochlorothiazide (DUTOPROL) 50-12.5 MG TB24 per tablet Take 1 tablet by mouth daily       metoprolol succinate (TOPROL-XL) 50 MG 24 hr tablet Take 1 tablet (50 mg) by mouth daily 90 tablet 3     apixaban ANTICOAGULANT (ELIQUIS) 5 MG tablet Take 1 tablet (5 mg) by mouth 2 times daily 60 tablet 3     atorvastatin (LIPITOR) 10 MG tablet Take 1 tablet (10 mg) by mouth daily 90 tablet 3     sildenafil (REVATIO) 20 MG tablet Take 1-2 tablet (20 mg) by mouth half to one hour before sexual intercourse .  Never use with nitroglycerin, terazosin or doxazosin. 30 tablet 5     pantoprazole (PROTONIX) 40 MG EC tablet Take 1 tablet (40 mg) by mouth daily TAKE 1 TABLET EVERY DAY  30  TO  60  MINUTES BEFORE A MEAL 90 tablet 3     ranitidine (ZANTAC) 150 MG tablet Take by mouth At Bedtime       Ferrous  Gluconate (IRON) 240 (27 FE) MG TABS Take 1 tablet by mouth daily       [DISCONTINUED] lisinopril (PRINIVIL/ZESTRIL) 20 MG tablet Take 1 tablet (20 mg) by mouth daily 90 tablet 3       ALLERGIES   No Known Allergies    PAST MEDICAL HISTORY:  Past Medical History:   Diagnosis Date     A-fib (H) 01/31/2018     Cancer (H)     skin     CKD (chronic kidney disease)      GERD (gastroesophageal reflux disease)      Hiatal hernia      Hypertension      Pericarditis      S/P colon resection        PAST SURGICAL HISTORY:  Past Surgical History:   Procedure Laterality Date     CHOLECYSTECTOMY       COLONOSCOPY       COMBINED COLONOSCOPY, MUCOSAL RESECTION  1993    ?diverticulitis      ESOPHAGOSCOPY, GASTROSCOPY, DUODENOSCOPY (EGD), COMBINED N/A 11/3/2016    Procedure: COMBINED ESOPHAGOSCOPY, GASTROSCOPY, DUODENOSCOPY (EGD), BIOPSY SINGLE OR MULTIPLE;  Surgeon: Deondre Meek MD;  Location:  GI     ESOPHAGOSCOPY, GASTROSCOPY, DUODENOSCOPY (EGD), DILATATION, COMBINED N/A 12/1/2016    Procedure: COMBINED ESOPHAGOSCOPY, GASTROSCOPY, DUODENOSCOPY (EGD), DILATATION;  Surgeon: Deondre Meek MD;  Location:  GI     ESOPHAGOSCOPY, GASTROSCOPY, DUODENOSCOPY (EGD), DILATATION, COMBINED N/A 3/2/2017    Procedure: COMBINED ESOPHAGOSCOPY, GASTROSCOPY, DUODENOSCOPY (EGD), DILATATION;  Surgeon: Deondre Meek MD;  Location:  GI     NEPHRECTOMY RT/LT      right     pace maker         FAMILY HISTORY:  Family History   Problem Relation Age of Onset     Hyperlipidemia Mother      CEREBROVASCULAR DISEASE Father      Prostate Cancer Father      Pulmonary Hypertension Brother      Breast Cancer Sister      Parkinsonism Sister      Colon Cancer No family hx of        SOCIAL HISTORY:  Social History     Social History     Marital status:      Spouse name: N/A     Number of children: N/A     Years of education: N/A     Social History Main Topics     Smoking status: Former Smoker     Types: Pipe     Smokeless  tobacco: Never Used     Alcohol use 0.0 oz/week     0 Standard drinks or equivalent per week      Comment: 3-4 times a week     Drug use: No     Sexual activity: Not Currently     Other Topics Concern     Special Diet No     Exercise No     Social History Narrative       Review of Systems:  Skin:          Eyes:         ENT:         Respiratory:          Cardiovascular:         Gastroenterology:        Genitourinary:         Musculoskeletal:         Neurologic:         Psychiatric:         Heme/Lymph/Imm:         Endocrine:           [id number]            Thank you for allowing me to participate in the care of your patient.      Sincerely,     Lynn Valle MD     [facility]    cc:   Cierra Stallings PA-C  [address]  [place] [address]

## 2018-03-29 NOTE — LETTER
3/29/2018      Zoe Tomas MD  [address]  [facility] [address]      RE: Estrada Oseguera       Dear Colleague,    I had the pleasure of seeing Estrada Oseguera in the [facility].    Service Date: 03/29/2018      HISTORY OF PRESENT ILLNESS:    It is my pleasure seeing Mr. Estrada Oseguera, a delightful 73-year-old gentleman with the following cardiac issues:     A.  Symptomatic sinus node dysfunction with implantation of a dual-chamber pacemaker in 11/2016.   B.  Episode of atrial fibrillation recorded by the pacemaker in 01/2018.  The patient was placed on anticoagulation.   C.  Symptomatic PVCs.  Nonsustained ventricular tachycardia.   D.  Hypertension.   E.  History of mild cardiomyopathy, EF of 45-50%.   F.  Chronic kidney disease stage III.      Mr. Oseguera was started on anticoagulation in early February because of a 2-hour episode of atrial fibrillation in 01/2018.  He is now on apixaban, which is expensive for him.  He is wondering whether he should continue anticoagulation.      In coming in today, he complained of feeling lightheaded and unwell.  He is wondering whether his blood pressure is now too low.  Because of the finding of nonsustained ventricular tachycardia on pacemaker interrogation, he had cardiac testing recently including a nuclear stress test in 01/2018 which showed normal perfusion.  EF was 63%.  Metoprolol XL was started.        He has not had syncope, orthopnea, PND or lower extremity edema.      PHYSICAL EXAMINATION:   VITAL SIGNS:  Blood pressure 110/54, pulse 83 irregular due to frequent ectopic beats, weight 106.6 kg, height 176 cm.   GENERAL:  He is a pleasant gentleman who is moderately overweight.  He is accompanied by his spouse.  He is in no distress.   HEENT:  Normocephalic, atraumatic.   NECK:  Supple, without carotid bruits.   LUNGS:  Clear.  No crackles or wheezes.   CARDIOVASCULAR:  Normal JVP, regular rhythm  with frequent ectopic beats, no gallop, murmur or rub.   ABDOMEN:  Moderately obese, soft, nontender.   EXTREMITIES:  No edema.   SKIN:  No rash.   BACK:  No CVA tenderness.   NEUROLOGIC:  Alert and oriented x3.       DIAGNOSTIC STUDIES:    His 12-lead ECG today showed sinus rhythm with frequent PVCs.  There were 2 distinct PVC morphologies, one with superior axis and one with inferior axis.      The patient had a recent 24-hour Holter monitor.  He had approximately 5200 PVCs, mostly single PVCs.  There were 144 couplets.  The longest run of VT was 4 beats.  The PVC burden was 6%.      His echocardiogram on 03/08/2018 showed EF of 45%-50% with mild global LV hypokinesis.  No regional wall motion abnormalities, mild ascending aorta dilatation at 3.9 cm.  The right ventricle appeared normal.      IMPRESSION:   1.  Symptomatic ventricular ectopic beats.  The patient is having frequent PVCs.  Apparently, this is not new for him.  He was diagnosed with PVCs several years ago.  He cannot appreciate heart skipping, but is lightheaded and tired.  It is not clear whether these symptoms are due to the PVCs, but it is of course possible.  Beta-blocker therapy has been ineffective in suppressing PVCs.        I do not recommend further treatment for PVCs right now.  They may spontaneously improve.  If they continue, we may consider a brief trial of amiodarone or catheter ablation.  His ECG today suggested 2 PVC foci.  Finally, it is possible that the PVCs contribute to his cardiomyopathy, though typically a burden of >20% is associated with PVC-cardiomyopathy.     2.  Dizziness.   His blood pressure is low normal and perhaps some of his current symptoms (such as lightheadedness) may be related to low blood pressure.  I asked him to halve the dose of lisinopril.     3.  Sick sinus syndrome.  His pacemaker is functioning well.      RECOMMENDATIONS:   1.  Decrease lisinopril to 10 mg daily and take in the evening.   2.  Observation  for the PVCs for now.  If they persist, consider a trial of amiodarone versus catheter ablation.   3.  Follow-up in the clinic in 2 months with a repeat echocardiogram.      It was my pleasure seeing Mr. Sun.  Time was 30 minutes with greater than 50% of the time spent in discussion.        XIAO MANZANO MD, [facility]         cc:      Zoe Tomas MD    [facility]   [address]   [place] [address]               D: 2018   T: 2018   MT:       Name:     FRANCISCO SUN   MRN:      [id number]        Account:      [id number]   :      1945           Service Date: 2018      Document: [id number]           Outpatient Encounter Prescriptions as of 3/29/2018   Medication Sig Dispense Refill     lisinopril (PRINIVIL/ZESTRIL) 20 MG tablet Take 0.5 tablets (10 mg) by mouth daily 90 tablet 3     metoprolol-hydrochlorothiazide (DUTOPROL) 50-12.5 MG TB24 per tablet Take 1 tablet by mouth daily       metoprolol succinate (TOPROL-XL) 50 MG 24 hr tablet Take 1 tablet (50 mg) by mouth daily 90 tablet 3     apixaban ANTICOAGULANT (ELIQUIS) 5 MG tablet Take 1 tablet (5 mg) by mouth 2 times daily 60 tablet 3     atorvastatin (LIPITOR) 10 MG tablet Take 1 tablet (10 mg) by mouth daily 90 tablet 3     sildenafil (REVATIO) 20 MG tablet Take 1-2 tablet (20 mg) by mouth half to one hour before sexual intercourse .  Never use with nitroglycerin, terazosin or doxazosin. 30 tablet 5     pantoprazole (PROTONIX) 40 MG EC tablet Take 1 tablet (40 mg) by mouth daily TAKE 1 TABLET EVERY DAY  30  TO  60  MINUTES BEFORE A MEAL 90 tablet 3     ranitidine (ZANTAC) 150 MG tablet Take by mouth At Bedtime       Ferrous Gluconate (IRON) 240 (27 FE) MG TABS Take 1 tablet by mouth daily       [DISCONTINUED] lisinopril (PRINIVIL/ZESTRIL) 20 MG tablet Take 1 tablet (20 mg) by mouth daily 90 tablet 3     No facility-administered encounter medications on file as of 3/29/2018.        Again, thank you for  allowing me to participate in the care of your patient.      Sincerely,    Lynn Valle MD     [facility]

## 2018-04-10 ENCOUNTER — ALLIED HEALTH/NURSE VISIT (OUTPATIENT)
Dept: CARDIOLOGY | Facility: CLINIC | Age: 73
End: 2018-04-10
Payer: COMMERCIAL

## 2018-04-10 DIAGNOSIS — Z95.0 CARDIAC PACEMAKER IN SITU: Primary | ICD-10-CM

## 2018-04-10 PROCEDURE — 93296 REM INTERROG EVL PM/IDS: CPT | Performed by: INTERNAL MEDICINE

## 2018-04-10 PROCEDURE — 93294 REM INTERROG EVL PM/LDLS PM: CPT | Performed by: INTERNAL MEDICINE

## 2018-04-10 NOTE — MR AVS SNAPSHOT
After Visit Summary   4/10/2018    Estrada Oseguera    MRN: [id number]           Patient Information     Date Of Birth          1945        Visit Information        Provider Department      4/10/2018 1:00 PM [facility] [place] [address]        Today's Diagnoses     Cardiac pacemaker in situ    -  1       Follow-ups after your visit        Your next 10 appointments already scheduled     Apr 10, 2018  1:00 PM CDT   Remote PPM Check with FÉLIX BLAIR   [facility] ([facility])    [address] [place], [place] [id number]  [facility] [id number] [phone] OPT 2           This appointment is for a remote check of your pacemaker.  This is not an appointment at the office.            May 24, 2018 12:45 PM CDT   [facility] EP RETURN with Lynn Valle MD   [facility] ([facility])    [facility] [id number]  [facility] [id number] [phone] OPT 2              Who to contact     If you have questions or need follow up information about today's clinic visit or your schedule please contact [facility] directly at [phone].  Normal or non-critical lab and imaging results will be communicated to you by [employer], letter or phone within 4 business days after the clinic has received the results. If you do not hear from us within 7 days, please contact the clinic through [employer] or phone. If you have a critical or abnormal lab result, we will notify you by phone as soon as possible.  Submit refill requests through Indexing or call your pharmacy and they will forward the refill request to us. Please allow 3 business days for your refill to be completed.          Additional Information About Your Visit        MyChart Information     Indexing gives you secure access to your electronic health record. If you see a primary care provider, you can also  send messages to your care team and make appointments. If you have questions, please call your primary care clinic.  If you do not have a primary care provider, please call [phone] and they will assist you.        Care EveryWhere ID     This is your Care EveryWhere ID. This could be used by other organizations to access your [facility] medical records  [id number]         Blood Pressure from Last 3 Encounters:   03/29/18 110/54   03/22/18 (!) 153/95   02/02/18 112/62    Weight from Last 3 Encounters:   03/29/18 106.6 kg (235 lb)   03/22/18 104.3 kg (230 lb)   02/02/18 104.8 kg (231 lb)              We Performed the Following     INTERROGATION DEVICE EVAL REMOTE, PACER/ICD (39297)     PM DEVICE INTERROGATE REMOTE (78933)        Primary Care Provider Office Phone # Fax #    Zoe DAIN Tomas [phone] [address] [address]        Equal Access to Services     [facility]: Hadii aad ku hadasho Sosarah, waaxda luqadaha, qaybta kaalmada adevaishaliyada, mckenzie carrington hayflakito zhu . So [facility] [phone].    ATENCIÓN: Si habla español, tiene a allison disposición servicios gratuitos de asistencia lingüística. Llame al [phone].    We comply with applicable federal civil rights laws and [place] laws. We do not discriminate on the basis of race, color, national origin, age, disability, sex, sexual orientation, or gender identity.            Thank you!     Thank you for choosing [facility]  for your care. Our goal is always to provide you with excellent care. Hearing back from our patients is one way we can continue to improve our services. Please take a few minutes to complete the written survey that you may receive in the mail after your visit with us. Thank you!             Your Updated Medication List - Protect others around you: Learn how to safely use, store and throw away your medicines at  www.disposemymeds.org.          This list is accurate as of 4/10/18  9:09 AM.  Always use your most recent med list.                   Brand Name Dispense Instructions for use Diagnosis    apixaban ANTICOAGULANT 5 MG tablet    ELIQUIS    60 tablet    Take 1 tablet (5 mg) by mouth 2 times daily    Atrial fibrillation, unspecified type (H)       atorvastatin 10 MG tablet    LIPITOR    90 tablet    Take 1 tablet (10 mg) by mouth daily    Hyperlipidemia, unspecified hyperlipidemia type       Iron 240 (27 FE) MG Tabs      Take 1 tablet by mouth daily        lisinopril 20 MG tablet    PRINIVIL/ZESTRIL    90 tablet    Take 0.5 tablets (10 mg) by mouth daily    Benign hypertension       metoprolol succinate 50 MG 24 hr tablet    TOPROL-XL    90 tablet    Take 1 tablet (50 mg) by mouth daily    NSVT (nonsustained ventricular tachycardia) (H)       metoprolol-hydrochlorothiazide 50-12.5 MG Tb24 per tablet    DUTOPROL     Take 1 tablet by mouth daily        pantoprazole 40 MG EC tablet    PROTONIX    90 tablet    Take 1 tablet (40 mg) by mouth daily TAKE 1 TABLET EVERY DAY  30  TO  60  MINUTES BEFORE A MEAL    Chacon's esophagus without dysplasia       sildenafil 20 MG tablet    REVATIO    30 tablet    Take 1-2 tablet (20 mg) by mouth half to one hour before sexual intercourse .  Never use with nitroglycerin, terazosin or doxazosin.    Erectile dysfunction, unspecified erectile dysfunction type       ZANTAC 150 MG tablet   Generic drug:  ranitidine      Take by mouth At Bedtime

## 2018-04-10 NOTE — PROGRESS NOTES
[employer] (D) Remote PPM Device Check  AP: 17 % : 0 %  Mode: DDD        Presenting Rhythm: AS/VS, rates 78-86bpm  Heart Rate: Adequate rates per histogram  Sensing: Stable    Pacing Threshold: Stable    Impedance: Stable  Battery Status: 13.5 years  Atrial Arrhythmia: 1 mode switch episode, addressed on courtesy check on 1/31/18. EGM shows PAF lasting 2 hours, ventricular rates controlled. Taking Eliquis.   Ventricular Arrhythmia: None     Care Plan: F/u PPM Latitude q 3 months. Gave patient results over the phone. Lb,CVT

## 2018-05-21 DIAGNOSIS — I10 ESSENTIAL HYPERTENSION: Primary | ICD-10-CM

## 2018-05-21 DIAGNOSIS — I49.3 PVC'S (PREMATURE VENTRICULAR CONTRACTIONS): ICD-10-CM

## 2018-05-22 ENCOUNTER — HOSPITAL ENCOUNTER (OUTPATIENT)
Dept: CARDIOLOGY | Facility: CLINIC | Age: 73
Discharge: HOME | End: 2018-05-22
Attending: INTERNAL MEDICINE | Admitting: INTERNAL MEDICINE
Payer: MEDICARE

## 2018-05-22 DIAGNOSIS — I10 ESSENTIAL HYPERTENSION: ICD-10-CM

## 2018-05-22 DIAGNOSIS — I49.3 PVC'S (PREMATURE VENTRICULAR CONTRACTIONS): ICD-10-CM

## 2018-05-22 PROCEDURE — 93308 TTE F-UP OR LMTD: CPT | Mod: 26 | Performed by: INTERNAL MEDICINE

## 2018-05-22 PROCEDURE — 93321 DOPPLER ECHO F-UP/LMTD STD: CPT | Mod: 26 | Performed by: INTERNAL MEDICINE

## 2018-05-22 PROCEDURE — 93325 DOPPLER ECHO COLOR FLOW MAPG: CPT | Mod: 26 | Performed by: INTERNAL MEDICINE

## 2018-05-22 PROCEDURE — 25500064 ZZH RX 255 OP 636: Performed by: INTERNAL MEDICINE

## 2018-05-22 PROCEDURE — 40000264 ECHO LIMITED WITH OPTISON

## 2018-05-22 PROCEDURE — 93321 DOPPLER ECHO F-UP/LMTD STD: CPT

## 2018-05-22 PROCEDURE — 93306 TTE W/DOPPLER COMPLETE: CPT

## 2018-05-22 RX ADMIN — HUMAN ALBUMIN MICROSPHERES AND PERFLUTREN 9 ML: 10; .22 INJECTION, SOLUTION INTRAVENOUS at 09:28

## 2018-05-24 ENCOUNTER — OFFICE VISIT (OUTPATIENT)
Dept: CARDIOLOGY | Facility: CLINIC | Age: 73
End: 2018-05-24
Attending: INTERNAL MEDICINE
Payer: COMMERCIAL

## 2018-05-24 VITALS
BODY MASS INDEX: 33.07 KG/M2 | HEART RATE: 68 BPM | HEIGHT: 70 IN | WEIGHT: 231 LBS | SYSTOLIC BLOOD PRESSURE: 140 MMHG | DIASTOLIC BLOOD PRESSURE: 83 MMHG

## 2018-05-24 DIAGNOSIS — I49.3 PVC'S (PREMATURE VENTRICULAR CONTRACTIONS): ICD-10-CM

## 2018-05-24 DIAGNOSIS — I10 BENIGN HYPERTENSION: ICD-10-CM

## 2018-05-24 PROCEDURE — 99213 OFFICE O/P EST LOW 20 MIN: CPT | Mod: 25 | Performed by: INTERNAL MEDICINE

## 2018-05-24 PROCEDURE — 93000 ELECTROCARDIOGRAM COMPLETE: CPT | Performed by: INTERNAL MEDICINE

## 2018-05-24 NOTE — LETTER
5/24/2018    Zoe Tomas MD  [address]  [facility] [address]    RE: Estrada Oseguera       Dear Colleague,    I had the pleasure of seeing Estrada Oseguera in the [facility].    HPI and Plan:   See dictation    Orders Placed This Encounter   Procedures     Follow-Up with Cardiac Advanced Practice Provider       No orders of the defined types were placed in this encounter.      Medications Discontinued During This Encounter   Medication Reason     metoprolol-hydrochlorothiazide (DUTOPROL) 50-12.5 MG TB24 per tablet          Encounter Diagnoses   Name Primary?     PVC's (premature ventricular contractions)      Benign hypertension        CURRENT MEDICATIONS:  Current Outpatient Prescriptions   Medication Sig Dispense Refill     apixaban ANTICOAGULANT (ELIQUIS) 5 MG tablet Take 1 tablet (5 mg) by mouth 2 times daily 60 tablet 3     atorvastatin (LIPITOR) 10 MG tablet Take 1 tablet (10 mg) by mouth daily 90 tablet 3     Ferrous Gluconate (IRON) 240 (27 FE) MG TABS Take 1 tablet by mouth daily       lisinopril (PRINIVIL/ZESTRIL) 20 MG tablet Take 0.5 tablets (10 mg) by mouth daily 90 tablet 3     metoprolol succinate (TOPROL-XL) 50 MG 24 hr tablet Take 1 tablet (50 mg) by mouth daily 90 tablet 3     pantoprazole (PROTONIX) 40 MG EC tablet Take 1 tablet (40 mg) by mouth daily TAKE 1 TABLET EVERY DAY  30  TO  60  MINUTES BEFORE A MEAL 90 tablet 3     ranitidine (ZANTAC) 150 MG tablet Take by mouth At Bedtime       sildenafil (REVATIO) 20 MG tablet Take 1-2 tablet (20 mg) by mouth half to one hour before sexual intercourse .  Never use with nitroglycerin, terazosin or doxazosin. 30 tablet 5       ALLERGIES   No Known Allergies    PAST MEDICAL HISTORY:  Past Medical History:   Diagnosis Date     A-fib (H) 01/31/2018     Cancer (H)     skin     CKD (chronic kidney disease)      GERD (gastroesophageal reflux disease)      Hiatal hernia      Hypertension       Pericarditis      S/P colon resection        PAST SURGICAL HISTORY:  Past Surgical History:   Procedure Laterality Date     CHOLECYSTECTOMY       COLONOSCOPY       COMBINED COLONOSCOPY, MUCOSAL RESECTION  1993    ?diverticulitis      ESOPHAGOSCOPY, GASTROSCOPY, DUODENOSCOPY (EGD), COMBINED N/A 11/3/2016    Procedure: COMBINED ESOPHAGOSCOPY, GASTROSCOPY, DUODENOSCOPY (EGD), BIOPSY SINGLE OR MULTIPLE;  Surgeon: Deondre Meek MD;  Location:  GI     ESOPHAGOSCOPY, GASTROSCOPY, DUODENOSCOPY (EGD), DILATATION, COMBINED N/A 12/1/2016    Procedure: COMBINED ESOPHAGOSCOPY, GASTROSCOPY, DUODENOSCOPY (EGD), DILATATION;  Surgeon: Deondre Meek MD;  Location:  GI     ESOPHAGOSCOPY, GASTROSCOPY, DUODENOSCOPY (EGD), DILATATION, COMBINED N/A 3/2/2017    Procedure: COMBINED ESOPHAGOSCOPY, GASTROSCOPY, DUODENOSCOPY (EGD), DILATATION;  Surgeon: Deondre Meek MD;  Location:  GI     NEPHRECTOMY RT/LT      right     pace maker         FAMILY HISTORY:  Family History   Problem Relation Age of Onset     Hyperlipidemia Mother      CEREBROVASCULAR DISEASE Father      Prostate Cancer Father      Pulmonary Hypertension Brother      Breast Cancer Sister      Parkinsonism Sister      Colon Cancer No family hx of        SOCIAL HISTORY:  Social History     Social History     Marital status:      Spouse name: N/A     Number of children: N/A     Years of education: N/A     Social History Main Topics     Smoking status: Former Smoker     Types: Pipe     Smokeless tobacco: Never Used     Alcohol use 0.0 oz/week     0 Standard drinks or equivalent per week      Comment: 3-4 times a week     Drug use: No     Sexual activity: Not Currently     Other Topics Concern     Special Diet No     Exercise No     Social History Narrative       Review of Systems:  Skin:  Negative       Eyes:  Positive for glasses    ENT:  Negative      Respiratory:  Negative for shortness of breath;dyspnea on exertion;cough      Cardiovascular:  Negative for;palpitations;chest pain;edema;syncope or near-syncope;lightheadedness;dizziness;fatigue      Gastroenterology: Positive for   has had gastric bleeding s/p endoscopy (Dr. Meek)  Genitourinary:  Negative      Musculoskeletal:  Negative      Neurologic:  Negative      Psychiatric:  Negative      Heme/Lymph/Imm:  Negative      Endocrine:  Negative        [id number]            Thank you for allowing me to participate in the care of your patient.      Sincerely,     Lynn Valle MD     [facility]    cc:   Zoe Tomas MD  [address] [address]

## 2018-05-24 NOTE — PROGRESS NOTES
HPI and Plan:   See dictation    Orders Placed This Encounter   Procedures     Follow-Up with Cardiac Advanced Practice Provider       No orders of the defined types were placed in this encounter.      Medications Discontinued During This Encounter   Medication Reason     metoprolol-hydrochlorothiazide (DUTOPROL) 50-12.5 MG TB24 per tablet          Encounter Diagnoses   Name Primary?     PVC's (premature ventricular contractions)      Benign hypertension        CURRENT MEDICATIONS:  Current Outpatient Prescriptions   Medication Sig Dispense Refill     apixaban ANTICOAGULANT (ELIQUIS) 5 MG tablet Take 1 tablet (5 mg) by mouth 2 times daily 60 tablet 3     atorvastatin (LIPITOR) 10 MG tablet Take 1 tablet (10 mg) by mouth daily 90 tablet 3     Ferrous Gluconate (IRON) 240 (27 FE) MG TABS Take 1 tablet by mouth daily       lisinopril (PRINIVIL/ZESTRIL) 20 MG tablet Take 0.5 tablets (10 mg) by mouth daily 90 tablet 3     metoprolol succinate (TOPROL-XL) 50 MG 24 hr tablet Take 1 tablet (50 mg) by mouth daily 90 tablet 3     pantoprazole (PROTONIX) 40 MG EC tablet Take 1 tablet (40 mg) by mouth daily TAKE 1 TABLET EVERY DAY  30  TO  60  MINUTES BEFORE A MEAL 90 tablet 3     ranitidine (ZANTAC) 150 MG tablet Take by mouth At Bedtime       sildenafil (REVATIO) 20 MG tablet Take 1-2 tablet (20 mg) by mouth half to one hour before sexual intercourse .  Never use with nitroglycerin, terazosin or doxazosin. 30 tablet 5       ALLERGIES   No Known Allergies    PAST MEDICAL HISTORY:  Past Medical History:   Diagnosis Date     A-fib (H) 01/31/2018     Cancer (H)     skin     CKD (chronic kidney disease)      GERD (gastroesophageal reflux disease)      Hiatal hernia      Hypertension      Pericarditis      S/P colon resection        PAST SURGICAL HISTORY:  Past Surgical History:   Procedure Laterality Date     CHOLECYSTECTOMY       COLONOSCOPY       COMBINED COLONOSCOPY, MUCOSAL RESECTION  1993    ?diverticulitis       ESOPHAGOSCOPY, GASTROSCOPY, DUODENOSCOPY (EGD), COMBINED N/A 11/3/2016    Procedure: COMBINED ESOPHAGOSCOPY, GASTROSCOPY, DUODENOSCOPY (EGD), BIOPSY SINGLE OR MULTIPLE;  Surgeon: Deondre Meek MD;  Location:  GI     ESOPHAGOSCOPY, GASTROSCOPY, DUODENOSCOPY (EGD), DILATATION, COMBINED N/A 12/1/2016    Procedure: COMBINED ESOPHAGOSCOPY, GASTROSCOPY, DUODENOSCOPY (EGD), DILATATION;  Surgeon: Deondre Meek MD;  Location:  GI     ESOPHAGOSCOPY, GASTROSCOPY, DUODENOSCOPY (EGD), DILATATION, COMBINED N/A 3/2/2017    Procedure: COMBINED ESOPHAGOSCOPY, GASTROSCOPY, DUODENOSCOPY (EGD), DILATATION;  Surgeon: Deondre Meek MD;  Location:  GI     NEPHRECTOMY RT/LT      right     pace maker         FAMILY HISTORY:  Family History   Problem Relation Age of Onset     Hyperlipidemia Mother      CEREBROVASCULAR DISEASE Father      Prostate Cancer Father      Pulmonary Hypertension Brother      Breast Cancer Sister      Parkinsonism Sister      Colon Cancer No family hx of        SOCIAL HISTORY:  Social History     Social History     Marital status:      Spouse name: N/A     Number of children: N/A     Years of education: N/A     Social History Main Topics     Smoking status: Former Smoker     Types: Pipe     Smokeless tobacco: Never Used     Alcohol use 0.0 oz/week     0 Standard drinks or equivalent per week      Comment: 3-4 times a week     Drug use: No     Sexual activity: Not Currently     Other Topics Concern     Special Diet No     Exercise No     Social History Narrative       Review of Systems:  Skin:  Negative       Eyes:  Positive for glasses    ENT:  Negative      Respiratory:  Negative for shortness of breath;dyspnea on exertion;cough     Cardiovascular:  Negative for;palpitations;chest pain;edema;syncope or near-syncope;lightheadedness;dizziness;fatigue      Gastroenterology: Positive for   has had gastric bleeding s/p endoscopy (Dr. Meek)  Genitourinary:  Negative       Musculoskeletal:  Negative      Neurologic:  Negative      Psychiatric:  Negative      Heme/Lymph/Imm:  Negative      Endocrine:  Negative        [id number]

## 2018-05-24 NOTE — MR AVS SNAPSHOT
After Visit Summary   5/24/2018    Estrada Oseguera    MRN: [id number]           Patient Information     Date Of Birth          1945        Visit Information        Provider Department      5/24/2018 8:00 AM Lynn Valle MD [facility]        Today's Diagnoses     PVC's (premature ventricular contractions)        Benign hypertension           Follow-ups after your visit        Additional Services     Follow-Up with Cardiac Advanced Practice Provider                 Your next 10 appointments already scheduled     Jul 17, 2018  2:15 PM CDT   Remote PPM Check with HEARD TECH1   [facility] ([facility])    [address]  [facility] [address] [phone] OPT 2           This appointment is for a remote check of your pacemaker.  This is not an appointment at the office.              Future tests that were ordered for you today     Open Future Orders        Priority Expected Expires Ordered    Follow-Up with Cardiac Advanced Practice Provider Routine 5/24/2019 5/25/2019 5/24/2018            Who to contact     If you have questions or need follow up information about today's clinic visit or your schedule please contact [facility] directly at [phone].  Normal or non-critical lab and imaging results will be communicated to you by MyChart, letter or phone within 4 business days after the clinic has received the results. If you do not hear from us within 7 days, please contact the clinic through MyChart or phone. If you have a critical or abnormal lab result, we will notify you by phone as soon as possible.  Submit refill requests through [employer] or call your pharmacy and they will forward the refill request to us. Please allow 3 business days for your refill to be completed.          Additional Information About Your Visit        MyChart  "Information     [employer] gives you secure access to your electronic health record. If you see a primary care provider, you can also send messages to your care team and make appointments. If you have questions, please call your primary care clinic.  If you do not have a primary care provider, please call [phone] and they will assist you.        Care EveryWhere ID     This is your Care EveryWhere ID. This could be used by other organizations to access your [place] medical records  [id number]        Your Vitals Were     Pulse Height BMI (Body Mass Index)             68 1.765 m (5' 9.5\") 33.62 kg/m2          Blood Pressure from Last 3 Encounters:   05/24/18 140/83   03/29/18 110/54   03/22/18 (!) 153/95    Weight from Last 3 Encounters:   05/24/18 104.8 kg (231 lb)   03/29/18 106.6 kg (235 lb)   03/22/18 104.3 kg (230 lb)              We Performed the Following     EKG 12-lead complete w/read - Clinics (to be scheduled)     Follow-Up with Electrophysiologist          Today's Medication Changes          These changes are accurate as of 5/24/18  8:20 AM.  If you have any questions, ask your nurse or doctor.               Stop taking these medicines if you haven't already. Please contact your care team if you have questions.     metoprolol-hydrochlorothiazide 50-12.5 MG Tb24 per tablet   Commonly known as:  DUTOPROL   Stopped by:  Lynn Valle MD                    Primary Care Provider Office Phone # Fax #    Zoe GAUTAM MD [place] [phone] [address] [address]        Equal Access to Services     [facility]: Hadrodger Malloy, mckenzie heard. So [facility] [phone].    ATENCIÓN: Si habla español, tiene a allison disposición servicios gratuitos de asistencia lingüística. Laci al [phone].    We comply with applicable federal civil rights laws and [place] laws. " We do not discriminate on the basis of race, color, national origin, age, disability, sex, sexual orientation, or gender identity.            Thank you!     Thank you for choosing [facility]  for your care. Our goal is always to provide you with excellent care. Hearing back from our patients is one way we can continue to improve our services. Please take a few minutes to complete the written survey that you may receive in the mail after your visit with us. Thank you!             Your Updated Medication List - Protect others around you: Learn how to safely use, store and throw away your medicines at www.disposemymeds.org.          This list is accurate as of 5/24/18  8:20 AM.  Always use your most recent med list.                   Brand Name Dispense Instructions for use Diagnosis    apixaban ANTICOAGULANT 5 MG tablet    ELIQUIS    60 tablet    Take 1 tablet (5 mg) by mouth 2 times daily    Atrial fibrillation, unspecified type (H)       atorvastatin 10 MG tablet    LIPITOR    90 tablet    Take 1 tablet (10 mg) by mouth daily    Hyperlipidemia, unspecified hyperlipidemia type       Iron 240 (27 Fe) MG Tabs      Take 1 tablet by mouth daily        lisinopril 20 MG tablet    PRINIVIL/ZESTRIL    90 tablet    Take 0.5 tablets (10 mg) by mouth daily    Benign hypertension       metoprolol succinate 50 MG 24 hr tablet    TOPROL-XL    90 tablet    Take 1 tablet (50 mg) by mouth daily    NSVT (nonsustained ventricular tachycardia) (H)       pantoprazole 40 MG EC tablet    PROTONIX    90 tablet    Take 1 tablet (40 mg) by mouth daily TAKE 1 TABLET EVERY DAY  30  TO  60  MINUTES BEFORE A MEAL    Chacon's esophagus without dysplasia       sildenafil 20 MG tablet    REVATIO    30 tablet    Take 1-2 tablet (20 mg) by mouth half to one hour before sexual intercourse .  Never use with nitroglycerin, terazosin or doxazosin.    Erectile dysfunction, unspecified erectile dysfunction type        ZANTAC 150 MG tablet   Generic drug:  ranitidine      Take by mouth At Bedtime

## 2018-05-24 NOTE — LETTER
5/24/2018      Zoe Tomas MD  [address] [facility] [address]  [facility] [address]      RE: Estrada Oseguera       Dear Colleague,    I had the pleasure of seeing Estrada Oseguera in the [facility].    Service Date: 05/24/2018      HISTORY OF PRESENT ILLNESS:  It was my pleasure to see Mr. Estrada Oseguera in followup of mild cardiomyopathy, sick sinus syndrome, atrial fibrillation and PVCs.  Please refer to my detailed note from 03/2018 for full account of his past medical history.      When I last saw Estrada, his main complaint was feeling lightheaded and unwell.  I had advised him to cut the dose of lisinopril in half and take it in the evening.  This was because his blood pressure had been low normal.      In coming in today, Estrada told me that he is feeling much better.  He is able to exercise and has no issues with chest pain, lightheadedness and his energy level has improved.  He said that he tried to cut the lisinopril in half as I had instructed him but the pill a small and could not do it and therefore he gave up on it.  He still takes the full 20 mg dose in the morning.  So despite no change in his medications, he is feeling quite a bit better.      PHYSICAL EXAMINATION:   VITAL SIGNS:  Blood pressure 140/83, pulse 68 and regular, weight 104.8 kg, height 176 cm.   NECK:  Supple.   LUNGS:  Clear.   CARDIOVASCULAR:  Regular rhythm without ectopic beats.   EXTREMITIES:  No edema.      DIAGNOSTIC STUDIES:  His 12-lead ECG today showed sinus rhythm.      His echocardiogram from 05/22/2018 was unchanged from previous.  LVEF was 50%.  RV function was normal.  There were no significant valvular abnormalities.      IMPRESSION:  Fatigue, lightheadedness.  These symptoms have improved without any change in his medications.  I noticed that he did not have ectopic beats today, either on his ECG or on exam.  He did have a 6% PVC burden on a recent Holter monitor.  It is  not clear if the PVCs have become quiescent and that may account for improvement of his symptoms, but in any case, he is doing well now, so I would recommend no further evaluation.      For his other medical issues, please refer to my note from 2018.  I have requested a followup in the clinic with Airam in 1 year.      cc:   Zoe Tomas MD    [facility]   [address]   [place] [address]         XIAO MANZANO MD             D: 2018   T: 2018   MT: DW      Name:     FRANCISCO SUN   MRN:      [id number]        Account:      [id number]   :      1945           Service Date: 2018      Document: [id number]         Outpatient Encounter Prescriptions as of 2018   Medication Sig Dispense Refill     apixaban ANTICOAGULANT (ELIQUIS) 5 MG tablet Take 1 tablet (5 mg) by mouth 2 times daily 60 tablet 3     atorvastatin (LIPITOR) 10 MG tablet Take 1 tablet (10 mg) by mouth daily 90 tablet 3     Ferrous Gluconate (IRON) 240 (27 FE) MG TABS Take 1 tablet by mouth daily       lisinopril (PRINIVIL/ZESTRIL) 20 MG tablet Take 0.5 tablets (10 mg) by mouth daily 90 tablet 3     metoprolol succinate (TOPROL-XL) 50 MG 24 hr tablet Take 1 tablet (50 mg) by mouth daily 90 tablet 3     pantoprazole (PROTONIX) 40 MG EC tablet Take 1 tablet (40 mg) by mouth daily TAKE 1 TABLET EVERY DAY  30  TO  60  MINUTES BEFORE A MEAL 90 tablet 3     ranitidine (ZANTAC) 150 MG tablet Take by mouth At Bedtime       sildenafil (REVATIO) 20 MG tablet Take 1-2 tablet (20 mg) by mouth half to one hour before sexual intercourse .  Never use with nitroglycerin, terazosin or doxazosin. 30 tablet 5     [DISCONTINUED] metoprolol-hydrochlorothiazide (DUTOPROL) 50-12.5 MG TB24 per tablet Take 1 tablet by mouth daily       No facility-administered encounter medications on file as of 2018.        Again, thank you for allowing me to participate in the care of your patient.       Sincerely,    Lynn Valle MD     [facility]

## 2018-05-24 NOTE — PROGRESS NOTES
Service Date: 05/24/2018      HISTORY OF PRESENT ILLNESS:    It was my pleasure to see Mr. Estrada Oseguera in follow-up of mild cardiomyopathy, sick sinus syndrome, atrial fibrillation and PVCs.  Please refer to my detailed note from 03/2018 for full account of his past medical history.      When I last saw Estrada, his main complaint was feeling lightheaded and unwell.  I had advised him to cut the dose of lisinopril in half and take it in the evening.  This was because his blood pressure had been low normal.      In coming in today, Estrada told me that he is feeling much better.  He is able to exercise and has no issues with chest pain, lightheadedness and his energy level has improved.  He said that he tried to cut the lisinopril in half, as I had instructed him, but the pill is small and he could not do it.  He gave up cutting it in half and still takes the full 20 mg dose in the morning.  So despite no change in his medications, he is feeling quite a bit better.      PHYSICAL EXAMINATION:   VITAL SIGNS:  Blood pressure 140/83, pulse 68 and regular, weight 104.8 kg, height 176 cm.   NECK:  Supple.   LUNGS:  Clear.   CARDIOVASCULAR:  Regular rhythm without ectopic beats.   EXTREMITIES:  No edema.      DIAGNOSTIC STUDIES:    His 12-lead ECG today showed sinus rhythm.   His echocardiogram from 05/22/2018 was unchanged from previous.  LVEF was 50%.  RV function was normal.  There were no significant valvular abnormalities.      IMPRESSION & PLAN:    1.  Fatigue, lightheadedness.  These symptoms have improved without change in his medications.  I noticed that he did not have ectopic beats today, either on his ECG or on exam.  He did have a 6% PVC burden on a recent Holter monitor.  It is unclear if the PVCs have become quiescent and that partly explains the improvement in his symptoms.  In any case, he is doing well now, so I recommended no further evaluation.      For his other medical issues, please refer to my  note from 2018.  I have requested a followup in the clinic with Airam in 1 year.      XIAO MANZANO MD, FACC         cc:   Zoe Tomas MD    [facility]   [address] [place] [address]          D: 2018   T: 2018   MT: BAY      Name:     FRANCISCO SUN   MRN:      [id number]        Account:      [id number]   :      1945           Service Date: 2018      Document: [id number]

## 2018-07-17 ENCOUNTER — ALLIED HEALTH/NURSE VISIT (OUTPATIENT)
Dept: CARDIOLOGY | Facility: CLINIC | Age: 73
End: 2018-07-17
Payer: COMMERCIAL

## 2018-07-17 DIAGNOSIS — Z95.0 CARDIAC PACEMAKER IN SITU: Primary | ICD-10-CM

## 2018-07-17 PROCEDURE — 93294 REM INTERROG EVL PM/LDLS PM: CPT | Performed by: INTERNAL MEDICINE

## 2018-07-17 PROCEDURE — 93296 REM INTERROG EVL PM/IDS: CPT | Performed by: INTERNAL MEDICINE

## 2018-07-17 NOTE — PROGRESS NOTES
[employer] (D) Remote PPM Device Check  AP: 15 % : 0 %  Mode: DDD        Presenting Rhythm: AS/VS, rates 57-65bpm  Heart Rate: Adequate rates per histogram  Sensing: Stable    Pacing Threshold: Stable    Impedance: Stable  Battery Status: 13.5 years  Atrial Arrhythmia: 1 mode switch episode. EGM shows As=Vs for PAT lasting about 20 seconds, rates in the 170s.   Ventricular Arrhythmia: 3 ventricular high rates. EGMs show As=Vs for PAT, longest episode lasted 3 minutes 57 seconds, rates in the 160's.       Care Plan: F/u PPM Latitude q 3 months. LM with results. Lb,CVT

## 2018-07-17 NOTE — MR AVS SNAPSHOT
After Visit Summary   7/17/2018    Estrada Oseguera    MRN: [id number]           Patient Information     Date Of Birth          1945        Visit Information        Provider Department      7/17/2018 2:15 PM HEARD [facility]        Today's Diagnoses     Cardiac pacemaker in situ    -  1       Follow-ups after your visit        Your next 10 appointments already scheduled     Jul 17, 2018  2:15 PM CDT   Remote PPM Check with HEARD TECH1   [facility] ([facility])    [address] [id number] [phone] OPT 2           This appointment is for a remote check of your pacemaker.  This is not an appointment at the office.            Oct 23, 2018  2:30 PM CDT   Remote PPM Check with HEARD TECH1   [facility] ([facility])    [address] [id number] [phone] OPT 2           This appointment is for a remote check of your pacemaker.  This is not an appointment at the office.              Who to contact     If you have questions or need follow up information about today's clinic visit or your schedule please contact [facility] directly at [phone].  Normal or non-critical lab and imaging results will be communicated to you by [employer], letter or phone within 4 business days after the clinic has received the results. If you do not hear from us within 7 days, please contact the clinic through [employer] or phone. If you have a critical or abnormal lab result, we will notify you by phone as soon as possible.  Submit refill requests through [employer] or call your pharmacy and they will forward the refill request to us. Please allow 3 business days for your refill to be completed.          Additional Information About Your Visit        [employer] Information     [employer] gives you secure  access to your electronic health record. If you see a primary care provider, you can also send messages to your care team and make appointments. If you have questions, please call your primary care clinic.  If you do not have a primary care provider, please call [phone] and they will assist you.        Care EveryWhere ID     This is your Care EveryWhere ID. This could be used by other organizations to access your [place] medical records  [id number]         Blood Pressure from Last 3 Encounters:   05/24/18 140/83   03/29/18 110/54   03/22/18 (!) 153/95    Weight from Last 3 Encounters:   05/24/18 104.8 kg (231 lb)   03/29/18 106.6 kg (235 lb)   03/22/18 104.3 kg (230 lb)              We Performed the Following     INTERROGATION DEVICE EVAL REMOTE, PACER/ICD (24553)     PM DEVICE INTERROGATE REMOTE (09174)        Primary Care Provider Office Phone # Fax #    Zoe GAUTAM MD [place] [phone] [address] [address]        Equal Access to Services     [facility] : Hadii aad ku hadasho Ryland, waaxda luqadaha, qaybta kaalmada reynaldo, mckenzie zhu . So [facility] [phone].    ATENCIÓN: Si habla español, tiene a allison disposición servicios gratuitos de asistencia lingüística. Llame al [phone].    We comply with applicable federal civil rights laws and [place] laws. We do not discriminate on the basis of race, color, national origin, age, disability, sex, sexual orientation, or gender identity.            Thank you!     Thank you for choosing [facility]  for your care. Our goal is always to provide you with excellent care. Hearing back from our patients is one way we can continue to improve our services. Please take a few minutes to complete the written survey that you may receive in the mail after your visit with us. Thank you!             Your Updated Medication List - Protect others  around you: Learn how to safely use, store and throw away your medicines at www.disposemymeds.org.          This list is accurate as of 7/17/18  9:28 AM.  Always use your most recent med list.                   Brand Name Dispense Instructions for use Diagnosis    apixaban ANTICOAGULANT 5 MG tablet    ELIQUIS    60 tablet    Take 1 tablet (5 mg) by mouth 2 times daily    Atrial fibrillation, unspecified type (H)       atorvastatin 10 MG tablet    LIPITOR    90 tablet    Take 1 tablet (10 mg) by mouth daily    Hyperlipidemia, unspecified hyperlipidemia type       Iron 240 (27 Fe) MG Tabs      Take 1 tablet by mouth daily        lisinopril 20 MG tablet    PRINIVIL/ZESTRIL    90 tablet    Take 0.5 tablets (10 mg) by mouth daily    Benign hypertension       metoprolol succinate 50 MG 24 hr tablet    TOPROL-XL    90 tablet    Take 1 tablet (50 mg) by mouth daily    NSVT (nonsustained ventricular tachycardia) (H)       pantoprazole 40 MG EC tablet    PROTONIX    90 tablet    Take 1 tablet (40 mg) by mouth daily TAKE 1 TABLET EVERY DAY  30  TO  60  MINUTES BEFORE A MEAL    Chacon's esophagus without dysplasia       sildenafil 20 MG tablet    REVATIO    30 tablet    Take 1-2 tablet (20 mg) by mouth half to one hour before sexual intercourse .  Never use with nitroglycerin, terazosin or doxazosin.    Erectile dysfunction, unspecified erectile dysfunction type       ZANTAC 150 MG tablet   Generic drug:  ranitidine      Take by mouth At Bedtime

## 2018-08-29 ENCOUNTER — DOCUMENTATION ONLY (OUTPATIENT)
Dept: OTHER | Facility: CLINIC | Age: 73
End: 2018-08-29

## 2018-09-11 DIAGNOSIS — I48.91 ATRIAL FIBRILLATION, UNSPECIFIED TYPE (H): ICD-10-CM

## 2018-09-21 ENCOUNTER — OFFICE VISIT (OUTPATIENT)
Dept: FAMILY MEDICINE | Facility: CLINIC | Age: 73
End: 2018-09-21
Payer: COMMERCIAL

## 2018-09-21 VITALS
TEMPERATURE: 97.4 F | DIASTOLIC BLOOD PRESSURE: 80 MMHG | HEART RATE: 69 BPM | SYSTOLIC BLOOD PRESSURE: 140 MMHG | HEIGHT: 70 IN | WEIGHT: 234 LBS | OXYGEN SATURATION: 96 % | BODY MASS INDEX: 33.5 KG/M2

## 2018-09-21 DIAGNOSIS — E61.1 IRON DEFICIENCY: ICD-10-CM

## 2018-09-21 DIAGNOSIS — I47.29 NSVT (NONSUSTAINED VENTRICULAR TACHYCARDIA) (H): ICD-10-CM

## 2018-09-21 DIAGNOSIS — I10 BENIGN HYPERTENSION: ICD-10-CM

## 2018-09-21 DIAGNOSIS — K22.70 BARRETT'S ESOPHAGUS WITHOUT DYSPLASIA: ICD-10-CM

## 2018-09-21 DIAGNOSIS — R97.20 ELEVATED PROSTATE SPECIFIC ANTIGEN (PSA): Primary | ICD-10-CM

## 2018-09-21 DIAGNOSIS — E78.5 HYPERLIPIDEMIA, UNSPECIFIED HYPERLIPIDEMIA TYPE: Primary | ICD-10-CM

## 2018-09-21 DIAGNOSIS — R73.03 PREDIABETES: ICD-10-CM

## 2018-09-21 DIAGNOSIS — Z12.5 SCREENING FOR PROSTATE CANCER: ICD-10-CM

## 2018-09-21 DIAGNOSIS — Z23 NEED FOR PROPHYLACTIC VACCINATION AND INOCULATION AGAINST INFLUENZA: ICD-10-CM

## 2018-09-21 LAB
ALBUMIN SERPL-MCNC: 3.8 G/DL (ref 3.4–5)
ALP SERPL-CCNC: 51 U/L (ref 40–150)
ALT SERPL W P-5'-P-CCNC: 88 U/L (ref 0–70)
ANION GAP SERPL CALCULATED.3IONS-SCNC: 7 MMOL/L (ref 3–14)
AST SERPL W P-5'-P-CCNC: 41 U/L (ref 0–45)
BILIRUB SERPL-MCNC: 0.5 MG/DL (ref 0.2–1.3)
BUN SERPL-MCNC: 19 MG/DL (ref 7–30)
CALCIUM SERPL-MCNC: 9.2 MG/DL (ref 8.5–10.1)
CHLORIDE SERPL-SCNC: 106 MMOL/L (ref 94–109)
CHOLEST SERPL-MCNC: 194 MG/DL
CO2 SERPL-SCNC: 27 MMOL/L (ref 20–32)
CREAT SERPL-MCNC: 1.33 MG/DL (ref 0.66–1.25)
ERYTHROCYTE [DISTWIDTH] IN BLOOD BY AUTOMATED COUNT: 13.7 % (ref 10–15)
FERRITIN SERPL-MCNC: 77 NG/ML (ref 26–388)
GFR SERPL CREATININE-BSD FRML MDRD: 53 ML/MIN/1.7M2
GLUCOSE SERPL-MCNC: 100 MG/DL (ref 70–99)
HBA1C MFR BLD: 6.4 % (ref 0–5.6)
HCT VFR BLD AUTO: 46.5 % (ref 40–53)
HDLC SERPL-MCNC: 54 MG/DL
HGB BLD-MCNC: 15.4 G/DL (ref 13.3–17.7)
LDLC SERPL CALC-MCNC: 116 MG/DL
MCH RBC QN AUTO: 31.7 PG (ref 26.5–33)
MCHC RBC AUTO-ENTMCNC: 33.1 G/DL (ref 31.5–36.5)
MCV RBC AUTO: 96 FL (ref 78–100)
NONHDLC SERPL-MCNC: 140 MG/DL
PLATELET # BLD AUTO: 184 10E9/L (ref 150–450)
POTASSIUM SERPL-SCNC: 4.1 MMOL/L (ref 3.4–5.3)
PROT SERPL-MCNC: 7.6 G/DL (ref 6.8–8.8)
PSA SERPL-ACNC: 5.22 UG/L (ref 0–4)
RBC # BLD AUTO: 4.86 10E12/L (ref 4.4–5.9)
SODIUM SERPL-SCNC: 140 MMOL/L (ref 133–144)
TRIGL SERPL-MCNC: 120 MG/DL
WBC # BLD AUTO: 5.6 10E9/L (ref 4–11)

## 2018-09-21 PROCEDURE — 83036 HEMOGLOBIN GLYCOSYLATED A1C: CPT | Performed by: INTERNAL MEDICINE

## 2018-09-21 PROCEDURE — 99214 OFFICE O/P EST MOD 30 MIN: CPT | Mod: 25 | Performed by: INTERNAL MEDICINE

## 2018-09-21 PROCEDURE — G0008 ADMIN INFLUENZA VIRUS VAC: HCPCS | Performed by: INTERNAL MEDICINE

## 2018-09-21 PROCEDURE — 36415 COLL VENOUS BLD VENIPUNCTURE: CPT | Performed by: INTERNAL MEDICINE

## 2018-09-21 PROCEDURE — 80053 COMPREHEN METABOLIC PANEL: CPT | Performed by: INTERNAL MEDICINE

## 2018-09-21 PROCEDURE — G0103 PSA SCREENING: HCPCS | Performed by: INTERNAL MEDICINE

## 2018-09-21 PROCEDURE — 82728 ASSAY OF FERRITIN: CPT | Performed by: INTERNAL MEDICINE

## 2018-09-21 PROCEDURE — 80061 LIPID PANEL: CPT | Performed by: INTERNAL MEDICINE

## 2018-09-21 PROCEDURE — 85027 COMPLETE CBC AUTOMATED: CPT | Performed by: INTERNAL MEDICINE

## 2018-09-21 PROCEDURE — 90662 IIV NO PRSV INCREASED AG IM: CPT | Performed by: INTERNAL MEDICINE

## 2018-09-21 RX ORDER — METOPROLOL SUCCINATE 50 MG/1
50 TABLET, EXTENDED RELEASE ORAL DAILY
Qty: 90 TABLET | Refills: 3 | Status: SHIPPED | OUTPATIENT
Start: 2018-09-21 | End: 2019-03-13

## 2018-09-21 RX ORDER — ATORVASTATIN CALCIUM 10 MG/1
10 TABLET, FILM COATED ORAL DAILY
Qty: 90 TABLET | Refills: 3 | Status: SHIPPED | OUTPATIENT
Start: 2018-09-21 | End: 2019-03-13

## 2018-09-21 RX ORDER — LISINOPRIL 20 MG/1
10 TABLET ORAL DAILY
Qty: 90 TABLET | Refills: 3 | Status: SHIPPED | OUTPATIENT
Start: 2018-09-21 | End: 2019-03-13

## 2018-09-21 RX ORDER — PANTOPRAZOLE SODIUM 40 MG/1
40 TABLET, DELAYED RELEASE ORAL DAILY
Qty: 90 TABLET | Refills: 3 | Status: SHIPPED | OUTPATIENT
Start: 2018-09-21 | End: 2019-03-13

## 2018-09-21 NOTE — PROGRESS NOTES
SUBJECTIVE:   Estrada Oseguera is a 73 year old male who presents to clinic today for the following health issues:    Patient is accompanied by his wife    Hyperlipidemia Follow-Up      Rate your low fat/cholesterol diet?: not monitoring fat    Taking statin?  Yes, no muscle aches from statin    Other lipid medications/supplements?:  none    Hypertension Follow-up      Outpatient blood pressures are not being checked.    Low Salt Diet: no added salt-low salt       Amount of exercise or physical activity: 6 days/week for an average of 40+ minutes    Problems taking medications regularly: No    Medication side effects: none    Diet: regular (no restrictions) and low salt    Patient has stopped taking Lipitor due to his prescription running out  He watches his salt and sugar intake  Patient has not been monitoring his BP at home  He does have a BP monitor  Patient is taking iron supplements  He takes one tablet per day    Smoking history  Patient has smoked in the past  He used a pipe for a short while  Reports he used about an inch of cigar in his pipes  States he smoked twice a week for 7 years  Patient has since stopped using a pipe  Reports he has scheduled an appointment for an abdominal aneurism screening    Problem list and histories reviewed & adjusted, as indicated.  Additional history: as documented    Medications and Labs reviewed in EPIC    Reviewed and updated as needed this visit by clinical staff  Tobacco  Allergies       Reviewed and updated as needed this visit by Provider       ROS:  Constitutional, HEENT, cardiovascular, pulmonary, GI, , musculoskeletal, neuro, skin, endocrine and psych systems are negative, except as otherwise noted.    This document serves as a record of the services and decisions personally performed and made by Zoe Tomas MD. It was created on her behalf by Airam Frankel, a trained medical scribe. The creation of this document is based on the provider's statements to the  "medical scribe.  Airam Frankel 12:30 PM September 21, 2018  OBJECTIVE:     BP (!) 159/91 (BP Location: Right arm, Patient Position: Sitting, Cuff Size: Adult Large)  Pulse 69  Temp 97.4  F (36.3  C) (Oral)  Ht 1.765 m (5' 9.5\")  Wt 106.1 kg (234 lb)  SpO2 96%  BMI 34.06 kg/m2  Body mass index is 34.06 kg/(m^2).    GENERAL APPEARANCE: overweight, alert and no distress  EYES: Eyes grossly normal to inspection, PERRL and conjunctivae and sclerae normal  HENT: Deviated nasal septum to right side, ear canals and TM's normal and nose and mouth without ulcers or lesions  NECK: no adenopathy  RESP: lungs clear to auscultation - no rales, rhonchi or wheezes  CV: regular rates and rhythm, normal S1 S2, no S3  PSYCH: mentation appears normal, affect normal/bright    Diagnostic Test Results:  none     Reviewed and discussed labs done on 02/02/2018  ASSESSMENT/PLAN:   Estrada was seen today for recheck.    Diagnoses and all orders for this visit:    Hyperlipidemia, unspecified hyperlipidemia type  -     atorvastatin (LIPITOR) 10 MG tablet; Take 1 tablet (10 mg) by mouth daily  -     Lipid panel reflex to direct LDL Fasting  Patient was taking Lipitor  However, he had ran out and has stopped taking it  Advised him to continue taking Lipitor    Chacon's esophagus without dysplasia  Comments:  seen on EGD dated 11/3/16  Orders:  -     pantoprazole (PROTONIX) 40 MG EC tablet; Take 1 tablet (40 mg) by mouth daily    Benign hypertension  -     lisinopril (PRINIVIL/ZESTRIL) 20 MG tablet; Take 0.5 tablets (10 mg) by mouth daily  -     Comprehensive metabolic panel  Patient watches his salt and sugar intake  Reports he has not been taking his BP readings at home  His BP was high today  Rechecked BP during office visit today  BP was 140/80 during office visit today  Advised him to start monitoring his BP at home  Advised him to contact me if BP stays higher than 140/90    NSVT (nonsustained ventricular tachycardia) (H)  -     metoprolol " succinate (TOPROL-XL) 50 MG 24 hr tablet; Take 1 tablet (50 mg) by mouth daily    Need for prophylactic vaccination and inoculation against influenza  -     FLU VACCINE, INCREASED ANTIGEN, PRESV FREE, AGE 65+ [53451]  -     Vaccine Administration, Initial [14515]  -     ADMIN INFLUENZA (For MEDICARE Patients ONLY) []  Flu shot done in office today    Iron deficiency  -     CBC with platelets  -     Ferritin  Patient is taking iron supplements once daily  His iron levels were good based on last labs  Advised him he can take iron supplements every other day    Prediabetes  -     Hemoglobin A1c    Screening for prostate cancer  -     PSA, screen    Due for a colonoscopy every 3 years (2019)  Flu shot given in office today    Patient Instructions   I refilled your prescriptions today  There is this is a new shingles vaccine available called shingrex  It is a series of 2 shots 2-6 months apart.  Considered more than 90% effective.  Please go to our pharmacy located in [place] [address] to get the  vaccine  Monitor your blood pressure once a week  at home.  Bring those readings on your next visit.  Notify us if your blood pressure readings consistently stays greater than 140/90.  Follow up in 6 months  Seek sooner medical attention if there is any worsening of symptoms or problems    The information in this document, created by the medical scribe for me, accurately reflects the services I personally performed and the decisions made by me. I have reviewed and approved this document for accuracy prior to leaving the patient care area.  September 21, 2018 12:46 PM    Zoe Tomas MD  [facility]      Injectable Influenza Immunization Documentation    1.  Is the person to be vaccinated sick today?   No    2. Does the person to be vaccinated have an allergy to a component   of the vaccine?   No  Egg Allergy Algorithm Link    3. Has the person to be vaccinated ever had a serious reaction   to influenza vaccine in  the past?   No    4. Has the person to be vaccinated ever had Guillain-Barré syndrome?   No    Form completed by María Cartagena CMA  Prior to injection verified patient identity using patient's name and date of birth.  Due to injection administration, patient instructed to remain in clinic for 15 minutes  afterwards, and to report any adverse reaction to me immediately.

## 2018-09-21 NOTE — MR AVS SNAPSHOT
After Visit Summary   9/21/2018    Estrada Oseguera    MRN: [id number]           Patient Information     Date Of Birth          1945        Visit Information        Provider Department      9/21/2018 12:30 PM Zoe Tomas MD [facility]        Today's Diagnoses     Hyperlipidemia, unspecified hyperlipidemia type    -  1    Chacon's esophagus without dysplasia        Benign hypertension        NSVT (nonsustained ventricular tachycardia) (H)        Need for prophylactic vaccination and inoculation against influenza        Iron deficiency        Prediabetes        Screening for prostate cancer          Care Instructions    I refilled your prescriptions today  There is this is a new shingles vaccine available called shingrex  It is a series of 2 shots 2-6 months apart.  Considered more than 90% effective.  Please go to our pharmacy located in [place] [address] to get the  vaccine  Monitor your blood pressure once a week  at home.  Bring those readings on your next visit.  Notify us if your blood pressure readings consistently stays greater than 140/90.  Follow up in 6 months  Seek sooner medical attention if there is any worsening of symptoms or problems            Follow-ups after your visit        Follow-up notes from your care team     Return in about 6 months (around 3/21/2019) for hyperlipidemia, hypertension, medication follow up.      Your next 10 appointments already scheduled     Sep 26, 2018  1:00 PM CDT   US AORTA MEDICARE AAA SCREENING with SHUS5   [facility]ound ([facility])    [address] [place] [address] [phone]           How do I prepare for my exam? (Food and drink instructions) Adults: No eating, smoking, gum chewing or drinking for 8 hours before the exam. You may take medicine with a small sip of water.  Children: * Infants, breast-fed: may have breast milk up to 2 hours before exam. * Infants, formula: may have  bottle until 4 hours before exam. * Children 1-5 years: No food or drink for 4 hours before exam. * Children 6 -12 years: No food or drink for 6 hours before exam. * Children over 12 years: No food or drink for 8 hours before exam.  * J Tube Fed: No need to stop feedings.  What should I wear: Wear comfortable clothes.  How long does the exam take: Most ultrasounds take 30 to 60 minutes.  What should I bring: Bring a list of your medicines, including vitamins, minerals and over-the-counter drugs. It is safest to leave personal items at home.  Do I need a :  No  is needed.  What do I need to tell my doctor: Tell your doctor about any allergies you may have.  What should I do after the exam: No restrictions, You may resume normal activities.  What is this test: An ultrasound uses sound waves to make pictures of the body. Sound waves do not cause pain. The only discomfort may be the pressure of the wand against your skin or full bladder.  Who should I call with questions: If you have any questions, please call the Imaging Department where you will have your exam. Directions, parking instructions, and other information is available on our website, [email or web address].            Oct 23, 2018  2:30 PM CDT   Remote PPM Check with HEARD TECH1   [facility] ([facility])    [address] [place] [address] [phone] OPT 2           This appointment is for a remote check of your pacemaker.  This is not an appointment at the office.              Who to contact     If you have questions or need follow up information about today's clinic visit or your schedule please contact [facility] directly at [phone].  Normal or non-critical lab and imaging results will be communicated to you by MyChart, letter or phone within 4 business days after the clinic has received the results. If you do not hear from us within 7 days, please contact the  "clinic through General Bloodt or phone. If you have a critical or abnormal lab result, we will notify you by phone as soon as possible.  Submit refill requests through Carestream or call your pharmacy and they will forward the refill request to us. Please allow 3 business days for your refill to be completed.          Additional Information About Your Visit        ownCloudhart Information     C[employer]ream gives you secure access to your electronic health record. If you see a primary care provider, you can also send messages to your care team and make appointments. If you have questions, please call your primary care clinic.  If you do not have a primary care provider, please call [phone] and they will assist you.        Care EveryWhere ID     This is your Care EveryWhere ID. This could be used by other organizations to access your [place] medical records  [id number]        Your Vitals Were     Pulse Temperature Height Pulse Oximetry BMI (Body Mass Index)       69 97.4  F (36.3  C) (Oral) 5' 9.5\" (1.765 m) 96% 34.06 kg/m2        Blood Pressure from Last 3 Encounters:   09/21/18 (!) 159/91   05/24/18 140/83   03/29/18 110/54    Weight from Last 3 Encounters:   09/21/18 234 lb (106.1 kg)   05/24/18 231 lb (104.8 kg)   03/29/18 235 lb (106.6 kg)              We Performed the Following     ADMIN INFLUENZA (For MEDICARE Patients ONLY) []     CBC with platelets     Comprehensive metabolic panel     Ferritin     FLU VACCINE, INCREASED ANTIGEN, PRESV FREE, AGE 65+ [61798]     Hemoglobin A1c     Lipid panel reflex to direct LDL Fasting     PSA, screen     Vaccine Administration, Initial [17303]          Today's Medication Changes          These changes are accurate as of 9/21/18 12:44 PM.  If you have any questions, ask your nurse or doctor.               These medicines have changed or have updated prescriptions.        Dose/Directions    pantoprazole 40 MG EC tablet   Commonly known as:  PROTONIX   This may have changed:  " additional instructions   Used for:  Chacon's esophagus without dysplasia   Changed by:  Zoe Tomas MD        Dose:  40 mg   Take 1 tablet (40 mg) by mouth daily   Quantity:  90 tablet   Refills:  3            Where to get your medicines      These medications were sent to [facility] - [place], [place] - [address]  [address], [facility] [address]     Phone:  [phone]     atorvastatin 10 MG tablet    lisinopril 20 MG tablet    metoprolol succinate 50 MG 24 hr tablet    pantoprazole 40 MG EC tablet                Primary Care Provider Office Phone # Fax #    Zoe Tomas [phone] [address] [facility] [address]  [facility] [address]        Equal Access to Services     [facility]: Hadii aad ku hadasho Ryland, waaxda luqadaha, qaybta kaalmada adevaishaliyada, waxay jimmiein cristina zhu . So [facility] [phone].    ATENCIÓN: Si habla español, tiene a allison disposición servicios gratuitos de asistencia lingüística. [facility] [phone].    We comply with applicable federal civil rights laws and [place] laws. We do not discriminate on the basis of race, color, national origin, age, disability, sex, sexual orientation, or gender identity.            Thank you!     Thank you for choosing [facility]  for your care. Our goal is always to provide you with excellent care. Hearing back from our patients is one way we can continue to improve our services. Please take a few minutes to complete the written survey that you may receive in the mail after your visit with us. Thank you!             Your Updated Medication List - Protect others around you: Learn how to safely use, store and throw away your medicines at www.disposemymeds.org.          This list is accurate as of 9/21/18 12:44 PM.  Always use your most recent med list.                   Brand Name Dispense Instructions for use Diagnosis    apixaban ANTICOAGULANT 5 MG tablet     ELIQUIS    180 tablet    Take 1 tablet (5 mg) by mouth 2 times daily    Atrial fibrillation, unspecified type (H)       atorvastatin 10 MG tablet    LIPITOR    90 tablet    Take 1 tablet (10 mg) by mouth daily    Hyperlipidemia, unspecified hyperlipidemia type       Iron 240 (27 Fe) MG Tabs      Take 1 tablet by mouth daily        lisinopril 20 MG tablet    PRINIVIL/ZESTRIL    90 tablet    Take 0.5 tablets (10 mg) by mouth daily    Benign hypertension       metoprolol succinate 50 MG 24 hr tablet    TOPROL-XL    90 tablet    Take 1 tablet (50 mg) by mouth daily    NSVT (nonsustained ventricular tachycardia) (H)       pantoprazole 40 MG EC tablet    PROTONIX    90 tablet    Take 1 tablet (40 mg) by mouth daily    Chacon's esophagus without dysplasia       sildenafil 20 MG tablet    REVATIO    30 tablet    Take 1-2 tablet (20 mg) by mouth half to one hour before sexual intercourse .  Never use with nitroglycerin, terazosin or doxazosin.    Erectile dysfunction, unspecified erectile dysfunction type       ZANTAC 150 MG tablet   Generic drug:  ranitidine      Take by mouth At Bedtime

## 2018-09-21 NOTE — PATIENT INSTRUCTIONS
I refilled your prescriptions today  There is this is a new shingles vaccine available called shingrex  It is a series of 2 shots 2-6 months apart.  Considered more than 90% effective.  Please go to our pharmacy located in [place] [address] to get the  vaccine  Monitor your blood pressure once a week  at home.  Bring those readings on your next visit.  Notify us if your blood pressure readings consistently stays greater than 140/90.  Follow up in 6 months  Seek sooner medical attention if there is any worsening of symptoms or problems

## 2018-09-22 NOTE — PROGRESS NOTES
Woody Dorado,    This is to inform you regarding your test result.    Chronic kidney disease is stable.  ALT which is liver test is slightly elevated.  Your total cholesterol is normal.  HDL which is called good cholesterol is normal.  Your LDL which is called bad cholesterol is elevated.  Eat low cholesterol low fat  diet and do regular physical activity.  Continue present management  Ferritin which is iron stores in the body is normal.  Your PSA (prostate cancer screening test) is slightly elevated.  Make appointment to see urologist.  Referral is placed .  Please call the following number to make the appointment to see urologist  [facility]: [employer] [facility] [place] [phone]   [email or web address]  CBC result which includes white count Hemoglobin and  Platelet Counts is normal.   HbA1c which is average glucose during last 3 months is 6.4%.  It is going up  You are prediabetic but very close to diabetes as if it goes above 6.5% then it is considered diabetic  If you would like to see a dietitian regarding this then please let us know so we can put a referral for you to see a dietitian.  Follow up office visit in 6 months.  Lab Results       Component                Value               Date                       A1C                      6.4                 09/21/2018                 A1C                      6.2                 02/02/2018                 A1C                      6.2                 08/11/2017                 A1C                      6.2                 02/07/2017                  Sincerely,      Dr.Nasima Thom MD,FACP

## 2018-09-26 ENCOUNTER — HOSPITAL ENCOUNTER (OUTPATIENT)
Dept: ULTRASOUND IMAGING | Facility: CLINIC | Age: 73
Discharge: HOME | End: 2018-09-26
Attending: INTERNAL MEDICINE | Admitting: INTERNAL MEDICINE
Payer: MEDICARE

## 2018-09-26 DIAGNOSIS — Z13.6 SCREENING FOR ABDOMINAL AORTIC ANEURYSM: ICD-10-CM

## 2018-09-26 PROCEDURE — 76706 US ABDL AORTA SCREEN AAA: CPT

## 2018-09-26 NOTE — PROGRESS NOTES
Woody Dorado,    This is to inform you regarding your test result.    Ultrasound result is normal.  No aneurysm seen    Sincerely,      Dr.Nasima Thom MD,FACP

## 2018-10-23 ENCOUNTER — ALLIED HEALTH/NURSE VISIT (OUTPATIENT)
Dept: CARDIOLOGY | Facility: CLINIC | Age: 73
End: 2018-10-23
Payer: COMMERCIAL

## 2018-10-23 DIAGNOSIS — Z95.0 CARDIAC PACEMAKER IN SITU: Primary | ICD-10-CM

## 2018-10-23 PROCEDURE — 93294 REM INTERROG EVL PM/LDLS PM: CPT | Performed by: INTERNAL MEDICINE

## 2018-10-23 PROCEDURE — 93296 REM INTERROG EVL PM/IDS: CPT | Performed by: INTERNAL MEDICINE

## 2018-10-23 NOTE — PROGRESS NOTES
[employer] MRI EL (D) Remote PPM Device Check  AP: 16% : 0%  Mode: DDD        Presenting Rhythm: AP/VS  Heart Rate: adequate heart rates per histogram  Sensing: stable    Pacing Threshold: stable    Impedance: stable  Battery Status: 13.5 years remaining  Atrial Arrhythmia: none  Ventricular Arrhythmia: none     Care Plan: Annual threshold scheduled in January.  Order in for annual OV to be scheduled in May 2019. Gave results over the phone to fabiola MCCOLLUM

## 2018-10-23 NOTE — MR AVS SNAPSHOT
After Visit Summary   10/23/2018    Estrada Oseguera    MRN: [id number]           Patient Information     Date Of Birth          1945        Visit Information        Provider Department      10/23/2018 2:30 PM HEARD TECH1 [facility]        Today's Diagnoses     Cardiac pacemaker in situ    -  1       Follow-ups after your visit        Your next 10 appointments already scheduled     Oct 23, 2018  2:30 PM CDT   Remote PPM Check with HEARD TECH1   [facility] ([facility])    [address] [address] [phone] OPT 2           This appointment is for a remote check of your pacemaker.  This is not an appointment at the office.            Jan 23, 2019  1:00 PM CST   Pacemaker Check with HEARD DCRN   [facility] ([facility])    [address] [place] [id number]  [facility] [id number] [phone] OPT 2            Mar 13, 2019 12:00 PM CDT   PHYSICAL with Zoe Tomas MD   [facility] ([facility])    [address] [facility] [id number] [phone]              Who to contact     If you have questions or need follow up information about today's clinic visit or your schedule please contact [facility] directly at [phone].  Normal or non-critical lab and imaging results will be communicated to you by MyChart, letter or phone within 4 business days after the clinic has received the results. If you do not hear from us within 7 days, please contact the clinic through MyChart or phone. If you have a critical or abnormal lab result, we will notify you by phone as soon as possible.  Submit refill requests through Bandhappy or call your pharmacy and they will forward the refill request to us. Please allow 3 business days for your refill to be completed.          Additional Information  About Your Visit        ProfStreamhart Information     [employer] gives you secure access to your electronic health record. If you see a primary care provider, you can also send messages to your care team and make appointments. If you have questions, please call your primary care clinic.  If you do not have a primary care provider, please call [phone] and they will assist you.        Care EveryWhere ID     This is your Care EveryWhere ID. This could be used by other organizations to access your [place] medical records  [id number]         Blood Pressure from Last 3 Encounters:   09/21/18 140/80   05/24/18 140/83   03/29/18 110/54    Weight from Last 3 Encounters:   09/21/18 106.1 kg (234 lb)   05/24/18 104.8 kg (231 lb)   03/29/18 106.6 kg (235 lb)              We Performed the Following     INTERROGATION DEVICE EVAL REMOTE, PACER/ICD (60337)     PM DEVICE INTERROGATE REMOTE (59193)        Primary Care Provider Office Phone # Fax #    Zoe GAUTAM MD Thom [phone] [address] [facility] [address]  [facility] [address]        Equal Access to Services     [facility]: Hadii aad ku hadasho Ryland, waaxda luqadaha, qaybta kaalmada adeegyada, waxay zeb zhu . So [facility] [phone].    ATENCIÓN: Si habla español, tiene a allison disposición servicios gratuitos de asistencia lingüística. Llame al [phone].    We comply with applicable federal civil rights laws and [place] laws. We do not discriminate on the basis of race, color, national origin, age, disability, sex, sexual orientation, or gender identity.            Thank you!     Thank you for choosing [facility]  for your care. Our goal is always to provide you with excellent care. Hearing back from our patients is one way we can continue to improve our services. Please take a few minutes to complete the written survey that you may receive in the mail after your visit with us.  Thank you!             Your Updated Medication List - Protect others around you: Learn how to safely use, store and throw away your medicines at www.disposemymeds.org.          This list is accurate as of 10/23/18  9:31 AM.  Always use your most recent med list.                   Brand Name Dispense Instructions for use Diagnosis    apixaban ANTICOAGULANT 5 MG tablet    ELIQUIS    180 tablet    Take 1 tablet (5 mg) by mouth 2 times daily    Atrial fibrillation, unspecified type (H)       atorvastatin 10 MG tablet    LIPITOR    90 tablet    Take 1 tablet (10 mg) by mouth daily    Hyperlipidemia, unspecified hyperlipidemia type       Iron 240 (27 Fe) MG Tabs      Take 1 tablet by mouth daily        lisinopril 20 MG tablet    PRINIVIL/ZESTRIL    90 tablet    Take 0.5 tablets (10 mg) by mouth daily    Benign hypertension       metoprolol succinate 50 MG 24 hr tablet    TOPROL-XL    90 tablet    Take 1 tablet (50 mg) by mouth daily    NSVT (nonsustained ventricular tachycardia) (H)       pantoprazole 40 MG EC tablet    PROTONIX    90 tablet    Take 1 tablet (40 mg) by mouth daily    Chacon's esophagus without dysplasia       sildenafil 20 MG tablet    REVATIO    30 tablet    Take 1-2 tablet (20 mg) by mouth half to one hour before sexual intercourse .  Never use with nitroglycerin, terazosin or doxazosin.    Erectile dysfunction, unspecified erectile dysfunction type       ZANTAC 150 MG tablet   Generic drug:  ranitidine      Take by mouth At Bedtime

## 2019-01-25 ENCOUNTER — TELEPHONE (OUTPATIENT)
Dept: CARDIOLOGY | Facility: CLINIC | Age: 74
End: 2019-01-25

## 2019-01-25 DIAGNOSIS — I49.5 SSS (SICK SINUS SYNDROME) (H): Primary | ICD-10-CM

## 2019-01-28 ENCOUNTER — TELEPHONE (OUTPATIENT)
Dept: CARDIOLOGY | Facility: CLINIC | Age: 74
End: 2019-01-28

## 2019-01-28 ENCOUNTER — ANCILLARY PROCEDURE (OUTPATIENT)
Dept: CARDIOLOGY | Facility: CLINIC | Age: 74
End: 2019-01-28
Payer: MEDICARE

## 2019-01-28 DIAGNOSIS — I49.5 SSS (SICK SINUS SYNDROME) (H): ICD-10-CM

## 2019-01-28 PROCEDURE — 93280 PM DEVICE PROGR EVAL DUAL: CPT | Mod: 26 | Performed by: INTERNAL MEDICINE

## 2019-01-28 NOTE — TELEPHONE ENCOUNTER
Patient with known NSVT. Previously placed on Metoprolol but continues to have short runs on NSVT. Patient denies symptoms. Will forward to Dr. Valle.   KLEBER Espitia, RN             [employer] (D) Pacemaker Device Check  AP: 20 % : <1 %  Mode: DDD         Underlying Rhythm: SR rates 50s-60s  Heart Rate: Stable with good variability   Sensing: WNL    Pacing Threshold: WNL   Impedance: WNL  Battery Status: 13.5 years   Device Site: Well healed   Atrial Arrhythmia: 1 mode switch logged. EGM suggests PAT with 1:1 conduction and then a short run of NSVT lasting 9 beats with rate in 190s-200s. 1 SVT episode logged. EGM suggests SVT with 1:1 conduction. Episode lasted 33 seconds. Rates in 160s.  Ventricular Arrhythmia: 4 NSVT episodes logged. Available EGM shows 9 beats of NSVT in 190s-200s. Patient has a known history of NSVT. Last EF 50%.   2 of the NSVT episodes and SVT episode occurred within 2 minutes on 12/01/18. Patient denies symptoms with any of the above episodes.   Setting Change: 0

## 2019-02-05 LAB
MDC_IDC_LEAD_IMPLANT_DT: NORMAL
MDC_IDC_LEAD_IMPLANT_DT: NORMAL
MDC_IDC_LEAD_LOCATION: NORMAL
MDC_IDC_LEAD_LOCATION: NORMAL
MDC_IDC_LEAD_LOCATION_DETAIL_1: NORMAL
MDC_IDC_LEAD_LOCATION_DETAIL_1: NORMAL
MDC_IDC_LEAD_MFG: NORMAL
MDC_IDC_LEAD_MFG: NORMAL
MDC_IDC_LEAD_MODEL: NORMAL
MDC_IDC_LEAD_MODEL: NORMAL
MDC_IDC_LEAD_POLARITY_TYPE: NORMAL
MDC_IDC_LEAD_POLARITY_TYPE: NORMAL
MDC_IDC_LEAD_SERIAL: NORMAL
MDC_IDC_LEAD_SERIAL: NORMAL
MDC_IDC_MSMT_BATTERY_STATUS: NORMAL
MDC_IDC_MSMT_LEADCHNL_RA_IMPEDANCE_VALUE: 709 OHM
MDC_IDC_MSMT_LEADCHNL_RA_PACING_THRESHOLD_AMPLITUDE: 0.8 V
MDC_IDC_MSMT_LEADCHNL_RA_PACING_THRESHOLD_PULSEWIDTH: 0.4 MS
MDC_IDC_MSMT_LEADCHNL_RA_SENSING_INTR_AMPL: 3.5 MV
MDC_IDC_MSMT_LEADCHNL_RV_IMPEDANCE_VALUE: 657 OHM
MDC_IDC_MSMT_LEADCHNL_RV_PACING_THRESHOLD_AMPLITUDE: 1.1 V
MDC_IDC_MSMT_LEADCHNL_RV_PACING_THRESHOLD_PULSEWIDTH: 0.4 MS
MDC_IDC_MSMT_LEADCHNL_RV_SENSING_INTR_AMPL: 19.1 MV
MDC_IDC_PG_IMPLANT_DTM: NORMAL
MDC_IDC_PG_MFG: NORMAL
MDC_IDC_PG_MODEL: NORMAL
MDC_IDC_PG_SERIAL: NORMAL
MDC_IDC_PG_TYPE: NORMAL
MDC_IDC_SESS_CLINIC_NAME: NORMAL
MDC_IDC_SESS_DTM: NORMAL
MDC_IDC_SESS_TYPE: NORMAL
MDC_IDC_SET_BRADY_AT_MODE_SWITCH_MODE: NORMAL
MDC_IDC_SET_BRADY_AT_MODE_SWITCH_RATE: 170 {BEATS}/MIN
MDC_IDC_SET_BRADY_HYSTRATE: NORMAL
MDC_IDC_SET_BRADY_LOWRATE: 55 {BEATS}/MIN
MDC_IDC_SET_BRADY_MAX_SENSOR_RATE: 130 {BEATS}/MIN
MDC_IDC_SET_BRADY_MAX_TRACKING_RATE: 130 {BEATS}/MIN
MDC_IDC_SET_BRADY_MODE: NORMAL
MDC_IDC_SET_BRADY_PAV_DELAY_HIGH: 200 MS
MDC_IDC_SET_BRADY_PAV_DELAY_LOW: 300 MS
MDC_IDC_SET_BRADY_SAV_DELAY_HIGH: 200 MS
MDC_IDC_SET_BRADY_SAV_DELAY_LOW: 300 MS
MDC_IDC_SET_LEADCHNL_RA_PACING_AMPLITUDE: 2 V
MDC_IDC_SET_LEADCHNL_RA_PACING_ANODE_ELECTRODE_1: NORMAL
MDC_IDC_SET_LEADCHNL_RA_PACING_ANODE_LOCATION_1: NORMAL
MDC_IDC_SET_LEADCHNL_RA_PACING_CAPTURE_MODE: NORMAL
MDC_IDC_SET_LEADCHNL_RA_PACING_CATHODE_ELECTRODE_1: NORMAL
MDC_IDC_SET_LEADCHNL_RA_PACING_CATHODE_LOCATION_1: NORMAL
MDC_IDC_SET_LEADCHNL_RA_PACING_POLARITY: NORMAL
MDC_IDC_SET_LEADCHNL_RA_PACING_PULSEWIDTH: 0.4 MS
MDC_IDC_SET_LEADCHNL_RA_SENSING_ADAPTATION_MODE: NORMAL
MDC_IDC_SET_LEADCHNL_RA_SENSING_ANODE_ELECTRODE_1: NORMAL
MDC_IDC_SET_LEADCHNL_RA_SENSING_ANODE_LOCATION_1: NORMAL
MDC_IDC_SET_LEADCHNL_RA_SENSING_CATHODE_ELECTRODE_1: NORMAL
MDC_IDC_SET_LEADCHNL_RA_SENSING_CATHODE_LOCATION_1: NORMAL
MDC_IDC_SET_LEADCHNL_RA_SENSING_POLARITY: NORMAL
MDC_IDC_SET_LEADCHNL_RA_SENSING_SENSITIVITY: 0.25 MV
MDC_IDC_SET_LEADCHNL_RV_PACING_AMPLITUDE: 1.6 V
MDC_IDC_SET_LEADCHNL_RV_PACING_ANODE_ELECTRODE_1: NORMAL
MDC_IDC_SET_LEADCHNL_RV_PACING_ANODE_LOCATION_1: NORMAL
MDC_IDC_SET_LEADCHNL_RV_PACING_CAPTURE_MODE: NORMAL
MDC_IDC_SET_LEADCHNL_RV_PACING_CATHODE_ELECTRODE_1: NORMAL
MDC_IDC_SET_LEADCHNL_RV_PACING_CATHODE_LOCATION_1: NORMAL
MDC_IDC_SET_LEADCHNL_RV_PACING_POLARITY: NORMAL
MDC_IDC_SET_LEADCHNL_RV_PACING_PULSEWIDTH: 0.4 MS
MDC_IDC_SET_LEADCHNL_RV_SENSING_ADAPTATION_MODE: NORMAL
MDC_IDC_SET_LEADCHNL_RV_SENSING_ANODE_ELECTRODE_1: NORMAL
MDC_IDC_SET_LEADCHNL_RV_SENSING_ANODE_LOCATION_1: NORMAL
MDC_IDC_SET_LEADCHNL_RV_SENSING_CATHODE_ELECTRODE_1: NORMAL
MDC_IDC_SET_LEADCHNL_RV_SENSING_CATHODE_LOCATION_1: NORMAL
MDC_IDC_SET_LEADCHNL_RV_SENSING_POLARITY: NORMAL
MDC_IDC_SET_LEADCHNL_RV_SENSING_SENSITIVITY: 1.5 MV
MDC_IDC_SET_ZONE_DETECTION_INTERVAL: 375 MS
MDC_IDC_SET_ZONE_TYPE: NORMAL
MDC_IDC_SET_ZONE_VENDOR_TYPE: NORMAL
MDC_IDC_STAT_EPISODE_RECENT_COUNT: 0
MDC_IDC_STAT_EPISODE_RECENT_COUNT_DTM_END: NORMAL
MDC_IDC_STAT_EPISODE_RECENT_COUNT_DTM_START: NORMAL
MDC_IDC_STAT_EPISODE_TOTAL_COUNT: 15
MDC_IDC_STAT_EPISODE_TOTAL_COUNT_DTM_END: NORMAL
MDC_IDC_STAT_EPISODE_TYPE: NORMAL
MDC_IDC_STAT_EPISODE_TYPE: NORMAL
MDC_IDC_STAT_EPISODE_VENDOR_TYPE: NORMAL
MDC_IDC_STAT_EPISODE_VENDOR_TYPE: NORMAL

## 2019-03-13 ENCOUNTER — OFFICE VISIT (OUTPATIENT)
Dept: FAMILY MEDICINE | Facility: CLINIC | Age: 74
End: 2019-03-13
Payer: MEDICARE

## 2019-03-13 VITALS
SYSTOLIC BLOOD PRESSURE: 128 MMHG | WEIGHT: 210 LBS | HEART RATE: 52 BPM | OXYGEN SATURATION: 97 % | TEMPERATURE: 96.8 F | DIASTOLIC BLOOD PRESSURE: 69 MMHG | HEIGHT: 69 IN | BODY MASS INDEX: 31.1 KG/M2

## 2019-03-13 DIAGNOSIS — E78.5 HYPERLIPIDEMIA, UNSPECIFIED HYPERLIPIDEMIA TYPE: ICD-10-CM

## 2019-03-13 DIAGNOSIS — I10 BENIGN HYPERTENSION: ICD-10-CM

## 2019-03-13 DIAGNOSIS — Z86.79 HISTORY OF CARDIOMYOPATHY: ICD-10-CM

## 2019-03-13 DIAGNOSIS — E61.1 IRON DEFICIENCY: ICD-10-CM

## 2019-03-13 DIAGNOSIS — K22.70 BARRETT'S ESOPHAGUS WITHOUT DYSPLASIA: ICD-10-CM

## 2019-03-13 DIAGNOSIS — Z00.00 ROUTINE HISTORY AND PHYSICAL EXAMINATION OF ADULT: Primary | ICD-10-CM

## 2019-03-13 DIAGNOSIS — R97.20 ELEVATED PROSTATE SPECIFIC ANTIGEN (PSA): ICD-10-CM

## 2019-03-13 DIAGNOSIS — I47.29 NSVT (NONSUSTAINED VENTRICULAR TACHYCARDIA) (H): ICD-10-CM

## 2019-03-13 DIAGNOSIS — M25.511 ACUTE PAIN OF RIGHT SHOULDER: ICD-10-CM

## 2019-03-13 DIAGNOSIS — R73.03 PREDIABETES: ICD-10-CM

## 2019-03-13 LAB
ERYTHROCYTE [DISTWIDTH] IN BLOOD BY AUTOMATED COUNT: 13.6 % (ref 10–15)
HBA1C MFR BLD: 5.8 % (ref 0–5.6)
HCT VFR BLD AUTO: 47.1 % (ref 40–53)
HGB BLD-MCNC: 15.9 G/DL (ref 13.3–17.7)
MCH RBC QN AUTO: 31.5 PG (ref 26.5–33)
MCHC RBC AUTO-ENTMCNC: 33.8 G/DL (ref 31.5–36.5)
MCV RBC AUTO: 94 FL (ref 78–100)
PLATELET # BLD AUTO: 142 10E9/L (ref 150–450)
RBC # BLD AUTO: 5.04 10E12/L (ref 4.4–5.9)
WBC # BLD AUTO: 6.7 10E9/L (ref 4–11)

## 2019-03-13 PROCEDURE — 83036 HEMOGLOBIN GLYCOSYLATED A1C: CPT | Performed by: INTERNAL MEDICINE

## 2019-03-13 PROCEDURE — G0439 PPPS, SUBSEQ VISIT: HCPCS | Performed by: INTERNAL MEDICINE

## 2019-03-13 PROCEDURE — 80053 COMPREHEN METABOLIC PANEL: CPT | Performed by: INTERNAL MEDICINE

## 2019-03-13 PROCEDURE — 82728 ASSAY OF FERRITIN: CPT | Performed by: INTERNAL MEDICINE

## 2019-03-13 PROCEDURE — 80061 LIPID PANEL: CPT | Performed by: INTERNAL MEDICINE

## 2019-03-13 PROCEDURE — 85027 COMPLETE CBC AUTOMATED: CPT | Performed by: INTERNAL MEDICINE

## 2019-03-13 PROCEDURE — 84153 ASSAY OF PSA TOTAL: CPT | Performed by: INTERNAL MEDICINE

## 2019-03-13 PROCEDURE — 99213 OFFICE O/P EST LOW 20 MIN: CPT | Mod: 25 | Performed by: INTERNAL MEDICINE

## 2019-03-13 PROCEDURE — 36415 COLL VENOUS BLD VENIPUNCTURE: CPT | Performed by: INTERNAL MEDICINE

## 2019-03-13 RX ORDER — PANTOPRAZOLE SODIUM 40 MG/1
40 TABLET, DELAYED RELEASE ORAL DAILY
Qty: 90 TABLET | Refills: 3 | Status: SHIPPED | OUTPATIENT
Start: 2019-03-13 | End: 2020-01-15

## 2019-03-13 RX ORDER — LISINOPRIL 20 MG/1
10 TABLET ORAL DAILY
Qty: 90 TABLET | Refills: 3 | Status: SHIPPED | OUTPATIENT
Start: 2019-03-13 | End: 2019-09-30

## 2019-03-13 RX ORDER — ATORVASTATIN CALCIUM 10 MG/1
10 TABLET, FILM COATED ORAL DAILY
Qty: 90 TABLET | Refills: 3 | Status: SHIPPED | OUTPATIENT
Start: 2019-03-13 | End: 2019-09-30

## 2019-03-13 RX ORDER — METOPROLOL SUCCINATE 50 MG/1
50 TABLET, EXTENDED RELEASE ORAL DAILY
Qty: 90 TABLET | Refills: 3 | Status: SHIPPED | OUTPATIENT
Start: 2019-03-13 | End: 2019-09-30

## 2019-03-13 ASSESSMENT — MIFFLIN-ST. JEOR: SCORE: 1682.93

## 2019-03-13 NOTE — PATIENT INSTRUCTIONS
Preventive Health Recommendations:     See your health care provider every year to    Review health changes.     Discuss preventive care.      Review your medicines if your doctor has prescribed any.    Talk with your health care provider about whether you should have a test to screen for prostate cancer (PSA).    Every 3 years, have a diabetes test (fasting glucose). If you are at risk for diabetes, you should have this test more often.    Every 5 years, have a cholesterol test. Have this test more often if you are at risk for high cholesterol or heart disease.     Every 10 years, have a colonoscopy. Or, have a yearly FIT test (stool test). These exams will check for colon cancer.    Talk to with your health care provider about screening for Abdominal Aortic Aneurysm if you have a family history of AAA or have a history of smoking.  Shots:     Get a flu shot each year.     Get a tetanus shot every 10 years.     Talk to your doctor about your pneumonia vaccines. There are now two you should receive - Pneumovax (PPSV 23) and Prevnar (PCV 13).    Talk to your pharmacist about a shingles vaccine.     Talk to your doctor about the hepatitis B vaccine.  Nutrition:     Eat at least 5 servings of fruits and vegetables each day.     Eat whole-grain bread, whole-wheat pasta and brown rice instead of white grains and rice.     Get adequate Calcium and Vitamin D.   Lifestyle    Exercise for at least 150 minutes a week (30 minutes a day, 5 days a week). This will help you control your weight and prevent disease.     Limit alcohol to one drink per day.     No smoking.     Wear sunscreen to prevent skin cancer.     See your dentist every six months for an exam and cleaning.     See your eye doctor every 1 to 2 years to screen for conditions such as glaucoma, macular degeneration and cataracts.    Personalized Prevention Plan  You are due for the preventive services outlined below.  Your care team is available to assist you in  scheduling these services.  If you have already completed any of these items, please share that information with your care team to update in your medical record.    Health Maintenance Due   Topic Date Due     Zoster (Shingles) Vaccine (1 of 2) 01/20/1995     Annual Wellness Visit  01/20/2010     Labs today  There is this is a new shingles vaccine available called shingrex  It is a series of 2 shots 2-6 months apart.  Considered more than 90% effective.  Please go to any pharmacy to get the  vaccine  Follow up in one year.   seek sooner medical attention if there is any worsening of symptoms or problems.

## 2019-03-13 NOTE — PROGRESS NOTES
"  SUBJECTIVE:   Estrada Oseguera is a 74 year old male who presents for Preventive Visit.  Patient came with his wife for physical  He is fasting for blood work  Are you in the first 12 months of your Medicare Part B coverage?  No    Physical Health:    In general, how would you rate your overall physical health? good    Outside of work, how many days during the week do you exercise? 6-7 days/week    Outside of work, approximately how many minutes a day do you exercise?30-45 minutes    If you drink alcohol do you typically have >3 drinks per day or >7 drinks per week? No    Do you usually eat at least 4 servings of fruit and vegetables a day, include whole grains & fiber and avoid regularly eating high fat or \"junk\" foods? Yes    Do you have any problems taking medications regularly?  No    Do you have any side effects from medications? none    Needs assistance for the following daily activities: no assistance needed    Which of the following safety concerns are present in your home?  none identified     Hearing impairment: No    In the past 6 months, have you been bothered by leaking of urine? no    Mental Health:    In general, how would you rate your overall mental or emotional health? good  PHQ-2 Score:      Do you feel safe in your environment? Yes    Do you have a Health Care Directive? Yes: Advance Directive has been received and scanned.    Additional concerns to address?      Fall risk:  Fallen 2 or more times in the past year?: No  Any fall with injury in the past year?: No    Cognitive Screenin) Repeat 3 items (Leader, Season, Table)    2) Clock draw: NORMAL  3) 3 item recall: Recalls 3 objects  Results: 3 items recalled: COGNITIVE IMPAIRMENT LESS LIKELY    Mini-CogTM Aliza Esparza. Licensed by the author for use in [facility]; reprinted with permission (helena@.[facility]). All rights reserved.      Do you have sleep apnea, excessive snoring or daytime drowsiness?: Daytime " drowsiness          Reviewed and updated as needed this visit by clinical staff  Tobacco  Allergies  Meds         Reviewed and updated as needed this visit by Provider        Social History     Tobacco Use     Smoking status: Former Smoker     Years: 7.00     Types: Pipe     Smokeless tobacco: Never Used     Tobacco comment: light smoker for few years only   Substance Use Topics     Alcohol use: Yes     Alcohol/week: 0.0 oz     Comment: 3-4 times a week                           Current providers sharing in care for this patient include:   Patient Care Team:  Zoe Tomas MD as PCP - General (Internal Medicine)  Zoe Tomas MD as Assigned PCP    The following health maintenance items are reviewed in Epic and correct as of today:  Health Maintenance   Topic Date Due     ZOSTER IMMUNIZATION (1 of 2) 01/20/1995     MEDICARE ANNUAL WELLNESS VISIT  01/20/2010     FALL RISK ASSESSMENT  09/21/2019     PSA Q1 YR  09/21/2019     PHQ-2 Q1 YR  09/21/2019     COLONOSCOPY Q3 YR  11/03/2019     ADVANCE DIRECTIVE PLANNING Q5 YRS  08/29/2023     LIPID SCREEN Q5 YR MALE (SYSTEM ASSIGNED)  09/21/2023     DTAP/TDAP/TD IMMUNIZATION (2 - Td) 10/27/2026     INFLUENZA VACCINE  Completed     AORTIC ANEURYSM SCREENING (SYSTEM ASSIGNED)  Completed     HEPATITIS C SCREENING  Completed     IPV IMMUNIZATION  Aged Out     MENINGITIS IMMUNIZATION  Aged Out     Labs reviewed in EPIC      ROS:  Constitutional, HEENT, cardiovascular, pulmonary, GI, , musculoskeletal, neuro, skin, endocrine and psych systems are negative, except as otherwise noted.  Patient went for another endoscopy and he was on Eliquis endoscope he was not done  He was advised to come back in 2 years per his report  So next endoscopy would be in 2020  He is feeling fine except for having some shoulder issues  His right shoulder hurts when he tried to abduct fully  Denied any injury  He said he did not make the appointment to see a urologist he was  "reluctant  OBJECTIVE:   /69 (BP Location: Right arm, Patient Position: Sitting, Cuff Size: Adult Large)   Pulse 52   Temp 96.8  F (36  C) (Oral)   Ht 1.753 m (5' 9\")   Wt 95.3 kg (210 lb)   SpO2 97%   BMI 31.01 kg/m   Estimated body mass index is 31.01 kg/m  as calculated from the following:    Height as of this encounter: 1.753 m (5' 9\").    Weight as of this encounter: 95.3 kg (210 lb).  EXAM:   GENERAL: healthy, alert and no distress  EYES: Eyes grossly normal to inspection, PERRL and conjunctivae and sclerae normal  HENT: ear canals and TM's normal, nose and mouth without ulcers or lesions  NECK: no adenopathy, no asymmetry, masses, or scars and thyroid normal to palpation  RESP: lungs clear to auscultation - no rales, rhonchi or wheezes  CV: regular rate and rhythm, normal S1 S2, no S3 or S4, no murmur, click or rub, very minimal peripheral edema and I was able to palpate posterior tibial but could not feel dorsalis pedis it was weaker but patient has no signs of any poor circulation he is not having any symptoms extremities are warm and moist  ABDOMEN: soft, nontender, no hepatosplenomegaly, no masses and bowel sounds normal   (male): normal male genitalia without lesions or urethral discharge, no hernia  RECTAL: normal sphincter tone, no rectal masses, prostate normal size, smooth, nontender without nodules or masses  MS: no gross musculoskeletal defects noted, no edema  SKIN: no suspicious lesions or rashes  Seborrheic keratosis lesion and cherry angiomas  NEURO: Normal strength and tone, mentation intact and speech normal  PSYCH: mentation appears normal, affect normal/bright        ASSESSMENT / PLAN:   Estrada was seen today for wellness visit.    Diagnoses and all orders for this visit:    Routine history and physical examination of adult  Patient had colonoscopy in November 2016  He got both the pneumonia shot  Is due for Shing Karri    Chacon's esophagus without dysplasia  Comments:  seen " on EGD dated 11/3/16  Orders:  -     pantoprazole (PROTONIX) 40 MG EC tablet; Take 1 tablet (40 mg) by mouth daily  She has had endoscopy in 2017.  He was advised to repeat in a year  Patient reported he went for endoscopy and they could not do it as patient was on Eliquis   He was advised to come back in  2 years so in 2020  He was advised to continue PPI  He takes Zantac at night and PPI every morning    NSVT (nonsustained ventricular tachycardia) (H)  -     metoprolol succinate ER (TOPROL-XL) 50 MG 24 hr tablet; Take 1 tablet (50 mg) by mouth daily    Benign hypertension  -     lisinopril (PRINIVIL/ZESTRIL) 20 MG tablet; Take 0.5 tablets (10 mg) by mouth daily  -     Comprehensive metabolic panel    Hyperlipidemia, unspecified hyperlipidemia type  -     atorvastatin (LIPITOR) 10 MG tablet; Take 1 tablet (10 mg) by mouth daily  -     Lipid panel reflex to direct LDL Fasting    Prediabetes  -     Hemoglobin A1c    Iron deficiency  -     Ferritin  -     CBC with platelets  He has past history and had workup    Elevated prostate specific antigen (PSA)  -     PSA, tumor marker  His last PSA was elevated but he did not go to see a urologist  If his PSA comes elevated he said he would call    Acute pain of right shoulder  -     [facility]; Future  Most likely it is related to rotator cuff  Referred for physical therapy    History of cardiomyopathy  He is followed by cardiology  Currently well compensated      This note was completed in part using Dragon voice recognition software.  Although reviewed after completion, some words and grammatical errors may occur.    End of Life Planning:  Patient currently has an advanced directive: Yes.  Practitioner is supportive of decision.    COUNSELING:  Reviewed preventive health counseling, as reflected in patient instructions       Regular exercise       Healthy diet/nutrition    BP Readings from Last 1 Encounters:   03/13/19 128/69     Estimated body  "mass index is 31.01 kg/m  as calculated from the following:    Height as of this encounter: 1.753 m (5' 9\").    Weight as of this encounter: 95.3 kg (210 lb).      Weight management plan: Discussed healthy diet and exercise guidelines     reports that he has quit smoking. His smoking use included pipe. He quit after 7.00 years of use. he has never used smokeless tobacco.      Appropriate preventive services were discussed with this patient, including applicable screening as appropriate for cardiovascular disease, diabetes, osteopenia/osteoporosis, and glaucoma.  As appropriate for age/gender, discussed screening for colorectal cancer, prostate cancer, breast cancer, and cervical cancer. Checklist reviewing preventive services available has been given to the patient.    Reviewed patients plan of care and provided an AVS. The Basic Care Plan (routine screening as documented in Health Maintenance) for Estrada meets the Care Plan requirement. This Care Plan has been established and reviewed with the Patient.    Counseling Resources:  ATP IV Guidelines  Pooled Cohorts Equation Calculator  Breast Cancer Risk Calculator  FRAX Risk Assessment  ICSI Preventive Guidelines  Dietary Guidelines for Americans, 2010  USDA's MyPlate  ASA Prophylaxis  Lung CA Screening    Zoe Tomas MD  [facility]  "

## 2019-03-14 LAB
ALBUMIN SERPL-MCNC: 3.9 G/DL (ref 3.4–5)
ALP SERPL-CCNC: 68 U/L (ref 40–150)
ALT SERPL W P-5'-P-CCNC: 51 U/L (ref 0–70)
ANION GAP SERPL CALCULATED.3IONS-SCNC: 8 MMOL/L (ref 3–14)
AST SERPL W P-5'-P-CCNC: 31 U/L (ref 0–45)
BILIRUB SERPL-MCNC: 0.7 MG/DL (ref 0.2–1.3)
BUN SERPL-MCNC: 21 MG/DL (ref 7–30)
CALCIUM SERPL-MCNC: 9.3 MG/DL (ref 8.5–10.1)
CHLORIDE SERPL-SCNC: 111 MMOL/L (ref 94–109)
CHOLEST SERPL-MCNC: 120 MG/DL
CO2 SERPL-SCNC: 21 MMOL/L (ref 20–32)
CREAT SERPL-MCNC: 1.26 MG/DL (ref 0.66–1.25)
FERRITIN SERPL-MCNC: 133 NG/ML (ref 26–388)
GFR SERPL CREATININE-BSD FRML MDRD: 56 ML/MIN/{1.73_M2}
GLUCOSE SERPL-MCNC: 99 MG/DL (ref 70–99)
HDLC SERPL-MCNC: 40 MG/DL
LDLC SERPL CALC-MCNC: 63 MG/DL
NONHDLC SERPL-MCNC: 80 MG/DL
POTASSIUM SERPL-SCNC: 4.4 MMOL/L (ref 3.4–5.3)
PROT SERPL-MCNC: 7.2 G/DL (ref 6.8–8.8)
PSA SERPL-MCNC: 5.28 UG/L (ref 0–4)
SODIUM SERPL-SCNC: 140 MMOL/L (ref 133–144)
TRIGL SERPL-MCNC: 87 MG/DL

## 2019-03-14 NOTE — RESULT ENCOUNTER NOTE
Woody Dorado,    This is to inform you regarding your test result.    Your total cholesterol is normal.  HDL which is called good cholesterol is normal.  Your LDL cholesterol is normal.  This is often call bad cholesterol and high levels increase the risk for heart attacks and strokes.  Your triglycerides are normal.  The testing of your kidney function, liver function and electrolytes was satisfactory   Ferritin which is iron stores in the body is normal.  PSA test continue to stay elevated  Please make the appointment to see a urologist as discussed during the office visit  Phone number for  urology is:  [facility]: [facility] [place] [phone]   CBC result which includes Hemoglobin and  Platelet Counts is satisfactory.  Platelet count is slightly lower than normal  We should recheck in 6 months  HbA1c which is average glucose during last 3 months is 5.8%  You are prediabetic and your numbers have improved    Sincerely,      Dr.Nasima Thom MD,FACP

## 2019-05-07 ENCOUNTER — ANCILLARY PROCEDURE (OUTPATIENT)
Dept: CARDIOLOGY | Facility: CLINIC | Age: 74
End: 2019-05-07
Payer: MEDICARE

## 2019-05-07 DIAGNOSIS — I49.5 SSS (SICK SINUS SYNDROME) (H): ICD-10-CM

## 2019-05-07 PROCEDURE — 93294 REM INTERROG EVL PM/LDLS PM: CPT | Performed by: INTERNAL MEDICINE

## 2019-05-07 PROCEDURE — 93296 REM INTERROG EVL PM/IDS: CPT | Performed by: INTERNAL MEDICINE

## 2019-05-15 ENCOUNTER — OFFICE VISIT (OUTPATIENT)
Dept: CARDIOLOGY | Facility: CLINIC | Age: 74
End: 2019-05-15
Payer: MEDICARE

## 2019-05-15 VITALS
DIASTOLIC BLOOD PRESSURE: 74 MMHG | BODY MASS INDEX: 30.64 KG/M2 | SYSTOLIC BLOOD PRESSURE: 138 MMHG | HEART RATE: 60 BPM | WEIGHT: 214 LBS | HEIGHT: 70 IN

## 2019-05-15 DIAGNOSIS — I49.5 SSS (SICK SINUS SYNDROME) (H): Primary | ICD-10-CM

## 2019-05-15 DIAGNOSIS — R00.1 BRADYCARDIA: ICD-10-CM

## 2019-05-15 DIAGNOSIS — I10 BENIGN HYPERTENSION: ICD-10-CM

## 2019-05-15 LAB
MDC_IDC_EPISODE_DTM: NORMAL
MDC_IDC_EPISODE_ID: NORMAL
MDC_IDC_EPISODE_TYPE: NORMAL
MDC_IDC_LEAD_IMPLANT_DT: NORMAL
MDC_IDC_LEAD_IMPLANT_DT: NORMAL
MDC_IDC_LEAD_LOCATION: NORMAL
MDC_IDC_LEAD_LOCATION: NORMAL
MDC_IDC_LEAD_LOCATION_DETAIL_1: NORMAL
MDC_IDC_LEAD_LOCATION_DETAIL_1: NORMAL
MDC_IDC_LEAD_MFG: NORMAL
MDC_IDC_LEAD_MFG: NORMAL
MDC_IDC_LEAD_MODEL: NORMAL
MDC_IDC_LEAD_MODEL: NORMAL
MDC_IDC_LEAD_POLARITY_TYPE: NORMAL
MDC_IDC_LEAD_POLARITY_TYPE: NORMAL
MDC_IDC_LEAD_SERIAL: NORMAL
MDC_IDC_LEAD_SERIAL: NORMAL
MDC_IDC_MSMT_BATTERY_DTM: NORMAL
MDC_IDC_MSMT_BATTERY_REMAINING_LONGEVITY: 150 MO
MDC_IDC_MSMT_BATTERY_REMAINING_PERCENTAGE: 100 %
MDC_IDC_MSMT_BATTERY_STATUS: NORMAL
MDC_IDC_MSMT_LEADCHNL_RA_IMPEDANCE_VALUE: 776 OHM
MDC_IDC_MSMT_LEADCHNL_RA_PACING_THRESHOLD_AMPLITUDE: 0.5 V
MDC_IDC_MSMT_LEADCHNL_RA_PACING_THRESHOLD_PULSEWIDTH: 0.4 MS
MDC_IDC_MSMT_LEADCHNL_RV_IMPEDANCE_VALUE: 657 OHM
MDC_IDC_MSMT_LEADCHNL_RV_PACING_THRESHOLD_AMPLITUDE: 1.4 V
MDC_IDC_MSMT_LEADCHNL_RV_PACING_THRESHOLD_PULSEWIDTH: 0.4 MS
MDC_IDC_PG_IMPLANT_DTM: NORMAL
MDC_IDC_PG_MFG: NORMAL
MDC_IDC_PG_MODEL: NORMAL
MDC_IDC_PG_SERIAL: NORMAL
MDC_IDC_PG_TYPE: NORMAL
MDC_IDC_SESS_CLINIC_NAME: NORMAL
MDC_IDC_SESS_DTM: NORMAL
MDC_IDC_SESS_TYPE: NORMAL
MDC_IDC_SET_BRADY_AT_MODE_SWITCH_MODE: NORMAL
MDC_IDC_SET_BRADY_AT_MODE_SWITCH_RATE: 170 {BEATS}/MIN
MDC_IDC_SET_BRADY_LOWRATE: 55 {BEATS}/MIN
MDC_IDC_SET_BRADY_MAX_SENSOR_RATE: 130 {BEATS}/MIN
MDC_IDC_SET_BRADY_MAX_TRACKING_RATE: 130 {BEATS}/MIN
MDC_IDC_SET_BRADY_MODE: NORMAL
MDC_IDC_SET_BRADY_PAV_DELAY_HIGH: 200 MS
MDC_IDC_SET_BRADY_PAV_DELAY_LOW: 300 MS
MDC_IDC_SET_BRADY_SAV_DELAY_HIGH: 200 MS
MDC_IDC_SET_BRADY_SAV_DELAY_LOW: 300 MS
MDC_IDC_SET_LEADCHNL_RA_PACING_AMPLITUDE: 2 V
MDC_IDC_SET_LEADCHNL_RA_PACING_CAPTURE_MODE: NORMAL
MDC_IDC_SET_LEADCHNL_RA_PACING_POLARITY: NORMAL
MDC_IDC_SET_LEADCHNL_RA_PACING_PULSEWIDTH: 0.4 MS
MDC_IDC_SET_LEADCHNL_RA_SENSING_ADAPTATION_MODE: NORMAL
MDC_IDC_SET_LEADCHNL_RA_SENSING_POLARITY: NORMAL
MDC_IDC_SET_LEADCHNL_RA_SENSING_SENSITIVITY: 0.25 MV
MDC_IDC_SET_LEADCHNL_RV_PACING_AMPLITUDE: 1.8 V
MDC_IDC_SET_LEADCHNL_RV_PACING_CAPTURE_MODE: NORMAL
MDC_IDC_SET_LEADCHNL_RV_PACING_POLARITY: NORMAL
MDC_IDC_SET_LEADCHNL_RV_PACING_PULSEWIDTH: 0.4 MS
MDC_IDC_SET_LEADCHNL_RV_SENSING_ADAPTATION_MODE: NORMAL
MDC_IDC_SET_LEADCHNL_RV_SENSING_POLARITY: NORMAL
MDC_IDC_SET_LEADCHNL_RV_SENSING_SENSITIVITY: 1.5 MV
MDC_IDC_SET_ZONE_DETECTION_INTERVAL: 375 MS
MDC_IDC_SET_ZONE_TYPE: NORMAL
MDC_IDC_SET_ZONE_VENDOR_TYPE: NORMAL
MDC_IDC_STAT_AT_BURDEN_PERCENT: 0 %
MDC_IDC_STAT_AT_DTM_END: NORMAL
MDC_IDC_STAT_AT_DTM_START: NORMAL
MDC_IDC_STAT_BRADY_DTM_END: NORMAL
MDC_IDC_STAT_BRADY_DTM_START: NORMAL
MDC_IDC_STAT_BRADY_RA_PERCENT_PACED: 34 %
MDC_IDC_STAT_BRADY_RV_PERCENT_PACED: 0 %
MDC_IDC_STAT_EPISODE_RECENT_COUNT: 0
MDC_IDC_STAT_EPISODE_RECENT_COUNT_DTM_END: NORMAL
MDC_IDC_STAT_EPISODE_RECENT_COUNT_DTM_START: NORMAL
MDC_IDC_STAT_EPISODE_TYPE: NORMAL
MDC_IDC_STAT_EPISODE_VENDOR_TYPE: NORMAL

## 2019-05-15 PROCEDURE — 99213 OFFICE O/P EST LOW 20 MIN: CPT | Performed by: NURSE PRACTITIONER

## 2019-05-15 ASSESSMENT — MIFFLIN-ST. JEOR: SCORE: 1709.01

## 2019-05-15 NOTE — PROGRESS NOTES
HPI and Plan: #[id number]  See dictation    Orders Placed This Encounter   Procedures     Follow-Up with Electrophysiologist       No orders of the defined types were placed in this encounter.      There are no discontinued medications.      Encounter Diagnosis   Name Primary?     Bradycardia Yes       CURRENT MEDICATIONS:  Current Outpatient Medications   Medication Sig Dispense Refill     apixaban ANTICOAGULANT (ELIQUIS) 5 MG tablet Take 1 tablet (5 mg) by mouth 2 times daily (Patient taking differently: Take 5 mg by mouth daily ) 180 tablet 2     atorvastatin (LIPITOR) 10 MG tablet Take 1 tablet (10 mg) by mouth daily 90 tablet 3     Ferrous Gluconate (IRON) 240 (27 FE) MG TABS Take 1 tablet by mouth daily       lisinopril (PRINIVIL/ZESTRIL) 20 MG tablet Take 0.5 tablets (10 mg) by mouth daily 90 tablet 3     metoprolol succinate ER (TOPROL-XL) 50 MG 24 hr tablet Take 1 tablet (50 mg) by mouth daily 90 tablet 3     pantoprazole (PROTONIX) 40 MG EC tablet Take 1 tablet (40 mg) by mouth daily 90 tablet 3     ranitidine (ZANTAC) 150 MG tablet Take by mouth At Bedtime       sildenafil (REVATIO) 20 MG tablet Take 1-2 tablet (20 mg) by mouth half to one hour before sexual intercourse .  Never use with nitroglycerin, terazosin or doxazosin. 30 tablet 5       ALLERGIES   No Known Allergies    PAST MEDICAL HISTORY:  Past Medical History:   Diagnosis Date     A-fib (H) 01/31/2018     Cancer (H)     skin     CKD (chronic kidney disease)      GERD (gastroesophageal reflux disease)      Hiatal hernia      Hypertension      Pericarditis      S/P colon resection        PAST SURGICAL HISTORY:  Past Surgical History:   Procedure Laterality Date     CHOLECYSTECTOMY       COLONOSCOPY       COMBINED COLONOSCOPY, MUCOSAL RESECTION  1993    ?diverticulitis      ESOPHAGOSCOPY, GASTROSCOPY, DUODENOSCOPY (EGD), COMBINED N/A 11/3/2016    Procedure: COMBINED ESOPHAGOSCOPY, GASTROSCOPY, DUODENOSCOPY (EGD), BIOPSY SINGLE OR MULTIPLE;  Surgeon:  Deondre Meek MD;  Location:  GI     ESOPHAGOSCOPY, GASTROSCOPY, DUODENOSCOPY (EGD), DILATATION, COMBINED N/A 12/1/2016    Procedure: COMBINED ESOPHAGOSCOPY, GASTROSCOPY, DUODENOSCOPY (EGD), DILATATION;  Surgeon: Deondre Meek MD;  Location:  GI     ESOPHAGOSCOPY, GASTROSCOPY, DUODENOSCOPY (EGD), DILATATION, COMBINED N/A 3/2/2017    Procedure: COMBINED ESOPHAGOSCOPY, GASTROSCOPY, DUODENOSCOPY (EGD), DILATATION;  Surgeon: Deonrde Meek MD;  Location:  GI     NEPHRECTOMY RT/LT      right     pace maker         FAMILY HISTORY:  Family History   Problem Relation Age of Onset     Hyperlipidemia Mother      Cerebrovascular Disease Father      Prostate Cancer Father      Pulmonary Hypertension Brother      Breast Cancer Sister      Parkinsonism Sister      Colon Cancer No family hx of        SOCIAL HISTORY:  Social History     Socioeconomic History     Marital status:      Spouse name: None     Number of children: None     Years of education: None     Highest education level: None   Occupational History     None   Social Needs     Financial resource strain: None     Food insecurity:     Worry: None     Inability: None     Transportation needs:     Medical: None     Non-medical: None   Tobacco Use     Smoking status: Former Smoker     Years: 7.00     Types: Pipe     Smokeless tobacco: Never Used     Tobacco comment: light smoker for few years only   Substance and Sexual Activity     Alcohol use: Yes     Alcohol/week: 0.0 oz     Comment: 3-4 times a week     Drug use: No     Sexual activity: Not Currently   Lifestyle     Physical activity:     Days per week: None     Minutes per session: None     Stress: None   Relationships     Social connections:     Talks on phone: None     Gets together: None     Attends Yazidism service: None     Active member of club or organization: None     Attends meetings of clubs or organizations: None     Relationship status: None     Intimate partner  "violence:     Fear of current or ex partner: None     Emotionally abused: None     Physically abused: None     Forced sexual activity: None   Other Topics Concern     Parent/sibling w/ CABG, MI or angioplasty before 65F 55M? Not Asked      Service Not Asked     Blood Transfusions Not Asked     Caffeine Concern Not Asked     Occupational Exposure Not Asked     Hobby Hazards Not Asked     Sleep Concern Not Asked     Stress Concern Not Asked     Weight Concern Not Asked     Special Diet No     Back Care Not Asked     Exercise No     Bike Helmet Not Asked     Seat Belt Not Asked     Self-Exams Not Asked   Social History Narrative     None       Review of Systems:  Skin:  Negative       Eyes:  Positive for glasses    ENT:  Negative      Respiratory:  Negative for shortness of breath;dyspnea on exertion;cough     Cardiovascular:  Negative for;palpitations;chest pain;edema;syncope or near-syncope;lightheadedness;dizziness;fatigue      Gastroenterology: Positive for   has had gastric bleeding s/p endoscopy (Dr. Meek)  Genitourinary:  Negative      Musculoskeletal:  Negative      Neurologic:  Negative      Psychiatric:  Negative      Heme/Lymph/Imm:  Negative      Endocrine:  Negative        Physical Exam:  Vitals: /74   Pulse 60   Ht 1.765 m (5' 9.5\")   Wt 97.1 kg (214 lb)   BMI 31.15 kg/m      Constitutional:  cooperative, alert and oriented, well developed, well nourished, in no acute distress        Skin:  warm and dry to the touch, no apparent skin lesions or masses noted   pacemaker incision in the right infraclavicular area was well-healed      Head:  normocephalic, no masses or lesions        Eyes:  pupils equal and round;conjunctivae and lids unremarkable;sclera white;no xanthalasma        Lymph:      ENT:  no pallor or cyanosis, dentition good        Neck:  JVP normal;no carotid bruit        Respiratory:  normal breath sounds, clear to auscultation, normal A-P diameter, normal symmetry, normal " respiratory excursion, no use of accessory muscles         Cardiac: regular rhythm;no murmurs, gallops or rubs detected;normal S1 and S2;no S3 or S4 occasional premature beats              not assessed this visit                                        GI:  abdomen soft obese      Extremities and Muscular Skeletal:  no deformities, clubbing, cyanosis, erythema observed;no edema              Neurological:  no gross motor deficits;affect appropriate        Psych:  Alert and Oriented x 3;affect appropriate, oriented to time, person and place        CC  No referring provider defined for this encounter.

## 2019-05-15 NOTE — PATIENT INSTRUCTIONS
Call my nurse with any questions or concerns:  [phone]  *If you have concerns after hours, please call [phone], option 2 to speak with on call Cardiologist.    1. Medication changes from today: No changes  See  with your annual pacer check in 1 year

## 2019-05-15 NOTE — LETTER
5/15/2019      Zoe Tomas MD  [address] [facility] [address]  [facility] [address]      RE: Estrada Oseguera       Dear Colleague,    I had the pleasure of seeing Estrada Oseguera in the [facility].    Service Date: 05/15/2019      HISTORY OF PRESENT ILLNESS:  Mr. Oseguera is a delightful 74-year-old gentleman that I am having the pleasure of seeing again today in followup.  He is an established patient of Dr. Valle.        His past medical history includes:      1.  Sick sinus syndrome with dual-chamber pacemaker implantation.   2.  Atrial fibrillation on Eliquis therapy.   3.  PVCs.   4.  Cardiomyopathy.      Estrada is here today for his annual visit.  Overall, he has felt well over the past year.  He denies any chest pain, shortness of breath, lightheadedness or dizziness.  His energy level is back to normal.      He was noted to have a 6% PVC burden on a Holter monitor last year.  It was not felt to be correlated with the patient's symptoms.  Nonetheless, these symptoms have since resolved.      He was also borderline type 2 diabetes mellitus.  He has been following a carb-free diet.  His A1c did go down from 6.4 to 5.8.  He has also lost 20 pounds during this process.      Last pacemaker check was 05/07/2019.  This showed that the patient was 34% A paced, 0% V paced with occasional PVCs.  Battery longevity is 12 years.  All other review of systems and past medical history are noted below.      ASSESSMENT AND PLAN:  Mr. Oseguera is a delightful 74-year-old gentleman here today for followup.   1.  Paroxysmal atrial fibrillation.  The patient is on Eliquis therapy as well as metoprolol 50 mg once daily.   2.  Device interrogation is scheduled for 08/13.   3.  Hypertension.  The patient is on lisinopril 10 mg daily, metoprolol 50 mg daily.  Blood pressure is stable at 138/74.   4.  History of cardiomyopathy.  His echocardiogram completed a year ago showed his EF to be  50% with grade I diastolic dysfunction noted.  RV function was normal.  He did have mild aortic root dilatation at 3.8 cm.   5.  Iron deficiency anemia, on ferrous gluconate.  Last hemoglobin was 15.9.      Thank you, as always, for including us in his care.  He will follow up next year with Dr. Valle with his annual threshold.  If there are questions or concerns in the interim, I have asked that they please contact us.         AIRAM ATKINSON NP             D: 05/15/2019   T: 05/15/2019   MT: CHAPINCITO      Name:     FRANCISCO SUN   MRN:      [id number]        Account:      [id number]   :      1945           Service Date: 05/15/2019      Document: [id number]         Outpatient Encounter Medications as of 5/15/2019   Medication Sig Dispense Refill     apixaban ANTICOAGULANT (ELIQUIS) 5 MG tablet Take 1 tablet (5 mg) by mouth 2 times daily (Patient taking differently: Take 5 mg by mouth daily ) 180 tablet 2     atorvastatin (LIPITOR) 10 MG tablet Take 1 tablet (10 mg) by mouth daily 90 tablet 3     Ferrous Gluconate (IRON) 240 (27 FE) MG TABS Take 1 tablet by mouth daily       lisinopril (PRINIVIL/ZESTRIL) 20 MG tablet Take 0.5 tablets (10 mg) by mouth daily 90 tablet 3     metoprolol succinate ER (TOPROL-XL) 50 MG 24 hr tablet Take 1 tablet (50 mg) by mouth daily 90 tablet 3     pantoprazole (PROTONIX) 40 MG EC tablet Take 1 tablet (40 mg) by mouth daily 90 tablet 3     ranitidine (ZANTAC) 150 MG tablet Take by mouth At Bedtime       sildenafil (REVATIO) 20 MG tablet Take 1-2 tablet (20 mg) by mouth half to one hour before sexual intercourse .  Never use with nitroglycerin, terazosin or doxazosin. 30 tablet 5     No facility-administered encounter medications on file as of 5/15/2019.        Again, thank you for allowing me to participate in the care of your patient.      Sincerely,    Airam Atkinson NP, APRN [facility]

## 2019-05-15 NOTE — LETTER
5/15/2019    Zoe Tomas MD  [address]  [facility] [address]    RE: Estrada Oseguera       Dear Colleague,    I had the pleasure of seeing Estrada Oseguera in the [facility].    HPI and Plan: #[id number]  See dictation    Orders Placed This Encounter   Procedures     Follow-Up with Electrophysiologist       No orders of the defined types were placed in this encounter.      There are no discontinued medications.      Encounter Diagnosis   Name Primary?     Bradycardia Yes       CURRENT MEDICATIONS:  Current Outpatient Medications   Medication Sig Dispense Refill     apixaban ANTICOAGULANT (ELIQUIS) 5 MG tablet Take 1 tablet (5 mg) by mouth 2 times daily (Patient taking differently: Take 5 mg by mouth daily ) 180 tablet 2     atorvastatin (LIPITOR) 10 MG tablet Take 1 tablet (10 mg) by mouth daily 90 tablet 3     Ferrous Gluconate (IRON) 240 (27 FE) MG TABS Take 1 tablet by mouth daily       lisinopril (PRINIVIL/ZESTRIL) 20 MG tablet Take 0.5 tablets (10 mg) by mouth daily 90 tablet 3     metoprolol succinate ER (TOPROL-XL) 50 MG 24 hr tablet Take 1 tablet (50 mg) by mouth daily 90 tablet 3     pantoprazole (PROTONIX) 40 MG EC tablet Take 1 tablet (40 mg) by mouth daily 90 tablet 3     ranitidine (ZANTAC) 150 MG tablet Take by mouth At Bedtime       sildenafil (REVATIO) 20 MG tablet Take 1-2 tablet (20 mg) by mouth half to one hour before sexual intercourse .  Never use with nitroglycerin, terazosin or doxazosin. 30 tablet 5       ALLERGIES   No Known Allergies    PAST MEDICAL HISTORY:  Past Medical History:   Diagnosis Date     A-fib (H) 01/31/2018     Cancer (H)     skin     CKD (chronic kidney disease)      GERD (gastroesophageal reflux disease)      Hiatal hernia      Hypertension      Pericarditis      S/P colon resection        PAST SURGICAL HISTORY:  Past Surgical History:   Procedure Laterality Date     CHOLECYSTECTOMY       COLONOSCOPY        COMBINED COLONOSCOPY, MUCOSAL RESECTION  1993    ?diverticulitis      ESOPHAGOSCOPY, GASTROSCOPY, DUODENOSCOPY (EGD), COMBINED N/A 11/3/2016    Procedure: COMBINED ESOPHAGOSCOPY, GASTROSCOPY, DUODENOSCOPY (EGD), BIOPSY SINGLE OR MULTIPLE;  Surgeon: Deondre Meek MD;  Location:  GI     ESOPHAGOSCOPY, GASTROSCOPY, DUODENOSCOPY (EGD), DILATATION, COMBINED N/A 12/1/2016    Procedure: COMBINED ESOPHAGOSCOPY, GASTROSCOPY, DUODENOSCOPY (EGD), DILATATION;  Surgeon: Deondre Meek MD;  Location:  GI     ESOPHAGOSCOPY, GASTROSCOPY, DUODENOSCOPY (EGD), DILATATION, COMBINED N/A 3/2/2017    Procedure: COMBINED ESOPHAGOSCOPY, GASTROSCOPY, DUODENOSCOPY (EGD), DILATATION;  Surgeon: Deondre Meek MD;  Location:  GI     NEPHRECTOMY RT/LT      right     pace maker         FAMILY HISTORY:  Family History   Problem Relation Age of Onset     Hyperlipidemia Mother      Cerebrovascular Disease Father      Prostate Cancer Father      Pulmonary Hypertension Brother      Breast Cancer Sister      Parkinsonism Sister      Colon Cancer No family hx of        SOCIAL HISTORY:  Social History     Socioeconomic History     Marital status:      Spouse name: None     Number of children: None     Years of education: None     Highest education level: None   Occupational History     None   Social Needs     Financial resource strain: None     Food insecurity:     Worry: None     Inability: None     Transportation needs:     Medical: None     Non-medical: None   Tobacco Use     Smoking status: Former Smoker     Years: 7.00     Types: Pipe     Smokeless tobacco: Never Used     Tobacco comment: light smoker for few years only   Substance and Sexual Activity     Alcohol use: Yes     Alcohol/week: 0.0 oz     Comment: 3-4 times a week     Drug use: No     Sexual activity: Not Currently   Lifestyle     Physical activity:     Days per week: None     Minutes per session: None     Stress: None   Relationships     Social  "connections:     Talks on phone: None     Gets together: None     Attends [employer] service: None     Active member of club or organization: None     Attends meetings of clubs or organizations: None     Relationship status: None     Intimate partner violence:     Fear of current or ex partner: None     Emotionally abused: None     Physically abused: None     Forced sexual activity: None   Other Topics Concern     Parent/sibling w/ CABG, MI or angioplasty before 65F 55M? Not Asked      Service Not Asked     Blood Transfusions Not Asked     Caffeine Concern Not Asked     Occupational Exposure Not Asked     Hobby Hazards Not Asked     Sleep Concern Not Asked     Stress Concern Not Asked     Weight Concern Not Asked     Special Diet No     Back Care Not Asked     Exercise No     Bike Helmet Not Asked     Seat Belt Not Asked     Self-Exams Not Asked   Social History Narrative     None       Review of Systems:  Skin:  Negative       Eyes:  Positive for glasses    ENT:  Negative      Respiratory:  Negative for shortness of breath;dyspnea on exertion;cough     Cardiovascular:  Negative for;palpitations;chest pain;edema;syncope or near-syncope;lightheadedness;dizziness;fatigue      Gastroenterology: Positive for   has had gastric bleeding s/p endoscopy (Dr. Meek)  Genitourinary:  Negative      Musculoskeletal:  Negative      Neurologic:  Negative      Psychiatric:  Negative      Heme/Lymph/Imm:  Negative      Endocrine:  Negative        Physical Exam:  Vitals: /74   Pulse 60   Ht 1.765 m (5' 9.5\")   Wt 97.1 kg (214 lb)   BMI 31.15 kg/m       Constitutional:  cooperative, alert and oriented, well developed, well nourished, in no acute distress        Skin:  warm and dry to the touch, no apparent skin lesions or masses noted   pacemaker incision in the right infraclavicular area was well-healed      Head:  normocephalic, no masses or lesions        Eyes:  pupils equal and round;conjunctivae and lids " unremarkable;sclera white;no xanthalasma        Lymph:      ENT:  no pallor or cyanosis, dentition good        Neck:  JVP normal;no carotid bruit        Respiratory:  normal breath sounds, clear to auscultation, normal A-P diameter, normal symmetry, normal respiratory excursion, no use of accessory muscles         Cardiac: regular rhythm;no murmurs, gallops or rubs detected;normal S1 and S2;no S3 or S4 occasional premature beats              not assessed this visit                                        GI:  abdomen soft obese      Extremities and Muscular Skeletal:  no deformities, clubbing, cyanosis, erythema observed;no edema              Neurological:  no gross motor deficits;affect appropriate        Psych:  Alert and Oriented x 3;affect appropriate, oriented to time, person and place        CC  No referring provider defined for this encounter.                Thank you for allowing me to participate in the care of your patient.      Sincerely,     Airam Atkinson, NP, APRN [facility]    cc:   No referring provider defined for this encounter.

## 2019-05-15 NOTE — PROGRESS NOTES
Service Date: 05/15/2019      HISTORY OF PRESENT ILLNESS:  Mr. Oseguera is a delightful 74-year-old gentleman that I am having the pleasure of seeing again today in followup.  He is an established patient of Dr. Valle.        His past medical history includes:      1.  Sick sinus syndrome with dual-chamber pacemaker implantation.   2.  Atrial fibrillation on Eliquis therapy.   3.  PVCs.   4.  Cardiomyopathy.      Estrada is here today for his annual visit.  Overall, he has felt well over the past year.  He denies any chest pain, shortness of breath, lightheadedness or dizziness.  His energy level is back to normal.      He was noted to have a 6% PVC burden on a Holter monitor last year.  It was not felt to be correlated with the patient's symptoms.  Nonetheless, these symptoms have since resolved.      He was also borderline type 2 diabetes mellitus.  He has been following a carb-free diet.  His A1c did go down from 6.4 to 5.8.  He has also lost 20 pounds during this process.      Last pacemaker check was 05/07/2019.  This showed that the patient was 34% A paced, 0% V paced with occasional PVCs.  Battery longevity is 12 years.  All other review of systems and past medical history are noted below.      ASSESSMENT AND PLAN:  Mr. Oseguera is a delightful 74-year-old gentleman here today for followup.   1.  Paroxysmal atrial fibrillation.  The patient is on Eliquis therapy as well as metoprolol 50 mg once daily.   2.  Device interrogation is scheduled for 08/13.   3.  Hypertension.  The patient is on lisinopril 10 mg daily, metoprolol 50 mg daily.  Blood pressure is stable at 138/74.   4.  History of cardiomyopathy.  His echocardiogram completed a year ago showed his EF to be 50% with grade I diastolic dysfunction noted.  RV function was normal.  He did have mild aortic root dilatation at 3.8 cm.   5.  Iron deficiency anemia, on ferrous gluconate.  Last hemoglobin was 15.9.      Thank you, as always, for including us in  his care.  He will follow up next year with Dr. Valle with his annual threshold.  If there are questions or concerns in the interim, I have asked that they please contact us.         SAMIA PINO NP             D: 05/15/2019   T: 05/15/2019   MT: CHAPINCITO      Name:     FRANCISCO SUN   MRN:      [id number]        Account:      [id number]   :      1945           Service Date: 05/15/2019      Document: [id number]

## 2019-08-13 ENCOUNTER — ANCILLARY PROCEDURE (OUTPATIENT)
Dept: CARDIOLOGY | Facility: CLINIC | Age: 74
End: 2019-08-13
Attending: INTERNAL MEDICINE
Payer: MEDICARE

## 2019-08-13 DIAGNOSIS — I49.5 SSS (SICK SINUS SYNDROME) (H): ICD-10-CM

## 2019-08-13 PROCEDURE — 93296 REM INTERROG EVL PM/IDS: CPT | Performed by: INTERNAL MEDICINE

## 2019-08-13 PROCEDURE — 93294 REM INTERROG EVL PM/LDLS PM: CPT | Performed by: INTERNAL MEDICINE

## 2019-08-14 LAB
MDC_IDC_EPISODE_DTM: NORMAL
MDC_IDC_EPISODE_DURATION: 13 S
MDC_IDC_EPISODE_DURATION: 14 S
MDC_IDC_EPISODE_DURATION: 15 S
MDC_IDC_EPISODE_ID: NORMAL
MDC_IDC_EPISODE_TYPE: NORMAL
MDC_IDC_LEAD_IMPLANT_DT: NORMAL
MDC_IDC_LEAD_IMPLANT_DT: NORMAL
MDC_IDC_LEAD_LOCATION: NORMAL
MDC_IDC_LEAD_LOCATION: NORMAL
MDC_IDC_LEAD_LOCATION_DETAIL_1: NORMAL
MDC_IDC_LEAD_LOCATION_DETAIL_1: NORMAL
MDC_IDC_LEAD_MFG: NORMAL
MDC_IDC_LEAD_MFG: NORMAL
MDC_IDC_LEAD_MODEL: NORMAL
MDC_IDC_LEAD_MODEL: NORMAL
MDC_IDC_LEAD_POLARITY_TYPE: NORMAL
MDC_IDC_LEAD_POLARITY_TYPE: NORMAL
MDC_IDC_LEAD_SERIAL: NORMAL
MDC_IDC_LEAD_SERIAL: NORMAL
MDC_IDC_MSMT_BATTERY_DTM: NORMAL
MDC_IDC_MSMT_BATTERY_REMAINING_LONGEVITY: 144 MO
MDC_IDC_MSMT_BATTERY_REMAINING_PERCENTAGE: 100 %
MDC_IDC_MSMT_BATTERY_STATUS: NORMAL
MDC_IDC_MSMT_LEADCHNL_RA_IMPEDANCE_VALUE: 676 OHM
MDC_IDC_MSMT_LEADCHNL_RA_PACING_THRESHOLD_AMPLITUDE: 0.5 V
MDC_IDC_MSMT_LEADCHNL_RA_PACING_THRESHOLD_PULSEWIDTH: 0.4 MS
MDC_IDC_MSMT_LEADCHNL_RV_IMPEDANCE_VALUE: 710 OHM
MDC_IDC_MSMT_LEADCHNL_RV_PACING_THRESHOLD_AMPLITUDE: 1.2 V
MDC_IDC_MSMT_LEADCHNL_RV_PACING_THRESHOLD_PULSEWIDTH: 0.4 MS
MDC_IDC_PG_IMPLANT_DTM: NORMAL
MDC_IDC_PG_MFG: NORMAL
MDC_IDC_PG_MODEL: NORMAL
MDC_IDC_PG_SERIAL: NORMAL
MDC_IDC_PG_TYPE: NORMAL
MDC_IDC_SESS_CLINIC_NAME: NORMAL
MDC_IDC_SESS_DTM: NORMAL
MDC_IDC_SESS_TYPE: NORMAL
MDC_IDC_SET_BRADY_AT_MODE_SWITCH_MODE: NORMAL
MDC_IDC_SET_BRADY_AT_MODE_SWITCH_RATE: 170 {BEATS}/MIN
MDC_IDC_SET_BRADY_LOWRATE: 55 {BEATS}/MIN
MDC_IDC_SET_BRADY_MAX_SENSOR_RATE: 130 {BEATS}/MIN
MDC_IDC_SET_BRADY_MAX_TRACKING_RATE: 130 {BEATS}/MIN
MDC_IDC_SET_BRADY_MODE: NORMAL
MDC_IDC_SET_BRADY_PAV_DELAY_HIGH: 200 MS
MDC_IDC_SET_BRADY_PAV_DELAY_LOW: 300 MS
MDC_IDC_SET_BRADY_SAV_DELAY_HIGH: 200 MS
MDC_IDC_SET_BRADY_SAV_DELAY_LOW: 300 MS
MDC_IDC_SET_LEADCHNL_RA_PACING_AMPLITUDE: 2 V
MDC_IDC_SET_LEADCHNL_RA_PACING_CAPTURE_MODE: NORMAL
MDC_IDC_SET_LEADCHNL_RA_PACING_POLARITY: NORMAL
MDC_IDC_SET_LEADCHNL_RA_PACING_PULSEWIDTH: 0.4 MS
MDC_IDC_SET_LEADCHNL_RA_SENSING_ADAPTATION_MODE: NORMAL
MDC_IDC_SET_LEADCHNL_RA_SENSING_POLARITY: NORMAL
MDC_IDC_SET_LEADCHNL_RA_SENSING_SENSITIVITY: 0.25 MV
MDC_IDC_SET_LEADCHNL_RV_PACING_AMPLITUDE: 1.8 V
MDC_IDC_SET_LEADCHNL_RV_PACING_CAPTURE_MODE: NORMAL
MDC_IDC_SET_LEADCHNL_RV_PACING_POLARITY: NORMAL
MDC_IDC_SET_LEADCHNL_RV_PACING_PULSEWIDTH: 0.4 MS
MDC_IDC_SET_LEADCHNL_RV_SENSING_ADAPTATION_MODE: NORMAL
MDC_IDC_SET_LEADCHNL_RV_SENSING_POLARITY: NORMAL
MDC_IDC_SET_LEADCHNL_RV_SENSING_SENSITIVITY: 1.5 MV
MDC_IDC_SET_ZONE_DETECTION_INTERVAL: 375 MS
MDC_IDC_SET_ZONE_TYPE: NORMAL
MDC_IDC_SET_ZONE_VENDOR_TYPE: NORMAL
MDC_IDC_STAT_AT_BURDEN_PERCENT: 0 %
MDC_IDC_STAT_AT_DTM_END: NORMAL
MDC_IDC_STAT_AT_DTM_START: NORMAL
MDC_IDC_STAT_BRADY_DTM_END: NORMAL
MDC_IDC_STAT_BRADY_DTM_START: NORMAL
MDC_IDC_STAT_BRADY_RA_PERCENT_PACED: 30 %
MDC_IDC_STAT_BRADY_RV_PERCENT_PACED: 0 %
MDC_IDC_STAT_EPISODE_RECENT_COUNT: 0
MDC_IDC_STAT_EPISODE_RECENT_COUNT: 3
MDC_IDC_STAT_EPISODE_RECENT_COUNT_DTM_END: NORMAL
MDC_IDC_STAT_EPISODE_RECENT_COUNT_DTM_START: NORMAL
MDC_IDC_STAT_EPISODE_TYPE: NORMAL
MDC_IDC_STAT_EPISODE_VENDOR_TYPE: NORMAL

## 2019-09-23 DIAGNOSIS — I10 BENIGN HYPERTENSION: ICD-10-CM

## 2019-09-23 DIAGNOSIS — I47.29 NSVT (NONSUSTAINED VENTRICULAR TACHYCARDIA) (H): ICD-10-CM

## 2019-09-23 DIAGNOSIS — K22.70 BARRETT'S ESOPHAGUS WITHOUT DYSPLASIA: ICD-10-CM

## 2019-09-23 DIAGNOSIS — E78.5 HYPERLIPIDEMIA, UNSPECIFIED HYPERLIPIDEMIA TYPE: ICD-10-CM

## 2019-09-24 DIAGNOSIS — I48.91 ATRIAL FIBRILLATION, UNSPECIFIED TYPE (H): ICD-10-CM

## 2019-09-24 RX ORDER — PANTOPRAZOLE SODIUM 40 MG/1
TABLET, DELAYED RELEASE ORAL
Start: 2019-09-24

## 2019-09-24 RX ORDER — METOPROLOL SUCCINATE 50 MG/1
TABLET, EXTENDED RELEASE ORAL
Start: 2019-09-24

## 2019-09-24 RX ORDER — ATORVASTATIN CALCIUM 10 MG/1
TABLET, FILM COATED ORAL
Start: 2019-09-24

## 2019-09-24 NOTE — TELEPHONE ENCOUNTER
"Denied all three   Too early; Rx's sent 3/13/2019 for 1 year  Minnie MONTAÑO RN    Last Written Prescription Date:  3/13/2019  Last Fill Quantity: 90,  # refills: 3   Last office visit: 3/13/2019 with prescribing provider:     Future Office Visit:    Requested Prescriptions   Pending Prescriptions Disp Refills     pantoprazole (PROTONIX) 40 MG EC tablet [Pharmacy Med Name: PANTOPRAZOLE SODIUM 40 MG Tablet Delayed Release] 90 tablet 3     Sig: TAKE 1 TABLET EVERY DAY       PPI Protocol Passed - 9/23/2019  4:46 PM        Passed - Not on Clopidogrel (unless Pantoprazole ordered)        Passed - No diagnosis of osteoporosis on record        Passed - Recent (12 mo) or future (30 days) visit within the authorizing provider's specialty     Patient had office visit in the last 12 months or has a visit in the next 30 days with authorizing provider or within the authorizing provider's specialty.  See \"Patient Info\" tab in inbasket, or \"Choose Columns\" in Meds & Orders section of the refill encounter.              Passed - Medication is active on med list        Passed - Patient is age 18 or older        atorvastatin (LIPITOR) 10 MG tablet [Pharmacy Med Name: ATORVASTATIN CALCIUM 10 MG Tablet] 90 tablet 3     Sig: TAKE 1 TABLET EVERY DAY       Statins Protocol Passed - 9/23/2019  4:46 PM        Passed - LDL on file in past 12 months     Recent Labs   Lab Test 03/13/19  1231   LDL 63             Passed - No abnormal creatine kinase in past 12 months     No lab results found.             Passed - Recent (12 mo) or future (30 days) visit within the authorizing provider's specialty     Patient had office visit in the last 12 months or has a visit in the next 30 days with authorizing provider or within the authorizing provider's specialty.  See \"Patient Info\" tab in inbasket, or \"Choose Columns\" in Meds & Orders section of the refill encounter.              Passed - Medication is active on med list        Passed - Patient is age 18 or " "older        metoprolol succinate ER (TOPROL-XL) 50 MG 24 hr tablet [Pharmacy Med Name: METOPROLOL SUCCINATE ER 50 MG Tablet Extended Release 24 Hour] 90 tablet 3     Sig: TAKE 1 TABLET EVERY DAY       Beta-Blockers Protocol Passed - 9/23/2019  4:46 PM        Passed - Blood pressure under 140/90 in past 12 months     BP Readings from Last 3 Encounters:   05/15/19 138/74   03/13/19 128/69   09/21/18 140/80                 Passed - Patient is age 6 or older        Passed - Recent (12 mo) or future (30 days) visit within the authorizing provider's specialty     Patient had office visit in the last 12 months or has a visit in the next 30 days with authorizing provider or within the authorizing provider's specialty.  See \"Patient Info\" tab in inbasket, or \"Choose Columns\" in Meds & Orders section of the refill encounter.              Passed - Medication is active on med list        "

## 2019-09-30 RX ORDER — LISINOPRIL 20 MG/1
10 TABLET ORAL DAILY
Qty: 90 TABLET | Refills: 1 | Status: SHIPPED | OUTPATIENT
Start: 2019-09-30 | End: 2020-01-15

## 2019-09-30 RX ORDER — METOPROLOL SUCCINATE 50 MG/1
50 TABLET, EXTENDED RELEASE ORAL DAILY
Qty: 90 TABLET | Refills: 1 | Status: SHIPPED | OUTPATIENT
Start: 2019-09-30 | End: 2020-01-15

## 2019-09-30 RX ORDER — ATORVASTATIN CALCIUM 10 MG/1
10 TABLET, FILM COATED ORAL DAILY
Qty: 90 TABLET | Refills: 1 | Status: SHIPPED | OUTPATIENT
Start: 2019-09-30 | End: 2020-01-15

## 2019-09-30 NOTE — TELEPHONE ENCOUNTER
Pt called 9/30/2019  Pharmacy told him that his RXs for these were denied with no reason given to the pharmacy. He should have refills left in our record, but he could not get one from the pharmacy. Would like a follow-up call or to have the prescription resent to pharmacy

## 2019-11-19 ENCOUNTER — ANCILLARY PROCEDURE (OUTPATIENT)
Dept: CARDIOLOGY | Facility: CLINIC | Age: 74
End: 2019-11-19
Attending: INTERNAL MEDICINE
Payer: MEDICARE

## 2019-11-19 DIAGNOSIS — I49.5 SSS (SICK SINUS SYNDROME) (H): ICD-10-CM

## 2019-11-19 LAB
MDC_IDC_EPISODE_DTM: NORMAL
MDC_IDC_EPISODE_DURATION: 12 S
MDC_IDC_EPISODE_ID: NORMAL
MDC_IDC_EPISODE_TYPE: NORMAL
MDC_IDC_LEAD_IMPLANT_DT: NORMAL
MDC_IDC_LEAD_IMPLANT_DT: NORMAL
MDC_IDC_LEAD_LOCATION: NORMAL
MDC_IDC_LEAD_LOCATION: NORMAL
MDC_IDC_LEAD_LOCATION_DETAIL_1: NORMAL
MDC_IDC_LEAD_LOCATION_DETAIL_1: NORMAL
MDC_IDC_LEAD_MFG: NORMAL
MDC_IDC_LEAD_MFG: NORMAL
MDC_IDC_LEAD_MODEL: NORMAL
MDC_IDC_LEAD_MODEL: NORMAL
MDC_IDC_LEAD_POLARITY_TYPE: NORMAL
MDC_IDC_LEAD_POLARITY_TYPE: NORMAL
MDC_IDC_LEAD_SERIAL: NORMAL
MDC_IDC_LEAD_SERIAL: NORMAL
MDC_IDC_MSMT_BATTERY_DTM: NORMAL
MDC_IDC_MSMT_BATTERY_REMAINING_LONGEVITY: 144 MO
MDC_IDC_MSMT_BATTERY_REMAINING_PERCENTAGE: 100 %
MDC_IDC_MSMT_BATTERY_STATUS: NORMAL
MDC_IDC_MSMT_LEADCHNL_RA_IMPEDANCE_VALUE: 674 OHM
MDC_IDC_MSMT_LEADCHNL_RA_PACING_THRESHOLD_AMPLITUDE: 0.5 V
MDC_IDC_MSMT_LEADCHNL_RA_PACING_THRESHOLD_PULSEWIDTH: 0.4 MS
MDC_IDC_MSMT_LEADCHNL_RV_IMPEDANCE_VALUE: 670 OHM
MDC_IDC_MSMT_LEADCHNL_RV_PACING_THRESHOLD_AMPLITUDE: 1.2 V
MDC_IDC_MSMT_LEADCHNL_RV_PACING_THRESHOLD_PULSEWIDTH: 0.4 MS
MDC_IDC_PG_IMPLANT_DTM: NORMAL
MDC_IDC_PG_MFG: NORMAL
MDC_IDC_PG_MODEL: NORMAL
MDC_IDC_PG_SERIAL: NORMAL
MDC_IDC_PG_TYPE: NORMAL
MDC_IDC_SESS_CLINIC_NAME: NORMAL
MDC_IDC_SESS_DTM: NORMAL
MDC_IDC_SESS_TYPE: NORMAL
MDC_IDC_SET_BRADY_AT_MODE_SWITCH_MODE: NORMAL
MDC_IDC_SET_BRADY_AT_MODE_SWITCH_RATE: 170 {BEATS}/MIN
MDC_IDC_SET_BRADY_LOWRATE: 55 {BEATS}/MIN
MDC_IDC_SET_BRADY_MAX_SENSOR_RATE: 130 {BEATS}/MIN
MDC_IDC_SET_BRADY_MAX_TRACKING_RATE: 130 {BEATS}/MIN
MDC_IDC_SET_BRADY_MODE: NORMAL
MDC_IDC_SET_BRADY_PAV_DELAY_HIGH: 200 MS
MDC_IDC_SET_BRADY_PAV_DELAY_LOW: 300 MS
MDC_IDC_SET_BRADY_SAV_DELAY_HIGH: 200 MS
MDC_IDC_SET_BRADY_SAV_DELAY_LOW: 300 MS
MDC_IDC_SET_LEADCHNL_RA_PACING_AMPLITUDE: 2 V
MDC_IDC_SET_LEADCHNL_RA_PACING_CAPTURE_MODE: NORMAL
MDC_IDC_SET_LEADCHNL_RA_PACING_POLARITY: NORMAL
MDC_IDC_SET_LEADCHNL_RA_PACING_PULSEWIDTH: 0.4 MS
MDC_IDC_SET_LEADCHNL_RA_SENSING_ADAPTATION_MODE: NORMAL
MDC_IDC_SET_LEADCHNL_RA_SENSING_POLARITY: NORMAL
MDC_IDC_SET_LEADCHNL_RA_SENSING_SENSITIVITY: 0.25 MV
MDC_IDC_SET_LEADCHNL_RV_PACING_AMPLITUDE: 1.8 V
MDC_IDC_SET_LEADCHNL_RV_PACING_CAPTURE_MODE: NORMAL
MDC_IDC_SET_LEADCHNL_RV_PACING_POLARITY: NORMAL
MDC_IDC_SET_LEADCHNL_RV_PACING_PULSEWIDTH: 0.4 MS
MDC_IDC_SET_LEADCHNL_RV_SENSING_ADAPTATION_MODE: NORMAL
MDC_IDC_SET_LEADCHNL_RV_SENSING_POLARITY: NORMAL
MDC_IDC_SET_LEADCHNL_RV_SENSING_SENSITIVITY: 1.5 MV
MDC_IDC_SET_ZONE_DETECTION_INTERVAL: 375 MS
MDC_IDC_SET_ZONE_TYPE: NORMAL
MDC_IDC_SET_ZONE_VENDOR_TYPE: NORMAL
MDC_IDC_STAT_AT_BURDEN_PERCENT: 0 %
MDC_IDC_STAT_AT_DTM_END: NORMAL
MDC_IDC_STAT_AT_DTM_START: NORMAL
MDC_IDC_STAT_BRADY_DTM_END: NORMAL
MDC_IDC_STAT_BRADY_DTM_START: NORMAL
MDC_IDC_STAT_BRADY_RA_PERCENT_PACED: 30 %
MDC_IDC_STAT_BRADY_RV_PERCENT_PACED: 0 %
MDC_IDC_STAT_EPISODE_RECENT_COUNT: 0
MDC_IDC_STAT_EPISODE_RECENT_COUNT: 4
MDC_IDC_STAT_EPISODE_RECENT_COUNT_DTM_END: NORMAL
MDC_IDC_STAT_EPISODE_RECENT_COUNT_DTM_START: NORMAL
MDC_IDC_STAT_EPISODE_TYPE: NORMAL
MDC_IDC_STAT_EPISODE_VENDOR_TYPE: NORMAL

## 2019-11-19 PROCEDURE — 93296 REM INTERROG EVL PM/IDS: CPT | Performed by: INTERNAL MEDICINE

## 2019-11-19 PROCEDURE — 93294 REM INTERROG EVL PM/LDLS PM: CPT | Performed by: INTERNAL MEDICINE

## 2019-11-21 ENCOUNTER — ALLIED HEALTH/NURSE VISIT (OUTPATIENT)
Dept: NURSING | Facility: CLINIC | Age: 74
End: 2019-11-21
Payer: MEDICARE

## 2019-11-21 DIAGNOSIS — Z23 NEED FOR PROPHYLACTIC VACCINATION AND INOCULATION AGAINST INFLUENZA: Primary | ICD-10-CM

## 2019-11-21 PROCEDURE — 90662 IIV NO PRSV INCREASED AG IM: CPT

## 2019-11-21 PROCEDURE — 99207 ZZC NO CHARGE NURSE ONLY: CPT

## 2019-11-21 PROCEDURE — G0008 ADMIN INFLUENZA VIRUS VAC: HCPCS

## 2020-01-14 ENCOUNTER — NURSE TRIAGE (OUTPATIENT)
Dept: NURSING | Facility: CLINIC | Age: 75
End: 2020-01-14

## 2020-01-14 DIAGNOSIS — E78.5 HYPERLIPIDEMIA, UNSPECIFIED HYPERLIPIDEMIA TYPE: ICD-10-CM

## 2020-01-14 DIAGNOSIS — K22.70 BARRETT'S ESOPHAGUS WITHOUT DYSPLASIA: ICD-10-CM

## 2020-01-14 DIAGNOSIS — I47.29 NSVT (NONSUSTAINED VENTRICULAR TACHYCARDIA) (H): ICD-10-CM

## 2020-01-14 DIAGNOSIS — I48.91 ATRIAL FIBRILLATION, UNSPECIFIED TYPE (H): ICD-10-CM

## 2020-01-14 DIAGNOSIS — I10 BENIGN HYPERTENSION: ICD-10-CM

## 2020-01-14 NOTE — TELEPHONE ENCOUNTER
Patient needs Dr. Tomas to fax 90 days worth of his prescriptions to [facility] do to change from [facility] to [facility] for part D prescription plan.    [facility] fax # is [phone].    Routed to [facility] providers    Dayana Rollins RN  [facility]

## 2020-01-14 NOTE — TELEPHONE ENCOUNTER
Patient needs Dr. Tomas to fax 90 days worth of his prescriptions to [facility] do to change from [facility] to [facility] for part D prescription plan.    [facility] fax # is [phone].    Routed to [facility] providers    Dayana Rollins RN  [facility]    Reason for Disposition    Caller requesting a NON-URGENT new prescription or refill and triager unable to refill per unit policy    Protocols used: MEDICATION QUESTION CALL-A-

## 2020-01-15 RX ORDER — ATORVASTATIN CALCIUM 10 MG/1
10 TABLET, FILM COATED ORAL DAILY
Qty: 90 TABLET | Refills: 0 | Status: SHIPPED | OUTPATIENT
Start: 2020-01-15 | End: 2020-03-16

## 2020-01-15 RX ORDER — METOPROLOL SUCCINATE 50 MG/1
50 TABLET, EXTENDED RELEASE ORAL DAILY
Qty: 90 TABLET | Refills: 0 | Status: SHIPPED | OUTPATIENT
Start: 2020-01-15 | End: 2020-03-16

## 2020-01-15 RX ORDER — LISINOPRIL 20 MG/1
10 TABLET ORAL DAILY
Qty: 90 TABLET | Refills: 0 | Status: SHIPPED | OUTPATIENT
Start: 2020-01-15 | End: 2020-03-16

## 2020-01-15 RX ORDER — PANTOPRAZOLE SODIUM 40 MG/1
40 TABLET, DELAYED RELEASE ORAL DAILY
Qty: 90 TABLET | Refills: 0 | Status: SHIPPED | OUTPATIENT
Start: 2020-01-15 | End: 2020-03-16

## 2020-01-15 NOTE — TELEPHONE ENCOUNTER
Routing refill request to provider for review/approval because:  Lisinopril & Apixaban: Labs out of range/not current:   Recent Labs   Lab Test 03/13/19  1231   CR 1.26*       Apixaban: Formerly RX'd by Cardiology- would you like to defer refill to Cardiology?     Please review and authorize if appropriate,     Thank you,   Latanya NEWTON RN     Note: atorvastatin, metoprolol, pantoprazole Prescription approved per [facility] Refill Protocol.

## 2020-01-15 NOTE — TELEPHONE ENCOUNTER
Mail Order Pharmacy change from [facility] to [facility]     atorvastatin (LIPITOR) 10 MG tablet 90 tablet 1 9/30/2019  No   Sig - Route: Take 1 tablet (10 mg) by mouth daily        Last Written Prescription Date:  09/30/2019  Last Fill Quantity: 90,  # refills: 1   Last office visit: 3/13/2019 with prescribing provider:     Future Office Visit:   Next 5 appointments (look out 90 days)    Mar 16, 2020  1:00 PM CDT  [facility]YSICAL with Zoe Tomas [facility]  [facility] [address] [facility] [id number] [phone]           ~~~~~~~~~~~~~~~~~~~~~~~~~~~~~~    lisinopril (PRINIVIL/ZESTRIL) 20 MG tablet 90 tablet 1 9/30/2019  No   Sig - Route: Take 0.5 tablets (10 mg) by mouth daily      Last Written Prescription Date:  09/30/2019  Last Fill Quantity: 90,  # refills: 1   Last office visit: 3/13/2019 with prescribing provider:     Future Office Visit:   Next 5 appointments (look out 90 days)    Mar 16, 2020  1:00 PM CDT  PHYSICAL with [facility]oe Tomas MD  [facility] [address] [facility] [id number] [phone]         ~~~~~~~~~~~~~~~~~~~~~~~~~~~~~~    metoprolol succinate ER (TOPROL-XL) 50 MG 24 hr tablet 90 tablet 1 9/30/2019  No   Sig - Route: Take 1 tablet (50 mg) by mouth daily      Last Written Prescription Date:  09/320/2019  Last Fill Quantity: 90,  # refills: 1   Last office visit: 3/13/2019 with prescribing provider:     Future Office Visit:   Next 5 appointments (look out 90 days)    Mar 16, 2020  1:00 PM CDT  PHYSICAL with Zoe Tomas MD  [facility] [address] [facility] [id number] [phone]           ~~~~~~~~~~~~~~~~~~~~~~~~~~~~~~    apixaban ANTICOAGULANT (ELIQUIS ANTICOAGULANT) 5 MG tablet 180 tablet 2 9/24/2019  No   Sig - Route: Take 1 tablet (5 mg) by mouth 2 times daily       Last Written Prescription Date:  09/24/2019  Last Fill Quantity: 180,  # refills: 2   Last  office visit: 3/13/2019 with prescribing provider:  09/24/2019   Future Office Visit:   Next 5 appointments (look out 90 days)    Mar 16, 2020  1:00 PM CDT  PHYSICAL with Zoe Tomas MD  [facility]) [address] [facility] [phone] [phone]           ~~~~~~~~~~~~~~~~~~~~~~~~~~~~~~    pantoprazole (PROTONIX) 40 MG EC tablet 90 tablet 3 3/13/2019  No   Sig - Route: Take 1 tablet (40 mg) by mouth daily      Last Written Prescription Date:  03/13/2019  Last Fill Quantity: 90,  # refills: 3   Last office visit: 3/13/2019 with prescribing provider:     Future Office Visit:   Next 5 appointments (look out 90 days)    Mar 16, 2020  1:00 PM CDT  PHYSICAL with Zoe Tomas MD  [facility]) [address] [phone] [phone]           ~~~~~~~~~~~~~~~~~~~~~~~~~~~~~~  Requested Prescriptions   Pending Prescriptions Disp Refills     apixaban ANTICOAGULANT (ELIQUIS ANTICOAGULANT) 5 MG tablet 180 tablet 2     Sig: Take 1 tablet (5 mg) by mouth 2 times daily       Direct Oral Anticoagulant Agents Failed - 1/15/2020  2:00 PM        Failed - Normal Platelets on file in past 12 months     Recent Labs   Lab Test 03/13/19  1231   *               Failed - Recent (6 mo) or future (30 days) visit within the authorizing provider's specialty        Passed - Medication is active on med list        Passed - Patient is 18-79 years of age        Passed - Serum creatinine less than or equal to 1.4 on file in past 12 mos     Recent Labs   Lab Test 03/13/19  1231   CR 1.26*             Passed - Weight is greater than 60 kg for the past year     Wt Readings from Last 3 Encounters:   05/15/19 97.1 kg (214 lb)   03/13/19 95.3 kg (210 lb)   09/21/18 106.1 kg (234 lb)             atorvastatin (LIPITOR) 10 MG tablet 90 tablet 1     Sig: Take 1 tablet (10 mg) by mouth daily       Statins Protocol Passed - 1/15/2020  2:00 PM         "Passed - LDL on file in past 12 months     Recent Labs   Lab Test 03/13/19  1231   LDL 63             Passed - No abnormal creatine kinase in past 12 months     No lab results found.             Passed - Recent (12 mo) or future (30 days) visit within the authorizing provider's specialty     Patient has had an office visit with the authorizing provider or a provider within the authorizing providers department within the previous 12 mos or has a future within next 30 days. See \"Patient Info\" tab in inbasket, or \"Choose Columns\" in Meds & Orders section of the refill encounter.              Passed - Medication is active on med list        Passed - Patient is age 18 or older        lisinopril (PRINIVIL/ZESTRIL) 20 MG tablet 90 tablet 1     Sig: Take 0.5 tablets (10 mg) by mouth daily       ACE Inhibitors (Including Combos) Protocol Failed - 1/15/2020  2:00 PM        Failed - Normal serum creatinine on file in past 12 months     Recent Labs   Lab Test 03/13/19  1231   CR 1.26*             Passed - Blood pressure under 140/90 in past 12 months     BP Readings from Last 3 Encounters:   05/15/19 138/74   03/13/19 128/69   09/21/18 140/80                 Passed - Recent (12 mo) or future (30 days) visit within the authorizing provider's specialty     Patient has had an office visit with the authorizing provider or a provider within the authorizing providers department within the previous 12 mos or has a future within next 30 days. See \"Patient Info\" tab in inbasket, or \"Choose Columns\" in Meds & Orders section of the refill encounter.              Passed - Medication is active on med list        Passed - Patient is age 18 or older        Passed - Normal serum potassium on file in past 12 months     Recent Labs   Lab Test 03/13/19  1231   POTASSIUM 4.4             metoprolol succinate ER (TOPROL-XL) 50 MG 24 hr tablet 90 tablet 1     Sig: Take 1 tablet (50 mg) by mouth daily       Beta-Blockers Protocol Passed - 1/15/2020  " "2:00 PM        Passed - Blood pressure under 140/90 in past 12 months     BP Readings from Last 3 Encounters:   05/15/19 138/74   03/13/19 128/69   09/21/18 140/80                 Passed - Patient is age 6 or older        Passed - Recent (12 mo) or future (30 days) visit within the authorizing provider's specialty     Patient has had an office visit with the authorizing provider or a provider within the authorizing providers department within the previous 12 mos or has a future within next 30 days. See \"Patient Info\" tab in inbasket, or \"Choose Columns\" in Meds & Orders section of the refill encounter.              Passed - Medication is active on med list        pantoprazole (PROTONIX) 40 MG EC tablet 90 tablet 3     Sig: Take 1 tablet (40 mg) by mouth daily       PPI Protocol Passed - 1/15/2020  2:00 PM        Passed - Not on Clopidogrel (unless Pantoprazole ordered)        Passed - No diagnosis of osteoporosis on record        Passed - Recent (12 mo) or future (30 days) visit within the authorizing provider's specialty     Patient has had an office visit with the authorizing provider or a provider within the authorizing providers department within the previous 12 mos or has a future within next 30 days. See \"Patient Info\" tab in inbasket, or \"Choose Columns\" in Meds & Orders section of the refill encounter.              Passed - Medication is active on med list        Passed - Patient is age 18 or older          "

## 2020-03-02 ENCOUNTER — ANCILLARY PROCEDURE (OUTPATIENT)
Dept: CARDIOLOGY | Facility: CLINIC | Age: 75
End: 2020-03-02
Attending: INTERNAL MEDICINE
Payer: MEDICARE

## 2020-03-02 DIAGNOSIS — I49.5 SSS (SICK SINUS SYNDROME) (H): ICD-10-CM

## 2020-03-02 PROCEDURE — 93280 PM DEVICE PROGR EVAL DUAL: CPT | Performed by: INTERNAL MEDICINE

## 2020-03-06 ENCOUNTER — OFFICE VISIT (OUTPATIENT)
Dept: CARDIOLOGY | Facility: CLINIC | Age: 75
End: 2020-03-06
Payer: MEDICARE

## 2020-03-06 VITALS
BODY MASS INDEX: 33.33 KG/M2 | WEIGHT: 225 LBS | DIASTOLIC BLOOD PRESSURE: 74 MMHG | OXYGEN SATURATION: 96 % | HEIGHT: 69 IN | HEART RATE: 68 BPM | SYSTOLIC BLOOD PRESSURE: 133 MMHG

## 2020-03-06 DIAGNOSIS — I48.0 PAROXYSMAL ATRIAL FIBRILLATION (H): Primary | ICD-10-CM

## 2020-03-06 DIAGNOSIS — R00.1 BRADYCARDIA: ICD-10-CM

## 2020-03-06 PROCEDURE — 93000 ELECTROCARDIOGRAM COMPLETE: CPT | Performed by: INTERNAL MEDICINE

## 2020-03-06 PROCEDURE — 99214 OFFICE O/P EST MOD 30 MIN: CPT | Mod: 25 | Performed by: INTERNAL MEDICINE

## 2020-03-06 ASSESSMENT — MIFFLIN-ST. JEOR: SCORE: 1745.97

## 2020-03-06 NOTE — LETTER
3/6/2020    Zoe Tomas MD  [address]  [facility] [address]    RE: Estrada Oseguera       Dear Colleague,    I had the pleasure of seeing Estrada Oseguera in the [facility].    HPI and Plan:   See dictation    Orders Placed This Encounter   Procedures     Follow-Up with Cardiac Advanced Practice Provider     EKG 12-lead complete w/read - Clinics (performed today)     Echocardiogram Complete       No orders of the defined types were placed in this encounter.      Medications Discontinued During This Encounter   Medication Reason     apixaban ANTICOAGULANT (ELIQUIS ANTICOAGULANT) 5 MG tablet          Encounter Diagnoses   Name Primary?     Bradycardia      Paroxysmal atrial fibrillation (H) Yes       CURRENT MEDICATIONS:  Current Outpatient Medications   Medication Sig Dispense Refill     atorvastatin (LIPITOR) 10 MG tablet Take 1 tablet (10 mg) by mouth daily 90 tablet 0     Ferrous Gluconate (IRON) 240 (27 FE) MG TABS Take 1 tablet by mouth daily       lisinopril (PRINIVIL/ZESTRIL) 20 MG tablet Take 0.5 tablets (10 mg) by mouth daily 90 tablet 0     metoprolol succinate ER (TOPROL-XL) 50 MG 24 hr tablet Take 1 tablet (50 mg) by mouth daily 90 tablet 0     pantoprazole (PROTONIX) 40 MG EC tablet Take 1 tablet (40 mg) by mouth daily 90 tablet 0     ranitidine (ZANTAC) 150 MG tablet Take by mouth At Bedtime       sildenafil (REVATIO) 20 MG tablet Take 1-2 tablet (20 mg) by mouth half to one hour before sexual intercourse .  Never use with nitroglycerin, terazosin or doxazosin. 30 tablet 5       ALLERGIES   No Known Allergies    PAST MEDICAL HISTORY:  Past Medical History:   Diagnosis Date     A-fib (H) 01/31/2018     Cancer (H)     skin     CKD (chronic kidney disease)      GERD (gastroesophageal reflux disease)      Hiatal hernia      Hypertension      Pericarditis      S/P colon resection        PAST SURGICAL HISTORY:  Past Surgical History:    Procedure Laterality Date     CHOLECYSTECTOMY       COLONOSCOPY       COMBINED COLONOSCOPY, MUCOSAL RESECTION  1993    ?diverticulitis      ESOPHAGOSCOPY, GASTROSCOPY, DUODENOSCOPY (EGD), COMBINED N/A 11/3/2016    Procedure: COMBINED ESOPHAGOSCOPY, GASTROSCOPY, DUODENOSCOPY (EGD), BIOPSY SINGLE OR MULTIPLE;  Surgeon: Deondre Meek MD;  Location:  GI     ESOPHAGOSCOPY, GASTROSCOPY, DUODENOSCOPY (EGD), DILATATION, COMBINED N/A 12/1/2016    Procedure: COMBINED ESOPHAGOSCOPY, GASTROSCOPY, DUODENOSCOPY (EGD), DILATATION;  Surgeon: Deondre Meek MD;  Location:  GI     ESOPHAGOSCOPY, GASTROSCOPY, DUODENOSCOPY (EGD), DILATATION, COMBINED N/A 3/2/2017    Procedure: COMBINED ESOPHAGOSCOPY, GASTROSCOPY, DUODENOSCOPY (EGD), DILATATION;  Surgeon: Deondre Meek MD;  Location:  GI     NEPHRECTOMY RT/LT      right     pace maker         FAMILY HISTORY:  Family History   Problem Relation Age of Onset     Hyperlipidemia Mother      Cerebrovascular Disease Father      Prostate Cancer Father      Pulmonary Hypertension Brother      Breast Cancer Sister      Parkinsonism Sister      Colon Cancer No family hx of        SOCIAL HISTORY:  Social History     Socioeconomic History     Marital status:      Spouse name: None     Number of children: None     Years of education: None     Highest education level: None   Occupational History     None   Social Needs     Financial resource strain: None     Food insecurity:     Worry: None     Inability: None     Transportation needs:     Medical: None     Non-medical: None   Tobacco Use     Smoking status: Former Smoker     Years: 7.00     Types: Pipe     Smokeless tobacco: Never Used     Tobacco comment: light smoker for few years only   Substance and Sexual Activity     Alcohol use: Not Currently     Alcohol/week: 0.0 standard drinks     Comment: 3-4 times a week     Drug use: No     Sexual activity: Not Currently   Lifestyle     Physical activity:     Days  per week: None     Minutes per session: None     Stress: None   Relationships     Social connections:     Talks on phone: None     Gets together: None     Attends Christian service: None     Active member of club or organization: None     Attends meetings of clubs or organizations: None     Relationship status: None     Intimate partner violence:     Fear of current or ex partner: None     Emotionally abused: None     Physically abused: None     Forced sexual activity: None   Other Topics Concern     Parent/sibling w/ CABG, MI or angioplasty before 65F 55M? Not Asked      Service Not Asked     Blood Transfusions Not Asked     Caffeine Concern Not Asked     Occupational Exposure Not Asked     Hobby Hazards Not Asked     Sleep Concern Not Asked     Stress Concern Not Asked     Weight Concern Not Asked     Special Diet No     Back Care Not Asked     Exercise No     Bike Helmet Not Asked     Seat Belt Not Asked     Self-Exams Not Asked   Social History Narrative     None       Review of Systems:  Skin:  Negative       Eyes:  Positive for glasses    ENT:  Negative      Respiratory:  Negative for shortness of breath;dyspnea on exertion;cough;wheezing;sleep apnea;CPAP     Cardiovascular:  Negative for;palpitations;chest pain;edema;syncope or near-syncope;lightheadedness;dizziness;fatigue      Gastroenterology: Positive for   has had gastric bleeding s/p endoscopy (Dr. Meek)  Genitourinary:  Negative      Musculoskeletal:  Negative      Neurologic:  Positive for numbness or tingling of feet    Psychiatric:  Negative      Heme/Lymph/Imm:  Negative      Endocrine:  Negative        [id number]  HPI and Plan:   See dictation    Orders Placed This Encounter   Procedures     Follow-Up with Cardiac Advanced Practice Provider     EKG 12-lead complete w/read - Clinics (performed today)     Echocardiogram Complete       No orders of the defined types were placed in this encounter.      Medications Discontinued During This  Encounter   Medication Reason     apixaban ANTICOAGULANT (ELIQUIS ANTICOAGULANT) 5 MG tablet          Encounter Diagnoses   Name Primary?     Paroxysmal atrial fibrillation (H) Yes     Bradycardia        CURRENT MEDICATIONS:  Current Outpatient Medications   Medication Sig Dispense Refill     atorvastatin (LIPITOR) 10 MG tablet Take 1 tablet (10 mg) by mouth daily 90 tablet 0     Ferrous Gluconate (IRON) 240 (27 FE) MG TABS Take 1 tablet by mouth daily       lisinopril (PRINIVIL/ZESTRIL) 20 MG tablet Take 0.5 tablets (10 mg) by mouth daily 90 tablet 0     metoprolol succinate ER (TOPROL-XL) 50 MG 24 hr tablet Take 1 tablet (50 mg) by mouth daily 90 tablet 0     pantoprazole (PROTONIX) 40 MG EC tablet Take 1 tablet (40 mg) by mouth daily 90 tablet 0     ranitidine (ZANTAC) 150 MG tablet Take by mouth At Bedtime       sildenafil (REVATIO) 20 MG tablet Take 1-2 tablet (20 mg) by mouth half to one hour before sexual intercourse .  Never use with nitroglycerin, terazosin or doxazosin. 30 tablet 5       ALLERGIES   No Known Allergies    PAST MEDICAL HISTORY:  Past Medical History:   Diagnosis Date     A-fib (H) 01/31/2018     Cancer (H)     skin     CKD (chronic kidney disease)      GERD (gastroesophageal reflux disease)      Hiatal hernia      Hypertension      Pericarditis      S/P colon resection        PAST SURGICAL HISTORY:  Past Surgical History:   Procedure Laterality Date     CHOLECYSTECTOMY       COLONOSCOPY       COMBINED COLONOSCOPY, MUCOSAL RESECTION  1993    ?diverticulitis      ESOPHAGOSCOPY, GASTROSCOPY, DUODENOSCOPY (EGD), COMBINED N/A 11/3/2016    Procedure: COMBINED ESOPHAGOSCOPY, GASTROSCOPY, DUODENOSCOPY (EGD), BIOPSY SINGLE OR MULTIPLE;  Surgeon: Deondre Meek MD;  Location: [facility]     ESOPHAGOSCOPY, GASTROSCOPY, DUODENOSCOPY (EGD), DILATATION, COMBINED N/A 12/1/2016    Procedure: COMBINED ESOPHAGOSCOPY, GASTROSCOPY, DUODENOSCOPY (EGD), DILATATION;  Surgeon: Deondre Meek MD;   Location:  GI     ESOPHAGOSCOPY, GASTROSCOPY, DUODENOSCOPY (EGD), DILATATION, COMBINED N/A 3/2/2017    Procedure: COMBINED ESOPHAGOSCOPY, GASTROSCOPY, DUODENOSCOPY (EGD), DILATATION;  Surgeon: Deondre Meek MD;  Location:  GI     NEPHRECTOMY RT/LT      right     pace maker         FAMILY HISTORY:  Family History   Problem Relation Age of Onset     Hyperlipidemia Mother      Cerebrovascular Disease Father      Prostate Cancer Father      Pulmonary Hypertension Brother      Breast Cancer Sister      Parkinsonism Sister      Colon Cancer No family hx of        SOCIAL HISTORY:  Social History     Socioeconomic History     Marital status:      Spouse name: None     Number of children: None     Years of education: None     Highest education level: None   Occupational History     None   Social Needs     Financial resource strain: None     Food insecurity:     Worry: None     Inability: None     Transportation needs:     Medical: None     Non-medical: None   Tobacco Use     Smoking status: Former Smoker     Years: 7.00     Types: Pipe     Smokeless tobacco: Never Used     Tobacco comment: light smoker for few years only   Substance and Sexual Activity     Alcohol use: Not Currently     Alcohol/week: 0.0 standard drinks     Comment: 3-4 times a week     Drug use: No     Sexual activity: Not Currently   Lifestyle     Physical activity:     Days per week: None     Minutes per session: None     Stress: None   Relationships     Social connections:     Talks on phone: None     Gets together: None     Attends Yarsani service: None     Active member of club or organization: None     Attends meetings of clubs or organizations: None     Relationship status: None     Intimate partner violence:     Fear of current or ex partner: None     Emotionally abused: None     Physically abused: None     Forced sexual activity: None   Other Topics Concern     Parent/sibling w/ CABG, MI or angioplasty before 65F 55M? Not  Asked      Service Not Asked     Blood Transfusions Not Asked     Caffeine Concern Not Asked     Occupational Exposure Not Asked     Hobby Hazards Not Asked     Sleep Concern Not Asked     Stress Concern Not Asked     Weight Concern Not Asked     Special Diet No     Back Care Not Asked     Exercise No     Bike Helmet Not Asked     Seat Belt Not Asked     Self-Exams Not Asked   Social History Narrative     None       Review of Systems:  Skin:  Negative       Eyes:  Positive for glasses    ENT:  Negative      Respiratory:  Negative for shortness of breath;dyspnea on exertion;cough;wheezing;sleep apnea;CPAP     Cardiovascular:  Negative for;palpitations;chest pain;edema;syncope or near-syncope;lightheadedness;dizziness;fatigue      Gastroenterology: Positive for   has had gastric bleeding s/p endoscopy (Dr. Meek)  Genitourinary:  Negative      Musculoskeletal:  Negative      Neurologic:  Positive for numbness or tingling of feet    Psychiatric:  Negative      Heme/Lymph/Imm:  Negative      Endocrine:  Negative      [id number]                Service Date: 03/06/2020      HISTORY OF PRESENT ILLNESS:  I had the pleasure of seeing Mr. Estrada Oseguera, a delightful 75-year-old male with the following medical issues:   1.  Symptomatic sinus node dysfunction.  Implantation of a dual-chamber pacemaker in 11/2016.   2.  A 2-hour episode of atrial fibrillation recorded by the pacemaker in 01/2018.  The patient was placed on apixaban.  There has been no subsequent documentation of atrial fibrillation.   3.  PVCs, occasionally symptomatic.  Nonsustained VT.   4.  History of mild cardiomyopathy, EF of 45%-50%.   5.  Chronic kidney disease, stage III.      Mr. Oseguera has been feeling well.  He does not have palpitation, chest pain, unusual dyspnea, orthopnea or PND.  He tells me that apixaban is expensive for him.  We have only documented a single episode of atrial fibrillation back 01/2018 (asymptomatic but recorded by his  device).  CHADS2-VASc score is probably 3 (age, hypertension).      PHYSICAL EXAMINATION:   VITAL SIGNS:  Blood pressure 133/74, pulse 68 and regular, weight 102 kg, height 175 cm.   GENERAL:  He is a very pleasant gentleman who is moderately overweight, in no distress.  He is accompanied by his wife.   HEENT:  Head normocephalic, atraumatic.  Sclerae are anicteric.  Mouth, oropharynx clear.   NECK:  Supple without thyromegaly, lymphadenopathy, no carotid bruits.   LUNGS:  Clear.   CHEST:  Nicely healed right pectoral incision.   CARDIOVASCULAR:  Regular rhythm without gallop, murmur or rub.   ABDOMEN:  Moderately obese, soft, nontender.   EXTREMITIES:  No edema, 2+ peripheral pulses throughout.      DIAGNOSTIC STUDIES:  His 12-lead ECG today showed sinus rhythm with 2 PVCs.  There was RBBB.      IMPRESSION:     1.  Sick sinus syndrome.  His pacemaker is functioning normally.  Continue routine device checks.     2.  Paroxysmal atrial fibrillation.  Estrada has only had a single 2-hour episode of atrial fibrillation recorded by her device in 01/2018.  He is definitely in a gray area with regard to anticoagulation.  I explained that when we initially detected the arrhythmia, placing him on anticoagulation was reasonable.  Typically in this age group more atrial fibrillation is seen with time, but he has a surprised me with the fact that he has had absolutely no further atrial fibrillation.   Thus, at this point, it is unclear whether the risk of expense of continuing Eliquis is justified.  I think it is equally reasonable to withhold anticoagulation and only consider restarting it if we document a second episode of atrial fibrillation.  I discussed with the patient that I have no crystal ball and if risk of stroke is very concerning to him, he should stay on anticoagulation.     After a good discussion, we mutually decided to stop anticoagulation.  He will hold onto the apixaban and restart it if we document further  atrial fibrillation through his pacemaker.     3.  History of mild cardiomyopathy.  We will repeat an echocardiogram.      RECOMMENDATIONS:   1.  Stop Eliquis.   2.  Echocardiogram.   3.  Routine Device Clinic followup.   4.  Follow up with Airam in 1 year.      It was my pleasure being involved in Mr. Sun's care.      cc:      Zoe Tomas MD    [facility]   [address]   [place] [id number]         XIAO MANZANO MD             D: 2020   T: 2020   MT:       Name:     FRANCISCO SUN   MRN:      [id number]        Account:      [id number]   :      1945           Service Date: 2020      Document: [id number]         Thank you for allowing me to participate in the care of your patient.      Sincerely,     Xiao Manzano MD     [facility] [place]    cc:   Airam Atkinson, APRN CNP  [address]  [place] [address]

## 2020-03-06 NOTE — LETTER
3/6/2020      Zoe Tomas MD  [address] [facility] [address]  [facility] [place] [address]      RE: Estrada Oseguera       Dear Colleague,    I had the pleasure of seeing Estrada Oseguera in the [facility].    Service Date: 03/06/2020      HISTORY OF PRESENT ILLNESS:    I had the pleasure of seeing Mr. Estrada Oseguera, a delightful 75-year-old male with the following medical issues:   1.  Symptomatic sinus node dysfunction.  Implantation of dual-chamber pacemaker in 11/2016.   2.  A 2-hour episode of atrial fibrillation recorded by the pacemaker in 01/2018.  The patient was placed on apixaban.  There has been no subsequent documentation of atrial fibrillation.   3.  PVCs, occasionally symptomatic.  Nonsustained VT.   4.  History of mild cardiomyopathy, EF of 45%-50%.   5.  Chronic kidney disease, stage III.      Mr. Oseguera has been feeling well.  He does not have palpitation, chest pain, unusual dyspnea, orthopnea or PND.  Apixaban is expensive for him.  We have only documented a single episode of atrial fibrillation in 01/2018 (asymptomatic, recorded by his device) which raise the question of whether to continue anticogulation.  [id number] is probably 3 (age, hypertension).        PHYSICAL EXAMINATION:   VITAL SIGNS:  Blood pressure 133/74, pulse 68 and regular, weight 102 kg, height 175 cm.   GENERAL:  He is a very pleasant gentleman who is moderately overweight, in no distress.  He is accompanied by his wife.   HEENT:  Head normocephalic, atraumatic.  Sclerae are anicteric.  Mouth, oropharynx clear.   NECK:  Supple without thyromegaly, lymphadenopathy, no carotid bruits.   LUNGS:  Clear.   CHEST:  Nicely healed right pectoral incision.   CARDIOVASCULAR:  Regular rhythm without gallop, murmur or rub.   ABDOMEN:  Moderately obese, soft, nontender.   EXTREMITIES:  No edema, 2+ peripheral pulses throughout.        DIAGNOSTIC STUDIES:  His 12-lead ECG today showed sinus rhythm  with 2 PVCs.  RBBB.        IMPRESSION:     1.  Sick sinus syndrome.  His pacemaker is functioning normally.  Continue routine device checks.     2.  Paroxysmal atrial fibrillation.  Francisco has only had a single 2-hour episode of atrial fibrillation recorded by his device in 2018.  He remains in a gray area with regard to anticoagulation.  I explained that when we initially detected the arrhythmia, placing him on anticoagulation seemed reasonable because, typically, in this age group more atrial fibrillation is seen over time.  However, this patient has surprised me as he has had no further atrial fibrillation...         At this point, continuing Eliquis seems hard to justify.  In my opinion, it is reasonable to withhold anticoagulation and only consider restarting if we document future lengthy episodes of atrial fibrillation.  I discussed with the patient that I have no crystal ball;  if the risk of stroke is a great concern to him, he may prefer to stay on anticoagulation.          After a good discussion, we have mutually decided to stop anticoagulation.  He will hold onto the apixaban and restart if we document more atrial fibrillation.     3.  History of mild cardiomyopathy.  We will repeat an echocardiogram.      RECOMMENDATIONS:   1.  Stop Eliquis.   2.  Echocardiogram.   3.  Routine Device Clinic followup.   4.  Follow-up with [facility] in 1 year.      It was my pleasure being involved in Mr. Sun's care.   Time spent was 25 minutes.  More than 50% of time was discussion and counseling.         XIAO MANZANO MD, [facility]            cc:      Zoe Tomas MD    [facility]   [address]   [place] [address]            D: 2020   T: 2020   MT: URMILA      Name:     FRANCISCO SUN   MRN:      [id number]        Account:      [id number]   :      1945           Service Date: 2020      Document: [id number]           Outpatient Encounter Medications as of 3/6/2020    Medication Sig Dispense Refill     atorvastatin (LIPITOR) 10 MG tablet Take 1 tablet (10 mg) by mouth daily 90 tablet 0     Ferrous Gluconate (IRON) 240 (27 FE) MG TABS Take 1 tablet by mouth daily       lisinopril (PRINIVIL/ZESTRIL) 20 MG tablet Take 0.5 tablets (10 mg) by mouth daily 90 tablet 0     metoprolol succinate ER (TOPROL-XL) 50 MG 24 hr tablet Take 1 tablet (50 mg) by mouth daily 90 tablet 0     pantoprazole (PROTONIX) 40 MG EC tablet Take 1 tablet (40 mg) by mouth daily 90 tablet 0     ranitidine (ZANTAC) 150 MG tablet Take by mouth At Bedtime       sildenafil (REVATIO) 20 MG tablet Take 1-2 tablet (20 mg) by mouth half to one hour before sexual intercourse .  Never use with nitroglycerin, terazosin or doxazosin. 30 tablet 5     [DISCONTINUED] apixaban ANTICOAGULANT (ELIQUIS ANTICOAGULANT) 5 MG tablet Take 1 tablet (5 mg) by mouth 2 times daily 180 tablet 0     No facility-administered encounter medications on file as of 3/6/2020.        Again, thank you for allowing me to participate in the care of your patient.      Sincerely,    Lynn Valle MD     [facility]

## 2020-03-06 NOTE — PROGRESS NOTES
HPI and Plan:   See dictation    Orders Placed This Encounter   Procedures     Follow-Up with Cardiac Advanced Practice Provider     EKG 12-lead complete w/read - Clinics (performed today)     Echocardiogram Complete       No orders of the defined types were placed in this encounter.      Medications Discontinued During This Encounter   Medication Reason     apixaban ANTICOAGULANT (ELIQUIS ANTICOAGULANT) 5 MG tablet          Encounter Diagnoses   Name Primary?     Bradycardia      Paroxysmal atrial fibrillation (H) Yes       CURRENT MEDICATIONS:  Current Outpatient Medications   Medication Sig Dispense Refill     atorvastatin (LIPITOR) 10 MG tablet Take 1 tablet (10 mg) by mouth daily 90 tablet 0     Ferrous Gluconate (IRON) 240 (27 FE) MG TABS Take 1 tablet by mouth daily       lisinopril (PRINIVIL/ZESTRIL) 20 MG tablet Take 0.5 tablets (10 mg) by mouth daily 90 tablet 0     metoprolol succinate ER (TOPROL-XL) 50 MG 24 hr tablet Take 1 tablet (50 mg) by mouth daily 90 tablet 0     pantoprazole (PROTONIX) 40 MG EC tablet Take 1 tablet (40 mg) by mouth daily 90 tablet 0     ranitidine (ZANTAC) 150 MG tablet Take by mouth At Bedtime       sildenafil (REVATIO) 20 MG tablet Take 1-2 tablet (20 mg) by mouth half to one hour before sexual intercourse .  Never use with nitroglycerin, terazosin or doxazosin. 30 tablet 5       ALLERGIES   No Known Allergies    PAST MEDICAL HISTORY:  Past Medical History:   Diagnosis Date     A-fib (H) 01/31/2018     Cancer (H)     skin     CKD (chronic kidney disease)      GERD (gastroesophageal reflux disease)      Hiatal hernia      Hypertension      Pericarditis      S/P colon resection        PAST SURGICAL HISTORY:  Past Surgical History:   Procedure Laterality Date     CHOLECYSTECTOMY       COLONOSCOPY       COMBINED COLONOSCOPY, MUCOSAL RESECTION  1993    ?diverticulitis      ESOPHAGOSCOPY, GASTROSCOPY, DUODENOSCOPY (EGD), COMBINED N/A 11/3/2016    Procedure: COMBINED ESOPHAGOSCOPY,  GASTROSCOPY, DUODENOSCOPY (EGD), BIOPSY SINGLE OR MULTIPLE;  Surgeon: Deondre Meek MD;  Location:  GI     ESOPHAGOSCOPY, GASTROSCOPY, DUODENOSCOPY (EGD), DILATATION, COMBINED N/A 12/1/2016    Procedure: COMBINED ESOPHAGOSCOPY, GASTROSCOPY, DUODENOSCOPY (EGD), DILATATION;  Surgeon: Deondre Meek MD;  Location:  GI     ESOPHAGOSCOPY, GASTROSCOPY, DUODENOSCOPY (EGD), DILATATION, COMBINED N/A 3/2/2017    Procedure: COMBINED ESOPHAGOSCOPY, GASTROSCOPY, DUODENOSCOPY (EGD), DILATATION;  Surgeon: Deondre Meek MD;  Location:  GI     NEPHRECTOMY RT/LT      right     pace maker         FAMILY HISTORY:  Family History   Problem Relation Age of Onset     Hyperlipidemia Mother      Cerebrovascular Disease Father      Prostate Cancer Father      Pulmonary Hypertension Brother      Breast Cancer Sister      Parkinsonism Sister      Colon Cancer No family hx of        SOCIAL HISTORY:  Social History     Socioeconomic History     Marital status:      Spouse name: None     Number of children: None     Years of education: None     Highest education level: None   Occupational History     None   Social Needs     Financial resource strain: None     Food insecurity:     Worry: None     Inability: None     Transportation needs:     Medical: None     Non-medical: None   Tobacco Use     Smoking status: Former Smoker     Years: 7.00     Types: Pipe     Smokeless tobacco: Never Used     Tobacco comment: light smoker for few years only   Substance and Sexual Activity     Alcohol use: Not Currently     Alcohol/week: 0.0 standard drinks     Comment: 3-4 times a week     Drug use: No     Sexual activity: Not Currently   Lifestyle     Physical activity:     Days per week: None     Minutes per session: None     Stress: None   Relationships     Social connections:     Talks on phone: None     Gets together: None     Attends Denominational service: None     Active member of club or organization: None     Attends  meetings of clubs or organizations: None     Relationship status: None     Intimate partner violence:     Fear of current or ex partner: None     Emotionally abused: None     Physically abused: None     Forced sexual activity: None   Other Topics Concern     Parent/sibling w/ CABG, MI or angioplasty before 65F 55M? Not Asked      Service Not Asked     Blood Transfusions Not Asked     Caffeine Concern Not Asked     Occupational Exposure Not Asked     Hobby Hazards Not Asked     Sleep Concern Not Asked     Stress Concern Not Asked     Weight Concern Not Asked     Special Diet No     Back Care Not Asked     Exercise No     Bike Helmet Not Asked     Seat Belt Not Asked     Self-Exams Not Asked   Social History Narrative     None       Review of Systems:  Skin:  Negative       Eyes:  Positive for glasses    ENT:  Negative      Respiratory:  Negative for shortness of breath;dyspnea on exertion;cough;wheezing;sleep apnea;CPAP     Cardiovascular:  Negative for;palpitations;chest pain;edema;syncope or near-syncope;lightheadedness;dizziness;fatigue      Gastroenterology: Positive for   has had gastric bleeding s/p endoscopy (Dr. Meek)  Genitourinary:  Negative      Musculoskeletal:  Negative      Neurologic:  Positive for numbness or tingling of feet    Psychiatric:  Negative      Heme/Lymph/Imm:  Negative      Endocrine:  Negative        [id number]  HPI and Plan:   See dictation    Orders Placed This Encounter   Procedures     Follow-Up with Cardiac Advanced Practice Provider     EKG 12-lead complete w/read - Clinics (performed today)     Echocardiogram Complete       No orders of the defined types were placed in this encounter.      Medications Discontinued During This Encounter   Medication Reason     apixaban ANTICOAGULANT (ELIQUIS ANTICOAGULANT) 5 MG tablet          Encounter Diagnoses   Name Primary?     Paroxysmal atrial fibrillation (H) Yes     Bradycardia        CURRENT MEDICATIONS:  Current Outpatient  Medications   Medication Sig Dispense Refill     atorvastatin (LIPITOR) 10 MG tablet Take 1 tablet (10 mg) by mouth daily 90 tablet 0     Ferrous Gluconate (IRON) 240 (27 FE) MG TABS Take 1 tablet by mouth daily       lisinopril (PRINIVIL/ZESTRIL) 20 MG tablet Take 0.5 tablets (10 mg) by mouth daily 90 tablet 0     metoprolol succinate ER (TOPROL-XL) 50 MG 24 hr tablet Take 1 tablet (50 mg) by mouth daily 90 tablet 0     pantoprazole (PROTONIX) 40 MG EC tablet Take 1 tablet (40 mg) by mouth daily 90 tablet 0     ranitidine (ZANTAC) 150 MG tablet Take by mouth At Bedtime       sildenafil (REVATIO) 20 MG tablet Take 1-2 tablet (20 mg) by mouth half to one hour before sexual intercourse .  Never use with nitroglycerin, terazosin or doxazosin. 30 tablet 5       ALLERGIES   No Known Allergies    PAST MEDICAL HISTORY:  Past Medical History:   Diagnosis Date     A-fib (H) 01/31/2018     Cancer (H)     skin     CKD (chronic kidney disease)      GERD (gastroesophageal reflux disease)      Hiatal hernia      Hypertension      Pericarditis      S/P colon resection        PAST SURGICAL HISTORY:  Past Surgical History:   Procedure Laterality Date     CHOLECYSTECTOMY       COLONOSCOPY       COMBINED COLONOSCOPY, MUCOSAL RESECTION  1993    ?diverticulitis      ESOPHAGOSCOPY, GASTROSCOPY, DUODENOSCOPY (EGD), COMBINED N/A 11/3/2016    Procedure: COMBINED ESOPHAGOSCOPY, GASTROSCOPY, DUODENOSCOPY (EGD), BIOPSY SINGLE OR MULTIPLE;  Surgeon: Deondre Meek MD;  Location: [facility]     ESOPHAGOSCOPY, GASTROSCOPY, DUODENOSCOPY (EGD), DILATATION, COMBINED N/A 12/1/2016    Procedure: COMBINED ESOPHAGOSCOPY, GASTROSCOPY, DUODENOSCOPY (EGD), DILATATION;  Surgeon: Deondre Meek MD;  Location: [facility]     ESOPHAGOSCOPY, GASTROSCOPY, DUODENOSCOPY (EGD), DILATATION, COMBINED N/A 3/2/2017    Procedure: COMBINED ESOPHAGOSCOPY, GASTROSCOPY, DUODENOSCOPY (EGD), DILATATION;  Surgeon: Deondre Meek MD;  Location: [facility]      NEPHRECTOMY RT/LT      right     pace maker         FAMILY HISTORY:  Family History   Problem Relation Age of Onset     Hyperlipidemia Mother      Cerebrovascular Disease Father      Prostate Cancer Father      Pulmonary Hypertension Brother      Breast Cancer Sister      Parkinsonism Sister      Colon Cancer No family hx of        SOCIAL HISTORY:  Social History     Socioeconomic History     Marital status:      Spouse name: None     Number of children: None     Years of education: None     Highest education level: None   Occupational History     None   Social Needs     Financial resource strain: None     Food insecurity:     Worry: None     Inability: None     Transportation needs:     Medical: None     Non-medical: None   Tobacco Use     Smoking status: Former Smoker     Years: 7.00     Types: Pipe     Smokeless tobacco: Never Used     Tobacco comment: light smoker for few years only   Substance and Sexual Activity     Alcohol use: Not Currently     Alcohol/week: 0.0 standard drinks     Comment: 3-4 times a week     Drug use: No     Sexual activity: Not Currently   Lifestyle     Physical activity:     Days per week: None     Minutes per session: None     Stress: None   Relationships     Social connections:     Talks on phone: None     Gets together: None     Attends Confucianist service: None     Active member of club or organization: None     Attends meetings of clubs or organizations: None     Relationship status: None     Intimate partner violence:     Fear of current or ex partner: None     Emotionally abused: None     Physically abused: None     Forced sexual activity: None   Other Topics Concern     Parent/sibling w/ CABG, MI or angioplasty before 65F 55M? Not Asked      Service Not Asked     Blood Transfusions Not Asked     Caffeine Concern Not Asked     Occupational Exposure Not Asked     Hobby Hazards Not Asked     Sleep Concern Not Asked     Stress Concern Not Asked     Weight Concern Not  Asked     Special Diet No     Back Care Not Asked     Exercise No     Bike Helmet Not Asked     Seat Belt Not Asked     Self-Exams Not Asked   Social History Narrative     None       Review of Systems:  Skin:  Negative       Eyes:  Positive for glasses    ENT:  Negative      Respiratory:  Negative for shortness of breath;dyspnea on exertion;cough;wheezing;sleep apnea;CPAP     Cardiovascular:  Negative for;palpitations;chest pain;edema;syncope or near-syncope;lightheadedness;dizziness;fatigue      Gastroenterology: Positive for   has had gastric bleeding s/p endoscopy (Dr. Meek)  Genitourinary:  Negative      Musculoskeletal:  Negative      Neurologic:  Positive for numbness or tingling of feet    Psychiatric:  Negative      Heme/Lymph/Imm:  Negative      Endocrine:  Negative      [id number]

## 2020-03-06 NOTE — PROGRESS NOTES
Service Date: 03/06/2020      HISTORY OF PRESENT ILLNESS:    I had the pleasure of seeing Mr. Estrada Oseguera, a delightful 75-year-old male with the following medical issues:   1.  Symptomatic sinus node dysfunction.  Implantation of dual-chamber pacemaker in 11/2016.   2.  A 2-hour episode of atrial fibrillation recorded by the pacemaker in 01/2018.  The patient was placed on apixaban.  There has been no subsequent documentation of atrial fibrillation.   3.  PVCs, occasionally symptomatic.  Nonsustained VT.   4.  History of mild cardiomyopathy, EF of 45%-50%.   5.  Chronic kidney disease, stage III.      Mr. Oseguera has been feeling well.  He does not have palpitation, chest pain, unusual dyspnea, orthopnea or PND.  Apixaban is expensive for him.  We have only documented a single episode of atrial fibrillation in 01/2018 (asymptomatic, recorded by his device) which raise the question of whether to continue anticogulation.  [id number] is probably 3 (age, hypertension).        PHYSICAL EXAMINATION:   VITAL SIGNS:  Blood pressure 133/74, pulse 68 and regular, weight 102 kg, height 175 cm.   GENERAL:  He is a very pleasant gentleman who is moderately overweight, in no distress.  He is accompanied by his wife.   HEENT:  Head normocephalic, atraumatic.  Sclerae are anicteric.  Mouth, oropharynx clear.   NECK:  Supple without thyromegaly, lymphadenopathy, no carotid bruits.   LUNGS:  Clear.   CHEST:  Nicely healed right pectoral incision.   CARDIOVASCULAR:  Regular rhythm without gallop, murmur or rub.   ABDOMEN:  Moderately obese, soft, nontender.   EXTREMITIES:  No edema, 2+ peripheral pulses throughout.        DIAGNOSTIC STUDIES:  His 12-lead ECG today showed sinus rhythm with 2 PVCs.  RBBB.        IMPRESSION:     1.  Sick sinus syndrome.  His pacemaker is functioning normally.  Continue routine device checks.     2.  Paroxysmal atrial fibrillation.  Estrada has only had a single 2-hour episode of atrial  fibrillation recorded by his device in 2018.  He remains in a gray area with regard to anticoagulation.  I explained that when we initially detected the arrhythmia, placing him on anticoagulation seemed reasonable because, typically, in this age group more atrial fibrillation is seen over time.  However, this patient has surprised me as he has had no further atrial fibrillation...         At this point, continuing Eliquis seems hard to justify.  In my opinion, it is reasonable to withhold anticoagulation and only consider restarting if we document future lengthy episodes of atrial fibrillation.  I discussed with the patient that I have no crystal ball;  if the risk of stroke is a great concern to him, he may prefer to stay on anticoagulation.          After a good discussion, we have mutually decided to stop anticoagulation.  He will hold onto the apixaban and restart if we document more atrial fibrillation.     3.  History of mild cardiomyopathy.  We will repeat an echocardiogram.      RECOMMENDATIONS:   1.  Stop Eliquis.   2.  Echocardiogram.   3.  Routine Device Clinic followup.   4.  Follow-up with [facility] in 1 year.      It was my pleasure being involved in Mr. Sun's care.   Time spent was 25 minutes.  More than 50% of time was discussion and counseling.         XIAO MANZANO MD, FACC            cc:      Zoe Tomas MD    [facility]   [address]   [place] [address]            D: 2020   T: 2020   MT: URMILA      Name:     FRANCISCO SUN   MRN:      [id number]        Account:      [id number]   :      1945           Service Date: 2020      Document: [id number]

## 2020-03-10 LAB
MDC_IDC_LEAD_IMPLANT_DT: NORMAL
MDC_IDC_LEAD_IMPLANT_DT: NORMAL
MDC_IDC_LEAD_LOCATION: NORMAL
MDC_IDC_LEAD_LOCATION: NORMAL
MDC_IDC_LEAD_LOCATION_DETAIL_1: NORMAL
MDC_IDC_LEAD_LOCATION_DETAIL_1: NORMAL
MDC_IDC_LEAD_MFG: NORMAL
MDC_IDC_LEAD_MFG: NORMAL
MDC_IDC_LEAD_MODEL: NORMAL
MDC_IDC_LEAD_MODEL: NORMAL
MDC_IDC_LEAD_POLARITY_TYPE: NORMAL
MDC_IDC_LEAD_POLARITY_TYPE: NORMAL
MDC_IDC_LEAD_SERIAL: NORMAL
MDC_IDC_LEAD_SERIAL: NORMAL
MDC_IDC_MSMT_BATTERY_STATUS: NORMAL
MDC_IDC_MSMT_LEADCHNL_RA_IMPEDANCE_VALUE: 718 OHM
MDC_IDC_MSMT_LEADCHNL_RA_PACING_THRESHOLD_AMPLITUDE: 0.8 V
MDC_IDC_MSMT_LEADCHNL_RA_PACING_THRESHOLD_PULSEWIDTH: 0.4 MS
MDC_IDC_MSMT_LEADCHNL_RA_SENSING_INTR_AMPL: 5.9 MV
MDC_IDC_MSMT_LEADCHNL_RV_IMPEDANCE_VALUE: 681 OHM
MDC_IDC_MSMT_LEADCHNL_RV_PACING_THRESHOLD_AMPLITUDE: 1 V
MDC_IDC_MSMT_LEADCHNL_RV_PACING_THRESHOLD_PULSEWIDTH: 0.4 MS
MDC_IDC_MSMT_LEADCHNL_RV_SENSING_INTR_AMPL: 14.4 MV
MDC_IDC_PG_IMPLANT_DTM: NORMAL
MDC_IDC_PG_MFG: NORMAL
MDC_IDC_PG_MODEL: NORMAL
MDC_IDC_PG_SERIAL: NORMAL
MDC_IDC_PG_TYPE: NORMAL
MDC_IDC_SESS_CLINIC_NAME: NORMAL
MDC_IDC_SESS_DTM: NORMAL
MDC_IDC_SESS_TYPE: NORMAL
MDC_IDC_SET_BRADY_AT_MODE_SWITCH_MODE: NORMAL
MDC_IDC_SET_BRADY_AT_MODE_SWITCH_RATE: 170 {BEATS}/MIN
MDC_IDC_SET_BRADY_HYSTRATE: NORMAL
MDC_IDC_SET_BRADY_LOWRATE: 55 {BEATS}/MIN
MDC_IDC_SET_BRADY_MAX_SENSOR_RATE: 130 {BEATS}/MIN
MDC_IDC_SET_BRADY_MAX_TRACKING_RATE: 130 {BEATS}/MIN
MDC_IDC_SET_BRADY_MODE: NORMAL
MDC_IDC_SET_BRADY_PAV_DELAY_HIGH: 200 MS
MDC_IDC_SET_BRADY_PAV_DELAY_LOW: 300 MS
MDC_IDC_SET_BRADY_SAV_DELAY_HIGH: 200 MS
MDC_IDC_SET_BRADY_SAV_DELAY_LOW: 300 MS
MDC_IDC_SET_LEADCHNL_RA_PACING_AMPLITUDE: 2 V
MDC_IDC_SET_LEADCHNL_RA_PACING_CAPTURE_MODE: NORMAL
MDC_IDC_SET_LEADCHNL_RA_PACING_POLARITY: NORMAL
MDC_IDC_SET_LEADCHNL_RA_PACING_PULSEWIDTH: 0.4 MS
MDC_IDC_SET_LEADCHNL_RA_SENSING_ADAPTATION_MODE: NORMAL
MDC_IDC_SET_LEADCHNL_RA_SENSING_POLARITY: NORMAL
MDC_IDC_SET_LEADCHNL_RA_SENSING_SENSITIVITY: 0.25 MV
MDC_IDC_SET_LEADCHNL_RV_PACING_AMPLITUDE: 1.7 V
MDC_IDC_SET_LEADCHNL_RV_PACING_CAPTURE_MODE: NORMAL
MDC_IDC_SET_LEADCHNL_RV_PACING_POLARITY: NORMAL
MDC_IDC_SET_LEADCHNL_RV_PACING_PULSEWIDTH: 0.4 MS
MDC_IDC_SET_LEADCHNL_RV_SENSING_ADAPTATION_MODE: NORMAL
MDC_IDC_SET_LEADCHNL_RV_SENSING_POLARITY: NORMAL
MDC_IDC_SET_LEADCHNL_RV_SENSING_SENSITIVITY: 1.5 MV
MDC_IDC_SET_ZONE_DETECTION_INTERVAL: 375 MS
MDC_IDC_SET_ZONE_TYPE: NORMAL
MDC_IDC_SET_ZONE_VENDOR_TYPE: NORMAL
MDC_IDC_STAT_EPISODE_RECENT_COUNT: 8
MDC_IDC_STAT_EPISODE_RECENT_COUNT_DTM_END: NORMAL
MDC_IDC_STAT_EPISODE_RECENT_COUNT_DTM_START: NORMAL
MDC_IDC_STAT_EPISODE_TOTAL_COUNT: 23
MDC_IDC_STAT_EPISODE_TOTAL_COUNT_DTM_END: NORMAL
MDC_IDC_STAT_EPISODE_TYPE: NORMAL
MDC_IDC_STAT_EPISODE_TYPE: NORMAL
MDC_IDC_STAT_EPISODE_VENDOR_TYPE: NORMAL
MDC_IDC_STAT_EPISODE_VENDOR_TYPE: NORMAL

## 2020-03-12 ENCOUNTER — HOSPITAL ENCOUNTER (OUTPATIENT)
Dept: CARDIOLOGY | Facility: CLINIC | Age: 75
Discharge: HOME | End: 2020-03-12
Attending: INTERNAL MEDICINE | Admitting: INTERNAL MEDICINE
Payer: MEDICARE

## 2020-03-12 DIAGNOSIS — Z86.79 HISTORY OF CARDIOMYOPATHY: Primary | ICD-10-CM

## 2020-03-12 DIAGNOSIS — I48.0 PAROXYSMAL ATRIAL FIBRILLATION (H): ICD-10-CM

## 2020-03-12 PROCEDURE — 25500064 ZZH RX 255 OP 636: Performed by: INTERNAL MEDICINE

## 2020-03-12 PROCEDURE — 93306 TTE W/DOPPLER COMPLETE: CPT | Mod: 26 | Performed by: INTERNAL MEDICINE

## 2020-03-12 PROCEDURE — 40000264 ECHOCARDIOGRAM COMPLETE

## 2020-03-12 RX ADMIN — HUMAN ALBUMIN MICROSPHERES AND PERFLUTREN 9 ML: 10; .22 INJECTION, SOLUTION INTRAVENOUS at 15:46

## 2020-03-16 ENCOUNTER — TELEPHONE (OUTPATIENT)
Dept: CARDIOLOGY | Facility: CLINIC | Age: 75
End: 2020-03-16

## 2020-03-16 ENCOUNTER — OFFICE VISIT (OUTPATIENT)
Dept: FAMILY MEDICINE | Facility: CLINIC | Age: 75
End: 2020-03-16
Payer: MEDICARE

## 2020-03-16 VITALS
HEIGHT: 69 IN | WEIGHT: 227 LBS | BODY MASS INDEX: 33.62 KG/M2 | OXYGEN SATURATION: 94 % | DIASTOLIC BLOOD PRESSURE: 83 MMHG | HEART RATE: 66 BPM | TEMPERATURE: 97.2 F | SYSTOLIC BLOOD PRESSURE: 132 MMHG

## 2020-03-16 DIAGNOSIS — E78.5 HYPERLIPIDEMIA, UNSPECIFIED HYPERLIPIDEMIA TYPE: ICD-10-CM

## 2020-03-16 DIAGNOSIS — R97.20 ELEVATED PROSTATE SPECIFIC ANTIGEN (PSA): ICD-10-CM

## 2020-03-16 DIAGNOSIS — R73.03 PREDIABETES: ICD-10-CM

## 2020-03-16 DIAGNOSIS — N18.30 CHRONIC KIDNEY DISEASE, STAGE III (MODERATE) (H): ICD-10-CM

## 2020-03-16 DIAGNOSIS — Z13.29 SCREENING FOR THYROID DISORDER: ICD-10-CM

## 2020-03-16 DIAGNOSIS — D36.9 TUBULAR ADENOMA: ICD-10-CM

## 2020-03-16 DIAGNOSIS — I47.29 NSVT (NONSUSTAINED VENTRICULAR TACHYCARDIA) (H): ICD-10-CM

## 2020-03-16 DIAGNOSIS — K22.70 BARRETT'S ESOPHAGUS WITHOUT DYSPLASIA: ICD-10-CM

## 2020-03-16 DIAGNOSIS — E61.1 IRON DEFICIENCY: ICD-10-CM

## 2020-03-16 DIAGNOSIS — Z12.11 SCREEN FOR COLON CANCER: ICD-10-CM

## 2020-03-16 DIAGNOSIS — I10 BENIGN HYPERTENSION: ICD-10-CM

## 2020-03-16 DIAGNOSIS — Z00.00 ENCOUNTER FOR MEDICARE ANNUAL WELLNESS EXAM: Primary | ICD-10-CM

## 2020-03-16 LAB — HBA1C MFR BLD: 6.3 % (ref 0–5.6)

## 2020-03-16 PROCEDURE — 85027 COMPLETE CBC AUTOMATED: CPT | Performed by: INTERNAL MEDICINE

## 2020-03-16 PROCEDURE — 80053 COMPREHEN METABOLIC PANEL: CPT | Performed by: INTERNAL MEDICINE

## 2020-03-16 PROCEDURE — 99213 OFFICE O/P EST LOW 20 MIN: CPT | Mod: 25 | Performed by: INTERNAL MEDICINE

## 2020-03-16 PROCEDURE — 36415 COLL VENOUS BLD VENIPUNCTURE: CPT | Performed by: INTERNAL MEDICINE

## 2020-03-16 PROCEDURE — 84443 ASSAY THYROID STIM HORMONE: CPT | Performed by: INTERNAL MEDICINE

## 2020-03-16 PROCEDURE — 84153 ASSAY OF PSA TOTAL: CPT | Performed by: INTERNAL MEDICINE

## 2020-03-16 PROCEDURE — 82728 ASSAY OF FERRITIN: CPT | Performed by: INTERNAL MEDICINE

## 2020-03-16 PROCEDURE — G0439 PPPS, SUBSEQ VISIT: HCPCS | Performed by: INTERNAL MEDICINE

## 2020-03-16 PROCEDURE — 80061 LIPID PANEL: CPT | Performed by: INTERNAL MEDICINE

## 2020-03-16 PROCEDURE — 83036 HEMOGLOBIN GLYCOSYLATED A1C: CPT | Performed by: INTERNAL MEDICINE

## 2020-03-16 RX ORDER — PANTOPRAZOLE SODIUM 40 MG/1
40 TABLET, DELAYED RELEASE ORAL DAILY
Qty: 90 TABLET | Refills: 3 | Status: SHIPPED | OUTPATIENT
Start: 2020-03-16 | End: 2021-03-18

## 2020-03-16 RX ORDER — ATORVASTATIN CALCIUM 10 MG/1
10 TABLET, FILM COATED ORAL DAILY
Qty: 90 TABLET | Refills: 3 | Status: SHIPPED | OUTPATIENT
Start: 2020-03-16 | End: 2021-03-18

## 2020-03-16 RX ORDER — LISINOPRIL 20 MG/1
10 TABLET ORAL DAILY
Qty: 90 TABLET | Refills: 0 | Status: SHIPPED | OUTPATIENT
Start: 2020-03-16 | End: 2020-05-12

## 2020-03-16 RX ORDER — METOPROLOL SUCCINATE 50 MG/1
50 TABLET, EXTENDED RELEASE ORAL DAILY
Qty: 90 TABLET | Refills: 3 | Status: SHIPPED | OUTPATIENT
Start: 2020-03-16 | End: 2021-03-18

## 2020-03-16 ASSESSMENT — MIFFLIN-ST. JEOR: SCORE: 1755.05

## 2020-03-16 ASSESSMENT — ACTIVITIES OF DAILY LIVING (ADL): CURRENT_FUNCTION: NO ASSISTANCE NEEDED

## 2020-03-16 NOTE — PATIENT INSTRUCTIONS
Labs today  Schedule colonoscopy at your earliest convenience by calling the following number:  Cory Cervantes [phone]    There is this is a new shingles vaccine available called shingrex  It is a series of 2 shots 2-6 months apart.  Considered more than 90% effective.  Please go to any pharmacy to get the  vaccine      Follow up in one year for physical    Seek sooner medical attention if there is any worsening of symptoms or problems.      Patient Education   Personalized Prevention Plan  You are due for the preventive services outlined below.  Your care team is available to assist you in scheduling these services.  If you have already completed any of these items, please share that information with your care team to update in your medical record.  Health Maintenance Due   Topic Date Due     Zoster (Shingles) Vaccine (1 of 2) 01/20/1995     Colorectal Cancer Screening  11/03/2019     PHQ-2  01/01/2020     FALL RISK ASSESSMENT  03/13/2020     Annual Wellness Visit  03/13/2020     Prostate Test  03/13/2020     Preventive Health Recommendations  See your health care provider every year to    Review health changes.     Discuss preventive care.      Review your medicines if your doctor has prescribed any.    Talk with your health care provider about whether you should have a test to screen for prostate cancer (PSA).    Every 3 years, have a diabetes test (fasting glucose). If you are at risk for diabetes, you should have this test more often.    Every 5 years, have a cholesterol test. Have this test more often if you are at risk for high cholesterol or heart disease.     Every 10 years, have a colonoscopy. Or, have a yearly FIT test (stool test). These exams will check for colon cancer.    Talk to with your health care provider about screening for Abdominal Aortic Aneurysm if you have a family history of AAA or have a history of smoking.    Shots:     Get a flu shot each year.     Get a tetanus shot every  10 years.     Talk to your doctor about your pneumonia vaccines. There are now two you should receive - Pneumovax (PPSV 23) and Prevnar (PCV 13).    Talk to your pharmacist about a shingles vaccine.     Talk to your doctor about the hepatitis B vaccine.    Nutrition:     Eat at least 5 servings of fruits and vegetables each day.     Eat whole-grain bread, whole-wheat pasta and brown rice instead of white grains and rice.     Get adequate Calcium and Vitamin D.     Lifestyle    Exercise for at least 150 minutes a week (30 minutes a day, 5 days a week). This will help you control your weight and prevent disease.     Limit alcohol to one drink per day.     No smoking.     Wear sunscreen to prevent skin cancer.     See your dentist every six months for an exam and cleaning.     See your eye doctor every 1 to 2 years to screen for conditions such as glaucoma, macular degeneration and cataracts.    Personalized Prevention Plan  You are due for the preventive services outlined below.  Your care team is available to assist you in scheduling these services.  If you have already completed any of these items, please share that information with your care team to update in your medical record.  Health Maintenance Due   Topic Date Due     Zoster (Shingles) Vaccine (1 of 2) 01/20/1995     Colorectal Cancer Screening  11/03/2019     PHQ-2  01/01/2020     FALL RISK ASSESSMENT  03/13/2020     Annual Wellness Visit  03/13/2020     Prostate Test  03/13/2020

## 2020-03-16 NOTE — TELEPHONE ENCOUNTER
Left voicemail for patient informing him of results. Asked that he call back with any questions.     ----- Message from Lynn Valle MD sent at 3/15/2020  8:31 AM CDT -----  TDS.  LVEF is low normal, slightly better than previously.  Good news.  Please call.  DI

## 2020-03-17 DIAGNOSIS — R97.20 ELEVATED PROSTATE SPECIFIC ANTIGEN (PSA): Primary | ICD-10-CM

## 2020-03-17 LAB
ALBUMIN SERPL-MCNC: 3.7 G/DL (ref 3.4–5)
ALP SERPL-CCNC: 68 U/L (ref 40–150)
ALT SERPL W P-5'-P-CCNC: 68 U/L (ref 0–70)
ANION GAP SERPL CALCULATED.3IONS-SCNC: 10 MMOL/L (ref 3–14)
AST SERPL W P-5'-P-CCNC: 30 U/L (ref 0–45)
BILIRUB SERPL-MCNC: 0.7 MG/DL (ref 0.2–1.3)
BUN SERPL-MCNC: 19 MG/DL (ref 7–30)
CALCIUM SERPL-MCNC: 9.3 MG/DL (ref 8.5–10.1)
CHLORIDE SERPL-SCNC: 107 MMOL/L (ref 94–109)
CHOLEST SERPL-MCNC: 135 MG/DL
CO2 SERPL-SCNC: 22 MMOL/L (ref 20–32)
CREAT SERPL-MCNC: 1.16 MG/DL (ref 0.66–1.25)
ERYTHROCYTE [DISTWIDTH] IN BLOOD BY AUTOMATED COUNT: 13.9 % (ref 10–15)
FERRITIN SERPL-MCNC: 116 NG/ML (ref 26–388)
GFR SERPL CREATININE-BSD FRML MDRD: 61 ML/MIN/{1.73_M2}
GLUCOSE SERPL-MCNC: 99 MG/DL (ref 70–99)
HCT VFR BLD AUTO: 47.3 % (ref 40–53)
HDLC SERPL-MCNC: 45 MG/DL
HGB BLD-MCNC: 16.2 G/DL (ref 13.3–17.7)
LDLC SERPL CALC-MCNC: 71 MG/DL
MCH RBC QN AUTO: 31.5 PG (ref 26.5–33)
MCHC RBC AUTO-ENTMCNC: 34.2 G/DL (ref 31.5–36.5)
MCV RBC AUTO: 92 FL (ref 78–100)
NONHDLC SERPL-MCNC: 90 MG/DL
PLATELET # BLD AUTO: 177 10E9/L (ref 150–450)
POTASSIUM SERPL-SCNC: 4.3 MMOL/L (ref 3.4–5.3)
PROT SERPL-MCNC: 7.6 G/DL (ref 6.8–8.8)
PSA SERPL-MCNC: 4.9 UG/L (ref 0–4)
RBC # BLD AUTO: 5.15 10E12/L (ref 4.4–5.9)
SODIUM SERPL-SCNC: 139 MMOL/L (ref 133–144)
TRIGL SERPL-MCNC: 94 MG/DL
TSH SERPL DL<=0.005 MIU/L-ACNC: 1.82 MU/L (ref 0.4–4)
WBC # BLD AUTO: 6.6 10E9/L (ref 4–11)

## 2020-03-17 NOTE — RESULT ENCOUNTER NOTE
Woody Dorado,    This is to inform you regarding your test result.    TSH which is thyroid hormone is normal.  Your total cholesterol is normal.  HDL which is called good cholesterol is normal.  Your LDL cholesterol is normal.  This is often call bad cholesterol and high levels increase the risk for heart attacks and strokes.  Your triglycerides are normal.  The testing of your blood sugar, kidney function, liver function and electrolytes was normal.  Ferritin which is iron stores in the body is normal.  You can discontinue iron pills  HbA1c which is average glucose during last 3 months is 6.3%.  You are considered prediabetic  The numbers are going up  It is very important that you watch your diet and do regular physical activity and avoid high sugar containing food  If you would like to see a dietitian regarding this then please let us know so we can put a referral for you to see a dietitian.  Your PSA (prostate cancer screening test)  is elevated.  Number is  slightly lower than last time  Please make an appointment to see a urologist by calling the following number:   [facility]: [facility] [place] [phone]         Lab Results       Component                Value               Date                       A1C                      6.3                 03/16/2020                 A1C                      5.8                 03/13/2019                 A1C                      6.4                 09/21/2018                 A1C                      6.2                 02/02/2018                 A1C                      6.2                 08/11/2017                  Sincerely,      Dr.Nasima Thom MD,FACP

## 2020-03-17 NOTE — RESULT ENCOUNTER NOTE
Woody Dorado,    This is to inform you regarding your test result.    CBC result which includes white count Hemoglobin and  Platelet Counts is normal.       Sincerely,      Dr.Nasima Thom MD,FACP

## 2020-03-17 NOTE — RESULT ENCOUNTER NOTE
Woody Dorado,    This is to inform you regarding your test result.    TSH which is thyroid hormone is normal.   Your total cholesterol is normal.   HDL which is called good cholesterol is normal.   Your LDL cholesterol is normal.  This is often call bad cholesterol and high levels increase the risk for heart attacks and strokes.   Your triglycerides are normal.   The testing of your blood sugar, kidney function, liver function and electrolytes was normal.   Ferritin which is iron stores in the body is normal.   You can discontinue iron pills   HbA1c which is average glucose during last 3 months is 6.3%.   You are considered prediabetic   The numbers are going up   It is very important that you watch your diet and do regular physical activity and avoid high sugar containing food   If you would like to see a dietitian regarding this then please let us know so we can put a referral for you to see a dietitian.   Your PSA (prostate cancer screening test)  is elevated.   Number is  slightly lower than last time   Please make an appointment to see a urologist by calling the following number:    [facility]: [facility] [phone]       Sincerely,      Dr.Nasima Thom MD,FACP

## 2020-06-15 ENCOUNTER — ANCILLARY PROCEDURE (OUTPATIENT)
Dept: CARDIOLOGY | Facility: CLINIC | Age: 75
End: 2020-06-15
Attending: INTERNAL MEDICINE
Payer: MEDICARE

## 2020-06-15 DIAGNOSIS — I49.5 SSS (SICK SINUS SYNDROME) (H): ICD-10-CM

## 2020-06-15 PROCEDURE — 93294 REM INTERROG EVL PM/LDLS PM: CPT | Performed by: INTERNAL MEDICINE

## 2020-06-15 PROCEDURE — 93296 REM INTERROG EVL PM/IDS: CPT | Performed by: INTERNAL MEDICINE

## 2020-06-23 LAB
MDC_IDC_EPISODE_DTM: NORMAL
MDC_IDC_EPISODE_ID: NORMAL
MDC_IDC_EPISODE_TYPE: NORMAL
MDC_IDC_LEAD_IMPLANT_DT: NORMAL
MDC_IDC_LEAD_IMPLANT_DT: NORMAL
MDC_IDC_LEAD_LOCATION: NORMAL
MDC_IDC_LEAD_LOCATION: NORMAL
MDC_IDC_LEAD_LOCATION_DETAIL_1: NORMAL
MDC_IDC_LEAD_LOCATION_DETAIL_1: NORMAL
MDC_IDC_LEAD_MFG: NORMAL
MDC_IDC_LEAD_MFG: NORMAL
MDC_IDC_LEAD_MODEL: NORMAL
MDC_IDC_LEAD_MODEL: NORMAL
MDC_IDC_LEAD_POLARITY_TYPE: NORMAL
MDC_IDC_LEAD_POLARITY_TYPE: NORMAL
MDC_IDC_LEAD_SERIAL: NORMAL
MDC_IDC_LEAD_SERIAL: NORMAL
MDC_IDC_MSMT_BATTERY_DTM: NORMAL
MDC_IDC_MSMT_BATTERY_REMAINING_LONGEVITY: 138 MO
MDC_IDC_MSMT_BATTERY_REMAINING_PERCENTAGE: 100 %
MDC_IDC_MSMT_BATTERY_STATUS: NORMAL
MDC_IDC_MSMT_LEADCHNL_RA_IMPEDANCE_VALUE: 686 OHM
MDC_IDC_MSMT_LEADCHNL_RA_PACING_THRESHOLD_AMPLITUDE: 0.5 V
MDC_IDC_MSMT_LEADCHNL_RA_PACING_THRESHOLD_PULSEWIDTH: 0.4 MS
MDC_IDC_MSMT_LEADCHNL_RV_IMPEDANCE_VALUE: 633 OHM
MDC_IDC_MSMT_LEADCHNL_RV_PACING_THRESHOLD_AMPLITUDE: 1.3 V
MDC_IDC_MSMT_LEADCHNL_RV_PACING_THRESHOLD_PULSEWIDTH: 0.4 MS
MDC_IDC_PG_IMPLANT_DTM: NORMAL
MDC_IDC_PG_MFG: NORMAL
MDC_IDC_PG_MODEL: NORMAL
MDC_IDC_PG_SERIAL: NORMAL
MDC_IDC_PG_TYPE: NORMAL
MDC_IDC_SESS_CLINIC_NAME: NORMAL
MDC_IDC_SESS_DTM: NORMAL
MDC_IDC_SESS_TYPE: NORMAL
MDC_IDC_SET_BRADY_AT_MODE_SWITCH_MODE: NORMAL
MDC_IDC_SET_BRADY_AT_MODE_SWITCH_RATE: 170 {BEATS}/MIN
MDC_IDC_SET_BRADY_LOWRATE: 55 {BEATS}/MIN
MDC_IDC_SET_BRADY_MAX_SENSOR_RATE: 130 {BEATS}/MIN
MDC_IDC_SET_BRADY_MAX_TRACKING_RATE: 130 {BEATS}/MIN
MDC_IDC_SET_BRADY_MODE: NORMAL
MDC_IDC_SET_BRADY_PAV_DELAY_HIGH: 200 MS
MDC_IDC_SET_BRADY_PAV_DELAY_LOW: 300 MS
MDC_IDC_SET_BRADY_SAV_DELAY_HIGH: 200 MS
MDC_IDC_SET_BRADY_SAV_DELAY_LOW: 300 MS
MDC_IDC_SET_LEADCHNL_RA_PACING_AMPLITUDE: 2 V
MDC_IDC_SET_LEADCHNL_RA_PACING_CAPTURE_MODE: NORMAL
MDC_IDC_SET_LEADCHNL_RA_PACING_POLARITY: NORMAL
MDC_IDC_SET_LEADCHNL_RA_PACING_PULSEWIDTH: 0.4 MS
MDC_IDC_SET_LEADCHNL_RA_SENSING_ADAPTATION_MODE: NORMAL
MDC_IDC_SET_LEADCHNL_RA_SENSING_POLARITY: NORMAL
MDC_IDC_SET_LEADCHNL_RA_SENSING_SENSITIVITY: 0.25 MV
MDC_IDC_SET_LEADCHNL_RV_PACING_AMPLITUDE: 1.8 V
MDC_IDC_SET_LEADCHNL_RV_PACING_CAPTURE_MODE: NORMAL
MDC_IDC_SET_LEADCHNL_RV_PACING_POLARITY: NORMAL
MDC_IDC_SET_LEADCHNL_RV_PACING_PULSEWIDTH: 0.4 MS
MDC_IDC_SET_LEADCHNL_RV_SENSING_ADAPTATION_MODE: NORMAL
MDC_IDC_SET_LEADCHNL_RV_SENSING_POLARITY: NORMAL
MDC_IDC_SET_LEADCHNL_RV_SENSING_SENSITIVITY: 1.5 MV
MDC_IDC_SET_ZONE_DETECTION_INTERVAL: 375 MS
MDC_IDC_SET_ZONE_TYPE: NORMAL
MDC_IDC_SET_ZONE_VENDOR_TYPE: NORMAL
MDC_IDC_STAT_AT_BURDEN_PERCENT: 0 %
MDC_IDC_STAT_AT_DTM_END: NORMAL
MDC_IDC_STAT_AT_DTM_START: NORMAL
MDC_IDC_STAT_BRADY_DTM_END: NORMAL
MDC_IDC_STAT_BRADY_DTM_START: NORMAL
MDC_IDC_STAT_BRADY_RA_PERCENT_PACED: 29 %
MDC_IDC_STAT_BRADY_RV_PERCENT_PACED: 0 %
MDC_IDC_STAT_EPISODE_RECENT_COUNT: 0
MDC_IDC_STAT_EPISODE_RECENT_COUNT_DTM_END: NORMAL
MDC_IDC_STAT_EPISODE_RECENT_COUNT_DTM_START: NORMAL
MDC_IDC_STAT_EPISODE_TYPE: NORMAL
MDC_IDC_STAT_EPISODE_VENDOR_TYPE: NORMAL

## 2020-08-01 NOTE — PROGRESS NOTES
"SUBJECTIVE:   Estrada Oseguera is a 75 year old male who presents for Preventive Visit.    We could not get hold of patient to schedule telephone visit  He showed up  Are you in the first 12 months of your Medicare coverage?  No    Healthy Habits:    In general, how would you rate your overall health?  Good    Duration of exercise:  15-30 minutes    Do you usually eat at least 4 servings of fruit and vegetables a day, include whole grains    & fiber and avoid regularly eating high fat or \"junk\" foods?  Yes    Taking medications regularly:  Yes    Barriers to taking medications:  None    Medication side effects:  None    Ability to successfully perform activities of daily living:  No assistance needed    Home Safety:  No safety concerns identified    Hearing Impairment:  No hearing concerns    In the past 6 months, have you been bothered by leaking of urine?  No    In general, how would you rate your overall mental or emotional health?  Good      PHQ-2 Total Score:    Additional concerns today:  No    Do you feel safe in your environment? Yes    Have you ever done Advance Care Planning? (For example, a Health Directive, POLST, or a discussion with a medical provider or your loved ones about your wishes): Yes, advance care planning is on file.      Fall risk  Fallen 2 or more times in the past year?: No  Any fall with injury in the past year?: No    Cognitive Screening   1) Repeat 3 items (Leader, Season, Table)    2) Clock draw: NORMAL  3) 3 item recall: Recalls 3 objects  Results: 3 items recalled: COGNITIVE IMPAIRMENT LESS LIKELY    Mini-CogTM Copyright HAWA Esparza. Licensed by the author for use in [facility]; reprinted with permission (helena@.[facility]). All rights reserved.      Do you have sleep apnea, excessive snoring or daytime drowsiness?: yes    Reviewed and updated as needed this visit by clinical staff  Tobacco  Allergies  Meds  Problems  Soc Hx        Reviewed and updated as needed this " "visit by Provider  Allergies  Meds  Problems        Social History     Tobacco Use     Smoking status: Former Smoker     Years: 7.00     Types: Pipe     Last attempt to quit: 1985     Years since quittin.2     Smokeless tobacco: Never Used     Tobacco comment: light smoker for few years only   Substance Use Topics     Alcohol use: Not Currently     Alcohol/week: 0.0 standard drinks     Comment: 3-4 times a week     If you drink alcohol do you typically have >3 drinks per day or >7 drinks per week? No    Alcohol Use 3/16/2020   Prescreen: >3 drinks/day or >7 drinks/week? No               Current providers sharing in care for this patient include:   Patient Care Team:  Zoe Tomas MD as PCP - General (Internal Medicine)  Zoe Tomas MD as Assigned PCP    The following health maintenance items are reviewed in Epic and correct as of today:  Health Maintenance   Topic Date Due     ZOSTER IMMUNIZATION (1 of 2) 1995     COLORECTAL CANCER SCREENING  2019     PHQ-2  2020     FALL RISK ASSESSMENT  2020     MEDICARE ANNUAL WELLNESS VISIT  2020     PSA  2020     ADVANCE CARE PLANNING  2023     LIPID  2024     DTAP/TDAP/TD IMMUNIZATION (2 - Td) 10/27/2026     HEPATITIS C SCREENING  Completed     INFLUENZA VACCINE  Completed     PNEUMOCOCCAL IMMUNIZATION 65+ LOW/MEDIUM RISK  Completed     AORTIC ANEURYSM SCREENING (SYSTEM ASSIGNED)  Completed     IPV IMMUNIZATION  Aged Out     MENINGITIS IMMUNIZATION  Aged Out     Lab work is in process      Review of Systems  Constitutional, HEENT, cardiovascular, pulmonary, GI, , musculoskeletal, neuro, skin, endocrine and psych systems are negative, except as otherwise noted.  Patient stopped taking anticoagulation as discussed with his cardiologist    OBJECTIVE:   /83 (BP Location: Right arm, Patient Position: Chair, Cuff Size: Adult Large)   Pulse 66   Temp 97.2  F (36.2  C) (Oral)   Ht 1.753 m (5' 9\")   Wt 103 " "kg (227 lb)   SpO2 94%   BMI 33.52 kg/m   Estimated body mass index is 33.52 kg/m  as calculated from the following:    Height as of this encounter: 1.753 m (5' 9\").    Weight as of this encounter: 103 kg (227 lb).  Physical Exam  GENERAL: healthy, alert and no distress  EYES: Eyes grossly normal to inspection, PERRL and conjunctivae and sclerae normal  HENT: ear canals and TM's normal, nose and mouth without ulcers or lesions  NECK: no adenopathy,   RESP: lungs clear to auscultation - no rales, rhonchi or wheezes  CV: regular rate and rhythm, normal S1 S2, no S3 or S4, no murmur, click or rub, no peripheral edema and peripheral pulses strong  ABDOMEN: soft, nontender, no hepatosplenomegaly, no masses and bowel sounds normal  MS: no gross musculoskeletal defects noted, no edema  SKIN: no suspicious lesions or rashes  NEURO: Normal strength and tone, mentation intact and speech normal  PSYCH: mentation appears normal, affect normal/bright  Rectal exam not performed      ASSESSMENT / PLAN:   Estrada was seen today for physical.    Diagnoses and all orders for this visit:    Encounter for Medicare annual wellness exam  Performed  He is overdue for colonoscopy  He has history of colon polyps and tubular adenoma  Due to recent pendemic of covid19 Will wait for few months before rescheduling that once situation is improved  But he does understands the importance of that  He has a history of elevated PSA but has not made the appointment with urology yet  He said I will give him a lecture about it he knows it that it is important but he keep postponing due to his age and he had weird stories  But he said he would try his best to do it at this time    Screen for colon cancer  -     GASTROENTEROLOGY ADULT REF PROCEDURE ONLY [facility] [phone]; Dr. MARK Meek  He will schedule it after a few months once this pandemic is over    Screening for thyroid disorder  -     TSH with free T4 reflex    Hyperlipidemia, " "unspecified hyperlipidemia type  -     atorvastatin (LIPITOR) 10 MG tablet; Take 1 tablet (10 mg) by mouth daily for cholestrol  -     Lipid panel reflex to direct LDL Fasting    Benign hypertension  -     lisinopril (ZESTRIL) 20 MG tablet; Take 0.5 tablets (10 mg) by mouth daily for heart and Blood Pressure  -     Comprehensive metabolic panel  Well-controlled    NSVT (nonsustained ventricular tachycardia) (H)  -     metoprolol succinate ER (TOPROL-XL) 50 MG 24 hr tablet; Take 1 tablet (50 mg) by mouth daily for heart and Blood Pressure  Past history  He is a stable  Having no more episodes    Chacon's esophagus without dysplasia  Comments:  seen on EGD dated 11/3/16  Orders:  -     pantoprazole (PROTONIX) 40 MG EC tablet; Take 1 tablet (40 mg) by mouth daily for acid reflux  Patient is advised that PPI's affect calcium absorption so make sure take adequate calcium with vit D     Iron deficiency  -     CBC with platelets  -     Ferritin    Elevated prostate specific antigen (PSA)  -     PSA, tumor marker  Discussed the importance of seeing urologist  He wants to wait and see what her test results show  He is entirely asymptomatic  He is not having any frequency or urgency    Chronic kidney disease, stage III (moderate) (H)  stable    Prediabetes  -     Hemoglobin A1c    Tubular adenoma  -     GASTROENTEROLOGY ADULT REF PROCEDURE ONLY [facility] [phone]; Dr. MARK Meek        COUNSELING:  Reviewed preventive health counseling, as reflected in patient instructions       Regular exercise       Healthy diet/nutrition       Colon cancer screening       Prostate cancer screening    Estimated body mass index is 33.52 kg/m  as calculated from the following:    Height as of this encounter: 1.753 m (5' 9\").    Weight as of this encounter: 103 kg (227 lb).    Weight management plan: Discussed healthy diet and exercise guidelines     reports that he quit smoking about 35 years ago. His smoking use included " pipe. He quit after 7.00 years of use. He has never used smokeless tobacco.      Appropriate preventive services were discussed with this patient, including applicable screening as appropriate for cardiovascular disease, diabetes, osteopenia/osteoporosis, and glaucoma.  As appropriate for age/gender, discussed screening for colorectal cancer, prostate cancer, breast cancer, and cervical cancer. Checklist reviewing preventive services available has been given to the patient.    Reviewed patients plan of care and provided an AVS. The Basic Care Plan (routine screening as documented in Health Maintenance) for Estrada meets the Care Plan requirement. This Care Plan has been established and reviewed with the Patient.    Counseling Resources:  ATP IV Guidelines  Pooled Cohorts Equation Calculator  Breast Cancer Risk Calculator  FRAX Risk Assessment  ICSI Preventive Guidelines  Dietary Guidelines for Americans, 2010  USDA's MyPlate  ASA Prophylaxis  Lung CA Screening    Zoe Tomas MD  [facility]    Identified Health Risks:   18

## 2020-09-21 ENCOUNTER — ANCILLARY PROCEDURE (OUTPATIENT)
Dept: CARDIOLOGY | Facility: CLINIC | Age: 75
End: 2020-09-21
Attending: INTERNAL MEDICINE
Payer: MEDICARE

## 2020-09-21 DIAGNOSIS — Z95.0 CARDIAC PACEMAKER IN SITU: ICD-10-CM

## 2020-09-21 DIAGNOSIS — I49.5 SICK SINUS SYNDROME (H): Primary | ICD-10-CM

## 2020-09-21 PROCEDURE — 93294 REM INTERROG EVL PM/LDLS PM: CPT | Performed by: INTERNAL MEDICINE

## 2020-09-21 PROCEDURE — 93296 REM INTERROG EVL PM/IDS: CPT | Performed by: INTERNAL MEDICINE

## 2020-09-30 LAB
MDC_IDC_EPISODE_DTM: NORMAL
MDC_IDC_EPISODE_DURATION: 16 S
MDC_IDC_EPISODE_DURATION: 28 S
MDC_IDC_EPISODE_DURATION: 59 S
MDC_IDC_EPISODE_ID: NORMAL
MDC_IDC_EPISODE_TYPE: NORMAL
MDC_IDC_EPISODE_VENDOR_TYPE: NORMAL
MDC_IDC_EPISODE_VENDOR_TYPE: NORMAL
MDC_IDC_LEAD_IMPLANT_DT: NORMAL
MDC_IDC_LEAD_IMPLANT_DT: NORMAL
MDC_IDC_LEAD_LOCATION: NORMAL
MDC_IDC_LEAD_LOCATION: NORMAL
MDC_IDC_LEAD_LOCATION_DETAIL_1: NORMAL
MDC_IDC_LEAD_LOCATION_DETAIL_1: NORMAL
MDC_IDC_LEAD_MFG: NORMAL
MDC_IDC_LEAD_MFG: NORMAL
MDC_IDC_LEAD_MODEL: NORMAL
MDC_IDC_LEAD_MODEL: NORMAL
MDC_IDC_LEAD_POLARITY_TYPE: NORMAL
MDC_IDC_LEAD_POLARITY_TYPE: NORMAL
MDC_IDC_LEAD_SERIAL: NORMAL
MDC_IDC_LEAD_SERIAL: NORMAL
MDC_IDC_MSMT_BATTERY_DTM: NORMAL
MDC_IDC_MSMT_BATTERY_REMAINING_LONGEVITY: 132 MO
MDC_IDC_MSMT_BATTERY_REMAINING_PERCENTAGE: 100 %
MDC_IDC_MSMT_BATTERY_STATUS: NORMAL
MDC_IDC_MSMT_LEADCHNL_RA_IMPEDANCE_VALUE: 656 OHM
MDC_IDC_MSMT_LEADCHNL_RA_PACING_THRESHOLD_AMPLITUDE: 0.5 V
MDC_IDC_MSMT_LEADCHNL_RA_PACING_THRESHOLD_PULSEWIDTH: 0.4 MS
MDC_IDC_MSMT_LEADCHNL_RV_IMPEDANCE_VALUE: 635 OHM
MDC_IDC_MSMT_LEADCHNL_RV_PACING_THRESHOLD_AMPLITUDE: 1.3 V
MDC_IDC_MSMT_LEADCHNL_RV_PACING_THRESHOLD_PULSEWIDTH: 0.4 MS
MDC_IDC_PG_IMPLANT_DTM: NORMAL
MDC_IDC_PG_MFG: NORMAL
MDC_IDC_PG_MODEL: NORMAL
MDC_IDC_PG_SERIAL: NORMAL
MDC_IDC_PG_TYPE: NORMAL
MDC_IDC_SESS_CLINIC_NAME: NORMAL
MDC_IDC_SESS_DTM: NORMAL
MDC_IDC_SESS_TYPE: NORMAL
MDC_IDC_SET_BRADY_AT_MODE_SWITCH_MODE: NORMAL
MDC_IDC_SET_BRADY_AT_MODE_SWITCH_RATE: 170 {BEATS}/MIN
MDC_IDC_SET_BRADY_LOWRATE: 55 {BEATS}/MIN
MDC_IDC_SET_BRADY_MAX_SENSOR_RATE: 130 {BEATS}/MIN
MDC_IDC_SET_BRADY_MAX_TRACKING_RATE: 130 {BEATS}/MIN
MDC_IDC_SET_BRADY_MODE: NORMAL
MDC_IDC_SET_BRADY_PAV_DELAY_HIGH: 200 MS
MDC_IDC_SET_BRADY_PAV_DELAY_LOW: 300 MS
MDC_IDC_SET_BRADY_SAV_DELAY_HIGH: 200 MS
MDC_IDC_SET_BRADY_SAV_DELAY_LOW: 300 MS
MDC_IDC_SET_LEADCHNL_RA_PACING_AMPLITUDE: 2 V
MDC_IDC_SET_LEADCHNL_RA_PACING_CAPTURE_MODE: NORMAL
MDC_IDC_SET_LEADCHNL_RA_PACING_POLARITY: NORMAL
MDC_IDC_SET_LEADCHNL_RA_PACING_PULSEWIDTH: 0.4 MS
MDC_IDC_SET_LEADCHNL_RA_SENSING_ADAPTATION_MODE: NORMAL
MDC_IDC_SET_LEADCHNL_RA_SENSING_POLARITY: NORMAL
MDC_IDC_SET_LEADCHNL_RA_SENSING_SENSITIVITY: 0.25 MV
MDC_IDC_SET_LEADCHNL_RV_PACING_AMPLITUDE: 1.8 V
MDC_IDC_SET_LEADCHNL_RV_PACING_CAPTURE_MODE: NORMAL
MDC_IDC_SET_LEADCHNL_RV_PACING_POLARITY: NORMAL
MDC_IDC_SET_LEADCHNL_RV_PACING_PULSEWIDTH: 0.4 MS
MDC_IDC_SET_LEADCHNL_RV_SENSING_ADAPTATION_MODE: NORMAL
MDC_IDC_SET_LEADCHNL_RV_SENSING_POLARITY: NORMAL
MDC_IDC_SET_LEADCHNL_RV_SENSING_SENSITIVITY: 1.5 MV
MDC_IDC_SET_ZONE_DETECTION_INTERVAL: 375 MS
MDC_IDC_SET_ZONE_TYPE: NORMAL
MDC_IDC_SET_ZONE_VENDOR_TYPE: NORMAL
MDC_IDC_STAT_AT_BURDEN_PERCENT: 0 %
MDC_IDC_STAT_AT_DTM_END: NORMAL
MDC_IDC_STAT_AT_DTM_START: NORMAL
MDC_IDC_STAT_BRADY_DTM_END: NORMAL
MDC_IDC_STAT_BRADY_DTM_START: NORMAL
MDC_IDC_STAT_BRADY_RA_PERCENT_PACED: 29 %
MDC_IDC_STAT_BRADY_RV_PERCENT_PACED: 0 %
MDC_IDC_STAT_EPISODE_RECENT_COUNT: 0
MDC_IDC_STAT_EPISODE_RECENT_COUNT: 1
MDC_IDC_STAT_EPISODE_RECENT_COUNT: 2
MDC_IDC_STAT_EPISODE_RECENT_COUNT_DTM_END: NORMAL
MDC_IDC_STAT_EPISODE_RECENT_COUNT_DTM_START: NORMAL
MDC_IDC_STAT_EPISODE_TYPE: NORMAL
MDC_IDC_STAT_EPISODE_VENDOR_TYPE: NORMAL

## 2020-10-31 ENCOUNTER — IMMUNIZATION (OUTPATIENT)
Dept: NURSING | Facility: CLINIC | Age: 75
End: 2020-10-31
Payer: MEDICARE

## 2020-10-31 VITALS — TEMPERATURE: 97.6 F

## 2020-10-31 DIAGNOSIS — Z23 NEED FOR PROPHYLACTIC VACCINATION AND INOCULATION AGAINST INFLUENZA: Primary | ICD-10-CM

## 2020-10-31 PROCEDURE — 99207 PR NO CHARGE NURSE ONLY: CPT

## 2020-10-31 PROCEDURE — 90662 IIV NO PRSV INCREASED AG IM: CPT

## 2020-10-31 PROCEDURE — G0008 ADMIN INFLUENZA VIRUS VAC: HCPCS

## 2020-12-22 ENCOUNTER — ANCILLARY PROCEDURE (OUTPATIENT)
Dept: CARDIOLOGY | Facility: CLINIC | Age: 75
End: 2020-12-22
Attending: INTERNAL MEDICINE
Payer: MEDICARE

## 2020-12-22 DIAGNOSIS — I49.5 SICK SINUS SYNDROME (H): ICD-10-CM

## 2020-12-22 PROCEDURE — 93294 REM INTERROG EVL PM/LDLS PM: CPT | Performed by: INTERNAL MEDICINE

## 2020-12-22 PROCEDURE — 93296 REM INTERROG EVL PM/IDS: CPT | Performed by: INTERNAL MEDICINE

## 2021-01-02 LAB
MDC_IDC_EPISODE_DTM: NORMAL
MDC_IDC_EPISODE_DURATION: 21 S
MDC_IDC_EPISODE_DURATION: 22 S
MDC_IDC_EPISODE_ID: NORMAL
MDC_IDC_EPISODE_TYPE: NORMAL
MDC_IDC_EPISODE_VENDOR_TYPE: NORMAL
MDC_IDC_LEAD_IMPLANT_DT: NORMAL
MDC_IDC_LEAD_IMPLANT_DT: NORMAL
MDC_IDC_LEAD_LOCATION: NORMAL
MDC_IDC_LEAD_LOCATION: NORMAL
MDC_IDC_LEAD_LOCATION_DETAIL_1: NORMAL
MDC_IDC_LEAD_LOCATION_DETAIL_1: NORMAL
MDC_IDC_LEAD_MFG: NORMAL
MDC_IDC_LEAD_MFG: NORMAL
MDC_IDC_LEAD_MODEL: NORMAL
MDC_IDC_LEAD_MODEL: NORMAL
MDC_IDC_LEAD_POLARITY_TYPE: NORMAL
MDC_IDC_LEAD_POLARITY_TYPE: NORMAL
MDC_IDC_LEAD_SERIAL: NORMAL
MDC_IDC_LEAD_SERIAL: NORMAL
MDC_IDC_MSMT_BATTERY_DTM: NORMAL
MDC_IDC_MSMT_BATTERY_REMAINING_LONGEVITY: 126 MO
MDC_IDC_MSMT_BATTERY_REMAINING_PERCENTAGE: 100 %
MDC_IDC_MSMT_BATTERY_STATUS: NORMAL
MDC_IDC_MSMT_LEADCHNL_RA_IMPEDANCE_VALUE: 692 OHM
MDC_IDC_MSMT_LEADCHNL_RA_PACING_THRESHOLD_AMPLITUDE: 0.5 V
MDC_IDC_MSMT_LEADCHNL_RA_PACING_THRESHOLD_PULSEWIDTH: 0.4 MS
MDC_IDC_MSMT_LEADCHNL_RV_IMPEDANCE_VALUE: 625 OHM
MDC_IDC_MSMT_LEADCHNL_RV_PACING_THRESHOLD_AMPLITUDE: 1.3 V
MDC_IDC_MSMT_LEADCHNL_RV_PACING_THRESHOLD_PULSEWIDTH: 0.4 MS
MDC_IDC_PG_IMPLANT_DTM: NORMAL
MDC_IDC_PG_MFG: NORMAL
MDC_IDC_PG_MODEL: NORMAL
MDC_IDC_PG_SERIAL: NORMAL
MDC_IDC_PG_TYPE: NORMAL
MDC_IDC_SESS_CLINIC_NAME: NORMAL
MDC_IDC_SESS_DTM: NORMAL
MDC_IDC_SESS_TYPE: NORMAL
MDC_IDC_SET_BRADY_AT_MODE_SWITCH_MODE: NORMAL
MDC_IDC_SET_BRADY_AT_MODE_SWITCH_RATE: 170 {BEATS}/MIN
MDC_IDC_SET_BRADY_LOWRATE: 55 {BEATS}/MIN
MDC_IDC_SET_BRADY_MAX_SENSOR_RATE: 130 {BEATS}/MIN
MDC_IDC_SET_BRADY_MAX_TRACKING_RATE: 130 {BEATS}/MIN
MDC_IDC_SET_BRADY_MODE: NORMAL
MDC_IDC_SET_BRADY_PAV_DELAY_HIGH: 200 MS
MDC_IDC_SET_BRADY_PAV_DELAY_LOW: 300 MS
MDC_IDC_SET_BRADY_SAV_DELAY_HIGH: 200 MS
MDC_IDC_SET_BRADY_SAV_DELAY_LOW: 300 MS
MDC_IDC_SET_LEADCHNL_RA_PACING_AMPLITUDE: 2 V
MDC_IDC_SET_LEADCHNL_RA_PACING_CAPTURE_MODE: NORMAL
MDC_IDC_SET_LEADCHNL_RA_PACING_POLARITY: NORMAL
MDC_IDC_SET_LEADCHNL_RA_PACING_PULSEWIDTH: 0.4 MS
MDC_IDC_SET_LEADCHNL_RA_SENSING_ADAPTATION_MODE: NORMAL
MDC_IDC_SET_LEADCHNL_RA_SENSING_POLARITY: NORMAL
MDC_IDC_SET_LEADCHNL_RA_SENSING_SENSITIVITY: 0.25 MV
MDC_IDC_SET_LEADCHNL_RV_PACING_AMPLITUDE: 1.7 V
MDC_IDC_SET_LEADCHNL_RV_PACING_CAPTURE_MODE: NORMAL
MDC_IDC_SET_LEADCHNL_RV_PACING_POLARITY: NORMAL
MDC_IDC_SET_LEADCHNL_RV_PACING_PULSEWIDTH: 0.4 MS
MDC_IDC_SET_LEADCHNL_RV_SENSING_ADAPTATION_MODE: NORMAL
MDC_IDC_SET_LEADCHNL_RV_SENSING_POLARITY: NORMAL
MDC_IDC_SET_LEADCHNL_RV_SENSING_SENSITIVITY: 1.5 MV
MDC_IDC_SET_ZONE_DETECTION_INTERVAL: 375 MS
MDC_IDC_SET_ZONE_TYPE: NORMAL
MDC_IDC_SET_ZONE_VENDOR_TYPE: NORMAL
MDC_IDC_STAT_AT_BURDEN_PERCENT: 0 %
MDC_IDC_STAT_AT_DTM_END: NORMAL
MDC_IDC_STAT_AT_DTM_START: NORMAL
MDC_IDC_STAT_BRADY_DTM_END: NORMAL
MDC_IDC_STAT_BRADY_DTM_START: NORMAL
MDC_IDC_STAT_BRADY_RA_PERCENT_PACED: 28 %
MDC_IDC_STAT_BRADY_RV_PERCENT_PACED: 0 %
MDC_IDC_STAT_EPISODE_RECENT_COUNT: 0
MDC_IDC_STAT_EPISODE_RECENT_COUNT: 2
MDC_IDC_STAT_EPISODE_RECENT_COUNT: 3
MDC_IDC_STAT_EPISODE_RECENT_COUNT_DTM_END: NORMAL
MDC_IDC_STAT_EPISODE_RECENT_COUNT_DTM_START: NORMAL
MDC_IDC_STAT_EPISODE_TYPE: NORMAL
MDC_IDC_STAT_EPISODE_VENDOR_TYPE: NORMAL

## 2021-02-03 ENCOUNTER — IMMUNIZATION (OUTPATIENT)
Dept: NURSING | Facility: CLINIC | Age: 76
End: 2021-02-03
Payer: MEDICARE

## 2021-02-03 PROCEDURE — 0001A PR COVID VAC PFIZER DIL RECON 30 MCG/0.3 ML IM: CPT

## 2021-02-03 PROCEDURE — 91300 PR COVID VAC PFIZER DIL RECON 30 MCG/0.3 ML IM: CPT

## 2021-02-24 ENCOUNTER — IMMUNIZATION (OUTPATIENT)
Dept: NURSING | Facility: CLINIC | Age: 76
End: 2021-02-24
Attending: INTERNAL MEDICINE
Payer: MEDICARE

## 2021-02-24 PROCEDURE — 0002A PR COVID VAC PFIZER DIL RECON 30 MCG/0.3 ML IM: CPT

## 2021-02-24 PROCEDURE — 91300 PR COVID VAC PFIZER DIL RECON 30 MCG/0.3 ML IM: CPT

## 2021-03-18 ENCOUNTER — OFFICE VISIT (OUTPATIENT)
Dept: FAMILY MEDICINE | Facility: CLINIC | Age: 76
End: 2021-03-18
Payer: MEDICARE

## 2021-03-18 VITALS
WEIGHT: 227.2 LBS | HEART RATE: 87 BPM | BODY MASS INDEX: 33.65 KG/M2 | TEMPERATURE: 97.2 F | DIASTOLIC BLOOD PRESSURE: 82 MMHG | SYSTOLIC BLOOD PRESSURE: 123 MMHG | OXYGEN SATURATION: 95 % | HEIGHT: 69 IN

## 2021-03-18 DIAGNOSIS — E78.5 HYPERLIPIDEMIA, UNSPECIFIED HYPERLIPIDEMIA TYPE: ICD-10-CM

## 2021-03-18 DIAGNOSIS — I42.8 OTHER CARDIOMYOPATHIES (H): ICD-10-CM

## 2021-03-18 DIAGNOSIS — I48.0 PAROXYSMAL ATRIAL FIBRILLATION (H): ICD-10-CM

## 2021-03-18 DIAGNOSIS — K22.70 BARRETT'S ESOPHAGUS WITHOUT DYSPLASIA: ICD-10-CM

## 2021-03-18 DIAGNOSIS — R97.20 ELEVATED PROSTATE SPECIFIC ANTIGEN (PSA): ICD-10-CM

## 2021-03-18 DIAGNOSIS — D36.9 TUBULAR ADENOMA: ICD-10-CM

## 2021-03-18 DIAGNOSIS — Z90.5 S/P NEPHRECTOMY: ICD-10-CM

## 2021-03-18 DIAGNOSIS — Z12.11 SCREEN FOR COLON CANCER: ICD-10-CM

## 2021-03-18 DIAGNOSIS — Z00.00 ENCOUNTER FOR MEDICARE ANNUAL WELLNESS EXAM: Primary | ICD-10-CM

## 2021-03-18 DIAGNOSIS — Z13.0 SCREENING FOR DEFICIENCY ANEMIA: ICD-10-CM

## 2021-03-18 DIAGNOSIS — M79.672 PAIN OF LEFT HEEL: ICD-10-CM

## 2021-03-18 DIAGNOSIS — I47.29 NSVT (NONSUSTAINED VENTRICULAR TACHYCARDIA) (H): ICD-10-CM

## 2021-03-18 DIAGNOSIS — Z13.29 SCREENING FOR THYROID DISORDER: ICD-10-CM

## 2021-03-18 DIAGNOSIS — Z90.49 S/P COLON RESECTION: ICD-10-CM

## 2021-03-18 DIAGNOSIS — R73.03 PREDIABETES: ICD-10-CM

## 2021-03-18 DIAGNOSIS — I10 BENIGN HYPERTENSION: ICD-10-CM

## 2021-03-18 LAB
ALBUMIN SERPL-MCNC: 3.6 G/DL (ref 3.4–5)
ALP SERPL-CCNC: 74 U/L (ref 40–150)
ALT SERPL W P-5'-P-CCNC: 55 U/L (ref 0–70)
ANION GAP SERPL CALCULATED.3IONS-SCNC: 8 MMOL/L (ref 3–14)
AST SERPL W P-5'-P-CCNC: 25 U/L (ref 0–45)
BILIRUB SERPL-MCNC: 0.7 MG/DL (ref 0.2–1.3)
BUN SERPL-MCNC: 18 MG/DL (ref 7–30)
CALCIUM SERPL-MCNC: 9 MG/DL (ref 8.5–10.1)
CHLORIDE SERPL-SCNC: 111 MMOL/L (ref 94–109)
CHOLEST SERPL-MCNC: 132 MG/DL
CO2 SERPL-SCNC: 21 MMOL/L (ref 20–32)
CREAT SERPL-MCNC: 1.23 MG/DL (ref 0.66–1.25)
ERYTHROCYTE [DISTWIDTH] IN BLOOD BY AUTOMATED COUNT: 13.9 % (ref 10–15)
FERRITIN SERPL-MCNC: 82 NG/ML (ref 26–388)
GFR SERPL CREATININE-BSD FRML MDRD: 57 ML/MIN/{1.73_M2}
GLUCOSE SERPL-MCNC: 130 MG/DL (ref 70–99)
HBA1C MFR BLD: 6.7 % (ref 0–5.6)
HCT VFR BLD AUTO: 48.5 % (ref 40–53)
HDLC SERPL-MCNC: 47 MG/DL
HGB BLD-MCNC: 16.7 G/DL (ref 13.3–17.7)
LDLC SERPL CALC-MCNC: 61 MG/DL
MCH RBC QN AUTO: 31.2 PG (ref 26.5–33)
MCHC RBC AUTO-ENTMCNC: 34.4 G/DL (ref 31.5–36.5)
MCV RBC AUTO: 91 FL (ref 78–100)
NONHDLC SERPL-MCNC: 85 MG/DL
PLATELET # BLD AUTO: 189 10E9/L (ref 150–450)
POTASSIUM SERPL-SCNC: 4.2 MMOL/L (ref 3.4–5.3)
PROT SERPL-MCNC: 7.7 G/DL (ref 6.8–8.8)
PSA SERPL-MCNC: 5.38 UG/L (ref 0–4)
RBC # BLD AUTO: 5.35 10E12/L (ref 4.4–5.9)
SODIUM SERPL-SCNC: 140 MMOL/L (ref 133–144)
TRIGL SERPL-MCNC: 118 MG/DL
TSH SERPL DL<=0.005 MIU/L-ACNC: 3.03 MU/L (ref 0.4–4)
WBC # BLD AUTO: 6.9 10E9/L (ref 4–11)

## 2021-03-18 PROCEDURE — 99214 OFFICE O/P EST MOD 30 MIN: CPT | Mod: 25 | Performed by: INTERNAL MEDICINE

## 2021-03-18 PROCEDURE — 83036 HEMOGLOBIN GLYCOSYLATED A1C: CPT | Performed by: INTERNAL MEDICINE

## 2021-03-18 PROCEDURE — 80061 LIPID PANEL: CPT | Performed by: INTERNAL MEDICINE

## 2021-03-18 PROCEDURE — 84153 ASSAY OF PSA TOTAL: CPT | Performed by: INTERNAL MEDICINE

## 2021-03-18 PROCEDURE — G0439 PPPS, SUBSEQ VISIT: HCPCS | Performed by: INTERNAL MEDICINE

## 2021-03-18 PROCEDURE — 85027 COMPLETE CBC AUTOMATED: CPT | Performed by: INTERNAL MEDICINE

## 2021-03-18 PROCEDURE — 82728 ASSAY OF FERRITIN: CPT | Performed by: INTERNAL MEDICINE

## 2021-03-18 PROCEDURE — 80053 COMPREHEN METABOLIC PANEL: CPT | Performed by: INTERNAL MEDICINE

## 2021-03-18 PROCEDURE — 36415 COLL VENOUS BLD VENIPUNCTURE: CPT | Performed by: INTERNAL MEDICINE

## 2021-03-18 PROCEDURE — 84443 ASSAY THYROID STIM HORMONE: CPT | Performed by: INTERNAL MEDICINE

## 2021-03-18 RX ORDER — LISINOPRIL 20 MG/1
10 TABLET ORAL DAILY
Qty: 45 TABLET | Refills: 3 | Status: SHIPPED | OUTPATIENT
Start: 2021-03-18 | End: 2022-08-19

## 2021-03-18 RX ORDER — METOPROLOL SUCCINATE 50 MG/1
50 TABLET, EXTENDED RELEASE ORAL DAILY
Qty: 90 TABLET | Refills: 3 | Status: SHIPPED | OUTPATIENT
Start: 2021-03-18 | End: 2022-02-17

## 2021-03-18 RX ORDER — PANTOPRAZOLE SODIUM 40 MG/1
40 TABLET, DELAYED RELEASE ORAL DAILY
Qty: 90 TABLET | Refills: 3 | Status: SHIPPED | OUTPATIENT
Start: 2021-03-18 | End: 2022-02-17

## 2021-03-18 RX ORDER — ATORVASTATIN CALCIUM 10 MG/1
10 TABLET, FILM COATED ORAL DAILY
Qty: 90 TABLET | Refills: 3 | Status: SHIPPED | OUTPATIENT
Start: 2021-03-18 | End: 2022-02-17

## 2021-03-18 ASSESSMENT — MIFFLIN-ST. JEOR: SCORE: 1750.95

## 2021-03-18 ASSESSMENT — ACTIVITIES OF DAILY LIVING (ADL): CURRENT_FUNCTION: NO ASSISTANCE NEEDED

## 2021-03-18 NOTE — PATIENT INSTRUCTIONS
There is this is a new shingles vaccine available called shingrex  It is a series of 2 shots 2-6 months apart.  Considered more than 90% effective.  Please go to any pharmacy to get the  vaccine  Labs today  Schedule colonoscopy at your earliest convenience by calling the following number:  [facility] [phone]   Please call the following number to make the appointment :  [facility]: [facility] - [place] [phone]    Make appointment with podiatrist  Follow up in 6 months  Seek sooner medical attention if there is any worsening of symptoms or problems.            Patient Education   Personalized Prevention Plan  You are due for the preventive services outlined below.  Your care team is available to assist you in scheduling these services.  If you have already completed any of these items, please share that information with your care team to update in your medical record.  Health Maintenance Due   Topic Date Due     Zoster (Shingles) Vaccine (1 of 2) Never done     Colorectal Cancer Screening  11/03/2019     FALL RISK ASSESSMENT  03/16/2021     Prostate Test  03/16/2021     Preventive Health Recommendations  See your health care provider every year to    Review health changes.     Discuss preventive care.      Review your medicines if your doctor has prescribed any.    Talk with your health care provider about whether you should have a test to screen for prostate cancer (PSA).    Every 3 years, have a diabetes test (fasting glucose). If you are at risk for diabetes, you should have this test more often.    Every 5 years, have a cholesterol test. Have this test more often if you are at risk for high cholesterol or heart disease.     Every 10 years, have a colonoscopy. Or, have a yearly FIT test (stool test). These exams will check for colon cancer.    Talk to with your health care provider about screening for Abdominal Aortic Aneurysm if you have a family history of AAA or have a history of  smoking.    Shots:     Get a flu shot each year.     Get a tetanus shot every 10 years.     Talk to your doctor about your pneumonia vaccines. There are now two you should receive - Pneumovax (PPSV 23) and Prevnar (PCV 13).    Talk to your pharmacist about a shingles vaccine.     Talk to your doctor about the hepatitis B vaccine.    Nutrition:     Eat at least 5 servings of fruits and vegetables each day.     Eat whole-grain bread, whole-wheat pasta and brown rice instead of white grains and rice.     Get adequate Calcium and Vitamin D.     Lifestyle    Exercise for at least 150 minutes a week (30 minutes a day, 5 days a week). This will help you control your weight and prevent disease.     Limit alcohol to one drink per day.     No smoking.     Wear sunscreen to prevent skin cancer.     See your dentist every six months for an exam and cleaning.     See your eye doctor every 1 to 2 years to screen for conditions such as glaucoma, macular degeneration and cataracts.    Personalized Prevention Plan  You are due for the preventive services outlined below.  Your care team is available to assist you in scheduling these services.  If you have already completed any of these items, please share that information with your care team to update in your medical record.  Health Maintenance   Topic Date Due     ZOSTER IMMUNIZATION (1 of 2) Never done     COLORECTAL CANCER SCREENING  11/03/2019     FALL RISK ASSESSMENT  03/16/2021     PSA  03/16/2021     MEDICARE ANNUAL WELLNESS VISIT  03/18/2022     LIPID  03/16/2025     ADVANCE CARE PLANNING  03/18/2026     DTAP/TDAP/TD IMMUNIZATION (2 - Td) 10/27/2026     HEPATITIS C SCREENING  Completed     PHQ-2  Completed     INFLUENZA VACCINE  Completed     Pneumococcal Vaccine: 65+ Years  Completed     COVID-19 Vaccine  Completed     Pneumococcal Vaccine: Pediatrics (0 to 5 Years) and At-Risk Patients (6 to 64 Years)  Aged Out     IPV IMMUNIZATION  Aged Out     MENINGITIS IMMUNIZATION   Aged Out     HEPATITIS B IMMUNIZATION  Aged Out       Urinary Incontinence (Male)    Urinary incontinence means not being able to control the release of urine from the bladder.   Causes  Common causes of urinary incontinence in men include:    Infection    Certain medicines    Aging    Poor pelvic muscle tone    Bladder spasms    Obesity    Trouble urinating and fully emptying the bladder (urinary retention)  Other things that can cause incontinence are:     Nervous system diseases    Diabetes    Sleep apnea    Urinary tract infections    Prostate surgery    Pelvic injury  Constipation and smoking have also been identified as risk factors.   Symptoms    Urge incontinence (overactive bladder). This is a sudden urge to urinate. It occurs even though there may not be much urine in the bladder. The need to urinate often during the night is common. It's due to bladder spasms.    Stress incontinence. This is urine leakage that you can't control. It can occur with sneezing, coughing, and other actions that put stress on the bladder.    Treatment  Treatment depends on what is causing the condition. Bladder infections are treated with antibiotics. Urinary retention is treated with a bladder catheter.   Home care  Follow these guidelines when caring for yourself at home:    Don't have any foods and drinks that may irritate the bladder. This includes:  ? Chocolate  ? Alcohol  ? Caffeine  ? Carbonated drinks  ? Acidic fruits and juices    Limit fluids to 6 to 8 cups a day.    Lose weight if you are overweight. This will reduce your symptoms.    If advised, do regular pelvic muscle-strengthening exercises such as Kegel exercises.    If needed, wear absorbent pads to catch urine. Change the pads often. This is for good hygiene and to prevent skin and bladder infections.    Bathe daily for good hygiene.    If an antibiotic was prescribed to treat a bladder infection, take it until it's finished. Keep taking it even if you are  feeling better. This is to make sure your infection has cleared.    If a catheter was left in place, keep bacteria from getting into the collection bag. Don't disconnect the catheter from the collection bag.    Use a leg band to secure the catheter drainage tube, so it does not pull on the catheter. Drain the collection bag when it becomes full. To do this, use the drain spout at the bottom of the bag. Don't disconnect the bag from the catheter.    Don't pull on or try to remove a catheter. The catheter must be removed by a healthcare provider.    If you smoke, stop. Ask your provider for help if you can't do this on your own.  Follow-up care  Follow up with your healthcare provider, or as advised.  When to get medical advice  Call your healthcare provider right away if any of these occur:    Fever over 100.4 F (38 C), or as directed by your provider    Bladder pain or fullness    Belly swelling, nausea, or vomiting    Back pain    Weakness, dizziness, or fainting    If a catheter was left in place, return if:  ? The catheter falls out  ? The catheter stops draining for 6 hours  ? Your urine gets cloudy or smells bad  [employer] last reviewed this educational content on 1/1/2020 2000-2020 [employer]. All rights reserved. This information is not intended as a substitute for professional medical care. Always follow your healthcare professional's instructions.

## 2021-03-18 NOTE — PROGRESS NOTES
SUBJECTIVE:   Estrada Oseguera is a 76 year old male who presents for Preventive Visit.      Patient has been advised of split billing requirements and indicates understanding: Yes   Are you in the first 12 months of your Medicare coverage?  No    Healthy Habits:     In general, how would you rate your overall health?  Good    Frequency of exercise:  2-3 days/week    Duration of exercise:  45-60 minutes    Taking medications regularly:  Yes    Barriers to taking medications:  None    Medication side effects:  None    Ability to successfully perform activities of daily living:  No assistance needed    Home Safety:  No safety concerns identified    Hearing Impairment:  No hearing concerns    In the past 6 months, have you been bothered by leaking of urine? Yes    In general, how would you rate your overall mental or emotional health?  Good      PHQ-2 Total Score: 0    Additional concerns today:  Yes (Left heel pain)    Do you feel safe in your environment? Yes    Have you ever done Advance Care Planning? (For example, a Health Directive, POLST, or a discussion with a medical provider or your loved ones about your wishes): Yes, advance care planning is on file.       Fall risk  Fallen 2 or more times in the past year?: No  Any fall with injury in the past year?: No    Cognitive Screening   1) Repeat 3 items (Leader, Season, Table)    2) Clock draw: NORMAL  3) 3 item recall: Recalls 3 objects  Results: 3 items recalled: COGNITIVE IMPAIRMENT LESS LIKELY    Mini-CogTM Aliza Esparza. Licensed by the author for use in [facility]; reprinted with permission (helena@.[email or web address]). All rights reserved.      Do you have sleep apnea, excessive snoring or daytime drowsiness?: no    Reviewed and updated as needed this visit by clinical staff  Tobacco  Allergies  Meds              Reviewed and updated as needed this visit by Provider                Social History     Tobacco Use     Smoking status: Former Smoker      "Years: 7.00     Types: Pipe     Quit date:      Years since quittin.2     Smokeless tobacco: Never Used     Tobacco comment: light smoker for few years only   Substance Use Topics     Alcohol use: Not Currently     Alcohol/week: 0.0 standard drinks     Comment: 3-4 times a week     If you drink alcohol do you typically have >3 drinks per day or >7 drinks per week? No    Alcohol Use 3/18/2021   Prescreen: >3 drinks/day or >7 drinks/week? No               Current providers sharing in care for this patient include:   Patient Care Team:  Zoe Tomas MD as PCP - General (Internal Medicine)  Zoe Tomas MD as Assigned PCP  Lynn Valle MD as Assigned Heart and Vascular Provider    The following health maintenance items are reviewed in Epic and correct as of today:  Health Maintenance   Topic Date Due     ZOSTER IMMUNIZATION (1 of 2) Never done     COLORECTAL CANCER SCREENING  2019     FALL RISK ASSESSMENT  2021     PSA  2021     MEDICARE ANNUAL WELLNESS VISIT  2022     ADVANCE CARE PLANNING  2026     DTAP/TDAP/TD IMMUNIZATION (2 - Td) 10/27/2026     HEPATITIS C SCREENING  Completed     PHQ-2  Completed     INFLUENZA VACCINE  Completed     Pneumococcal Vaccine: 65+ Years  Completed     COVID-19 Vaccine  Completed     Pneumococcal Vaccine: Pediatrics (0 to 5 Years) and At-Risk Patients (6 to 64 Years)  Aged Out     IPV IMMUNIZATION  Aged Out     MENINGITIS IMMUNIZATION  Aged Out     HEPATITIS B IMMUNIZATION  Aged Out       Review of Systems  Constitutional, HEENT, cardiovascular, pulmonary, GI, , musculoskeletal, neuro, skin, endocrine and psych systems are negative, except as otherwise noted.    OBJECTIVE:   /82 (BP Location: Right arm, Cuff Size: Adult Large)   Pulse 87   Temp 97.2  F (36.2  C) (Tympanic)   Ht 1.753 m (5' 9\")   Wt 103.1 kg (227 lb 3.2 oz)   SpO2 95%   BMI 33.55 kg/m   Estimated body mass index is 33.55 kg/m  as calculated " "from the following:    Height as of this encounter: 1.753 m (5' 9\").    Weight as of this encounter: 103.1 kg (227 lb 3.2 oz).  Physical Exam  GENERAL: healthy, alert and no distress  EYES: Eyes grossly normal to inspection, PERRL and conjunctivae and sclerae normal  HENT: ear canals and TM's normal, nose and mouth without ulcers or lesions  NECK: no adenopathy, no asymmetry, masses, or scars and thyroid normal to palpation  RESP: lungs clear to auscultation - no rales, rhonchi or wheezes  CV: regular rate and rhythm, normal S1 S2, no S3   ABDOMEN: soft, nontender, no hepatosplenomegaly, no masses and bowel sounds normal  Scar of colon resection and nephrectomy  RECTAL: normal sphincter tone, no rectal masses, prostate normal size, smooth, nontender without nodules or masses  MS: no gross musculoskeletal defects noted, no edema  SKIN: no suspicious lesions or rashes  Numerous moles and freckles  Numerous SK lesion  Follows dermatology  Not seen this year   NEURO: Normal strength and tone, mentation intact and speech normal  PSYCH: mentation appears normal, affect normal/bright        ASSESSMENT / PLAN:   Estrada was seen today for physical.    Diagnoses and all orders for this visit:    Encounter for Medicare annual wellness exam  He is due colonoscopy due to history of tubular adenoma  Due for Shingrix    Screen for colon cancer  -     GASTROENTEROLOGY ADULT REF PROCEDURE ONLY; Future    Screening for deficiency anemia  -     Ferritin  -     CBC with platelets    Screening for thyroid disorder  -     TSH with free T4 reflex    Elevated prostate specific antigen (PSA)  -     PSA, tumor marker  -     UROLOGY ADULT REFERRAL; Future  Patient did not make appointment with urology as recommended previously  He said he will do it at this time    Tubular adenoma  -     GASTROENTEROLOGY ADULT REF PROCEDURE ONLY; Future    Other cardiomyopathies (H)  Mild and follows cardiology and currently well compensated    NSVT " (nonsustained ventricular tachycardia) (H)  -     metoprolol succinate ER (TOPROL-XL) 50 MG 24 hr tablet; Take 1 tablet (50 mg) by mouth daily for heart and Blood Pressure  He has a past history but currently stable    Benign hypertension  -     lisinopril (ZESTRIL) 20 MG tablet; Take 0.5 tablets (10 mg) by mouth daily for Blood Pressure  -     Comprehensive metabolic panel    Paroxysmal atrial fibrillation (H)  Comments:  past history see note of  dated 3/6/20    A 2-hour episode of atrial fibrillation recorded by the pacemaker in 01/2018  This has resolved  See the note of cardiology  Anticoagulation was a stopped  He has a history of Chacon's esophagus so cannot take aspirin    Hyperlipidemia, unspecified hyperlipidemia type  -     atorvastatin (LIPITOR) 10 MG tablet; Take 1 tablet (10 mg) by mouth daily for cholestrol  -     Lipid panel reflex to direct LDL Fasting    Chacon's esophagus without dysplasia  Comments:  seen on EGD dated 11/3/16  Orders:  -     pantoprazole (PROTONIX) 40 MG EC tablet; Take 1 tablet (40 mg) by mouth daily for acid reflux    Prediabetes  -     Hemoglobin A1c    S/P colon resection  Per patient it was done years ago due to history of diverticulosis    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  It was done years ago    Pain of left heel  -     Orthopedic & Spine  Referral; Future  Patient has ongoing left heel pain  It gets worse as days goes on  He is doing some therapy recommended by therapist at his place where he will live  That is not helping  so referral is done    Patient has numerous moles and freckles and spots  He follows dermatology but has not made an appointment before this year  Did not see them last year but saw him year before  Recommended to make an appointment      Disclaimer: This note consists of symbols derived from keyboarding, dictation and/or voice recognition software. As a result, there may be errors in the script that  "have gone undetected. Please consider this when interpreting information found in this chart.      Patient has been advised of split billing requirements and indicates understanding: Yes  COUNSELING:  Reviewed preventive health counseling, as reflected in patient instructions       Regular exercise       Healthy diet/nutrition       Colon cancer screening       Prostate cancer screening    Estimated body mass index is 33.55 kg/m  as calculated from the following:    Height as of this encounter: 1.753 m (5' 9\").    Weight as of this encounter: 103.1 kg (227 lb 3.2 oz).    Weight management plan: Discussed healthy diet and exercise guidelines    He reports that he quit smoking about 36 years ago. His smoking use included pipe. He quit after 7.00 years of use. He has never used smokeless tobacco.      Appropriate preventive services were discussed with this patient, including applicable screening as appropriate for cardiovascular disease, diabetes, osteopenia/osteoporosis, and glaucoma.  As appropriate for age/gender, discussed screening for colorectal cancer, prostate cancer, breast cancer, and cervical cancer. Checklist reviewing preventive services available has been given to the patient.    Reviewed patients plan of care and provided an AVS. The Basic Care Plan (routine screening as documented in Health Maintenance) for Estrada meets the Care Plan requirement. This Care Plan has been established and reviewed with the Patient.    Counseling Resources:  ATP IV Guidelines  Pooled Cohorts Equation Calculator  Breast Cancer Risk Calculator  Breast Cancer: Medication to Reduce Risk  FRAX Risk Assessment  ICSI Preventive Guidelines  Dietary Guidelines for Americans, 2010  USDA's MyPlate  ASA Prophylaxis  Lung CA Screening    Zoe Tomas MD  [facility]    Identified Health Risks:  "

## 2021-03-19 NOTE — RESULT ENCOUNTER NOTE
Woody Dorado,    This is to inform you regarding your test result.      CBC result which includes white count Hemoglobin and  Platelet Counts is normal.   TSH which is thyroid hormone is normal.  Your total cholesterol is normal.  HDL which is called good cholesterol is normal.  Your LDL cholesterol is normal.  This is often call bad cholesterol and high levels increase the risk for heart attacks and strokes.  Your triglycerides are normal.  The testing of your kidney function, liver function and electrolytes was satisfactory   Glucose which is your blood sugar is slightly elevated.  Ferritin which is iron stores in the body is normal.  PSA is elevated   Numbers are going up   Make appointment with urology as discussed during office visit today.  HbA1c which is average glucose during last 3 months is 6.7%  You are considered diabetic   Please schedule virtual visit to discuss this further .      Lab Results       Component                Value               Date                       A1C                      6.7                 03/18/2021                 A1C                      6.3                 03/16/2020                 A1C                      5.8                 03/13/2019                 A1C                      6.4                 09/21/2018                 A1C                      6.2                 02/02/2018                Sincerely,      Dr.Nasima Thom MD,FACP

## 2021-03-22 DIAGNOSIS — E11.9 TYPE 2 DIABETES MELLITUS WITHOUT COMPLICATION, WITHOUT LONG-TERM CURRENT USE OF INSULIN (H): Primary | ICD-10-CM

## 2021-03-22 RX ORDER — METFORMIN HCL 500 MG
500 TABLET, EXTENDED RELEASE 24 HR ORAL
Qty: 90 TABLET | Refills: 1 | Status: SHIPPED | OUTPATIENT
Start: 2021-03-22 | End: 2021-09-08

## 2021-03-23 NOTE — RESULT ENCOUNTER NOTE
Woody Dorado,    This is to inform you regarding your test result.    I spoke to you on phone but sending you a note also.  I have referred you to diabetic educator   Please make appointment with diabetic educator by calling the following number :    [phone] for [facility]  I sent the script of new glucometer, test strips and lancets   Start metformin 500 mg daily.  Make appointment with me in one month.      Sincerely,      Dr.Nasima Thom MD,FACP

## 2021-03-30 ENCOUNTER — OFFICE VISIT (OUTPATIENT)
Dept: PODIATRY | Facility: CLINIC | Age: 76
End: 2021-03-30
Attending: INTERNAL MEDICINE
Payer: MEDICARE

## 2021-03-30 VITALS
HEIGHT: 69 IN | BODY MASS INDEX: 33.62 KG/M2 | DIASTOLIC BLOOD PRESSURE: 86 MMHG | WEIGHT: 227 LBS | SYSTOLIC BLOOD PRESSURE: 124 MMHG

## 2021-03-30 DIAGNOSIS — M72.2 PLANTAR FASCIITIS: Primary | ICD-10-CM

## 2021-03-30 PROCEDURE — 99203 OFFICE O/P NEW LOW 30 MIN: CPT | Performed by: PODIATRIST

## 2021-03-30 ASSESSMENT — MIFFLIN-ST. JEOR: SCORE: 1750.05

## 2021-03-30 NOTE — LETTER
3/30/2021         RE: Estrada Oseguera  [address]  [address]  [place] [address]        Dear Colleague,    Thank you for referring your patient, Estrada Oseguera, to the [facility]. Please see a copy of my visit note below.    PATIENT HISTORY:  Estrada Oseguera is a 76 year old male who presents to clinic for left plantar heel pain.  1-1/2 months duration.  No injury.  Worse after rest and with walking.  He has tried rolling a tennis ball on the area.  Minimal improvement.  3-8 out of 10 pain.    Review of Systems:  Patient denies fever, chills, rash, wound, stiffness, limping, numbness, weakness, heart burn, blood in stool, chest pain with activity, calf pain when walking, shortness of breath with activity, chronic cough, easy bleeding/bruising, swelling of ankles, excessive thirst, fatigue, depression, anxiety.      PAST MEDICAL HISTORY:   Past Medical History:   Diagnosis Date     A-fib (H) 01/31/2018     Cancer (H)     skin     CKD (chronic kidney disease)      GERD (gastroesophageal reflux disease)      Hiatal hernia      Hypertension      Pericarditis      S/P colon resection         PAST SURGICAL HISTORY:   Past Surgical History:   Procedure Laterality Date     CHOLECYSTECTOMY       COLONOSCOPY       COMBINED COLONOSCOPY, MUCOSAL RESECTION  1993    ?diverticulitis      ESOPHAGOSCOPY, GASTROSCOPY, DUODENOSCOPY (EGD), COMBINED N/A 11/3/2016    Procedure: COMBINED ESOPHAGOSCOPY, GASTROSCOPY, DUODENOSCOPY (EGD), BIOPSY SINGLE OR MULTIPLE;  Surgeon: Deondre Meek MD;  Location: [facility]     ESOPHAGOSCOPY, GASTROSCOPY, DUODENOSCOPY (EGD), DILATATION, COMBINED N/A 12/1/2016    Procedure: COMBINED ESOPHAGOSCOPY, GASTROSCOPY, DUODENOSCOPY (EGD), DILATATION;  Surgeon: Deondre Meek MD;  Location:  GI     ESOPHAGOSCOPY, GASTROSCOPY, DUODENOSCOPY (EGD), DILATATION, COMBINED N/A 3/2/2017    Procedure: COMBINED ESOPHAGOSCOPY, GASTROSCOPY, DUODENOSCOPY (EGD),  DILATATION;  Surgeon: Deondre Meek MD;  Location:  GI     NEPHRECTOMY RT/LT      right     pace maker          MEDICATIONS:   Current Outpatient Medications:      atorvastatin (LIPITOR) 10 MG tablet, Take 1 tablet (10 mg) by mouth daily for cholestrol, Disp: 90 tablet, Rfl: 3     blood glucose (NO BRAND SPECIFIED) lancets standard, Use to test blood sugar 1 times daily or as directed., Disp: 100 each, Rfl: 1     blood glucose (NO BRAND SPECIFIED) test strip, Use to test blood sugar 1 times daily or as directed., Disp: 100 strip, Rfl: 1     blood glucose monitoring (NO BRAND SPECIFIED) meter device kit, Use to test blood sugar as directed., Disp: 1 kit, Rfl: 0     Ferrous Gluconate (IRON) 240 (27 FE) MG TABS, Take 1 tablet by mouth daily, Disp: , Rfl:      lisinopril (ZESTRIL) 20 MG tablet, Take 0.5 tablets (10 mg) by mouth daily for Blood Pressure, Disp: 45 tablet, Rfl: 3     metFORMIN (GLUCOPHAGE-XR) 500 MG 24 hr tablet, Take 1 tablet (500 mg) by mouth daily (with dinner) for diabetes, Disp: 90 tablet, Rfl: 1     metoprolol succinate ER (TOPROL-XL) 50 MG 24 hr tablet, Take 1 tablet (50 mg) by mouth daily for heart and Blood Pressure, Disp: 90 tablet, Rfl: 3     pantoprazole (PROTONIX) 40 MG EC tablet, Take 1 tablet (40 mg) by mouth daily for acid reflux, Disp: 90 tablet, Rfl: 3     sildenafil (REVATIO) 20 MG tablet, Take 1-2 tablet (20 mg) by mouth half to one hour before sexual intercourse .  Never use with nitroglycerin, terazosin or doxazosin., Disp: 30 tablet, Rfl: 5     ALLERGIES:  No Known Allergies     SOCIAL HISTORY:   Social History     Socioeconomic History     Marital status:      Spouse name: Not on file     Number of children: Not on file     Years of education: Not on file     Highest education level: Not on file   Occupational History     Not on file   Social Needs     Financial resource strain: Not on file     Food insecurity     Worry: Not on file     Inability: Not on file      "Transportation needs     Medical: Not on file     Non-medical: Not on file   Tobacco Use     Smoking status: Former Smoker     Years: 7.00     Types: Pipe     Quit date:      Years since quittin.2     Smokeless tobacco: Never Used     Tobacco comment: light smoker for few years only   Substance and Sexual Activity     Alcohol use: Not Currently     Alcohol/week: 0.0 standard drinks     Comment: 3-4 times a week     Drug use: No     Sexual activity: Not Currently   Lifestyle     Physical activity     Days per week: Not on file     Minutes per session: Not on file     Stress: Not on file   Relationships     Social connections     Talks on phone: Not on file     Gets together: Not on file     Attends Church service: Not on file     Active member of club or organization: Not on file     Attends meetings of clubs or organizations: Not on file     Relationship status: Not on file     Intimate partner violence     Fear of current or ex partner: Not on file     Emotionally abused: Not on file     Physically abused: Not on file     Forced sexual activity: Not on file   Other Topics Concern     Parent/sibling w/ CABG, MI or angioplasty before 65F 55M? Not Asked      Service Not Asked     Blood Transfusions Not Asked     Caffeine Concern Not Asked     Occupational Exposure Not Asked     Hobby Hazards Not Asked     Sleep Concern Not Asked     Stress Concern Not Asked     Weight Concern Not Asked     Special Diet No     Back Care Not Asked     Exercise No     Bike Helmet Not Asked     Seat Belt Not Asked     Self-Exams Not Asked   Social History Narrative     Not on file        FAMILY HISTORY:   Family History   Problem Relation Age of Onset     Hyperlipidemia Mother      Cerebrovascular Disease Father      Prostate Cancer Father      Pulmonary Hypertension Brother      Breast Cancer Sister      Parkinsonism Sister      Colon Cancer No family hx of         EXAM:Vitals: /86   Ht 1.753 m (5' 9\")   Wt " 103 kg (227 lb)   BMI 33.52 kg/m    BMI= Body mass index is 33.52 kg/m .    General appearance: Patient is alert and fully cooperative with history & exam.  No sign of distress is noted during the visit.     Psychiatric: Affect is pleasant & appropriate.  Patient appears motivated to improve health.     Respiratory: Breathing is regular & unlabored while sitting.     HEENT: Hearing is intact to spoken word.  Speech is clear.  No gross evidence of visual impairment that would impact ambulation.     Dermatologic: Skin is intact to left foot.  No wounds noted.  No paronychia or evidence of soft tissue infection is noted.     Vascular: PT pulse palpable on the left foot.  I could not locate the DP pulse.  No significant edema or varicosities noted.  CFT and skin temperature are normal.     Neurologic: Lower extremity sensation is intact to light touch.  No evidence of weakness or contracture in the lower extremities.  No evidence of neuropathy.     Musculoskeletal: Pain to palpation of the left heel at the plantar fascial insertion.  No pain with side-to-side heel squeeze.  Patient is ambulatory without assistive device or brace.  No gross ankle deformity noted.  No foot or ankle joint effusion is noted.     ASSESSMENT: Left plantar fasciitis     PLAN:  Reviewed patient's chart in epic.  Discussed condition and treatment options including pros and cons.    The potential causes and nature of plantar fasciitis were discussed with the patient.  We reviewed the natural history/prognosis of the condition and risks if left untreated.     We discussed possible causes of the condition as it relates to the patients specific situation.      Conservative treatment options were reviewed:  appropriate shoes, avoidance of barefoot walking, inserts/orthoses, stretching, ice, massage, immobilization and NSAIDs.     We also reviewed the option of injection therapy if not improving.    After thorough discussion and answering all  questions, the patient elected to try icing, stretching, super feet inserts.     Follow-up in 1 to 2 months as needed.      Yvon Tate DPM, FACFAS      Disclaimer: This note consists of symbols derived from keyboarding, dictation and/or voice recognition software. As a result, there may be errors in the script that have gone undetected. Please consider this when interpreting information found in this chart.        Again, thank you for allowing me to participate in the care of your patient.        Sincerely,        Yvon Tate DPM

## 2021-03-30 NOTE — PATIENT INSTRUCTIONS
PLANTAR FASCIITIS  Plantar fasciitis is often referred to as heel spurs or heel pain. Plantar fasciitis is a very common problem that affects people of all foot shapes, age, weight and activity level. Pain may be in the arch or on the weight-bearing surface of the heel. The pain may come on without injury or identifiable cause. Pain is generally present when first getting out of bed in the morning or up from a seated break.     CAUSES  The plantar fascia is a dense fibrous band of tissue that stretches across the bottom surface of the foot. The fascia helps support the foot muscles and arch. Plantar fasciitis is thought to be caused by mechanical strain or overload. Frequent walking without shoes or wearing unsupportive shoes is thought to cause structural overload and ultimately inflammation of the plantar fascia. Some people have heel spurs that can be seen on x-ray. The heel spur is actually a minor component of plantar fascitis and is largely ignored.       SELF TREATMENT   The easiest solution is to stop walking around your home without shoes. Plantar fasciitis is largely a shoe problem. Shoes are either not being worn often enough or your current shoes are inadequate for your weight, foot structure or activity level. The majority of shoes on the market today are not sufficient to resist development of plantar fasciitis or to promote healing. Assume that your current shoes are inadequate and will need to be replaced. Even high quality shoes wear out with 6 months to one year of frequent use. Weight loss is another option. Losing ten pounds in the next two months may be enough to resolve the problem. Ice applied to the area of pain two to three times per day for ten minutes each session can be very helpful. Warm foot soaks in epsom salts can also relieve pain. This should continue until the problem resolves. Achilles tendon stretching is essential. Stretch multiple times daily to promote healing and to prevent  recurrence in the future. Over all stretching of the body is helpful as well such as the calves, thighs and lower back. Normally when one area of the body is tight, other areas are too. Gentle Yoga can be good for this.     Over the counter topical anti inflammatories can be helpful such as biofreeze, bengay, salon pas, ect...  Oral ibuprofen or aleve is recommended as well to try to calm down inflammation.     Night splints can be helpful to gradually stretch the foot at night as a lot of pain is when you get up in the morning. Taking a towel or thera band and stretching the foot back multiple times before you get ou of bed can be beneficial as well.     MEDICAL TREATMENT  Medical treatments often include custom arch supports, cortisone injections, physical therapy, splints to be worn in bed, prescription medications and surgery. The home treatments listed above will be necessary regardless of these advanced medical treatments. Surgery is rarely needed but is very helpful in selected cases.     PROGNOSIS  Plantar fasciitis can last from one day to a lifetime. Some people get intermittent fascitis that is very short-lived. Others suffer daily for years. Excessive body weight, frequent bare foot walking, long hours on the feet, inadequate shoes, predisposing foot structures and excessive activity such as running are all potential issues that lead to chronic and/or recurring plantar fascitis. Having plantar fasciitis means that you are forever prone to this problem and will require modification of some of the above factors. Most people seek treatment within one to four months. Healing usually requires a similar one to four month time frame. Healing time is relative to the amount of effort spent treating the problem.   Plantar fasciitis is highly recurrent. Risk factors often continue, including return to bare foot walking, inadequate shoes, excessive body weight, excessive activities, etc. Your life style and foot  structure may predispose you to recurrent plantar fasciitis. A daily prevention regimen can be very helpful. Ongoing use of shoe inserts, careful attention to appropriate shoes, daily Achilles stretching, etc. may prevent recurrence. Prompt attention at the earliest warning signs of heel pain can resolve the problem in as short as a few days.     EXERCISES  Stair Exercise: Step on the stairs with the ball of your foot and hold your position for at least 15 seconds, then slowly step down with the heels of your foot. You can do this daily and as often as you want.   Picking the Towel: Sit comfortably and then pick the towel up with your toes. You can use any object other than a towel as long as the material can be soft and you can pick it up with your toes.  Rolling the Bottle: Use a small ball or frozen water bottle and then roll it around with your foot.   Flex the Toes: Sit comfortably and then flex your toes by pointing it towards the floor or towards your body. This will relax and flex your foot and exercise your plantar fascia, the calf, and the Achilles tendon. The inability of the foot to stretch often causes the bunching up of the plantar fascia area leading to the pain.  Calf/Achilles Stretching: Lay on you back and raise one foot, then point your toes towards the floor. See photo below:               Hold each stretch for 10 seconds. Stretch 10 times per set, three sets per day. Morning, afternoon and evening. If your heel pain is very severe in the morning, consider doing the first set of stretches before you get out of bed.      OVER THE COUNTER INSERT RECOMMENDATIONS  SuperFeet   Sofsole Fit Spenco   Power Step   Walk-Fit Arch Cradles     Most of these can be found at your local [employer], [employer], or online.  **A good high quality over the counter insert should cost around $40-$50      [employer] LOCATIONS  [address]  [phone]   [address]  [address]  [phone] [place]  [address] [facility]  [phone]   [place]  [address] [facility] [address]  [phone]   [place]  [address]  [phone] [place] [address]  [phone]   [place]  [address]  [phone]   [place]  [address] [place] [address]  [phone] [place]  [address]  [phone]

## 2021-03-30 NOTE — PROGRESS NOTES
PATIENT HISTORY:  Estrada Oseguera is a 76 year old male who presents to clinic for left plantar heel pain.  1-1/2 months duration.  No injury.  Worse after rest and with walking.  He has tried rolling a tennis ball on the area.  Minimal improvement.  3-8 out of 10 pain.    I was requested to see this patient for this issue by Dr. Tomas.    Review of Systems:  Patient denies fever, chills, rash, wound, stiffness, limping, numbness, weakness, heart burn, blood in stool, chest pain with activity, calf pain when walking, shortness of breath with activity, chronic cough, easy bleeding/bruising, swelling of ankles, excessive thirst, fatigue, depression, anxiety.      PAST MEDICAL HISTORY:   Past Medical History:   Diagnosis Date     A-fib (H) 01/31/2018     Cancer (H)     skin     CKD (chronic kidney disease)      GERD (gastroesophageal reflux disease)      Hiatal hernia      Hypertension      Pericarditis      S/P colon resection         PAST SURGICAL HISTORY:   Past Surgical History:   Procedure Laterality Date     CHOLECYSTECTOMY       COLONOSCOPY       COMBINED COLONOSCOPY, MUCOSAL RESECTION  1993    ?diverticulitis      ESOPHAGOSCOPY, GASTROSCOPY, DUODENOSCOPY (EGD), COMBINED N/A 11/3/2016    Procedure: COMBINED ESOPHAGOSCOPY, GASTROSCOPY, DUODENOSCOPY (EGD), BIOPSY SINGLE OR MULTIPLE;  Surgeon: Deondre Meek MD;  Location: [facility]     ESOPHAGOSCOPY, GASTROSCOPY, DUODENOSCOPY (EGD), DILATATION, COMBINED N/A 12/1/2016    Procedure: COMBINED ESOPHAGOSCOPY, GASTROSCOPY, DUODENOSCOPY (EGD), DILATATION;  Surgeon: Deondre Meek MD;  Location:  GI     ESOPHAGOSCOPY, GASTROSCOPY, DUODENOSCOPY (EGD), DILATATION, COMBINED N/A 3/2/2017    Procedure: COMBINED ESOPHAGOSCOPY, GASTROSCOPY, DUODENOSCOPY (EGD), DILATATION;  Surgeon: Deondre Meek MD;  Location:  GI     NEPHRECTOMY RT/LT      right     pace maker          MEDICATIONS:   Current Outpatient Medications:      atorvastatin (LIPITOR) 10 MG  tablet, Take 1 tablet (10 mg) by mouth daily for cholestrol, Disp: 90 tablet, Rfl: 3     blood glucose (NO BRAND SPECIFIED) lancets standard, Use to test blood sugar 1 times daily or as directed., Disp: 100 each, Rfl: 1     blood glucose (NO BRAND SPECIFIED) test strip, Use to test blood sugar 1 times daily or as directed., Disp: 100 strip, Rfl: 1     blood glucose monitoring (NO BRAND SPECIFIED) meter device kit, Use to test blood sugar as directed., Disp: 1 kit, Rfl: 0     Ferrous Gluconate (IRON) 240 (27 FE) MG TABS, Take 1 tablet by mouth daily, Disp: , Rfl:      lisinopril (ZESTRIL) 20 MG tablet, Take 0.5 tablets (10 mg) by mouth daily for Blood Pressure, Disp: 45 tablet, Rfl: 3     metFORMIN (GLUCOPHAGE-XR) 500 MG 24 hr tablet, Take 1 tablet (500 mg) by mouth daily (with dinner) for diabetes, Disp: 90 tablet, Rfl: 1     metoprolol succinate ER (TOPROL-XL) 50 MG 24 hr tablet, Take 1 tablet (50 mg) by mouth daily for heart and Blood Pressure, Disp: 90 tablet, Rfl: 3     pantoprazole (PROTONIX) 40 MG EC tablet, Take 1 tablet (40 mg) by mouth daily for acid reflux, Disp: 90 tablet, Rfl: 3     sildenafil (REVATIO) 20 MG tablet, Take 1-2 tablet (20 mg) by mouth half to one hour before sexual intercourse .  Never use with nitroglycerin, terazosin or doxazosin., Disp: 30 tablet, Rfl: 5     ALLERGIES:  No Known Allergies     SOCIAL HISTORY:   Social History     Socioeconomic History     Marital status:      Spouse name: Not on file     Number of children: Not on file     Years of education: Not on file     Highest education level: Not on file   Occupational History     Not on file   Social Needs     Financial resource strain: Not on file     Food insecurity     Worry: Not on file     Inability: Not on file     Transportation needs     Medical: Not on file     Non-medical: Not on file   Tobacco Use     Smoking status: Former Smoker     Years: 7.00     Types: Pipe     Quit date:      Years since quittin.2  "    Smokeless tobacco: Never Used     Tobacco comment: light smoker for few years only   Substance and Sexual Activity     Alcohol use: Not Currently     Alcohol/week: 0.0 standard drinks     Comment: 3-4 times a week     Drug use: No     Sexual activity: Not Currently   Lifestyle     Physical activity     Days per week: Not on file     Minutes per session: Not on file     Stress: Not on file   Relationships     Social connections     Talks on phone: Not on file     Gets together: Not on file     Attends Orthodox service: Not on file     Active member of club or organization: Not on file     Attends meetings of clubs or organizations: Not on file     Relationship status: Not on file     Intimate partner violence     Fear of current or ex partner: Not on file     Emotionally abused: Not on file     Physically abused: Not on file     Forced sexual activity: Not on file   Other Topics Concern     Parent/sibling w/ CABG, MI or angioplasty before 65F 55M? Not Asked      Service Not Asked     Blood Transfusions Not Asked     Caffeine Concern Not Asked     Occupational Exposure Not Asked     Hobby Hazards Not Asked     Sleep Concern Not Asked     Stress Concern Not Asked     Weight Concern Not Asked     Special Diet No     Back Care Not Asked     Exercise No     Bike Helmet Not Asked     Seat Belt Not Asked     Self-Exams Not Asked   Social History Narrative     Not on file        FAMILY HISTORY:   Family History   Problem Relation Age of Onset     Hyperlipidemia Mother      Cerebrovascular Disease Father      Prostate Cancer Father      Pulmonary Hypertension Brother      Breast Cancer Sister      Parkinsonism Sister      Colon Cancer No family hx of         EXAM:Vitals: /86   Ht 1.753 m (5' 9\")   Wt 103 kg (227 lb)   BMI 33.52 kg/m    BMI= Body mass index is 33.52 kg/m .    General appearance: Patient is alert and fully cooperative with history & exam.  No sign of distress is noted during the visit.   "   Psychiatric: Affect is pleasant & appropriate.  Patient appears motivated to improve health.     Respiratory: Breathing is regular & unlabored while sitting.     HEENT: Hearing is intact to spoken word.  Speech is clear.  No gross evidence of visual impairment that would impact ambulation.     Dermatologic: Skin is intact to left foot.  No wounds noted.  No paronychia or evidence of soft tissue infection is noted.     Vascular: PT pulse palpable on the left foot.  I could not locate the DP pulse.  No significant edema or varicosities noted.  CFT and skin temperature are normal.     Neurologic: Lower extremity sensation is intact to light touch.  No evidence of weakness or contracture in the lower extremities.  No evidence of neuropathy.     Musculoskeletal: Pain to palpation of the left heel at the plantar fascial insertion.  No pain with side-to-side heel squeeze.  Patient is ambulatory without assistive device or brace.  No gross ankle deformity noted.  No foot or ankle joint effusion is noted.     ASSESSMENT: Left plantar fasciitis     PLAN:  Reviewed patient's chart in epic.  Discussed condition and treatment options including pros and cons.    The potential causes and nature of plantar fasciitis were discussed with the patient.  We reviewed the natural history/prognosis of the condition and risks if left untreated.     We discussed possible causes of the condition as it relates to the patients specific situation.      Conservative treatment options were reviewed:  appropriate shoes, avoidance of barefoot walking, inserts/orthoses, stretching, ice, massage, immobilization and NSAIDs.     We also reviewed the option of injection therapy if not improving.    After thorough discussion and answering all questions, the patient elected to try icing, stretching, super feet inserts.     Follow-up in 1 to 2 months as needed.      Yvon Tate, PAGE, FACFAS      Disclaimer: This note consists of symbols derived  from keyboarding, dictation and/or voice recognition software. As a result, there may be errors in the script that have gone undetected. Please consider this when interpreting information found in this chart.

## 2021-04-05 DIAGNOSIS — E11.9 TYPE 2 DIABETES MELLITUS WITHOUT COMPLICATION, WITHOUT LONG-TERM CURRENT USE OF INSULIN (H): ICD-10-CM

## 2021-04-05 NOTE — TELEPHONE ENCOUNTER
Pharmacy notes read: missing/illegible information on Rx-SIG code: Medicare  Part B needs specific directions not as directed. Please resend as just 1 time daily if appropriate.

## 2021-04-07 ENCOUNTER — VIRTUAL VISIT (OUTPATIENT)
Dept: UROLOGY | Facility: CLINIC | Age: 76
End: 2021-04-07
Attending: INTERNAL MEDICINE
Payer: MEDICARE

## 2021-04-07 VITALS — HEIGHT: 70 IN | BODY MASS INDEX: 32.5 KG/M2 | WEIGHT: 227 LBS

## 2021-04-07 DIAGNOSIS — R39.9 LOWER URINARY TRACT SYMPTOMS (LUTS): Primary | ICD-10-CM

## 2021-04-07 DIAGNOSIS — R97.20 ELEVATED PROSTATE SPECIFIC ANTIGEN (PSA): ICD-10-CM

## 2021-04-07 PROCEDURE — 99204 OFFICE O/P NEW MOD 45 MIN: CPT | Mod: 95 | Performed by: UROLOGY

## 2021-04-07 RX ORDER — TAMSULOSIN HYDROCHLORIDE 0.4 MG/1
0.4 CAPSULE ORAL DAILY
Qty: 90 CAPSULE | Refills: 3 | Status: SHIPPED | OUTPATIENT
Start: 2021-04-07 | End: 2022-04-18

## 2021-04-07 ASSESSMENT — MIFFLIN-ST. JEOR: SCORE: 1765.92

## 2021-04-07 ASSESSMENT — PAIN SCALES - GENERAL: PAINLEVEL: NO PAIN (0)

## 2021-04-07 NOTE — Clinical Note
Kingsley Tomas,    I saw Estrada and his wife today in consultation for his elevated PSA and LUTS.  We discussed initiation of tamsulosin 0.4 mg daily and prescription was sent to his pharmacy.  We will also plan to proceed with a prostate MRI to rule out any concerning prostate lesions for prostate cancer and [place] demonstrate his prostate size for his BPH symptoms.    Thanks,   Jono Billy M.D.  Cell: [phone]

## 2021-04-07 NOTE — PROGRESS NOTES
[facility]   Name: Estrada Oseguera    MRN: [id number]   YOB: 1945       We were asked to see Estrada Oseguera at the request of Dr. Tomas for evaluation and treatment of Elevated PSA .        Chief Complaint:   Elevated PSA     History is obtained from the patient            History of Present Illness:   Estrada Oseguera is a 76 year old male who is being seen for evaluation of Elevated PSA     Has been having significant LUTS worsening over the past 6-12 months  Slow and weak stream  Nocturia x3/night  Having daytime frequency   Having some urgency   He is having to do some abdominal straining  No prior acute urinary retention, UTI, or gross hematuria     Also with some Elevated PSA     Father had low grade prostate cancer found at time of death at age 87 from a stroke             Past Medical History:     Past Medical History:   Diagnosis Date     A-fib (H) 01/31/2018     Cancer (H)     skin     CKD (chronic kidney disease)      GERD (gastroesophageal reflux disease)      Hernia, abdominal      Hiatal hernia      Hypertension      Mumps      Pericarditis      S/P colon resection             Past Surgical History:     Past Surgical History:   Procedure Laterality Date     CHOLECYSTECTOMY       COLONOSCOPY       COMBINED COLONOSCOPY, MUCOSAL RESECTION  1993    ?diverticulitis      ESOPHAGOSCOPY, GASTROSCOPY, DUODENOSCOPY (EGD), COMBINED N/A 11/3/2016    Procedure: COMBINED ESOPHAGOSCOPY, GASTROSCOPY, DUODENOSCOPY (EGD), BIOPSY SINGLE OR MULTIPLE;  Surgeon: Deondre Meek MD;  Location:  GI     ESOPHAGOSCOPY, GASTROSCOPY, DUODENOSCOPY (EGD), DILATATION, COMBINED N/A 12/1/2016    Procedure: COMBINED ESOPHAGOSCOPY, GASTROSCOPY, DUODENOSCOPY (EGD), DILATATION;  Surgeon: Deondre Meek MD;  Location:  GI     ESOPHAGOSCOPY, GASTROSCOPY, DUODENOSCOPY (EGD), DILATATION, COMBINED N/A 3/2/2017    Procedure: COMBINED ESOPHAGOSCOPY, GASTROSCOPY,  DUODENOSCOPY (EGD), DILATATION;  Surgeon: Deondre Meek MD;  Location:  GI     NEPHRECTOMY       NEPHRECTOMY RT/LT      right     pace maker       VASECTOMY              Social History:     Social History     Tobacco Use     Smoking status: Former Smoker     Years: .     Types: Pipe     Quit date:      Years since quittin.2     Smokeless tobacco: Never Used     Tobacco comment: light smoker for few years only   Substance Use Topics     Alcohol use: Not Currently     Alcohol/week: 0.0 standard drinks     Comment: 3-4 times a week       History   Smoking Status     Former Smoker     Years: .     Types: Pipe     Quit date:    Smokeless Tobacco     Never Used     Comment: light smoker for few years only            Family History:     Family History   Problem Relation Age of Onset     Hyperlipidemia Mother      Cerebrovascular Disease Father      Prostate Cancer Father      Pulmonary Hypertension Brother      Breast Cancer Sister      Parkinsonism Sister      Colon Cancer No family hx of               Allergies:   No Known Allergies         Medications:     Current Outpatient Medications   Medication Sig     atorvastatin (LIPITOR) 10 MG tablet Take 1 tablet (10 mg) by mouth daily for cholestrol     blood glucose (NO BRAND SPECIFIED) lancets standard Use to test blood sugar 1 times daily or as directed.     blood glucose (NO BRAND SPECIFIED) test strip Use to test blood sugar 1 times daily     blood glucose monitoring (NO BRAND SPECIFIED) meter device kit Use to test blood sugar as directed.     Ferrous Gluconate (IRON) 240 (27 FE) MG TABS Take 1 tablet by mouth daily     lisinopril (ZESTRIL) 20 MG tablet Take 0.5 tablets (10 mg) by mouth daily for Blood Pressure     metFORMIN (GLUCOPHAGE-XR) 500 MG 24 hr tablet Take 1 tablet (500 mg) by mouth daily (with dinner) for diabetes     metoprolol succinate ER (TOPROL-XL) 50 MG 24 hr tablet Take 1 tablet (50 mg) by mouth daily for heart and Blood  "Pressure     pantoprazole (PROTONIX) 40 MG EC tablet Take 1 tablet (40 mg) by mouth daily for acid reflux     sildenafil (REVATIO) 20 MG tablet Take 1-2 tablet (20 mg) by mouth half to one hour before sexual intercourse .  Never use with nitroglycerin, terazosin or doxazosin.     No current facility-administered medications for this visit.              Review of Systems:     Skin: negative  Eyes: negative  Ears/Nose/Throat: negative  Respiratory: No shortness of breath, dyspnea on exertion, cough, or hemoptysis  Cardiovascular: negative  Gastrointestinal: negative  Genitourinary: as above  Musculoskeletal: negative  Neurologic: negative  Psychiatric: negative  Hematologic/Lymphatic/Immunologic: negative  Endocrine: negative          Physical Exam:     PHYSICAL EXAM  Patient is a 76 year old  male   Vitals: Height 1.778 m (5' 10\"), weight 103 kg (227 lb).  Body mass index is 32.57 kg/m .  General Appearance Adult:   Alert, no acute distress, oriented  HENT: throat/mouth:normal, good dentition  Lungs: no respiratory distress, or pursed lip breathing  Heart: No obvious jugular venous distension present  Abdomen: obesely - distended  Musculoskeltal: extremities normal, no peripheral edema  Skin: no suspicious lesions or rashes  Neuro: Alert, oriented, speech and mentation normal  Psych: affect and mood normal  Gait: Normal           Data:   All laboratory data reviewed:      Component PSA   Latest Ref Rng & Units 0 - 4 ug/L   9/21/2018 5.22 (H)   3/13/2019 5.28 (H)   3/16/2020 4.90 (H)   3/18/2021 5.38 (H)     Creatinine   Date Value Ref Range Status   03/18/2021 1.23 0.66 - 1.25 mg/dL Final   03/16/2020 1.16 0.66 - 1.25 mg/dL Final   03/13/2019 1.26 (H) 0.66 - 1.25 mg/dL Final   09/21/2018 1.33 (H) 0.66 - 1.25 mg/dL Final   02/02/2018 1.30 (H) 0.66 - 1.25 mg/dL Final              Impression and Plan:   Impression:   76-year-old man with history of elevated PSA and LUTS      Plan:   Elevated PSA  -Reviewed his PSA " history for last several years which has demonstrated a fairly stable but elevated PSA  -We discussed that guidelines recommend stopping PSA monitoring and age of 75 or when the life expectancy is less than 10 years, however given that he has never been fully evaluated for this PSA I do think it reasonable to proceed with a prostate MRI  -We discussed the use of prostate MRI and the biopsy naïve patient population at this is quickly become the clinical standard of care.  This has been shown to increase the accuracy of an initial biopsy and decrease the rate of repeat biopsies  -Order for MRI placed  -Virtual visit follow-up after the MRI to discuss the results    LUTS  -We discussed the pathophysiology of the bladder and the prostate and normal changes associated with the development of BPH and LUTS  -He has fairly significant bothersome symptoms we discussed first-line treatment with tamsulosin 0.4 mg daily  -We discussed the mechanism of action and common side effects of this medication and prescription sent to his pharmacy  -We also discussed the the MRI will be very helpful in terms of prostate imaging and sizing and demonstrating any interaction with the bladder neck  -We will follow up treatment response at our next visit     Thank you for the kind consultation.    Chart documentation with Dragon Voice recognition Software. Although reviewed after completion, some words and grammatical errors may remain.     Time spent: 19 minutes spent on the date of the encounter doing chart review, history and exam, documentation and further activities as noted above.    Jono Billy MD   Urology  [facility]  [facility] Phone: [phone]  [facility] Phone: [phone]         Estrada is a 76 year old who is being evaluated via a billable video visit.      How would you like to obtain your AVS? MyChart  If the video visit is dropped, the invitation should be  resent by: Text to cell phone: [phone]  Will anyone else be joining your video visit? No      Video-Visit Details    Type of service:  Video Visit    Video Start Time: 3:48 PM    Video End Time:4:01 PM    Originating Location (pt. Location): Home    Distant Location (provider location):  [facility] [place]     Platform used for Video Visit: [email or web address]

## 2021-04-07 NOTE — LETTER
4/7/2021       RE: Estrada Oseguera  [address]  [address]  [place] [address]     Dear Colleague,    Thank you for referring your patient, Estrada Oseguera, to the [facility] UROLOGY CLINIC [place] [facility]. Please see a copy of my visit note below.    Urology Consult History and Physical  [facility]   Name: Estrada Oseguera    MRN: [id number]   YOB: 1945       We were asked to see Estrada Oseguera at the request of Dr. Tomas for evaluation and treatment of Elevated PSA .        Chief Complaint:   Elevated PSA     History is obtained from the patient            History of Present Illness:   Estrada Oseguera is a 76 year old male who is being seen for evaluation of Elevated PSA     Has been having significant LUTS worsening over the past 6-12 months  Slow and weak stream  Nocturia x3/night  Having daytime frequency   Having some urgency   He is having to do some abdominal straining  No prior acute urinary retention, UTI, or gross hematuria     Also with some Elevated PSA     Father had low grade prostate cancer found at time of death at age 87 from a stroke             Past Medical History:     Past Medical History:   Diagnosis Date     A-fib (H) 01/31/2018     Cancer (H)     skin     CKD (chronic kidney disease)      GERD (gastroesophageal reflux disease)      Hernia, abdominal      Hiatal hernia      Hypertension      Mumps      Pericarditis      S/P colon resection             Past Surgical History:     Past Surgical History:   Procedure Laterality Date     CHOLECYSTECTOMY       COLONOSCOPY       COMBINED COLONOSCOPY, MUCOSAL RESECTION  1993    ?diverticulitis      ESOPHAGOSCOPY, GASTROSCOPY, DUODENOSCOPY (EGD), COMBINED N/A 11/3/2016    Procedure: COMBINED ESOPHAGOSCOPY, GASTROSCOPY, DUODENOSCOPY (EGD), BIOPSY SINGLE OR MULTIPLE;  Surgeon: Deondre Meek MD;  Location: [facility]     ESOPHAGOSCOPY, GASTROSCOPY, DUODENOSCOPY  (EGD), DILATATION, COMBINED N/A 2016    Procedure: COMBINED ESOPHAGOSCOPY, GASTROSCOPY, DUODENOSCOPY (EGD), DILATATION;  Surgeon: Deondre Meek MD;  Location:  GI     ESOPHAGOSCOPY, GASTROSCOPY, DUODENOSCOPY (EGD), DILATATION, COMBINED N/A 3/2/2017    Procedure: COMBINED ESOPHAGOSCOPY, GASTROSCOPY, DUODENOSCOPY (EGD), DILATATION;  Surgeon: Deondre Meek MD;  Location:  GI     NEPHRECTOMY       NEPHRECTOMY RT/LT      right     pace maker       VASECTOMY              Social History:     Social History     Tobacco Use     Smoking status: Former Smoker     Years: 7.00     Types: Pipe     Quit date:      Years since quittin.2     Smokeless tobacco: Never Used     Tobacco comment: light smoker for few years only   Substance Use Topics     Alcohol use: Not Currently     Alcohol/week: 0.0 standard drinks     Comment: 3-4 times a week       History   Smoking Status     Former Smoker     Years: 7.     Types: Pipe     Quit date:    Smokeless Tobacco     Never Used     Comment: light smoker for few years only            Family History:     Family History   Problem Relation Age of Onset     Hyperlipidemia Mother      Cerebrovascular Disease Father      Prostate Cancer Father      Pulmonary Hypertension Brother      Breast Cancer Sister      Parkinsonism Sister      Colon Cancer No family hx of               Allergies:   No Known Allergies         Medications:     Current Outpatient Medications   Medication Sig     atorvastatin (LIPITOR) 10 MG tablet Take 1 tablet (10 mg) by mouth daily for cholestrol     blood glucose (NO BRAND SPECIFIED) lancets standard Use to test blood sugar 1 times daily or as directed.     blood glucose (NO BRAND SPECIFIED) test strip Use to test blood sugar 1 times daily     blood glucose monitoring (NO BRAND SPECIFIED) meter device kit Use to test blood sugar as directed.     Ferrous Gluconate (IRON) 240 (27 FE) MG TABS Take 1 tablet by mouth daily     lisinopril  "(ZESTRIL) 20 MG tablet Take 0.5 tablets (10 mg) by mouth daily for Blood Pressure     metFORMIN (GLUCOPHAGE-XR) 500 MG 24 hr tablet Take 1 tablet (500 mg) by mouth daily (with dinner) for diabetes     metoprolol succinate ER (TOPROL-XL) 50 MG 24 hr tablet Take 1 tablet (50 mg) by mouth daily for heart and Blood Pressure     pantoprazole (PROTONIX) 40 MG EC tablet Take 1 tablet (40 mg) by mouth daily for acid reflux     sildenafil (REVATIO) 20 MG tablet Take 1-2 tablet (20 mg) by mouth half to one hour before sexual intercourse .  Never use with nitroglycerin, terazosin or doxazosin.     No current facility-administered medications for this visit.              Review of Systems:     Skin: negative  Eyes: negative  Ears/Nose/Throat: negative  Respiratory: No shortness of breath, dyspnea on exertion, cough, or hemoptysis  Cardiovascular: negative  Gastrointestinal: negative  Genitourinary: as above  Musculoskeletal: negative  Neurologic: negative  Psychiatric: negative  Hematologic/Lymphatic/Immunologic: negative  Endocrine: negative          Physical Exam:     PHYSICAL EXAM  Patient is a 76 year old  male   Vitals: Height 1.778 m (5' 10\"), weight 103 kg (227 lb).  Body mass index is 32.57 kg/m .  General Appearance Adult:   Alert, no acute distress, oriented  HENT: throat/mouth:normal, good dentition  Lungs: no respiratory distress, or pursed lip breathing  Heart: No obvious jugular venous distension present  Abdomen: obesely - distended  Musculoskeltal: extremities normal, no peripheral edema  Skin: no suspicious lesions or rashes  Neuro: Alert, oriented, speech and mentation normal  Psych: affect and mood normal  Gait: Normal           Data:   All laboratory data reviewed:      Component PSA   Latest Ref Rng & Units 0 - 4 ug/L   9/21/2018 5.22 (H)   3/13/2019 5.28 (H)   3/16/2020 4.90 (H)   3/18/2021 5.38 (H)     Creatinine   Date Value Ref Range Status   03/18/2021 1.23 0.66 - 1.25 mg/dL Final   03/16/2020 1.16 " 0.66 - 1.25 mg/dL Final   03/13/2019 1.26 (H) 0.66 - 1.25 mg/dL Final   09/21/2018 1.33 (H) 0.66 - 1.25 mg/dL Final   02/02/2018 1.30 (H) 0.66 - 1.25 mg/dL Final              Impression and Plan:   Impression:   76-year-old man with history of elevated PSA and LUTS      Plan:   Elevated PSA  -Reviewed his PSA history for last several years which has demonstrated a fairly stable but elevated PSA  -We discussed that guidelines recommend stopping PSA monitoring and age of 75 or when the life expectancy is less than 10 years, however given that he has never been fully evaluated for this PSA I do think it reasonable to proceed with a prostate MRI  -We discussed the use of prostate MRI and the biopsy naïve patient population at this is quickly become the clinical standard of care.  This has been shown to increase the accuracy of an initial biopsy and decrease the rate of repeat biopsies  -Order for MRI placed  -Virtual visit follow-up after the MRI to discuss the results    LUTS  -We discussed the pathophysiology of the bladder and the prostate and normal changes associated with the development of BPH and LUTS  -He has fairly significant bothersome symptoms we discussed first-line treatment with tamsulosin 0.4 mg daily  -We discussed the mechanism of action and common side effects of this medication and prescription sent to his pharmacy  -We also discussed the the MRI will be very helpful in terms of prostate imaging and sizing and demonstrating any interaction with the bladder neck  -We will follow up treatment response at our next visit     Thank you for the kind consultation.    Chart documentation with Dragon Voice recognition Software. Although reviewed after completion, some words and grammatical errors may remain.     Time spent: 19 minutes spent on the date of the encounter doing chart review, history and exam, documentation and further activities as noted above.    Jono Billy MD   Urology  [facility]  [facility]  [facility] Phone: [phone]  [facility] Phone: [phone]         Estrada is a 76 year old who is being evaluated via a billable video visit.      How would you like to obtain your AVS? MyChart  If the video visit is dropped, the invitation should be resent by: Text to cell phone: [phone]  Will anyone else be joining your video visit? No      Video-Visit Details    Type of service:  Video Visit    Video Start Time: 3:48 PM    Video End Time:4:01 PM    Originating Location (pt. Location): Home    Distant Location (provider location):  [facility] UROLOGY CLINIC [place]     Platform used for Video Visit: [employer]

## 2021-04-08 ENCOUNTER — TELEPHONE (OUTPATIENT)
Dept: UROLOGY | Facility: CLINIC | Age: 76
End: 2021-04-08

## 2021-04-08 NOTE — TELEPHONE ENCOUNTER
----- Message from Evelyn Harrison sent at 4/7/2021  4:22 PM CDT -----  Return in about 4 weeks (around 5/5/2021) Please schedule MRI and virtual visit     [facility]

## 2021-04-13 ENCOUNTER — ANCILLARY PROCEDURE (OUTPATIENT)
Dept: CARDIOLOGY | Facility: CLINIC | Age: 76
End: 2021-04-13
Attending: INTERNAL MEDICINE
Payer: MEDICARE

## 2021-04-13 DIAGNOSIS — Z95.0 CARDIAC PACEMAKER IN SITU: ICD-10-CM

## 2021-04-13 PROCEDURE — 93296 REM INTERROG EVL PM/IDS: CPT | Performed by: INTERNAL MEDICINE

## 2021-04-13 PROCEDURE — 93294 REM INTERROG EVL PM/LDLS PM: CPT | Performed by: INTERNAL MEDICINE

## 2021-04-14 LAB
MDC_IDC_EPISODE_DTM: NORMAL
MDC_IDC_EPISODE_ID: NORMAL
MDC_IDC_EPISODE_TYPE: NORMAL
MDC_IDC_LEAD_IMPLANT_DT: NORMAL
MDC_IDC_LEAD_IMPLANT_DT: NORMAL
MDC_IDC_LEAD_LOCATION: NORMAL
MDC_IDC_LEAD_LOCATION: NORMAL
MDC_IDC_LEAD_LOCATION_DETAIL_1: NORMAL
MDC_IDC_LEAD_LOCATION_DETAIL_1: NORMAL
MDC_IDC_LEAD_MFG: NORMAL
MDC_IDC_LEAD_MFG: NORMAL
MDC_IDC_LEAD_MODEL: NORMAL
MDC_IDC_LEAD_MODEL: NORMAL
MDC_IDC_LEAD_POLARITY_TYPE: NORMAL
MDC_IDC_LEAD_POLARITY_TYPE: NORMAL
MDC_IDC_LEAD_SERIAL: NORMAL
MDC_IDC_LEAD_SERIAL: NORMAL
MDC_IDC_MSMT_BATTERY_DTM: NORMAL
MDC_IDC_MSMT_BATTERY_REMAINING_LONGEVITY: 126 MO
MDC_IDC_MSMT_BATTERY_REMAINING_PERCENTAGE: 100 %
MDC_IDC_MSMT_BATTERY_STATUS: NORMAL
MDC_IDC_MSMT_LEADCHNL_RA_IMPEDANCE_VALUE: 709 OHM
MDC_IDC_MSMT_LEADCHNL_RA_PACING_THRESHOLD_AMPLITUDE: 0.6 V
MDC_IDC_MSMT_LEADCHNL_RA_PACING_THRESHOLD_PULSEWIDTH: 0.4 MS
MDC_IDC_MSMT_LEADCHNL_RV_IMPEDANCE_VALUE: 648 OHM
MDC_IDC_MSMT_LEADCHNL_RV_PACING_THRESHOLD_AMPLITUDE: 1.2 V
MDC_IDC_MSMT_LEADCHNL_RV_PACING_THRESHOLD_PULSEWIDTH: 0.4 MS
MDC_IDC_PG_IMPLANT_DTM: NORMAL
MDC_IDC_PG_MFG: NORMAL
MDC_IDC_PG_MODEL: NORMAL
MDC_IDC_PG_SERIAL: NORMAL
MDC_IDC_PG_TYPE: NORMAL
MDC_IDC_SESS_CLINIC_NAME: NORMAL
MDC_IDC_SESS_DTM: NORMAL
MDC_IDC_SESS_TYPE: NORMAL
MDC_IDC_SET_BRADY_AT_MODE_SWITCH_MODE: NORMAL
MDC_IDC_SET_BRADY_AT_MODE_SWITCH_RATE: 170 {BEATS}/MIN
MDC_IDC_SET_BRADY_LOWRATE: 55 {BEATS}/MIN
MDC_IDC_SET_BRADY_MAX_SENSOR_RATE: 130 {BEATS}/MIN
MDC_IDC_SET_BRADY_MAX_TRACKING_RATE: 130 {BEATS}/MIN
MDC_IDC_SET_BRADY_MODE: NORMAL
MDC_IDC_SET_BRADY_PAV_DELAY_HIGH: 200 MS
MDC_IDC_SET_BRADY_PAV_DELAY_LOW: 300 MS
MDC_IDC_SET_BRADY_SAV_DELAY_HIGH: 200 MS
MDC_IDC_SET_BRADY_SAV_DELAY_LOW: 300 MS
MDC_IDC_SET_LEADCHNL_RA_PACING_AMPLITUDE: 2 V
MDC_IDC_SET_LEADCHNL_RA_PACING_CAPTURE_MODE: NORMAL
MDC_IDC_SET_LEADCHNL_RA_PACING_POLARITY: NORMAL
MDC_IDC_SET_LEADCHNL_RA_PACING_PULSEWIDTH: 0.4 MS
MDC_IDC_SET_LEADCHNL_RA_SENSING_ADAPTATION_MODE: NORMAL
MDC_IDC_SET_LEADCHNL_RA_SENSING_POLARITY: NORMAL
MDC_IDC_SET_LEADCHNL_RA_SENSING_SENSITIVITY: 0.25 MV
MDC_IDC_SET_LEADCHNL_RV_PACING_AMPLITUDE: 1.5 V
MDC_IDC_SET_LEADCHNL_RV_PACING_CAPTURE_MODE: NORMAL
MDC_IDC_SET_LEADCHNL_RV_PACING_POLARITY: NORMAL
MDC_IDC_SET_LEADCHNL_RV_PACING_PULSEWIDTH: 0.4 MS
MDC_IDC_SET_LEADCHNL_RV_SENSING_ADAPTATION_MODE: NORMAL
MDC_IDC_SET_LEADCHNL_RV_SENSING_POLARITY: NORMAL
MDC_IDC_SET_LEADCHNL_RV_SENSING_SENSITIVITY: 1.5 MV
MDC_IDC_SET_ZONE_DETECTION_INTERVAL: 375 MS
MDC_IDC_SET_ZONE_TYPE: NORMAL
MDC_IDC_SET_ZONE_VENDOR_TYPE: NORMAL
MDC_IDC_STAT_AT_BURDEN_PERCENT: 0 %
MDC_IDC_STAT_AT_DTM_END: NORMAL
MDC_IDC_STAT_AT_DTM_START: NORMAL
MDC_IDC_STAT_BRADY_DTM_END: NORMAL
MDC_IDC_STAT_BRADY_DTM_START: NORMAL
MDC_IDC_STAT_BRADY_RA_PERCENT_PACED: 27 %
MDC_IDC_STAT_BRADY_RV_PERCENT_PACED: 0 %
MDC_IDC_STAT_EPISODE_RECENT_COUNT: 0
MDC_IDC_STAT_EPISODE_RECENT_COUNT: 2
MDC_IDC_STAT_EPISODE_RECENT_COUNT: 3
MDC_IDC_STAT_EPISODE_RECENT_COUNT_DTM_END: NORMAL
MDC_IDC_STAT_EPISODE_RECENT_COUNT_DTM_START: NORMAL
MDC_IDC_STAT_EPISODE_TYPE: NORMAL
MDC_IDC_STAT_EPISODE_VENDOR_TYPE: NORMAL

## 2021-04-15 ENCOUNTER — ANCILLARY PROCEDURE (OUTPATIENT)
Dept: CARDIOLOGY | Facility: CLINIC | Age: 76
End: 2021-04-15
Attending: INTERNAL MEDICINE
Payer: MEDICARE

## 2021-04-15 ENCOUNTER — HOSPITAL ENCOUNTER (OUTPATIENT)
Dept: GENERAL RADIOLOGY | Facility: CLINIC | Age: 76
End: 2021-04-15
Attending: UROLOGY
Payer: MEDICARE

## 2021-04-15 ENCOUNTER — VIRTUAL VISIT (OUTPATIENT)
Dept: EDUCATION SERVICES | Facility: CLINIC | Age: 76
End: 2021-04-15
Attending: INTERNAL MEDICINE
Payer: MEDICARE

## 2021-04-15 ENCOUNTER — HOSPITAL ENCOUNTER (OUTPATIENT)
Dept: MRI IMAGING | Facility: CLINIC | Age: 76
End: 2021-04-15
Attending: UROLOGY
Payer: MEDICARE

## 2021-04-15 DIAGNOSIS — E11.9 TYPE 2 DIABETES MELLITUS WITHOUT COMPLICATION, WITHOUT LONG-TERM CURRENT USE OF INSULIN (H): ICD-10-CM

## 2021-04-15 DIAGNOSIS — R39.9 LOWER URINARY TRACT SYMPTOMS (LUTS): ICD-10-CM

## 2021-04-15 DIAGNOSIS — R97.20 ELEVATED PROSTATE SPECIFIC ANTIGEN (PSA): ICD-10-CM

## 2021-04-15 LAB
MDC_IDC_LEAD_IMPLANT_DT: NORMAL
MDC_IDC_LEAD_LOCATION: NORMAL
MDC_IDC_LEAD_LOCATION_DETAIL_1: NORMAL
MDC_IDC_LEAD_MFG: NORMAL
MDC_IDC_LEAD_MODEL: NORMAL
MDC_IDC_LEAD_POLARITY_TYPE: NORMAL
MDC_IDC_LEAD_SERIAL: NORMAL
MDC_IDC_MSMT_BATTERY_STATUS: NORMAL
MDC_IDC_MSMT_BATTERY_STATUS: NORMAL
MDC_IDC_MSMT_LEADCHNL_RA_IMPEDANCE_VALUE: 727 OHM
MDC_IDC_MSMT_LEADCHNL_RA_IMPEDANCE_VALUE: 727 OHM
MDC_IDC_MSMT_LEADCHNL_RA_PACING_THRESHOLD_AMPLITUDE: 0.9 V
MDC_IDC_MSMT_LEADCHNL_RA_PACING_THRESHOLD_AMPLITUDE: 0.9 V
MDC_IDC_MSMT_LEADCHNL_RA_PACING_THRESHOLD_PULSEWIDTH: 0.4 MS
MDC_IDC_MSMT_LEADCHNL_RA_PACING_THRESHOLD_PULSEWIDTH: 0.4 MS
MDC_IDC_MSMT_LEADCHNL_RA_SENSING_INTR_AMPL: 6.8 MV
MDC_IDC_MSMT_LEADCHNL_RA_SENSING_INTR_AMPL: 6.8 MV
MDC_IDC_MSMT_LEADCHNL_RV_IMPEDANCE_VALUE: 728 OHM
MDC_IDC_MSMT_LEADCHNL_RV_IMPEDANCE_VALUE: 728 OHM
MDC_IDC_MSMT_LEADCHNL_RV_PACING_THRESHOLD_AMPLITUDE: 0.9 V
MDC_IDC_MSMT_LEADCHNL_RV_PACING_THRESHOLD_AMPLITUDE: 0.9 V
MDC_IDC_MSMT_LEADCHNL_RV_PACING_THRESHOLD_PULSEWIDTH: 0.4 MS
MDC_IDC_MSMT_LEADCHNL_RV_PACING_THRESHOLD_PULSEWIDTH: 0.4 MS
MDC_IDC_MSMT_LEADCHNL_RV_SENSING_INTR_AMPL: 25 MV
MDC_IDC_MSMT_LEADCHNL_RV_SENSING_INTR_AMPL: 25 MV
MDC_IDC_PG_IMPLANT_DTM: NORMAL
MDC_IDC_PG_IMPLANT_DTM: NORMAL
MDC_IDC_PG_MFG: NORMAL
MDC_IDC_PG_MFG: NORMAL
MDC_IDC_PG_MODEL: NORMAL
MDC_IDC_PG_MODEL: NORMAL
MDC_IDC_PG_SERIAL: NORMAL
MDC_IDC_PG_SERIAL: NORMAL
MDC_IDC_PG_TYPE: NORMAL
MDC_IDC_PG_TYPE: NORMAL
MDC_IDC_SESS_CLINIC_NAME: NORMAL
MDC_IDC_SESS_CLINIC_NAME: NORMAL
MDC_IDC_SESS_DTM: NORMAL
MDC_IDC_SESS_DTM: NORMAL
MDC_IDC_SESS_TYPE: NORMAL
MDC_IDC_SESS_TYPE: NORMAL
MDC_IDC_SET_BRADY_AT_MODE_SWITCH_MODE: NORMAL
MDC_IDC_SET_BRADY_AT_MODE_SWITCH_MODE: NORMAL
MDC_IDC_SET_BRADY_AT_MODE_SWITCH_RATE: 170 {BEATS}/MIN
MDC_IDC_SET_BRADY_AT_MODE_SWITCH_RATE: 170 {BEATS}/MIN
MDC_IDC_SET_BRADY_HYSTRATE: NORMAL
MDC_IDC_SET_BRADY_HYSTRATE: NORMAL
MDC_IDC_SET_BRADY_LOWRATE: 55 {BEATS}/MIN
MDC_IDC_SET_BRADY_LOWRATE: 55 {BEATS}/MIN
MDC_IDC_SET_BRADY_MAX_SENSOR_RATE: 130 {BEATS}/MIN
MDC_IDC_SET_BRADY_MAX_SENSOR_RATE: 130 {BEATS}/MIN
MDC_IDC_SET_BRADY_MAX_TRACKING_RATE: 130 {BEATS}/MIN
MDC_IDC_SET_BRADY_MAX_TRACKING_RATE: 130 {BEATS}/MIN
MDC_IDC_SET_BRADY_MODE: NORMAL
MDC_IDC_SET_BRADY_MODE: NORMAL
MDC_IDC_SET_BRADY_PAV_DELAY_HIGH: 200 MS
MDC_IDC_SET_BRADY_PAV_DELAY_HIGH: 200 MS
MDC_IDC_SET_BRADY_PAV_DELAY_LOW: 300 MS
MDC_IDC_SET_BRADY_PAV_DELAY_LOW: 300 MS
MDC_IDC_SET_BRADY_SAV_DELAY_HIGH: 200 MS
MDC_IDC_SET_BRADY_SAV_DELAY_HIGH: 200 MS
MDC_IDC_SET_BRADY_SAV_DELAY_LOW: 300 MS
MDC_IDC_SET_BRADY_SAV_DELAY_LOW: 300 MS
MDC_IDC_SET_LEADCHNL_RA_PACING_AMPLITUDE: 2 V
MDC_IDC_SET_LEADCHNL_RA_PACING_AMPLITUDE: 2 V
MDC_IDC_SET_LEADCHNL_RA_PACING_CAPTURE_MODE: NORMAL
MDC_IDC_SET_LEADCHNL_RA_PACING_CAPTURE_MODE: NORMAL
MDC_IDC_SET_LEADCHNL_RA_PACING_POLARITY: NORMAL
MDC_IDC_SET_LEADCHNL_RA_PACING_POLARITY: NORMAL
MDC_IDC_SET_LEADCHNL_RA_PACING_PULSEWIDTH: 0.4 MS
MDC_IDC_SET_LEADCHNL_RA_PACING_PULSEWIDTH: 0.4 MS
MDC_IDC_SET_LEADCHNL_RA_SENSING_ADAPTATION_MODE: NORMAL
MDC_IDC_SET_LEADCHNL_RA_SENSING_ADAPTATION_MODE: NORMAL
MDC_IDC_SET_LEADCHNL_RA_SENSING_POLARITY: NORMAL
MDC_IDC_SET_LEADCHNL_RA_SENSING_POLARITY: NORMAL
MDC_IDC_SET_LEADCHNL_RA_SENSING_SENSITIVITY: 0.25 MV
MDC_IDC_SET_LEADCHNL_RA_SENSING_SENSITIVITY: 0.25 MV
MDC_IDC_SET_LEADCHNL_RV_PACING_AMPLITUDE: 3.5 V
MDC_IDC_SET_LEADCHNL_RV_PACING_AMPLITUDE: 3.5 V
MDC_IDC_SET_LEADCHNL_RV_PACING_CAPTURE_MODE: NORMAL
MDC_IDC_SET_LEADCHNL_RV_PACING_CAPTURE_MODE: NORMAL
MDC_IDC_SET_LEADCHNL_RV_PACING_POLARITY: NORMAL
MDC_IDC_SET_LEADCHNL_RV_PACING_POLARITY: NORMAL
MDC_IDC_SET_LEADCHNL_RV_PACING_PULSEWIDTH: 0.4 MS
MDC_IDC_SET_LEADCHNL_RV_PACING_PULSEWIDTH: 0.4 MS
MDC_IDC_SET_LEADCHNL_RV_SENSING_ADAPTATION_MODE: NORMAL
MDC_IDC_SET_LEADCHNL_RV_SENSING_ADAPTATION_MODE: NORMAL
MDC_IDC_SET_LEADCHNL_RV_SENSING_POLARITY: NORMAL
MDC_IDC_SET_LEADCHNL_RV_SENSING_POLARITY: NORMAL
MDC_IDC_SET_LEADCHNL_RV_SENSING_SENSITIVITY: 1.5 MV
MDC_IDC_SET_LEADCHNL_RV_SENSING_SENSITIVITY: 1.5 MV
MDC_IDC_SET_ZONE_DETECTION_INTERVAL: 375 MS
MDC_IDC_SET_ZONE_DETECTION_INTERVAL: 375 MS
MDC_IDC_SET_ZONE_TYPE: NORMAL
MDC_IDC_SET_ZONE_TYPE: NORMAL
MDC_IDC_SET_ZONE_VENDOR_TYPE: NORMAL
MDC_IDC_SET_ZONE_VENDOR_TYPE: NORMAL
MDC_IDC_STAT_EPISODE_RECENT_COUNT: 2
MDC_IDC_STAT_EPISODE_RECENT_COUNT: 2
MDC_IDC_STAT_EPISODE_RECENT_COUNT_DTM_END: NORMAL
MDC_IDC_STAT_EPISODE_RECENT_COUNT_DTM_END: NORMAL
MDC_IDC_STAT_EPISODE_RECENT_COUNT_DTM_START: NORMAL
MDC_IDC_STAT_EPISODE_RECENT_COUNT_DTM_START: NORMAL
MDC_IDC_STAT_EPISODE_TOTAL_COUNT: 25
MDC_IDC_STAT_EPISODE_TOTAL_COUNT: 25
MDC_IDC_STAT_EPISODE_TOTAL_COUNT_DTM_END: NORMAL
MDC_IDC_STAT_EPISODE_TOTAL_COUNT_DTM_END: NORMAL
MDC_IDC_STAT_EPISODE_TYPE: NORMAL
MDC_IDC_STAT_EPISODE_VENDOR_TYPE: NORMAL

## 2021-04-15 PROCEDURE — 72197 MRI PELVIS W/O & W/DYE: CPT | Mod: MG

## 2021-04-15 PROCEDURE — 93286 PERI-PX EVAL PM/LDLS PM IP: CPT

## 2021-04-15 PROCEDURE — G0108 DIAB MANAGE TRN  PER INDIV: HCPCS | Mod: 95

## 2021-04-15 PROCEDURE — A9585 GADOBUTROL INJECTION: HCPCS | Performed by: UROLOGY

## 2021-04-15 PROCEDURE — G1004 CDSM NDSC: HCPCS | Performed by: RADIOLOGY

## 2021-04-15 PROCEDURE — 72197 MRI PELVIS W/O & W/DYE: CPT | Mod: 26 | Performed by: RADIOLOGY

## 2021-04-15 PROCEDURE — 255N000002 HC RX 255 OP 636: Performed by: UROLOGY

## 2021-04-15 PROCEDURE — 71046 X-RAY EXAM CHEST 2 VIEWS: CPT

## 2021-04-15 PROCEDURE — 71046 X-RAY EXAM CHEST 2 VIEWS: CPT | Mod: 26 | Performed by: RADIOLOGY

## 2021-04-15 PROCEDURE — G1004 CDSM NDSC: HCPCS

## 2021-04-15 PROCEDURE — 93286 PERI-PX EVAL PM/LDLS PM IP: CPT | Mod: 26 | Performed by: INTERNAL MEDICINE

## 2021-04-15 RX ORDER — GADOBUTROL 604.72 MG/ML
10 INJECTION INTRAVENOUS ONCE
Status: COMPLETED | OUTPATIENT
Start: 2021-04-15 | End: 2021-04-15

## 2021-04-15 RX ADMIN — GADOBUTROL 10 ML: 604.72 INJECTION INTRAVENOUS at 15:13

## 2021-04-15 NOTE — PROGRESS NOTES
"Diabetes Self-Management Education & Support    Presents for: Initial Assessment for new diagnosis.   Type of service:  Video Visit  Originating Location (pt. Location): Home  Distant Location (provider location): Home  Mode of Communication:  Video Conference via Qwiki  Video Start Time:  Video End Time (time video stopped):      How would patient like to obtain AVS? Terrihart    SUBJECTIVE/OBJECTIVE:  Presents for: Initial Assessment for new diagnosis  Accompanied by: Self  Diabetes education in the past 24mo: No  Diabetes type: Type 2  Date of diagnosis: 6.7  (3/18/2021)  Other concerns:: None  Cultural Influences/Ethnic Background:  American      Diabetes Symptoms & Complications:    Patient Problem List and Family Medical History reviewed for relevant medical history, current medical status, and diabetes risk factors.    Vitals:  There were no vitals taken for this visit.  Estimated body mass index is 31.66 kg/m  as calculated from the following:    Height as of 4/21/21: 1.803 m (5' 11\").    Weight as of 4/21/21: 103 kg (227 lb).   Last 3 BP:   BP Readings from Last 3 Encounters:   03/30/21 124/86   03/18/21 123/82   03/16/20 132/83       History   Smoking Status     Former Smoker     Years: 7.00     Types: Pipe     Quit date: 1985   Smokeless Tobacco     Never Used     Comment: light smoker for few years only       Labs:  Lab Results   Component Value Date    A1C 6.7 03/18/2021     Lab Results   Component Value Date     03/18/2021     Lab Results   Component Value Date    LDL 61 03/18/2021     HDL Cholesterol   Date Value Ref Range Status   03/18/2021 47 >39 mg/dL Final   ]  GFR Estimate   Date Value Ref Range Status   03/18/2021 57 (L) >60 mL/min/[1.73_m2] Final     Comment:     Non  GFR Calc  Starting 12/18/2018, serum creatinine based estimated GFR (eGFR) will be   calculated using the Chronic Kidney Disease Epidemiology Collaboration   (CKD-EPI) equation.       GFR Estimate If " Black   Date Value Ref Range Status   03/18/2021 66 >60 mL/min/[1.73_m2] Final     Comment:      GFR Calc  Starting 12/18/2018, serum creatinine based estimated GFR (eGFR) will be   calculated using the Chronic Kidney Disease Epidemiology Collaboration   (CKD-EPI) equation.       Lab Results   Component Value Date    CR 1.23 03/18/2021     No results found for: MICROALBUMIN    Healthy Eating:  Healthy Eating Assessed Today: Yes  Breakfast: english muffin, 1 egg, cheese, rust (sometimes) + water and 2 black coffee.   Hashbrowns.       Likes apples and eats 2/day   Skips lunch OR chobani less sugar yogurt with berries or cup of homemade soup or nuts   Crackers, but not really eating since diagnosis   Dinner:  Bigger meal; pot roast is favorite, potatoes, carrots, onions meat. OR chicken soup with brown rice, beans, peas.  Spaghetti pie (meat, butter, cheese, eggs), sometimes itlaian / mexican foods.   GERD - avoids citrus and eating late   Not a sweets person   Gained weight with covid , more toast, crackers etc.     Being Active:  Being Active Assessed Today: Yes  caleb younger had been walking waking 1 hour a day,  Knees bone on bone. Has to be careful walking.  2 months off.  Pool to tread water in (indoor and outdoor)       Monitoring:   supplies sent to [facility]     Taking Medications:  Diabetes Medication(s)     Biguanides       metFORMIN (GLUCOPHAGE-XR) 500 MG 24 hr tablet    Take 1 tablet (500 mg) by mouth daily (with dinner) for diabetes          Taking Medication Assessed Today: Yes    Problem Solving:  Not assessed at this visit       Reducing Risks:  Reducing Risks Assessed Today: No    Healthy Coping:  Healthy Coping Assessed Today: Yes  Emotional response to diabetes: Ready to learn  Stage of change: ACTION (Actively working towards change)  Support resources: Websites  Patient Activation Measure Survey Score:  No flowsheet data found.    Diabetes knowledge and skills assessment:    Patient is knowledgeable in diabetes management concepts related to: Healthy Eating, Being Active, Monitoring and Taking Medication  Continue AADE7 education on the following diabetes management concepts: Problem Solving, Reducing Risks and Healthy Coping  Based on learning assessment above, most appropriate setting for further diabetes education would be: Individual setting.      INTERVENTIONS:  Education provided today on:  AADE Self-Care Behaviors:  Diabetes Pathophysiology  Healthy Eating: carbohydrate counting, consistency in amount, composition, and timing of food intake, portion control and label reading  Being Active: relationship to blood glucose  Monitoring: proper technique, log and interpret results and individual blood glucose targets  Taking Medication: action of prescribed medication    Opportunities for ongoing education and support in diabetes-self management were discussed.  Pt verbalized understanding of concepts discussed and recommendations provided today.       Education Materials Provided:  [employer] Healthy Living with Diabetes Book and Goals for Your Diabetes Care      ASSESSMENT:  New diagnosis with A1c 6.7.  Estrada has made significant diet and lifestyle changes.  Has options for adding  activity.  Reviewed diet, activity, pathophysiology and blood sugar monitoring.       Patient's most recent  is meeting goal of <7.0  Lab Results   Component Value Date    A1C 6.7 03/18/2021    A1C 6.3 03/16/2020    A1C 5.8 03/13/2019    A1C 6.4 09/21/2018    A1C 6.2 02/02/2018       PLAN  Rotate blood sugar checks 3+ times weekly pre and post meal   Label reading; moderate carbohydrate diet   Exercise / movement as able     Marcella Florian RD, LD, CDE  Diabetes Education    Time Spent: 49 minutes  Encounter Type: Individual     A copy of this encounter was shared with the provider.

## 2021-04-15 NOTE — Clinical Note
Thank you for the Diabetes ed referral.  Estrada is doing well!  Cristin Florian RD, LD, CDE  Diabetes Education

## 2021-04-15 NOTE — LETTER
"    4/15/2021         RE: Estrada Oseguera  [address]  [address]  [place] [address]        Dear Colleague,    Thank you for referring your patient, Estrada Oseguera, to the [facility] DIABETES EDUCATION [place]. Please see a copy of my visit note below.    Diabetes Self-Management Education & Support    Presents for: Initial Assessment for new diagnosis.   Type of service:  Video Visit  Originating Location (pt. Location): Home  Distant Location (provider location): Home  Mode of Communication:  Video Conference via [employer]  Video Start Time:  Video End Time (time video stopped):      How would patient like to obtain AVS? MyChart    SUBJECTIVE/OBJECTIVE:  Presents for: Initial Assessment for new diagnosis  Accompanied by: Self  Diabetes education in the past 24mo: No  Diabetes type: Type 2  Date of diagnosis: 6.7  (3/18/2021)  Other concerns:: None  Cultural Influences/Ethnic Background:  American      Diabetes Symptoms & Complications:    Patient Problem List and Family Medical History reviewed for relevant medical history, current medical status, and diabetes risk factors.    Vitals:  There were no vitals taken for this visit.  Estimated body mass index is 31.66 kg/m  as calculated from the following:    Height as of 4/21/21: 1.803 m (5' 11\").    Weight as of 4/21/21: 103 kg (227 lb).   Last 3 BP:   BP Readings from Last 3 Encounters:   03/30/21 124/86   03/18/21 123/82   03/16/20 132/83       History   Smoking Status     Former Smoker     Years: 7.00     Types: Pipe     Quit date: 1985   Smokeless Tobacco     Never Used     Comment: light smoker for few years only       Labs:  Lab Results   Component Value Date    A1C 6.7 03/18/2021     Lab Results   Component Value Date     03/18/2021     Lab Results   Component Value Date    LDL 61 03/18/2021     HDL Cholesterol   Date Value Ref Range Status   03/18/2021 47 >39 mg/dL Final   ]  GFR Estimate   Date Value Ref Range Status   03/18/2021 " 57 (L) >60 mL/min/[1.73_m2] Final     Comment:     Non  GFR Calc  Starting 12/18/2018, serum creatinine based estimated GFR (eGFR) will be   calculated using the Chronic Kidney Disease Epidemiology Collaboration   (CKD-EPI) equation.       GFR Estimate If Black   Date Value Ref Range Status   03/18/2021 66 >60 mL/min/[1.73_m2] Final     Comment:      GFR Calc  Starting 12/18/2018, serum creatinine based estimated GFR (eGFR) will be   calculated using the Chronic Kidney Disease Epidemiology Collaboration   (CKD-EPI) equation.       Lab Results   Component Value Date    CR 1.23 03/18/2021     No results found for: MICROALBUMIN    Healthy Eating:  Healthy Eating Assessed Today: Yes  Breakfast: english muffin, 1 egg, cheese, rust (sometimes) + water and 2 black coffee.   Hashbrowns.       Likes apples and eats 2/day   Skips lunch OR chobani less sugar yogurt with berries or cup of homemade soup or nuts   Crackers, but not really eating since diagnosis   Dinner:  Bigger meal; pot roast is favorite, potatoes, carrots, onions meat. OR chicken soup with brown rice, beans, peas.  Spaghetti pie (meat, butter, cheese, eggs), sometimes itlaian / mexican foods.   GERD - avoids citrus and eating late   Not a sweets person   Gained weight with covid , more toast, crackers etc.     Being Active:  Being Active Assessed Today: Yes  caleb younger had been walking waking 1 hour a day,  Knees bone on bone. Has to be careful walking.  2 months off.  Pool to tread water in (indoor and outdoor)       Monitoring:   supplies sent to [employer]     Taking Medications:  Diabetes Medication(s)     Biguanides       metFORMIN (GLUCOPHAGE-XR) 500 MG 24 hr tablet    Take 1 tablet (500 mg) by mouth daily (with dinner) for diabetes          Taking Medication Assessed Today: Yes    Problem Solving:  Not assessed at this visit       Reducing Risks:  Reducing Risks Assessed Today: No    Healthy Coping:  Healthy Coping  Assessed Today: Yes  Emotional response to diabetes: Ready to learn  Stage of change: ACTION (Actively working towards change)  Support resources: Websites  Patient Activation Measure Survey Score:  No flowsheet data found.    Diabetes knowledge and skills assessment:   Patient is knowledgeable in diabetes management concepts related to: Healthy Eating, Being Active, Monitoring and Taking Medication  Continue AADE7 education on the following diabetes management concepts: Problem Solving, Reducing Risks and Healthy Coping  Based on learning assessment above, most appropriate setting for further diabetes education would be: Individual setting.      INTERVENTIONS:  Education provided today on:  AADE Self-Care Behaviors:  Diabetes Pathophysiology  Healthy Eating: carbohydrate counting, consistency in amount, composition, and timing of food intake, portion control and label reading  Being Active: relationship to blood glucose  Monitoring: proper technique, log and interpret results and individual blood glucose targets  Taking Medication: action of prescribed medication    Opportunities for ongoing education and support in diabetes-self management were discussed.  Pt verbalized understanding of concepts discussed and recommendations provided today.       Education Materials Provided:  [employer] Healthy Living with Diabetes Book and Goals for Your Diabetes Care      ASSESSMENT:  New diagnosis with A1c 6.7.  Estrada has made significant diet and lifestyle changes.  Has options for adding  activity.  Reviewed diet, activity, pathophysiology and blood sugar monitoring.       Patient's most recent  is meeting goal of <7.0  Lab Results   Component Value Date    A1C 6.7 03/18/2021    A1C 6.3 03/16/2020    A1C 5.8 03/13/2019    A1C 6.4 09/21/2018    A1C 6.2 02/02/2018       PLAN  Rotate blood sugar checks 3+ times weekly pre and post meal   Label reading; moderate carbohydrate diet   Exercise / movement as able     Marcella  Anival AVILEZ, KARO, CDE  Diabetes Education    Time Spent: 49 minutes  Encounter Type: Individual     A copy of this encounter was shared with the provider.

## 2021-04-21 ENCOUNTER — VIRTUAL VISIT (OUTPATIENT)
Dept: UROLOGY | Facility: CLINIC | Age: 76
End: 2021-04-21
Payer: MEDICARE

## 2021-04-21 VITALS — HEIGHT: 71 IN | WEIGHT: 227 LBS | BODY MASS INDEX: 31.78 KG/M2

## 2021-04-21 DIAGNOSIS — R39.9 LOWER URINARY TRACT SYMPTOMS (LUTS): ICD-10-CM

## 2021-04-21 DIAGNOSIS — R97.20 ELEVATED PROSTATE SPECIFIC ANTIGEN (PSA): Primary | ICD-10-CM

## 2021-04-21 PROCEDURE — 99213 OFFICE O/P EST LOW 20 MIN: CPT | Mod: 95 | Performed by: UROLOGY

## 2021-04-21 RX ORDER — CIPROFLOXACIN 500 MG/1
500 TABLET, FILM COATED ORAL 2 TIMES DAILY
Qty: 6 TABLET | Refills: 0 | Status: SHIPPED | OUTPATIENT
Start: 2021-04-21 | End: 2021-08-04

## 2021-04-21 ASSESSMENT — PAIN SCALES - GENERAL: PAINLEVEL: NO PAIN (0)

## 2021-04-21 ASSESSMENT — MIFFLIN-ST. JEOR: SCORE: 1781.8

## 2021-04-21 NOTE — LETTER
"4/21/2021       RE: Estrada Oseguera  [address]  [address]  [facility] [address]     Dear Colleague,    Thank you for referring your patient, Estrada Oseguera, to the [facility] at [facility]. Please see a copy of my visit note below.    FABRICIO  CHIEF COMPLAINT   It was my pleasure to see Estrada Oseguera who is a 76 year old male for follow-up of Elevated PSA.      HPI   Estrada Oseguera is a very pleasant 76 year old male     Initially seen 4/7/21:  \"Estrada Oseguera is a 76 year old male who is being seen for evaluation of Elevated PSA      Has been having significant LUTS worsening over the past 6-12 months  Slow and weak stream  Nocturia x3/night  Having daytime frequency   Having some urgency   He is having to do some abdominal straining  No prior acute urinary retention, UTI, or gross hematuria      Also with some Elevated PSA      Father had low grade prostate cancer found at time of death at age 87 from a stroke\"    TODAY:  Started on tamsulosin 0.4mg (Flomax) at last visit  Follow up to discuss MRI  Notes a slight change with tamsulosin 0.4mg (Flomax)     PHYSICAL EXAM  Patient is a 76 year old  male   Vitals: Height 1.803 m (5' 11\"), weight 103 kg (227 lb).  Body mass index is 31.66 kg/m .  General Appearance Adult:   Alert, no acute distress, oriented  HENT: throat/mouth:normal, good dentition  Lungs: no respiratory distress, or pursed lip breathing  Heart: No obvious jugular venous distension present  Abdomen: obesely - distended  Musculoskeltal: extremities normal, no peripheral edema  Skin: no suspicious lesions or rashes  Neuro: Alert, oriented, speech and mentation normal  Psych: affect and mood normal  Gait: Normal     Component PSA   Latest Ref Rng & Units 0 - 4 ug/L   9/21/2018 5.22 (H)   3/13/2019 5.28 (H)   3/16/2020 4.90 (H)   3/18/2021 5.38 (H)     IMAGING:  All pertinent imaging reviewed:    All imaging " "studies reviewed by me.  I personally reviewed these imaging films.  A formal report from radiology will follow.    MRI PROSTATE 4/15/21:  \"FINDINGS:  Size: 6.1 x 4.8 x 4.7 cm; 72 grams  Hemorrhage: Absent   Peripheral zone: Heterogeneous on T2-weighted images. Suspicious  lesions as detailed below.  Transition zone: Enlarged with BPH changes. Transition zone nodules  which are circumscribed or mostly encapsulated without diffusion  restriction.  PI-RADS 2.  No highly suspicious nodules.     Lesion(s) in rank order of severity (highest score- to lowest score,  then by size)      Lesion 1:  Location: Left apex peripheral zone at the 5 o'clock position relative  to the urethra. Series 4 image 36.   Additional prostate regions involved: None   Size: 6  mm  T2 description: Rounded moderate hypointensity  T2 numerical assessment: 3  DWI description: Focal hypointensity on ADC and hyperintensity on DWI  DWI numerical assessment: 3  DCE assessment: Positive    Prostate margin: Capsular abutment<6 mm with smooth contour   Lesion overall PI-RADS category: 4     Neurovascular bundles: No neurovascular bundle involvement by  malignancy.    Seminal vesicles: No seminal vesicle involvement by malignancy.   Lymph nodes: No lymph node involvement   Bones: No suspicious lesions   Other pelvic organs: Prominent fat in bilateral inguinal canal, left  more than right. Circumferential apparent urinary bladder wall  thickening with trabeculation/sacculations, likely representing post  chronic urinary outflow obstructive change.                                                         IMPRESSION:     1. Based on the most suspicious abnormality, this exam is  characterized as PIRADS 4 - Clinically significant cancer is likely to  be present.  The most suspicious abnormality is located at the left  apex peripheral zone, 5:00 position and there is minimal capsular  abutment with no convincing evidence of extraprostatic extension.  2. No " suspicious adenopathy or evidence of pelvic metastases.    ASSESSMENT and PLAN  76-year-old man with elevated PSA and LUTS    Elevated PSA  -I again reviewed his PSA history and trend  -Reviewed his MRI report and images personally and I agree with the radiologist interpretation  -We discussed that a PI-RADS 4 lesion will correlate with clinically significant prostate cancer about 80% of the time  -We discussed the recommendation to proceed with an MR fusion biopsy  -TRUS/Bx - we discussed the risks and benefits of the prostate biopsy including the normal intermittent hematuria, blood per rectum, and blood with ejaculation as well as a 1-2% risk of post biopsy sepsis requiring hospitalization and IV antibiotics  -Prescription for ciprofloxacin sent to his pharmacy    LUTS  -He notes some improvement with voiding following initiation of tamsulosin 0.4 mg daily  -We discussed that his MRI did demonstrate prostatomegaly with a volume of 72 ml  -We will continue to monitor symptoms going forward    Chart documentation with Dragon Voice recognition Software. Although reviewed after completion, some words and grammatical errors may remain.     Time spent: 15 minutes spent on the date of the encounter doing chart review, history and exam, documentation and further activities as noted above.    Jono Billy MD   Urology  [facility]  [facility] Phone: [phone]  [facility] Phone: [phone]          Estrada is a 76 year old who is being evaluated via a billable video visit.      How would you like to obtain your AVS? MyChart  If the video visit is dropped, the invitation should be resent by: Send to e-mail at: [email or web address]  Will anyone else be joining your video visit? No          Video-Visit Details    Type of service:  Video Visit    Video Start Time: 3:46 PM    Video End Time:3:55 PM    Originating Location (pt. Location):  Home    Distant Location (provider location):  [facility]     Platform used for Video Visit: Majo

## 2021-04-21 NOTE — PROGRESS NOTES
"[facility]  CHIEF COMPLAINT   It was my pleasure to see Estrada Oseguera who is a 76 year old male for follow-up of Elevated PSA.      HPI   Estrada Oseguera is a very pleasant 76 year old male     Initially seen 4/7/21:  \"Estrada Oseguear is a 76 year old male who is being seen for evaluation of Elevated PSA      Has been having significant LUTS worsening over the past 6-12 months  Slow and weak stream  Nocturia x3/night  Having daytime frequency   Having some urgency   He is having to do some abdominal straining  No prior acute urinary retention, UTI, or gross hematuria      Also with some Elevated PSA      Father had low grade prostate cancer found at time of death at age 87 from a stroke\"    TODAY:  Started on tamsulosin 0.4mg (Flomax) at last visit  Follow up to discuss MRI  Notes a slight change with tamsulosin 0.4mg (Flomax)     PHYSICAL EXAM  Patient is a 76 year old  male   Vitals: Height 1.803 m (5' 11\"), weight 103 kg (227 lb).  Body mass index is 31.66 kg/m .  General Appearance Adult:   Alert, no acute distress, oriented  HENT: throat/mouth:normal, good dentition  Lungs: no respiratory distress, or pursed lip breathing  Heart: No obvious jugular venous distension present  Abdomen: obesely - distended  Musculoskeltal: extremities normal, no peripheral edema  Skin: no suspicious lesions or rashes  Neuro: Alert, oriented, speech and mentation normal  Psych: affect and mood normal  Gait: Normal     Component PSA   Latest Ref Rng & Units 0 - 4 ug/L   9/21/2018 5.22 (H)   3/13/2019 5.28 (H)   3/16/2020 4.90 (H)   3/18/2021 5.38 (H)     IMAGING:  All pertinent imaging reviewed:    All imaging studies reviewed by me.  I personally reviewed these imaging films.  A formal report from radiology will follow.    MRI PROSTATE 4/15/21:  \"FINDINGS:  Size: 6.1 x 4.8 x 4.7 cm; 72 grams  Hemorrhage: Absent   Peripheral zone: Heterogeneous on T2-weighted images. Suspicious  lesions as detailed below.  Transition zone: " Enlarged with BPH changes. Transition zone nodules  which are circumscribed or mostly encapsulated without diffusion  restriction.  PI-RADS 2.  No highly suspicious nodules.     Lesion(s) in rank order of severity (highest score- to lowest score,  then by size)      Lesion 1:  Location: Left apex peripheral zone at the 5 o'clock position relative  to the urethra. Series 4 image 36.   Additional prostate regions involved: None   Size: 6  mm  T2 description: Rounded moderate hypointensity  T2 numerical assessment: 3  DWI description: Focal hypointensity on ADC and hyperintensity on DWI  DWI numerical assessment: 3  DCE assessment: Positive    Prostate margin: Capsular abutment<6 mm with smooth contour   Lesion overall PI-RADS category: 4     Neurovascular bundles: No neurovascular bundle involvement by  malignancy.    Seminal vesicles: No seminal vesicle involvement by malignancy.   Lymph nodes: No lymph node involvement   Bones: No suspicious lesions   Other pelvic organs: Prominent fat in bilateral inguinal canal, left  more than right. Circumferential apparent urinary bladder wall  thickening with trabeculation/sacculations, likely representing post  chronic urinary outflow obstructive change.                                                         IMPRESSION:     1. Based on the most suspicious abnormality, this exam is  characterized as PIRADS 4 - Clinically significant cancer is likely to  be present.  The most suspicious abnormality is located at the left  apex peripheral zone, 5:00 position and there is minimal capsular  abutment with no convincing evidence of extraprostatic extension.  2. No suspicious adenopathy or evidence of pelvic metastases.    ASSESSMENT and PLAN  76-year-old man with elevated PSA and LUTS    Elevated PSA  -I again reviewed his PSA history and trend  -Reviewed his MRI report and images personally and I agree with the radiologist interpretation  -We discussed that a PI-RADS 4 lesion will  correlate with clinically significant prostate cancer about 80% of the time  -We discussed the recommendation to proceed with an MR fusion biopsy  -TRUS/Bx - we discussed the risks and benefits of the prostate biopsy including the normal intermittent hematuria, blood per rectum, and blood with ejaculation as well as a 1-2% risk of post biopsy sepsis requiring hospitalization and IV antibiotics  -Prescription for ciprofloxacin sent to his pharmacy    LUTS  -He notes some improvement with voiding following initiation of tamsulosin 0.4 mg daily  -We discussed that his MRI did demonstrate prostatomegaly with a volume of 72 ml  -We will continue to monitor symptoms going forward    Chart documentation with Dragon Voice recognition Software. Although reviewed after completion, some words and grammatical errors may remain.     Time spent: 15 minutes spent on the date of the encounter doing chart review, history and exam, documentation and further activities as noted above.    Jono Billy MD   Urology  [facility]  [facility] Phone: [phone]  [facility] Phone: [phone]          Estrada is a 76 year old who is being evaluated via a billable video visit.      How would you like to obtain your AVS? MyChart  If the video visit is dropped, the invitation should be resent by: Send to e-mail at: [email or web address]  Will anyone else be joining your video visit? No          Video-Visit Details    Type of service:  Video Visit    Video Start Time: 3:46 PM    Video End Time:3:55 PM    Originating Location (pt. Location): Home    Distant Location (provider location):  [facility]OLOG[facility]     Platform used for Video Visit: PorfirioWell

## 2021-04-22 NOTE — PATIENT INSTRUCTIONS
Ultrasound Guided Prostate Biopsy    What is a prostate biopsy? A prostate biopsy is a relatively painless procedure performed in the physician's office, outpatient facility or hospital.  A long, thin needle is inserted into the prostate to collect a sample of tissue from the prostate.  These samples are then examined by a pathologist for abnormal cells.    Why do I need a prostate biopsy? During a man's lifetime the prostate gradually increases in size.  The patient may or may not experience symptoms.  Symptoms of an enlarged prostate include:    Increased frequency of urination    Decreased force of urinary stream    Trouble with urination    Awakening at night to urinate    When the prostate is examined, the physician may feel a nodule (hard or firm growth) which would require a biopsy.    Another reason for having a prostate biopsy is an increase in the prostate specific androgen (PSA) in your blood.  A prostate biopsy may be necessary to determine the cause of the increased PSA.    Your physician will also perform an ultrasound (an image created by sound waves).  The ultrasound is performed by placing a small probe in the rectum to image the prostate gland.    Preparation for the Prostate Biopsy    1. Blood thinning medications must be stopped prior to your biopsy.  Please stop the following medication the appropriate number of days before:  a. 10 days-acetylsalicylic acid, vitamin E, fish oil, aspirin, baby aspirin  b. 7 days- Plavix, Brilinta, ibuprofen (Advil, Motrin, Aleve)  c. 5 days-Pradaxa  d. 4 days-Coumadin/warfarin  e. 3 days-Eliquis, Xarelto    2.  If you have any bleeding problems (thin blood), please tell your doctor.    3.  If you have been told by another doctor to take antibiotics before dental work or surgery, please tell the doctor.  This is common for patients that have had a joint replacement.    YOU HAVE BEEN PRESCRIBED AN ANTIBIOTIC.  Please follow the directions written on the bottle.   The directions usually will tell you to start the antibiotic the day before your biopsy.  You will continue taking the medication until it is gone.    The day of the biopsy you will be asked to use an enema one hour before you leave for your appointment.    PLEASE EAT YOUR REGULAR MEALS ON THE DAY OF YOUR BIOPSY.    Unless you have been prescribed a sedative (Valium or a similar drug) you will be able to drive to and from your appointment.  If you have taken a sedative you must have a ride.    AFTER THE BIOPSY    DANGER SIGNS    Small amount of blood in the urine for 10-14 days Excessive blood in the urine, stool or ejaculate  Small amount of blood in the stool for 48 hours Fever over 100 or chills  Small amount of blood in the ejaculate for up to Frequent urination or burning when you urinate   4 weeks          CALL THE DOCTOR'S OFFICE -[phone] IF YOU HAVE ANY OF THESE SYMPTOMS.  IF YOU CANNOT CONTACT THE OFFICE, GO TO THE EMERGENCY ROOM.          Your biopsy is scheduled on____5/12/21____ at our [place] office.  Please be at the office at    ___1:30pm______.      A follow up visit has been scheduled for you on ___5/19/21________@___4:45pm_____     as a virtual visit. Your doctor will discuss your results with you at this visit.

## 2021-04-22 NOTE — PATIENT INSTRUCTIONS
Ultrasound Guided Prostate Biopsy    What is a prostate biopsy? A prostate biopsy is a relatively painless procedure performed in the physician's office, outpatient facility or hospital.  A long, thin needle is inserted into the prostate to collect a sample of tissue from the prostate.  These samples are then examined by a pathologist for abnormal cells.    Why do I need a prostate biopsy? During a man's lifetime the prostate gradually increases in size.  The patient may or may not experience symptoms.  Symptoms of an enlarged prostate include:    Increased frequency of urination    Decreased force of urinary stream    Trouble with urination    Awakening at night to urinate    When the prostate is examined, the physician may feel a nodule (hard or firm growth) which would require a biopsy.    Another reason for having a prostate biopsy is an increase in the prostate specific androgen (PSA) in your blood.  A prostate biopsy may be necessary to determine the cause of the increased PSA.    Your physician will also perform an ultrasound (an image created by sound waves).  The ultrasound is performed by placing a small probe in the rectum to image the prostate gland.    Preparation for the Prostate Biopsy    1. Blood thinning medications must be stopped prior to your biopsy.  Please stop the following medication the appropriate number of days before:  a. 10 days-acetylsalicylic acid, vitamin E, fish oil, aspirin, baby aspirin  b. 7 days- Plavix, Brilinta, ibuprofen (Advil, Motrin, Aleve)  c. 5 days-Pradaxa  d. 4 days-Coumadin/warfarin  e. 3 days-Eliquis, Xarelto    2.  If you have any bleeding problems (thin blood), please tell your doctor.    3.  If you have been told by another doctor to take antibiotics before dental work or surgery, please tell the doctor.  This is common for patients that have had a joint replacement.    YOU HAVE BEEN PRESCRIBED AN ANTIBIOTIC.  Please follow the directions written on the bottle.   The directions usually will tell you to start the antibiotic the day before your biopsy.  You will continue taking the medication until it is gone.    The day of the biopsy you will be asked to use an enema one hour before you leave for your appointment.    PLEASE EAT YOUR REGULAR MEALS ON THE DAY OF YOUR BIOPSY.    Unless you have been prescribed a sedative (Valium or a similar drug) you will be able to drive to and from your appointment.  If you have taken a sedative you must have a ride.    AFTER THE BIOPSY    DANGER SIGNS    Small amount of blood in the urine for 10-14 days Excessive blood in the urine, stool or ejaculate  Small amount of blood in the stool for 48 hours Fever over 100 or chills  Small amount of blood in the ejaculate for up to Frequent urination or burning when you urinate   4 weeks          CALL THE DOCTOR'S OFFICE -[phone] IF YOU HAVE ANY OF THESE SYMPTOMS.  IF YOU CANNOT CONTACT THE OFFICE, GO TO THE EMERGENCY ROOM.          Your biopsy is scheduled on____5/12/21_________ at our [place] office.  Please be at the office at    ___1:30pm____.      A follow up visit has been scheduled for you on ____5/19/21_____@____4:45pm______     as a virtual visit. Your doctor will discuss your results with you at this visit

## 2021-04-26 DIAGNOSIS — Z11.59 ENCOUNTER FOR SCREENING FOR OTHER VIRAL DISEASES: ICD-10-CM

## 2021-05-04 DIAGNOSIS — Z11.59 ENCOUNTER FOR SCREENING FOR OTHER VIRAL DISEASES: ICD-10-CM

## 2021-05-04 LAB
LABORATORY COMMENT REPORT: NORMAL
SARS-COV-2 RNA RESP QL NAA+PROBE: NEGATIVE
SARS-COV-2 RNA RESP QL NAA+PROBE: NORMAL
SPECIMEN SOURCE: NORMAL
SPECIMEN SOURCE: NORMAL

## 2021-05-04 PROCEDURE — U0003 INFECTIOUS AGENT DETECTION BY NUCLEIC ACID (DNA OR RNA); SEVERE ACUTE RESPIRATORY SYNDROME CORONAVIRUS 2 (SARS-COV-2) (CORONAVIRUS DISEASE [COVID-19]), AMPLIFIED PROBE TECHNIQUE, MAKING USE OF HIGH THROUGHPUT TECHNOLOGIES AS DESCRIBED BY CMS-2020-01-R: HCPCS | Performed by: INTERNAL MEDICINE

## 2021-05-04 PROCEDURE — U0005 INFEC AGEN DETEC AMPLI PROBE: HCPCS | Performed by: INTERNAL MEDICINE

## 2021-05-06 ENCOUNTER — HOSPITAL ENCOUNTER (OUTPATIENT)
Facility: CLINIC | Age: 76
Discharge: HOME | End: 2021-05-06
Attending: INTERNAL MEDICINE | Admitting: INTERNAL MEDICINE
Payer: MEDICARE

## 2021-05-06 VITALS
OXYGEN SATURATION: 94 % | TEMPERATURE: 97.2 F | RESPIRATION RATE: 23 BRPM | HEART RATE: 84 BPM | DIASTOLIC BLOOD PRESSURE: 68 MMHG | SYSTOLIC BLOOD PRESSURE: 109 MMHG

## 2021-05-06 LAB — COLONOSCOPY: NORMAL

## 2021-05-06 PROCEDURE — 45380 COLONOSCOPY AND BIOPSY: CPT | Performed by: INTERNAL MEDICINE

## 2021-05-06 PROCEDURE — 250N000011 HC RX IP 250 OP 636: Performed by: INTERNAL MEDICINE

## 2021-05-06 PROCEDURE — 88305 TISSUE EXAM BY PATHOLOGIST: CPT | Mod: 26 | Performed by: PATHOLOGY

## 2021-05-06 PROCEDURE — 88305 TISSUE EXAM BY PATHOLOGIST: CPT | Mod: TC | Performed by: INTERNAL MEDICINE

## 2021-05-06 PROCEDURE — 45385 COLONOSCOPY W/LESION REMOVAL: CPT | Performed by: INTERNAL MEDICINE

## 2021-05-06 PROCEDURE — G0500 MOD SEDAT ENDO SERVICE >5YRS: HCPCS | Performed by: INTERNAL MEDICINE

## 2021-05-06 PROCEDURE — 258N000003 HC RX IP 258 OP 636: Performed by: INTERNAL MEDICINE

## 2021-05-06 RX ORDER — FENTANYL CITRATE 50 UG/ML
INJECTION, SOLUTION INTRAMUSCULAR; INTRAVENOUS PRN
Status: COMPLETED | OUTPATIENT
Start: 2021-05-06 | End: 2021-05-06

## 2021-05-06 RX ORDER — SODIUM CHLORIDE 9 MG/ML
INJECTION, SOLUTION INTRAVENOUS CONTINUOUS PRN
Status: COMPLETED | OUTPATIENT
Start: 2021-05-06 | End: 2021-05-06

## 2021-05-06 RX ADMIN — SODIUM CHLORIDE 500 ML: 0.9 INJECTION, SOLUTION INTRAVENOUS at 07:58

## 2021-05-06 RX ADMIN — FENTANYL CITRATE 50 MCG: 50 INJECTION, SOLUTION INTRAMUSCULAR; INTRAVENOUS at 07:57

## 2021-05-06 RX ADMIN — MIDAZOLAM 2 MG: 1 INJECTION INTRAMUSCULAR; INTRAVENOUS at 07:58

## 2021-05-10 LAB — COPATH REPORT: NORMAL

## 2021-05-12 ENCOUNTER — ALLIED HEALTH/NURSE VISIT (OUTPATIENT)
Dept: UROLOGY | Facility: CLINIC | Age: 76
End: 2021-05-12
Payer: MEDICARE

## 2021-05-12 ENCOUNTER — OFFICE VISIT (OUTPATIENT)
Dept: UROLOGY | Facility: CLINIC | Age: 76
End: 2021-05-12
Payer: MEDICARE

## 2021-05-12 VITALS
WEIGHT: 227 LBS | SYSTOLIC BLOOD PRESSURE: 128 MMHG | OXYGEN SATURATION: 95 % | BODY MASS INDEX: 33.62 KG/M2 | HEART RATE: 88 BPM | HEIGHT: 69 IN | DIASTOLIC BLOOD PRESSURE: 64 MMHG

## 2021-05-12 DIAGNOSIS — R97.20 ELEVATED PROSTATE SPECIFIC ANTIGEN (PSA): Primary | ICD-10-CM

## 2021-05-12 PROCEDURE — 88305 TISSUE EXAM BY PATHOLOGIST: CPT | Performed by: PATHOLOGY

## 2021-05-12 PROCEDURE — 76872 US TRANSRECTAL: CPT | Performed by: UROLOGY

## 2021-05-12 PROCEDURE — 96372 THER/PROPH/DIAG INJ SC/IM: CPT | Mod: 59 | Performed by: UROLOGY

## 2021-05-12 PROCEDURE — 88342 IMHCHEM/IMCYTCHM 1ST ANTB: CPT | Performed by: PATHOLOGY

## 2021-05-12 PROCEDURE — 55700 PR BIOPSY OF PROSTATE,NEEDLE/PUNCH: CPT | Performed by: UROLOGY

## 2021-05-12 RX ORDER — LIDOCAINE HYDROCHLORIDE 10 MG/ML
20 INJECTION, SOLUTION EPIDURAL; INFILTRATION; INTRACAUDAL; PERINEURAL ONCE
Status: COMPLETED | OUTPATIENT
Start: 2021-05-12 | End: 2021-05-12

## 2021-05-12 RX ORDER — GENTAMICIN 40 MG/ML
80 INJECTION, SOLUTION INTRAMUSCULAR; INTRAVENOUS ONCE
Status: COMPLETED | OUTPATIENT
Start: 2021-05-12 | End: 2021-05-12

## 2021-05-12 RX ADMIN — LIDOCAINE HYDROCHLORIDE 20 ML: 10 INJECTION, SOLUTION EPIDURAL; INFILTRATION; INTRACAUDAL; PERINEURAL at 14:45

## 2021-05-12 RX ADMIN — GENTAMICIN 80 MG: 40 INJECTION, SOLUTION INTRAMUSCULAR; INTRAVENOUS at 13:31

## 2021-05-12 ASSESSMENT — PAIN SCALES - GENERAL: PAINLEVEL: NO PAIN (0)

## 2021-05-12 ASSESSMENT — MIFFLIN-ST. JEOR: SCORE: 1750.05

## 2021-05-12 NOTE — NURSING NOTE
Chief Complaint   Patient presents with     Elevated PSA     Patient is here for Transrectal Ultrasound/ MRI guided Prostate Biopsies      Consent read and signed: Yes     Allergies   Allergen Reactions     Morphine Sulfate Nausea and Vomiting and Cramps     Pre-operative antibiotics taken: Yes  Aspirin or other blood thinning medications not taken in 7-10 days:  Yes  Time of Fleet's enema: 12:05pm    Julissa Craig LPN

## 2021-05-12 NOTE — PATIENT INSTRUCTIONS
[facility]  Transrectal Ultrasound  Post Operative Information    The physician who performed your Transrectal Ultrasound is Dr. Billy (telephone number [phone]).  Please contact this doctor if you have any problems or questions.  If unable to reach your doctor, please return to the Emergency Department.      Take one antibiotic the evening of the procedure and then as directed on your prescription.    Drink at least 6-8 glasses of fluids for the first 48 hours.    Avoid heavy lifting and strenuous activity for 48 hours.    Avoid sexual intercourse for the first 24 hours.    No aspirin or ibuprofen products (Motrin, Advil, Nuprin, ect.) for one week.  You may take acetaminophen (Tylenol) for pain.    You may notice a small amount of blood on the tissue after a bowel movement.    You may pass blood with clots in your urine following the procedure.  The amount will decrease with time but may be visible for up to two weeks.     You make have blood in your semen for 4 weeks after the procedure.    You may experience mild perineal (groin area) discomfort after the procedure.    Please call you doctor if you have any of the follow symptoms:  Fever  Increase in the amount of blood passed  Severe discomfort or pain

## 2021-05-12 NOTE — PROGRESS NOTES
The following medication was given:     MEDICATION: Gentamicin   ROUTE: IM  SITE: LUQ - Gluteus  DOSE: 80mg/2ml   LOT #: [id number]  : [employer]  EXPIRATION DATE: 11/21  NDC#: 18601-415-51  Was there drug waste? No  Multi-dose vial: Yes    Julissa Craig LPN  May 12, 2021

## 2021-05-12 NOTE — NURSING NOTE
"Chief Complaint   Patient presents with     Elevated PSA     Patient is here for Transrectal Ultrasound/ MRI guided Prostate Biopsies        Blood pressure 132/64, pulse 64, height 1.753 m (5' 9\"), weight 103 kg (227 lb). Body mass index is 33.52 kg/m .    Patient Active Problem List   Diagnosis     Solitary kidney, acquired     Benign essential hypertension     Hiatal hernia     Benign hypertension     GERD (gastroesophageal reflux disease)     CKD (chronic kidney disease)     S/P colon resection     S/p nephrectomy     Tubular adenoma     Iron deficiency     Stricture esophagus     Chacon's esophagus without dysplasia     Cardiac pacemaker     Hyperlipidemia, unspecified hyperlipidemia type     Elevated prostate specific antigen (PSA)     History of cardiomyopathy     Other cardiomyopathies (H)     NSVT (nonsustained ventricular tachycardia) (H)     Paroxysmal atrial fibrillation (H)     Type 2 diabetes mellitus without complication, without long-term current use of insulin (H)       No Known Allergies    Current Outpatient Medications   Medication Sig Dispense Refill     atorvastatin (LIPITOR) 10 MG tablet Take 1 tablet (10 mg) by mouth daily for cholestrol 90 tablet 3     blood glucose (NO BRAND SPECIFIED) lancets standard Use to test blood sugar 1 times daily or as directed. 100 each 1     blood glucose (NO BRAND SPECIFIED) test strip Use to test blood sugar 1 times daily 100 strip 1     blood glucose monitoring (NO BRAND SPECIFIED) meter device kit Use to test blood sugar as directed. 1 kit 0     ciprofloxacin (CIPRO) 500 MG tablet Take 1 tablet (500 mg) by mouth 2 times daily Start taking the day before your prostate biopsy. 6 tablet 0     Ferrous Gluconate (IRON) 240 (27 FE) MG TABS Take 1 tablet by mouth daily       lisinopril (ZESTRIL) 20 MG tablet Take 0.5 tablets (10 mg) by mouth daily for Blood Pressure 45 tablet 3     metFORMIN (GLUCOPHAGE-XR) 500 MG 24 hr tablet Take 1 tablet (500 mg) by mouth daily " (with dinner) for diabetes 90 tablet 1     metoprolol succinate ER (TOPROL-XL) 50 MG 24 hr tablet Take 1 tablet (50 mg) by mouth daily for heart and Blood Pressure 90 tablet 3     pantoprazole (PROTONIX) 40 MG EC tablet Take 1 tablet (40 mg) by mouth daily for acid reflux 90 tablet 3     sildenafil (REVATIO) 20 MG tablet Take 1-2 tablet (20 mg) by mouth half to one hour before sexual intercourse .  Never use with nitroglycerin, terazosin or doxazosin. 30 tablet 5     tamsulosin (FLOMAX) 0.4 MG capsule Take 1 capsule (0.4 mg) by mouth daily 90 capsule 3       Social History     Tobacco Use     Smoking status: Former Smoker     Years: 7.00     Types: Pipe     Quit date:      Years since quittin.3     Smokeless tobacco: Never Used     Tobacco comment: light smoker for few years only   Substance Use Topics     Alcohol use: Not Currently     Alcohol/week: 0.0 standard drinks     Comment: 3-4 times a week     Drug use: No         Procedure was explained to patient prior to performing said procedure. The patient signed the consent form and all questions were answered prior to the procedure. Any pre-procedural antibiotics were given according to the performing physicians recommendation. Patient. information was confirmed on samples and samples were sent for analysis. [facility] reviewed information on labels sent with patient and confirmed the accuracy of all the labels.    Consent read and signed: Yes   No Known Allergies  Performing Physician: Dr. Billy  Antibiotic taken? yes  Aspirin or other blood thinning medications discontinued 7-10 days: yes  Time of Fleet's enema: yes  Patient given Gentamicin intramuscular injection 30 minutes prior to procedure Yes given by Julissa Craig    12 samples were taken from the right and left, medial and lateral base, mid, and apex of the prostate respectively.Yes additional samples were taken. Vitals were repeated prior to patient leaving and instructions for postTransrectal  Ultrasound/ MRI guided Prostate Biopsies  care were explained to the patient.     The following medication was given:     MEDICATION:  Lidocaine 1% Soln  ROUTE: RECTAL  SITE: PROSTATE  DOSE: 15ML  LOT #: [id number]  : Hospira  EXPIRATION DATE: 01/01/2022  NDC#: 0078-6926-33   Was there drug waste? Yes  Amount of drug waste (mL): 5ML.  Reason for waste:  Single use vial  Multi-dose vial: THOMAS Pedroza  5/12/2021  2:12 PM

## 2021-05-12 NOTE — LETTER
5/12/2021       RE: Estrada Oseguera  [address]  [address]  [place] [address]     Dear Colleague,    Thank you for referring your patient, Estrada Oseguera, to the [facility] [place] at [facility]. Please see a copy of my visit note below.    PHYSICIANS NOTE: TRANSRECTAL ULTRASOUND AND BIOPSY PROCEDURE: 5/12/2021   FABRICIO     Sign In   History and Physical Exam reviewed and is unchanged.     Primary Diagnosis: Elevated psa (primary encounter diagnosis)   Prostate cancer     Informed Consent Discussed: Yes. Risks, benefits, alternatives and personnel discussed with patient who consents to proceed.     Sign in Communication: Completed   Time Out: Team Confirms the Correct Patient,   Correct Procedure; Transrectal Ultrasound and Biopsy, Correct Site and Site Marking (not applicable), Correct Position.   Affirmation of Time Out: YES   Sign Out: Sign Out Discussion: Completed   Patient history and ROS confirmed unchanged from last office visit.   Family history for prostate cancer is: unknown   Audible Time Out: Yes     TRUS PROCEDURE WITH BIOPSY     The patient was placed in the lateral decubitus position.     Digital rectal exam was normal.     The ultrasound probe was placed into the rectum and the prostate visualized.   Approximately five cc plain lidocaine was injected bilaterally into the perioprostatic nerves.     The prostate was visualized in both planes and no hypoechoic lesion identified.     The total prostate volume was 77.8 gm     The patient underwent biopsy removing 15 total cores. 3 From the target biopsy and 12 standard cores.    PSA   Date Value Ref Range Status   03/18/2021 5.38 (H) 0 - 4 ug/L Final     Comment:     Assay Method:  Chemiluminescence using Siemens Vista analyzer     MRI PROSTATE:  FINDINGS:  Size: 6.1 x 4.8 x 4.7 cm; 72 grams  Hemorrhage: Absent   Peripheral zone: Heterogeneous on T2-weighted images.  Suspicious  lesions as detailed below.  Transition zone: Enlarged with BPH changes. Transition zone nodules  which are circumscribed or mostly encapsulated without diffusion  restriction.  PI-RADS 2.  No highly suspicious nodules.     Lesion(s) in rank order of severity (highest score- to lowest score,  then by size)      Lesion 1:  Location: Left apex peripheral zone at the 5 o'clock position relative  to the urethra. Series 4 image 36.   Additional prostate regions involved: None   Size: 6  mm  T2 description: Rounded moderate hypointensity  T2 numerical assessment: 3  DWI description: Focal hypointensity on ADC and hyperintensity on DWI  DWI numerical assessment: 3  DCE assessment: Positive    Prostate margin: Capsular abutment<6 mm with smooth contour   Lesion overall PI-RADS category: 4        Neurovascular bundles: No neurovascular bundle involvement by  malignancy.    Seminal vesicles: No seminal vesicle involvement by malignancy.   Lymph nodes: No lymph node involvement   Bones: No suspicious lesions   Other pelvic organs: Prominent fat in bilateral inguinal canal, left  more than right. Circumferential apparent urinary bladder wall  thickening with trabeculation/sacculations, likely representing post  chronic urinary outflow obstructive change.                                                                         IMPRESSION:     1. Based on the most suspicious abnormality, this exam is  characterized as PIRADS 4 - Clinically significant cancer is likely to  be present.  The most suspicious abnormality is located at the left  apex peripheral zone, 5:00 position and there is minimal capsular  abutment with no convincing evidence of extraprostatic extension.  2. No suspicious adenopathy or evidence of pelvic metastases.     ----------     Complications: None     Follow-up: We will CONTACT the patient.    Jono Billy MD

## 2021-05-12 NOTE — PROGRESS NOTES
PHYSICIANS NOTE: TRANSRECTAL ULTRASOUND AND BIOPSY PROCEDURE: 5/12/2021   FABRICIO     Sign In   History and Physical Exam reviewed and is unchanged.     Primary Diagnosis: Elevated psa (primary encounter diagnosis)   Prostate cancer     Informed Consent Discussed: Yes. Risks, benefits, alternatives and personnel discussed with patient who consents to proceed.     Sign in Communication: Completed   Time Out: Team Confirms the Correct Patient,   Correct Procedure; Transrectal Ultrasound and Biopsy, Correct Site and Site Marking (not applicable), Correct Position.   Affirmation of Time Out: YES   Sign Out: Sign Out Discussion: Completed   Patient history and ROS confirmed unchanged from last office visit.   Family history for prostate cancer is: unknown   Audible Time Out: Yes     TRUS PROCEDURE WITH BIOPSY     The patient was placed in the lateral decubitus position.     Digital rectal exam was normal.     The ultrasound probe was placed into the rectum and the prostate visualized.   Approximately five cc plain lidocaine was injected bilaterally into the perioprostatic nerves.     The prostate was visualized in both planes and no hypoechoic lesion identified.     The total prostate volume was 77.8 gm     The patient underwent biopsy removing 15 total cores. 3 From the target biopsy and 12 standard cores.    PSA   Date Value Ref Range Status   03/18/2021 5.38 (H) 0 - 4 ug/L Final     Comment:     Assay Method:  Chemiluminescence using Siemens Vista analyzer     MRI PROSTATE:  FINDINGS:  Size: 6.1 x 4.8 x 4.7 cm; 72 grams  Hemorrhage: Absent   Peripheral zone: Heterogeneous on T2-weighted images. Suspicious  lesions as detailed below.  Transition zone: Enlarged with BPH changes. Transition zone nodules  which are circumscribed or mostly encapsulated without diffusion  restriction.  PI-RADS 2.  No highly suspicious nodules.     Lesion(s) in rank order of severity (highest score- to lowest score,  then by size)       Lesion 1:  Location: Left apex peripheral zone at the 5 o'clock position relative  to the urethra. Series 4 image 36.   Additional prostate regions involved: None   Size: 6  mm  T2 description: Rounded moderate hypointensity  T2 numerical assessment: 3  DWI description: Focal hypointensity on ADC and hyperintensity on DWI  DWI numerical assessment: 3  DCE assessment: Positive    Prostate margin: Capsular abutment<6 mm with smooth contour   Lesion overall PI-RADS category: 4        Neurovascular bundles: No neurovascular bundle involvement by  malignancy.    Seminal vesicles: No seminal vesicle involvement by malignancy.   Lymph nodes: No lymph node involvement   Bones: No suspicious lesions   Other pelvic organs: Prominent fat in bilateral inguinal canal, left  more than right. Circumferential apparent urinary bladder wall  thickening with trabeculation/sacculations, likely representing post  chronic urinary outflow obstructive change.                                                                         IMPRESSION:     1. Based on the most suspicious abnormality, this exam is  characterized as PIRADS 4 - Clinically significant cancer is likely to  be present.  The most suspicious abnormality is located at the left  apex peripheral zone, 5:00 position and there is minimal capsular  abutment with no convincing evidence of extraprostatic extension.  2. No suspicious adenopathy or evidence of pelvic metastases.     ----------     Complications: None     Follow-up: We will CONTACT the patient.    Jono Billy MD

## 2021-05-17 LAB — COPATH REPORT: NORMAL

## 2021-05-19 ENCOUNTER — VIRTUAL VISIT (OUTPATIENT)
Dept: UROLOGY | Facility: CLINIC | Age: 76
End: 2021-05-19
Payer: MEDICARE

## 2021-05-19 VITALS — WEIGHT: 227 LBS | HEIGHT: 70 IN | BODY MASS INDEX: 32.5 KG/M2

## 2021-05-19 DIAGNOSIS — R39.9 LOWER URINARY TRACT SYMPTOMS (LUTS): ICD-10-CM

## 2021-05-19 DIAGNOSIS — C61 PROSTATE CANCER (H): Primary | ICD-10-CM

## 2021-05-19 PROCEDURE — 99213 OFFICE O/P EST LOW 20 MIN: CPT | Mod: 95 | Performed by: UROLOGY

## 2021-05-19 ASSESSMENT — MIFFLIN-ST. JEOR: SCORE: 1765.92

## 2021-05-19 ASSESSMENT — PAIN SCALES - GENERAL: PAINLEVEL: NO PAIN (0)

## 2021-05-19 NOTE — LETTER
"5/19/2021       RE: Estrada Oseguera  [address]  [address]  [place] [place] [place] [address]     Dear Colleague,    Thank you for referring your patient, Estrada Oseguera, to the [facility] [place] at [facility]. Please see a copy of my visit note below.    FABRICIO  CHIEF COMPLAINT   It was my pleasure to see Estrada Oseguera who is a 76 year old male for follow-up of Elevated PSA.      HPI   Estrada Oseguera is a very pleasant 76 year old male     Initially seen 4/7/21:  \"Estrada Oseguera is a 76 year old male who is being seen for evaluation of Elevated PSA      Has been having significant LUTS worsening over the past 6-12 months  Slow and weak stream  Nocturia x3/night  Having daytime frequency   Having some urgency   He is having to do some abdominal straining  No prior acute urinary retention, UTI, or gross hematuria      Also with some Elevated PSA      Father had low grade prostate cancer found at time of death at age 87 from a stroke\"    4/21/21:  Started on tamsulosin 0.4mg (Flomax) at last visit  Follow up to discuss MRI  Notes a slight change with tamsulosin 0.4mg (Flomax)     TODAY 5/19/21:  Follow-up today to discuss his biopsy results  He did well after the biopsy with no issues  His wife joins him for this call    PHYSICAL EXAM  Patient is a 76 year old  male   Vitals: Height 1.778 m (5' 10\"), weight 103 kg (227 lb).  Body mass index is 32.57 kg/m .  General Appearance Adult:   Alert, no acute distress, oriented  HENT: throat/mouth:normal, good dentition  Lungs: no respiratory distress, or pursed lip breathing  Heart: No obvious jugular venous distension present  Abdomen: obesely - distended  Musculoskeltal: extremities normal, no peripheral edema  Skin: no suspicious lesions or rashes  Neuro: Alert, oriented, speech and mentation normal  Psych: affect and mood normal  Gait: Normal     Component PSA   Latest Ref Rng & Units 0 - 4 ug/L "   9/21/2018 5.22 (H)   3/13/2019 5.28 (H)   3/16/2020 4.90 (H)   3/18/2021 5.38 (H)     PATHOLOGY:  MR-FUSION URONAV 5/12/21:  FINAL DIAGNOSIS:   A.  Prostate gland, left lateral base, needle biopsy:   - Negative for malignancy.   - Mild chronic inflammation.     B.  Prostate gland, left lateral mid, needle biopsy:   - Negative for malignancy.   - Glandular atrophy.     C.  Prostate gland, left lateral apex, needle biopsy:   - Negative for malignancy.   - Glandular atrophy.     D.  Prostate gland, left base, needle biopsy:   - Prostatic acinar adenocarcinoma.      - Lesley score  6 (3+3):      - WHO Grade Group - 1      - Proportion of tissue involved by tumor:  Approximately 33%      - Number of tissue cores involved by tumor:  1 (out of 1)      - Angiolymphatic invasion:  Not identified      - Perineural invasion:  Not identified     E.  Prostate gland, left mid, needle biopsy:   - Prostatic acinar adenocarcinoma.      - [place] score  6 (3+3):      - WHO Grade Group - 1      - Proportion of tissue involved by tumor:  Approximately 33%      - Number of tissue cores involved by tumor:  1 (out of 1)      - Angiolymphatic invasion:  Not identified      - Perineural invasion:  Not identified     F.  Prostate gland, left apex, needle biopsy:]   - Negative for malignancy.     G.  Prostate gland, right lateral base, needle biopsy:   - Negative for malignancy.   - Glandular atrophy.     H.  Prostate gland, right lateral mid, needle biopsy:   - A minute focus of atypical small acinar proliferation, suspicious for   malignancy.   - Glandular atrophy.     I.  Prostate gland, right lateral apex, needle biopsy:   - Negative for malignancy.   - Glandular atrophy.     J.  Prostate gland, right base, needle biopsy:   - Negative for malignancy.   - Glandular atrophy.     K.  Prostate gland, right mid, needle biopsy:   - Negative for malignancy.   - Glandular atrophy.     L.  Prostate gland, right apex, needle biopsy:   - Negative  "for malignancy.   - Glandular atrophy.     M.  Prostate gland., right sided lesion, needle biopsies x 3:   - Prostatic acinar adenocarcinoma.      - Lesley score:  6 (3+3)      - WHO Grade Group - 1      - Proportion of tissue involved by tumor:  approximately 40%      - Number of tissue cores involved by tumor: 3 (out of 3)      - Angiolymphatic invasion:  Not identified      - Perineural invasion:  Not identified       IMAGING:  All pertinent imaging reviewed:    All imaging studies reviewed by me.  I personally reviewed these imaging films.  A formal report from radiology will follow.    MRI PROSTATE 4/15/21:  \"FINDINGS:  Size: 6.1 x 4.8 x 4.7 cm; 72 grams  Hemorrhage: Absent   Peripheral zone: Heterogeneous on T2-weighted images. Suspicious  lesions as detailed below.  Transition zone: Enlarged with BPH changes. Transition zone nodules  which are circumscribed or mostly encapsulated without diffusion  restriction.  PI-RADS 2.  No highly suspicious nodules.     Lesion(s) in rank order of severity (highest score- to lowest score,  then by size)      Lesion 1:  Location: Left apex peripheral zone at the 5 o'clock position relative  to the urethra. Series 4 image 36.   Additional prostate regions involved: None   Size: 6  mm  T2 description: Rounded moderate hypointensity  T2 numerical assessment: 3  DWI description: Focal hypointensity on ADC and hyperintensity on DWI  DWI numerical assessment: 3  DCE assessment: Positive    Prostate margin: Capsular abutment<6 mm with smooth contour   Lesion overall PI-RADS category: 4     Neurovascular bundles: No neurovascular bundle involvement by  malignancy.    Seminal vesicles: No seminal vesicle involvement by malignancy.   Lymph nodes: No lymph node involvement   Bones: No suspicious lesions   Other pelvic organs: Prominent fat in bilateral inguinal canal, left  more than right. Circumferential apparent urinary bladder wall  thickening with trabeculation/sacculations, " likely representing post  chronic urinary outflow obstructive change.                                                         IMPRESSION:     1. Based on the most suspicious abnormality, this exam is  characterized as PIRADS 4 - Clinically significant cancer is likely to  be present.  The most suspicious abnormality is located at the left  apex peripheral zone, 5:00 position and there is minimal capsular  abutment with no convincing evidence of extraprostatic extension.  2. No suspicious adenopathy or evidence of pelvic metastases.    ASSESSMENT and PLAN  76-year-old man with elevated PSA and LUTS, now with an MRI showing a PI-RADS 4 lesion and a prostate volume of 72 g.  Status post an MR fusion biopsy with Lesley 3+3 = 6 prostate cancer in 3/3 of the target lesions as well as 2/12 of the template biopsy    DISCUSSION PROSTATE CANCER     The natural history of this disease was explained to the patient at length and the treatment options discussed including radical prostatectomy by open or robotic-assisted laparoscopic approach, external-beam radiotherapy, brachytherapy, active surveillance and watchful waiting, including the probability of success and complications associated with these approaches.    With respect to watchful waiting, we discussed the difficulties of estimating the extent of disease preoperatively, the risk of cancer progression, and risk that salvage might not be possible with progression. However, the favorable 10-year outcomes of active surveillance in appropriately selected patients was conveyed.  We discussed that active surveillance has become the treatment of choice for men with low volume and low grade, Lesley 3+3 = 6, prostate cancer.  We discussed that he is an ideal candidate for active surveillance.  We discussed that this entails close monitoring of his PSA and possible future repeat MRI and biopsy.    We discussed that other treatment options would include surgery or radiation,  however given his low volume of low-grade prostate cancer I would not recommend either modality at this time.    Plan:  -Follow-up in 4 months with a PSA prior.  This could be a virtual visit      LUTS  -He notes some improvement with voiding following initiation of tamsulosin 0.4 mg daily  -We discussed that his MRI did demonstrate prostatomegaly with a volume of 72 ml  -We will continue to monitor symptoms going forward    Chart documentation with Dragon Voice recognition Software. Although reviewed after completion, some words and grammatical errors may remain.     Time spent: 15 minutes spent on the date of the encounter doing chart review, history and exam, documentation and further activities as noted above.    Jono Billy MD   Urology  [facility]  [facility] Phone: [phone]  [facility] Phone: [phone]      Estrada is a 76 year old who is being evaluated via a billable video visit.      How would you like to obtain your AVS? MyChart  If the video visit is dropped, the invitation should be resent by: Text to cell phone: [phone]  Will anyone else be joining your video visit? No        Video-Visit Details    Type of service:  Video Visit    Video Start Time: 4:42 PM    Video End Time:4:51 PM    Originating Location (pt. Location): Home    Distant Location (provider location):  [facility] [place]     Platform used for Video Visit: Andi

## 2021-05-19 NOTE — PROGRESS NOTES
"[facility]  CHIEF COMPLAINT   It was my pleasure to see Estrada Oseguera who is a 76 year old male for follow-up of Elevated PSA.      HPI   Estrada Oseguera is a very pleasant 76 year old male     Initially seen 4/7/21:  \"Estrada Oseguera is a 76 year old male who is being seen for evaluation of Elevated PSA      Has been having significant LUTS worsening over the past 6-12 months  Slow and weak stream  Nocturia x3/night  Having daytime frequency   Having some urgency   He is having to do some abdominal straining  No prior acute urinary retention, UTI, or gross hematuria      Also with some Elevated PSA      Father had low grade prostate cancer found at time of death at age 87 from a stroke\"    4/21/21:  Started on tamsulosin 0.4mg (Flomax) at last visit  Follow up to discuss MRI  Notes a slight change with tamsulosin 0.4mg (Flomax)     TODAY 5/19/21:  Follow-up today to discuss his biopsy results  He did well after the biopsy with no issues  His wife joins him for this call    PHYSICAL EXAM  Patient is a 76 year old  male   Vitals: Height 1.778 m (5' 10\"), weight 103 kg (227 lb).  Body mass index is 32.57 kg/m .  General Appearance Adult:   Alert, no acute distress, oriented  HENT: throat/mouth:normal, good dentition  Lungs: no respiratory distress, or pursed lip breathing  Heart: No obvious jugular venous distension present  Abdomen: obesely - distended  Musculoskeltal: extremities normal, no peripheral edema  Skin: no suspicious lesions or rashes  Neuro: Alert, oriented, speech and mentation normal  Psych: affect and mood normal  Gait: Normal     Component PSA   Latest Ref Rng & Units 0 - 4 ug/L   9/21/2018 5.22 (H)   3/13/2019 5.28 (H)   3/16/2020 4.90 (H)   3/18/2021 5.38 (H)     PATHOLOGY:  MR-FUSION URONAV 5/12/21:  FINAL DIAGNOSIS:   A.  Prostate gland, left lateral base, needle biopsy:   - Negative for malignancy.   - Mild chronic inflammation.     B.  Prostate gland, left lateral mid, needle biopsy: "   - Negative for malignancy.   - Glandular atrophy.     C.  Prostate gland, left lateral apex, needle biopsy:   - Negative for malignancy.   - Glandular atrophy.     D.  Prostate gland, left base, needle biopsy:   - Prostatic acinar adenocarcinoma.      - Lesley score  6 (3+3):      - WHO Grade Group - 1      - Proportion of tissue involved by tumor:  Approximately 33%      - Number of tissue cores involved by tumor:  1 (out of 1)      - Angiolymphatic invasion:  Not identified      - Perineural invasion:  Not identified     E.  Prostate gland, left mid, needle biopsy:   - Prostatic acinar adenocarcinoma.      - Lesley score  6 (3+3):      - WHO Grade Group - 1      - Proportion of tissue involved by tumor:  Approximately 33%      - Number of tissue cores involved by tumor:  1 (out of 1)      - Angiolymphatic invasion:  Not identified      - Perineural invasion:  Not identified     F.  Prostate gland, left apex, needle biopsy:]   - Negative for malignancy.     G.  Prostate gland, right lateral base, needle biopsy:   - Negative for malignancy.   - Glandular atrophy.     H.  Prostate gland, right lateral mid, needle biopsy:   - A minute focus of atypical small acinar proliferation, suspicious for   malignancy.   - Glandular atrophy.     I.  Prostate gland, right lateral apex, needle biopsy:   - Negative for malignancy.   - Glandular atrophy.     J.  Prostate gland, right base, needle biopsy:   - Negative for malignancy.   - Glandular atrophy.     K.  Prostate gland, right mid, needle biopsy:   - Negative for malignancy.   - Glandular atrophy.     L.  Prostate gland, right apex, needle biopsy:   - Negative for malignancy.   - Glandular atrophy.     M.  Prostate gland., right sided lesion, needle biopsies x 3:   - Prostatic acinar adenocarcinoma.      - Linwood score:  6 (3+3)      - WHO Grade Group - 1      - Proportion of tissue involved by tumor:  approximately 40%      - Number of tissue cores involved by tumor: 3  "(out of 3)      - Angiolymphatic invasion:  Not identified      - Perineural invasion:  Not identified       IMAGING:  All pertinent imaging reviewed:    All imaging studies reviewed by me.  I personally reviewed these imaging films.  A formal report from radiology will follow.    MRI PROSTATE 4/15/21:  \"FINDINGS:  Size: 6.1 x 4.8 x 4.7 cm; 72 grams  Hemorrhage: Absent   Peripheral zone: Heterogeneous on T2-weighted images. Suspicious  lesions as detailed below.  Transition zone: Enlarged with BPH changes. Transition zone nodules  which are circumscribed or mostly encapsulated without diffusion  restriction.  PI-RADS 2.  No highly suspicious nodules.     Lesion(s) in rank order of severity (highest score- to lowest score,  then by size)      Lesion 1:  Location: Left apex peripheral zone at the 5 o'clock position relative  to the urethra. Series 4 image 36.   Additional prostate regions involved: None   Size: 6  mm  T2 description: Rounded moderate hypointensity  T2 numerical assessment: 3  DWI description: Focal hypointensity on ADC and hyperintensity on DWI  DWI numerical assessment: 3  DCE assessment: Positive    Prostate margin: Capsular abutment<6 mm with smooth contour   Lesion overall PI-RADS category: 4     Neurovascular bundles: No neurovascular bundle involvement by  malignancy.    Seminal vesicles: No seminal vesicle involvement by malignancy.   Lymph nodes: No lymph node involvement   Bones: No suspicious lesions   Other pelvic organs: Prominent fat in bilateral inguinal canal, left  more than right. Circumferential apparent urinary bladder wall  thickening with trabeculation/sacculations, likely representing post  chronic urinary outflow obstructive change.                                                         IMPRESSION:     1. Based on the most suspicious abnormality, this exam is  characterized as PIRADS 4 - Clinically significant cancer is likely to  be present.  The most suspicious abnormality is " located at the left  apex peripheral zone, 5:00 position and there is minimal capsular  abutment with no convincing evidence of extraprostatic extension.  2. No suspicious adenopathy or evidence of pelvic metastases.    ASSESSMENT and PLAN  76-year-old man with elevated PSA and LUTS, now with an MRI showing a PI-RADS 4 lesion and a prostate volume of 72 g.  Status post an MR fusion biopsy with Lesley 3+3 = 6 prostate cancer in 3/3 of the target lesions as well as 2/12 of the template biopsy    DISCUSSION PROSTATE CANCER     The natural history of this disease was explained to the patient at length and the treatment options discussed including radical prostatectomy by open or robotic-assisted laparoscopic approach, external-beam radiotherapy, brachytherapy, active surveillance and watchful waiting, including the probability of success and complications associated with these approaches.    With respect to watchful waiting, we discussed the difficulties of estimating the extent of disease preoperatively, the risk of cancer progression, and risk that salvage might not be possible with progression. However, the favorable 10-year outcomes of active surveillance in appropriately selected patients was conveyed.  We discussed that active surveillance has become the treatment of choice for men with low volume and low grade, Lesley 3+3 = 6, prostate cancer.  We discussed that he is an ideal candidate for active surveillance.  We discussed that this entails close monitoring of his PSA and possible future repeat MRI and biopsy.    We discussed that other treatment options would include surgery or radiation, however given his low volume of low-grade prostate cancer I would not recommend either modality at this time.    Plan:  -Follow-up in 4 months with a PSA prior.  This could be a virtual visit      LUTS  -He notes some improvement with voiding following initiation of tamsulosin 0.4 mg daily  -We discussed that his MRI did  demonstrate prostatomegaly with a volume of 72 ml  -We will continue to monitor symptoms going forward    Chart documentation with Dragon Voice recognition Software. Although reviewed after completion, some words and grammatical errors may remain.     Time spent: 15 minutes spent on the date of the encounter doing chart review, history and exam, documentation and further activities as noted above.    Jono Billy MD   Urology  [facility]  [facility] Phone: [phone]  [facility] Phone: [phone]      Estrada is a 76 year old who is being evaluated via a billable video visit.      How would you like to obtain your AVS? MyChart  If the video visit is dropped, the invitation should be resent by: Text to cell phone: [phone]  Will anyone else be joining your video visit? No        Video-Visit Details    Type of service:  Video Visit    Video Start Time: 4:42 PM    Video End Time:4:51 PM    Originating Location (pt. Location): Home    Distant Location (provider location):  [facility]     Platform used for Video Visit: GemaRec[employer]Match

## 2021-05-27 ENCOUNTER — VIRTUAL VISIT (OUTPATIENT)
Dept: EDUCATION SERVICES | Facility: CLINIC | Age: 76
End: 2021-05-27
Payer: MEDICARE

## 2021-05-27 DIAGNOSIS — E11.9 TYPE 2 DIABETES MELLITUS WITHOUT COMPLICATION, WITHOUT LONG-TERM CURRENT USE OF INSULIN (H): Primary | ICD-10-CM

## 2021-05-27 PROCEDURE — 98968 PH1 ASSMT&MGMT NQHP 21-30: CPT | Mod: 95

## 2021-05-27 NOTE — PROGRESS NOTES
Diabetes Follow-up    Subjective/Objective:  Type of service:  Video Visit  Originating Location (pt. Location): Home  Distant Location (provider location): Home  Mode of Communication:  Video Conference via Kili  Video Start Time: 8:32  Video End Time (time video stopped):8:57  How would patient like to obtain AVS? MyChart / reviewed verbally       Diabetes is being managed with   Lifestyle (diet/activity), Diabetes Medications   Diabetes Medication(s)     Biguanides       metFORMIN (GLUCOPHAGE-XR) 500 MG 24 hr tablet    Take 1 tablet (500 mg) by mouth daily (with dinner) for diabetes          BG/Food Log:   Haven't been checking blood sugars - did not  the meter  Discused benefits of checking at 1 x weekly or even 1 time monthly   Recommend A1c every 3 months if not checking   Ordered new a1C for 6/18 or after     Activity:   Got exercise going the last 10 days, now in pool.  Walking 2 miles per day now.      Assessment/Plan/Response:    AADE Self-Care Behaviors:  Diabetes Pathophysiology  Healthy Eating: consistency in amount, composition, and timing of food intake and portion control.  Is not counting specific grams, but is carbohydrate aware and watching portions.  Trying new recipes that are higher vegetable and more carbohydrate controled.   Being Active: relationship to blood glucose and describe appropriate activity program  Monitoring: purpose  Problem Solving: safe travel, when to call health care provider and sick day arrangements  Reducing Risks: major complications of diabetes, prevention, early diagnostic measures and treatment of complications, appropriate dental care, annual eye exam, A1C - goals, relating to blood glucose levels, how often to check, lipids levels and goals and blood pressure and goals  Healthy Coping: benefits of making appropriate lifestyle changes and utilize support systems      Follow up with any questions via ProtAbhart   Future A1c ordered     Marcella Florian  RD, KARO, CDE  Diabetes Education  Time spent: 25 minutes

## 2021-06-26 DIAGNOSIS — E11.9 TYPE 2 DIABETES MELLITUS WITHOUT COMPLICATION, WITHOUT LONG-TERM CURRENT USE OF INSULIN (H): ICD-10-CM

## 2021-06-26 LAB — HBA1C MFR BLD: 6.4 % (ref 0–5.6)

## 2021-06-26 PROCEDURE — 83036 HEMOGLOBIN GLYCOSYLATED A1C: CPT | Performed by: INTERNAL MEDICINE

## 2021-06-26 PROCEDURE — 36415 COLL VENOUS BLD VENIPUNCTURE: CPT | Performed by: INTERNAL MEDICINE

## 2021-06-26 NOTE — RESULT ENCOUNTER NOTE
Woody Dorado,    This is to inform you regarding your test result.    HbA1c which is average glucose during last 3 months is 6.4%.  Your diabetes is under control.      Lab Results       Component                Value               Date                       A1C                      6.4                 06/26/2021                 A1C                      6.7                 03/18/2021                 A1C                      6.3                 03/16/2020                 A1C                      5.8                 03/13/2019                 A1C                      6.4                 09/21/2018                  Sincerely,      Dr.Nasima Thom MD,FACP

## 2021-07-19 ENCOUNTER — ANCILLARY PROCEDURE (OUTPATIENT)
Dept: CARDIOLOGY | Facility: CLINIC | Age: 76
End: 2021-07-19
Attending: INTERNAL MEDICINE
Payer: MEDICARE

## 2021-07-19 DIAGNOSIS — I49.5 SSS (SICK SINUS SYNDROME) (H): Primary | ICD-10-CM

## 2021-07-19 DIAGNOSIS — Z95.0 CARDIAC PACEMAKER IN SITU: ICD-10-CM

## 2021-07-19 PROCEDURE — 93280 PM DEVICE PROGR EVAL DUAL: CPT | Performed by: INTERNAL MEDICINE

## 2021-07-25 LAB
MDC_IDC_LEAD_IMPLANT_DT: NORMAL
MDC_IDC_LEAD_IMPLANT_DT: NORMAL
MDC_IDC_LEAD_LOCATION: NORMAL
MDC_IDC_LEAD_LOCATION: NORMAL
MDC_IDC_LEAD_LOCATION_DETAIL_1: NORMAL
MDC_IDC_LEAD_LOCATION_DETAIL_1: NORMAL
MDC_IDC_LEAD_MFG: NORMAL
MDC_IDC_LEAD_MFG: NORMAL
MDC_IDC_LEAD_MODEL: NORMAL
MDC_IDC_LEAD_MODEL: NORMAL
MDC_IDC_LEAD_POLARITY_TYPE: NORMAL
MDC_IDC_LEAD_POLARITY_TYPE: NORMAL
MDC_IDC_LEAD_SERIAL: NORMAL
MDC_IDC_LEAD_SERIAL: NORMAL
MDC_IDC_MSMT_BATTERY_STATUS: NORMAL
MDC_IDC_MSMT_LEADCHNL_RA_IMPEDANCE_VALUE: 760 OHM
MDC_IDC_MSMT_LEADCHNL_RA_PACING_THRESHOLD_AMPLITUDE: 1 V
MDC_IDC_MSMT_LEADCHNL_RA_PACING_THRESHOLD_PULSEWIDTH: 0.4 MS
MDC_IDC_MSMT_LEADCHNL_RA_SENSING_INTR_AMPL: 4 MV
MDC_IDC_MSMT_LEADCHNL_RV_IMPEDANCE_VALUE: 656 OHM
MDC_IDC_MSMT_LEADCHNL_RV_PACING_THRESHOLD_AMPLITUDE: 1.3 V
MDC_IDC_MSMT_LEADCHNL_RV_PACING_THRESHOLD_PULSEWIDTH: 0.4 MS
MDC_IDC_MSMT_LEADCHNL_RV_SENSING_INTR_AMPL: 13.6 MV
MDC_IDC_PG_IMPLANT_DTM: NORMAL
MDC_IDC_PG_MFG: NORMAL
MDC_IDC_PG_MODEL: NORMAL
MDC_IDC_PG_SERIAL: NORMAL
MDC_IDC_PG_TYPE: NORMAL
MDC_IDC_SESS_CLINIC_NAME: NORMAL
MDC_IDC_SESS_DTM: NORMAL
MDC_IDC_SESS_TYPE: NORMAL
MDC_IDC_SET_BRADY_AT_MODE_SWITCH_MODE: NORMAL
MDC_IDC_SET_BRADY_AT_MODE_SWITCH_RATE: 170 {BEATS}/MIN
MDC_IDC_SET_BRADY_HYSTRATE: NORMAL
MDC_IDC_SET_BRADY_LOWRATE: 55 {BEATS}/MIN
MDC_IDC_SET_BRADY_MAX_SENSOR_RATE: 130 {BEATS}/MIN
MDC_IDC_SET_BRADY_MAX_TRACKING_RATE: 130 {BEATS}/MIN
MDC_IDC_SET_BRADY_MODE: NORMAL
MDC_IDC_SET_BRADY_PAV_DELAY_HIGH: 200 MS
MDC_IDC_SET_BRADY_PAV_DELAY_LOW: 300 MS
MDC_IDC_SET_BRADY_SAV_DELAY_HIGH: 200 MS
MDC_IDC_SET_BRADY_SAV_DELAY_LOW: 300 MS
MDC_IDC_SET_LEADCHNL_RA_PACING_AMPLITUDE: 2 V
MDC_IDC_SET_LEADCHNL_RA_PACING_CAPTURE_MODE: NORMAL
MDC_IDC_SET_LEADCHNL_RA_PACING_POLARITY: NORMAL
MDC_IDC_SET_LEADCHNL_RA_PACING_PULSEWIDTH: 0.4 MS
MDC_IDC_SET_LEADCHNL_RA_SENSING_ADAPTATION_MODE: NORMAL
MDC_IDC_SET_LEADCHNL_RA_SENSING_POLARITY: NORMAL
MDC_IDC_SET_LEADCHNL_RA_SENSING_SENSITIVITY: 0.25 MV
MDC_IDC_SET_LEADCHNL_RV_PACING_AMPLITUDE: 1.8 V
MDC_IDC_SET_LEADCHNL_RV_PACING_CAPTURE_MODE: NORMAL
MDC_IDC_SET_LEADCHNL_RV_PACING_POLARITY: NORMAL
MDC_IDC_SET_LEADCHNL_RV_PACING_PULSEWIDTH: 0.4 MS
MDC_IDC_SET_LEADCHNL_RV_SENSING_ADAPTATION_MODE: NORMAL
MDC_IDC_SET_LEADCHNL_RV_SENSING_POLARITY: NORMAL
MDC_IDC_SET_LEADCHNL_RV_SENSING_SENSITIVITY: 1.5 MV
MDC_IDC_SET_ZONE_DETECTION_INTERVAL: 375 MS
MDC_IDC_SET_ZONE_TYPE: NORMAL
MDC_IDC_SET_ZONE_VENDOR_TYPE: NORMAL
MDC_IDC_STAT_EPISODE_RECENT_COUNT: 2
MDC_IDC_STAT_EPISODE_RECENT_COUNT_DTM_END: NORMAL
MDC_IDC_STAT_EPISODE_RECENT_COUNT_DTM_START: NORMAL
MDC_IDC_STAT_EPISODE_TOTAL_COUNT: 25
MDC_IDC_STAT_EPISODE_TOTAL_COUNT_DTM_END: NORMAL
MDC_IDC_STAT_EPISODE_TYPE: NORMAL
MDC_IDC_STAT_EPISODE_TYPE: NORMAL
MDC_IDC_STAT_EPISODE_VENDOR_TYPE: NORMAL
MDC_IDC_STAT_EPISODE_VENDOR_TYPE: NORMAL

## 2021-08-04 ENCOUNTER — OFFICE VISIT (OUTPATIENT)
Dept: CARDIOLOGY | Facility: CLINIC | Age: 76
End: 2021-08-04
Payer: MEDICARE

## 2021-08-04 VITALS
WEIGHT: 225 LBS | DIASTOLIC BLOOD PRESSURE: 72 MMHG | SYSTOLIC BLOOD PRESSURE: 109 MMHG | HEART RATE: 61 BPM | BODY MASS INDEX: 33.33 KG/M2 | HEIGHT: 69 IN

## 2021-08-04 DIAGNOSIS — I48.0 PAROXYSMAL ATRIAL FIBRILLATION (H): ICD-10-CM

## 2021-08-04 DIAGNOSIS — Z86.79 HISTORY OF CARDIOMYOPATHY: ICD-10-CM

## 2021-08-04 DIAGNOSIS — I49.5 SSS (SICK SINUS SYNDROME) (H): Primary | ICD-10-CM

## 2021-08-04 PROCEDURE — 99213 OFFICE O/P EST LOW 20 MIN: CPT | Performed by: NURSE PRACTITIONER

## 2021-08-04 ASSESSMENT — MIFFLIN-ST. JEOR: SCORE: 1741.22

## 2021-08-04 NOTE — PROGRESS NOTES
"HPI:  Mr. Estrada Oseguera is a delightful 76-year-old male with the following medical issues:   1.  Symptomatic sinus node dysfunction.  Implantation of dual-chamber pacemaker in 11/2016.   2.  A 2-hour episode of atrial fibrillation recorded by the pacemaker in 01/2018.  The patient was placed on apixaban.  There has been no subsequent documentation of atrial fibrillation on his device interrogations and Eliquis was stopped last year.  3.  PVCs, occasionally symptomatic.  Nonsustained VT.   4.  History of mild cardiomyopathy, EF of 45%-50%.   5.  Chronic kidney disease, stage III.   6.  Recent diagnosis of prostate cancer. PSA 5.38. Patient has follow-up this fall with repeat imaging and PSA.  7. DM II, last A1c 6.4    At today's visit Estrada is feeling well.  He is here today with his wife.  Despite being diagnosed with \"slow-growing\" prostate cancer, Estrada feels his year has gone well.  He denies chest pain, shortness of breath, lightheadedness, dizziness, or peripheral edema.    7/19/2021 [employer] Accolade (D) Pacemaker Device Check  Patient seen in clinic for device evaluation and iterative programming.   AP: 28    %    : <1 %    Mode: DDD    Underlying Rhythm: SB-57    Heart Rate: Stable with good variability. Heart rates range from  bpm.     Sensing: WNL    Pacing Threshold: Stable    Impedance: Stable    Battery Status: Estimated at 10 years    Device Site: Well healed    Atrial Arrhythmia: None    Ventricular Arrhythmia: none    Setting Change: Adjusted alert for Atrial arrhythmia burden from 12 hours to 0.5 hours, based on patient stopping Eliquis due to low AT/Afib burden per Dr Valle recommendations.     Care Plan: Remote check scheduled for 10/18/21. Scheduled to see Airam Atkinson 8/4/21.    BRAD RN    ECHO 3/2020  The visual ejection fraction is estimated at 50-55%.  Left ventricular systolic function is borderline reduced.  The ascending aorta is Mildly dilated.  The study was " technically difficult.          Plan:   1.  Sick sinus syndrome.  His pacemaker is functioning normally.  Continue routine device checks. Right pectoral pocket well healed.     2.  Paroxysmal atrial fibrillation  As noted above. No recurrent Afib noted on device checks. If pt has a reoccurrence  Then will reinitiate Eliquis.     3. DM II-well controlled    4. Mild CM-EF 50-55%  On bb and ACEI    No medication changes were warranted at today's visit.  The patient will follow up with  in 1 year.  His next remote check is this fall.  Thank you for including us in his care.    Airam Atkinson, NP, APRN CNP      Orders Placed This Encounter   Procedures     Follow-Up with Electrophysiologist       No orders of the defined types were placed in this encounter.      Medications Discontinued During This Encounter   Medication Reason     ciprofloxacin (CIPRO) 500 MG tablet Therapy completed         Encounter Diagnosis   Name Primary?     SSS (sick sinus syndrome) (H) Yes       CURRENT MEDICATIONS:  Current Outpatient Medications   Medication Sig Dispense Refill     atorvastatin (LIPITOR) 10 MG tablet Take 1 tablet (10 mg) by mouth daily for cholestrol 90 tablet 3     lisinopril (ZESTRIL) 20 MG tablet Take 0.5 tablets (10 mg) by mouth daily for Blood Pressure 45 tablet 3     metFORMIN (GLUCOPHAGE-XR) 500 MG 24 hr tablet Take 1 tablet (500 mg) by mouth daily (with dinner) for diabetes 90 tablet 1     metoprolol succinate ER (TOPROL-XL) 50 MG 24 hr tablet Take 1 tablet (50 mg) by mouth daily for heart and Blood Pressure 90 tablet 3     pantoprazole (PROTONIX) 40 MG EC tablet Take 1 tablet (40 mg) by mouth daily for acid reflux 90 tablet 3     sildenafil (REVATIO) 20 MG tablet Take 1-2 tablet (20 mg) by mouth half to one hour before sexual intercourse .  Never use with nitroglycerin, terazosin or doxazosin. 30 tablet 5     tamsulosin (FLOMAX) 0.4 MG capsule Take 1 capsule (0.4 mg) by mouth daily 90 capsule 3     blood  glucose (NO BRAND SPECIFIED) lancets standard Use to test blood sugar 1 times daily or as directed. 100 each 1     blood glucose (NO BRAND SPECIFIED) test strip Use to test blood sugar 1 times daily 100 strip 1     blood glucose monitoring (NO BRAND SPECIFIED) meter device kit Use to test blood sugar as directed. 1 kit 0     Ferrous Gluconate (IRON) 240 (27 FE) MG TABS Take 1 tablet by mouth daily (Patient not taking: Reported on 8/4/2021)         ALLERGIES   No Known Allergies    PAST MEDICAL HISTORY:  Past Medical History:   Diagnosis Date     A-fib (H) 01/31/2018     Cancer (H)     skin     CKD (chronic kidney disease)      GERD (gastroesophageal reflux disease)      Hernia, abdominal      Hiatal hernia      Hypertension      Mumps      Pericarditis      S/P colon resection        PAST SURGICAL HISTORY:  Past Surgical History:   Procedure Laterality Date     CHOLECYSTECTOMY       COLONOSCOPY       COLONOSCOPY N/A 5/6/2021    Procedure: COLONOSCOPY, WITH POLYPECTOMY AND BIOPSY;  Surgeon: Deondre Meek MD;  Location: [facility]     COMBINED COLONOSCOPY, MUCOSAL RESECTION  1993    ?diverticulitis      ESOPHAGOSCOPY, GASTROSCOPY, DUODENOSCOPY (EGD), COMBINED N/A 11/3/2016    Procedure: COMBINED ESOPHAGOSCOPY, GASTROSCOPY, DUODENOSCOPY (EGD), BIOPSY SINGLE OR MULTIPLE;  Surgeon: Deondre Meek MD;  Location: [facility]     ESOPHAGOSCOPY, GASTROSCOPY, DUODENOSCOPY (EGD), DILATATION, COMBINED N/A 12/1/2016    Procedure: COMBINED ESOPHAGOSCOPY, GASTROSCOPY, DUODENOSCOPY (EGD), DILATATION;  Surgeon: Deondre Meek MD;  Location: [facility]     ESOPHAGOSCOPY, GASTROSCOPY, DUODENOSCOPY (EGD), DILATATION, COMBINED N/A 3/2/2017    Procedure: COMBINED ESOPHAGOSCOPY, GASTROSCOPY, DUODENOSCOPY (EGD), DILATATION;  Surgeon: Deondre Meek MD;  Location: [facility]     NEPHRECTOMY       NEPHRECTOMY RT/LT      right     pace maker       VASECTOMY         FAMILY HISTORY:  Family History   Problem Relation Age of Onset      Hyperlipidemia Mother      Cerebrovascular Disease Father      Prostate Cancer Father      Pulmonary Hypertension Brother      Breast Cancer Sister      Parkinsonism Sister      Colon Cancer No family hx of        SOCIAL HISTORY:  Social History     Socioeconomic History     Marital status:      Spouse name: None     Number of children: None     Years of education: None     Highest education level: None   Occupational History     None   Tobacco Use     Smoking status: Former Smoker     Years: 7.00     Types: Pipe     Quit date:      Years since quittin.6     Smokeless tobacco: Never Used     Tobacco comment: light smoker for few years only   Substance and Sexual Activity     Alcohol use: Not Currently     Alcohol/week: 0.0 standard drinks     Comment: 3-4 times a week     Drug use: No     Sexual activity: Not Currently   Other Topics Concern     Parent/sibling w/ CABG, MI or angioplasty before 65F 55M? Not Asked      Service Not Asked     Blood Transfusions Not Asked     Caffeine Concern Not Asked     Occupational Exposure Not Asked     Hobby Hazards Not Asked     Sleep Concern Not Asked     Stress Concern Not Asked     Weight Concern Not Asked     Special Diet No     Back Care Not Asked     Exercise No     Bike Helmet Not Asked     Seat Belt Not Asked     Self-Exams Not Asked   Social History Narrative     None     Social Determinants of Health     Financial Resource Strain:      Difficulty of Paying Living Expenses:    Food Insecurity:      Worried About Running Out of Food in the Last Year:      Ran Out of Food in the Last Year:    Transportation Needs:      Lack of Transportation (Medical):      Lack of Transportation (Non-Medical):    Physical Activity:      Days of Exercise per Week:      Minutes of Exercise per Session:    Stress:      Feeling of Stress :    Social Connections:      Frequency of Communication with Friends and Family:      Frequency of Social Gatherings with Friends  "and Family:      Attends Nondenominational Services:      Active Member of Clubs or Organizations:      Attends Club or Organization Meetings:      Marital Status:    Intimate Partner Violence:      Fear of Current or Ex-Partner:      Emotionally Abused:      Physically Abused:      Sexually Abused:        Review of Systems:  Skin:  Negative       Eyes:  Positive for glasses    ENT:  Negative      Respiratory:  Negative for shortness of breath;dyspnea on exertion;cough;wheezing;sleep apnea;CPAP     Cardiovascular:  Negative for;palpitations;chest pain;edema;syncope or near-syncope;lightheadedness;dizziness;fatigue Positive for;dizziness;lightheadedness    Gastroenterology: Positive for   has had gastric bleeding s/p endoscopy (Dr. Meek)  Genitourinary:  Negative      Musculoskeletal:  Negative      Neurologic:  Positive for numbness or tingling of feet    Psychiatric:  Negative      Heme/Lymph/Imm:  Negative      Endocrine:  Negative        Physical Exam:  Vitals: /72   Pulse 61   Ht 1.753 m (5' 9.02\")   Wt 102.1 kg (225 lb)   BMI 33.21 kg/m      Constitutional:  cooperative, alert and oriented, well developed, well nourished, in no acute distress        Skin:  warm and dry to the touch, no apparent skin lesions or masses noted   pacemaker incision in the right infraclavicular area was well-healed      Head:  normocephalic, no masses or lesions        Eyes:  pupils equal and round;conjunctivae and lids unremarkable        Lymph:      ENT:  no pallor or cyanosis, dentition good        Neck:  JVP normal;no carotid bruit        Respiratory:  normal breath sounds, clear to auscultation, normal A-P diameter, normal symmetry, normal respiratory excursion, no use of accessory muscles         Cardiac: regular rhythm;no murmurs, gallops or rubs detected;normal S1 and S2;no S3 or S4                not assessed this visit                                        GI:  abdomen soft obese      Extremities and Muscular Skeletal: "  no deformities, clubbing, cyanosis, erythema observed;no edema              Neurological:  no gross motor deficits;affect appropriate        Psych:  Alert and Oriented x 3;affect appropriate, oriented to time, person and place        CC  No referring provider defined for this encounter.

## 2021-08-04 NOTE — LETTER
"8/4/2021    Zoe Tomas MD  [address]  [facility] [address]    RE: Estrada Oseguera       Dear Colleague,    I had the pleasure of seeing Estrada Oseguera in the [facility].    HPI:  Mr. Estrada Oseguera is a delightful 76-year-old male with the following medical issues:   1.  Symptomatic sinus node dysfunction.  Implantation of dual-chamber pacemaker in 11/2016.   2.  A 2-hour episode of atrial fibrillation recorded by the pacemaker in 01/2018.  The patient was placed on apixaban.  There has been no subsequent documentation of atrial fibrillation on his device interrogations and Eliquis was stopped last year.  3.  PVCs, occasionally symptomatic.  Nonsustained VT.   4.  History of mild cardiomyopathy, EF of 45%-50%.   5.  Chronic kidney disease, stage III.   6.  Recent diagnosis of prostate cancer. PSA 5.38. Patient has follow-up this fall with repeat imaging and PSA.  7. DM II, last A1c 6.4    At today's visit Estrada is feeling well.  He is here today with his wife.  Despite being diagnosed with \"slow-growing\" prostate cancer, Estrada feels his year has gone well.  He denies chest pain, shortness of breath, lightheadedness, dizziness, or peripheral edema.    7/19/2021 [employer] Accolade (D) Pacemaker Device Check  Patient seen in clinic for device evaluation and iterative programming.   AP: 28    %    : <1 %    Mode: DDD    Underlying Rhythm: SB-57    Heart Rate: Stable with good variability. Heart rates range from  bpm.     Sensing: WNL    Pacing Threshold: Stable    Impedance: Stable    Battery Status: Estimated at 10 years    Device Site: Well healed    Atrial Arrhythmia: None    Ventricular Arrhythmia: none    Setting Change: Adjusted alert for Atrial arrhythmia burden from 12 hours to 0.5 hours, based on patient stopping Eliquis due to low AT/Afib burden per Dr Valle recommendations.     Care " Plan: Remote check scheduled for 10/18/21. Scheduled to see Airam Atkinson 8/4/21.    KF RN    ECHO 3/2020  The visual ejection fraction is estimated at 50-55%.  Left ventricular systolic function is borderline reduced.  The ascending aorta is Mildly dilated.  The study was technically difficult.          Plan:   1.  Sick sinus syndrome.  His pacemaker is functioning normally.  Continue routine device checks. Right pectoral pocket well healed.     2.  Paroxysmal atrial fibrillation  As noted above. No recurrent Afib noted on device checks. If pt has a reoccurrence  Then will reinitiate Eliquis.     3. DM II-well controlled    4. Mild CM-EF 50-55%  On bb and ACEI    No medication changes were warranted at today's visit.  The patient will follow up with  in 1 year.  His next remote check is this fall.  Thank you for including us in his care.    Airam Atkinson, NP, APRN CNP      Orders Placed This Encounter   Procedures     Follow-Up with Electrophysiologist       No orders of the defined types were placed in this encounter.      Medications Discontinued During This Encounter   Medication Reason     ciprofloxacin (CIPRO) 500 MG tablet Therapy completed         Encounter Diagnosis   Name Primary?     SSS (sick sinus syndrome) (H) Yes       CURRENT MEDICATIONS:  Current Outpatient Medications   Medication Sig Dispense Refill     atorvastatin (LIPITOR) 10 MG tablet Take 1 tablet (10 mg) by mouth daily for cholestrol 90 tablet 3     lisinopril (ZESTRIL) 20 MG tablet Take 0.5 tablets (10 mg) by mouth daily for Blood Pressure 45 tablet 3     metFORMIN (GLUCOPHAGE-XR) 500 MG 24 hr tablet Take 1 tablet (500 mg) by mouth daily (with dinner) for diabetes 90 tablet 1     metoprolol succinate ER (TOPROL-XL) 50 MG 24 hr tablet Take 1 tablet (50 mg) by mouth daily for heart and Blood Pressure 90 tablet 3     pantoprazole (PROTONIX) 40 MG EC tablet Take 1 tablet (40 mg) by mouth daily for acid reflux 90 tablet 3     sildenafil  (REVATIO) 20 MG tablet Take 1-2 tablet (20 mg) by mouth half to one hour before sexual intercourse .  Never use with nitroglycerin, terazosin or doxazosin. 30 tablet 5     tamsulosin (FLOMAX) 0.4 MG capsule Take 1 capsule (0.4 mg) by mouth daily 90 capsule 3     blood glucose (NO BRAND SPECIFIED) lancets standard Use to test blood sugar 1 times daily or as directed. 100 each 1     blood glucose (NO BRAND SPECIFIED) test strip Use to test blood sugar 1 times daily 100 strip 1     blood glucose monitoring (NO BRAND SPECIFIED) meter device kit Use to test blood sugar as directed. 1 kit 0     Ferrous Gluconate (IRON) 240 (27 FE) MG TABS Take 1 tablet by mouth daily (Patient not taking: Reported on 8/4/2021)         ALLERGIES   No Known Allergies    PAST MEDICAL HISTORY:  Past Medical History:   Diagnosis Date     A-fib (H) 01/31/2018     Cancer (H)     skin     CKD (chronic kidney disease)      GERD (gastroesophageal reflux disease)      Hernia, abdominal      Hiatal hernia      Hypertension      Mumps      Pericarditis      S/P colon resection        PAST SURGICAL HISTORY:  Past Surgical History:   Procedure Laterality Date     CHOLECYSTECTOMY       COLONOSCOPY       COLONOSCOPY N/A 5/6/2021    Procedure: COLONOSCOPY, WITH POLYPECTOMY AND BIOPSY;  Surgeon: Deondre Meek MD;  Location: [facility]     COMBINED COLONOSCOPY, MUCOSAL RESECTION  1993    ?diverticulitis      ESOPHAGOSCOPY, GASTROSCOPY, DUODENOSCOPY (EGD), COMBINED N/A 11/3/2016    Procedure: COMBINED ESOPHAGOSCOPY, GASTROSCOPY, DUODENOSCOPY (EGD), BIOPSY SINGLE OR MULTIPLE;  Surgeon: Deondre Meek MD;  Location: [facility]     ESOPHAGOSCOPY, GASTROSCOPY, DUODENOSCOPY (EGD), DILATATION, COMBINED N/A 12/1/2016    Procedure: COMBINED ESOPHAGOSCOPY, GASTROSCOPY, DUODENOSCOPY (EGD), DILATATION;  Surgeon: Deondre Meek MD;  Location: [facility]     ESOPHAGOSCOPY, GASTROSCOPY, DUODENOSCOPY (EGD), DILATATION, COMBINED N/A 3/2/2017    Procedure: COMBINED  ESOPHAGOSCOPY, GASTROSCOPY, DUODENOSCOPY (EGD), DILATATION;  Surgeon: Deondre Meek MD;  Location:  [facility]     NEPHRECTOMY       NEPHRECTOMY RT/LT      right     pace maker       VASECTOMY         FAMILY HISTORY:  Family History   Problem Relation Age of Onset     Hyperlipidemia Mother      Cerebrovascular Disease Father      Prostate Cancer Father      Pulmonary Hypertension Brother      Breast Cancer Sister      Parkinsonism Sister      Colon Cancer No family hx of        SOCIAL HISTORY:  Social History     Socioeconomic History     Marital status:      Spouse name: None     Number of children: None     Years of education: None     Highest education level: None   Occupational History     None   Tobacco Use     Smoking status: Former Smoker     Years: 7.00     Types: Pipe     Quit date:      Years since quittin.6     Smokeless tobacco: Never Used     Tobacco comment: light smoker for few years only   Substance and Sexual Activity     Alcohol use: Not Currently     Alcohol/week: 0.0 standard drinks     Comment: 3-4 times a week     Drug use: No     Sexual activity: Not Currently   Other Topics Concern     Parent/sibling w/ CABG, MI or angioplasty before 65F 55M? Not Asked      Service Not Asked     Blood Transfusions Not Asked     Caffeine Concern Not Asked     Occupational Exposure Not Asked     Hobby Hazards Not Asked     Sleep Concern Not Asked     Stress Concern Not Asked     Weight Concern Not Asked     Special Diet No     Back Care Not Asked     Exercise No     Bike Helmet Not Asked     Seat Belt Not Asked     Self-Exams Not Asked   Social History Narrative     None     Social Determinants of Health     Financial Resource Strain:      Difficulty of Paying Living Expenses:    Food Insecurity:      Worried About Running Out of Food in the Last Year:      Ran Out of Food in the Last Year:    Transportation Needs:      Lack of Transportation (Medical):      Lack of Transportation  "(Non-Medical):    Physical Activity:      Days of Exercise per Week:      Minutes of Exercise per Session:    Stress:      Feeling of Stress :    Social Connections:      Frequency of Communication with Friends and Family:      Frequency of Social Gatherings with Friends and Family:      Attends Mandaen Services:      Active Member of Clubs or Organizations:      Attends Club or Organization Meetings:      Marital Status:    Intimate Partner Violence:      Fear of Current or Ex-Partner:      Emotionally Abused:      Physically Abused:      Sexually Abused:        Review of Systems:  Skin:  Negative       Eyes:  Positive for glasses    ENT:  Negative      Respiratory:  Negative for shortness of breath;dyspnea on exertion;cough;wheezing;sleep apnea;CPAP     Cardiovascular:  Negative for;palpitations;chest pain;edema;syncope or near-syncope;lightheadedness;dizziness;fatigue Positive for;dizziness;lightheadedness    Gastroenterology: Positive for   has had gastric bleeding s/p endoscopy (Dr. Meek)  Genitourinary:  Negative      Musculoskeletal:  Negative      Neurologic:  Positive for numbness or tingling of feet    Psychiatric:  Negative      Heme/Lymph/Imm:  Negative      Endocrine:  Negative        Physical Exam:  Vitals: /72   Pulse 61   Ht 1.753 m (5' 9.02\")   Wt 102.1 kg (225 lb)   BMI 33.21 kg/m      Constitutional:  cooperative, alert and oriented, well developed, well nourished, in no acute distress        Skin:  warm and dry to the touch, no apparent skin lesions or masses noted   pacemaker incision in the right infraclavicular area was well-healed      Head:  normocephalic, no masses or lesions        Eyes:  pupils equal and round;conjunctivae and lids unremarkable        Lymph:      ENT:  no pallor or cyanosis, dentition good        Neck:  JVP normal;no carotid bruit        Respiratory:  normal breath sounds, clear to auscultation, normal A-P diameter, normal symmetry, normal respiratory " excursion, no use of accessory muscles         Cardiac: regular rhythm;no murmurs, gallops or rubs detected;normal S1 and S2;no S3 or S4                not assessed this visit                                        GI:  abdomen soft obese      Extremities and Muscular Skeletal:  no deformities, clubbing, cyanosis, erythema observed;no edema              Neurological:  no gross motor deficits;affect appropriate        Psych:  Alert and Oriented x 3;affect appropriate, oriented to time, person and place        CC  No referring provider defined for this encounter.                  Thank you for allowing me to participate in the care of your patient.      Sincerely,     Airam Atkinson NP, APRN [facility]  cc:   No referring provider defined for this encounter.

## 2021-08-04 NOTE — PATIENT INSTRUCTIONS
Call my nurse with any questions or concerns:  [phone]  *If you have concerns after hours, please call [phone], option 2 to speak with on call Cardiologist.    No change

## 2021-09-07 DIAGNOSIS — E11.9 TYPE 2 DIABETES MELLITUS WITHOUT COMPLICATION, WITHOUT LONG-TERM CURRENT USE OF INSULIN (H): ICD-10-CM

## 2021-09-08 RX ORDER — METFORMIN HCL 500 MG
500 TABLET, EXTENDED RELEASE 24 HR ORAL
Qty: 90 TABLET | Refills: 0 | Status: SHIPPED | OUTPATIENT
Start: 2021-09-08 | End: 2021-10-14

## 2021-09-08 NOTE — TELEPHONE ENCOUNTER
Metformin  mg tablet    Summary: Take 1 tablet (500 mg) by mouth daily (with dinner) for diabetes, Disp-90 tablet, R-1, E-Prescribe   Dose, Route, Frequency: 500 mg, Oral, DAILY WITH SUPPER  Start: 3/22/2021  Ord/Sold: 3/22/2021

## 2021-09-08 NOTE — TELEPHONE ENCOUNTER
Next 5 appointments (look out 90 days)    Oct 14, 2021 11:00 AM  Office Visit with Zoe Tomas MD  [facility] ([facility] ) [address], [address]  [facility] [address] [phone]        Medication filled 1 time as pt is due for a follow-up in clinic. Patient is already scheduled for upcoming appointment.

## 2021-09-20 ENCOUNTER — TRANSFERRED RECORDS (OUTPATIENT)
Dept: HEALTH INFORMATION MANAGEMENT | Facility: CLINIC | Age: 76
End: 2021-09-20

## 2021-09-20 ENCOUNTER — LAB (OUTPATIENT)
Dept: LAB | Facility: CLINIC | Age: 76
End: 2021-09-20
Payer: MEDICARE

## 2021-09-20 DIAGNOSIS — C61 PROSTATE CANCER (H): ICD-10-CM

## 2021-09-20 LAB — PSA SERPL-MCNC: 6.1 UG/L (ref 0–4)

## 2021-09-20 PROCEDURE — 84153 ASSAY OF PSA TOTAL: CPT

## 2021-09-20 PROCEDURE — 36415 COLL VENOUS BLD VENIPUNCTURE: CPT

## 2021-09-21 ENCOUNTER — TRANSFERRED RECORDS (OUTPATIENT)
Dept: HEALTH INFORMATION MANAGEMENT | Facility: CLINIC | Age: 76
End: 2021-09-21

## 2021-09-27 ENCOUNTER — TELEPHONE (OUTPATIENT)
Dept: UROLOGY | Facility: CLINIC | Age: 76
End: 2021-09-27

## 2021-09-27 NOTE — TELEPHONE ENCOUNTER
[facility] Call Center    Phone Message    May a detailed message be left on voicemail: yes     Reason for Call: Other: Estrada called wanting to reschedule his 10/01 video visit as he will be out of town. First available with Dr. Billy is 1/27/22. Please call patient back to reschedule.     Action Taken: Message routed to:  Other: [employer]    Travel Screening: Not Applicable

## 2021-10-14 ENCOUNTER — OFFICE VISIT (OUTPATIENT)
Dept: FAMILY MEDICINE | Facility: CLINIC | Age: 76
End: 2021-10-14
Payer: MEDICARE

## 2021-10-14 VITALS
SYSTOLIC BLOOD PRESSURE: 137 MMHG | TEMPERATURE: 98 F | BODY MASS INDEX: 33.18 KG/M2 | HEIGHT: 69 IN | RESPIRATION RATE: 16 BRPM | DIASTOLIC BLOOD PRESSURE: 89 MMHG | WEIGHT: 224 LBS | HEART RATE: 82 BPM | OXYGEN SATURATION: 96 %

## 2021-10-14 DIAGNOSIS — K21.00 GASTROESOPHAGEAL REFLUX DISEASE WITH ESOPHAGITIS WITHOUT HEMORRHAGE: ICD-10-CM

## 2021-10-14 DIAGNOSIS — E11.9 TYPE 2 DIABETES MELLITUS WITHOUT COMPLICATION, WITHOUT LONG-TERM CURRENT USE OF INSULIN (H): Primary | ICD-10-CM

## 2021-10-14 DIAGNOSIS — K44.9 HIATAL HERNIA: ICD-10-CM

## 2021-10-14 DIAGNOSIS — Z23 NEED FOR PROPHYLACTIC VACCINATION AND INOCULATION AGAINST INFLUENZA: ICD-10-CM

## 2021-10-14 DIAGNOSIS — Z90.5 S/P NEPHRECTOMY: ICD-10-CM

## 2021-10-14 DIAGNOSIS — I47.29 NSVT (NONSUSTAINED VENTRICULAR TACHYCARDIA) (H): ICD-10-CM

## 2021-10-14 DIAGNOSIS — I48.0 PAROXYSMAL ATRIAL FIBRILLATION (H): ICD-10-CM

## 2021-10-14 DIAGNOSIS — Z90.49 S/P COLON RESECTION: ICD-10-CM

## 2021-10-14 DIAGNOSIS — N18.31 STAGE 3A CHRONIC KIDNEY DISEASE (H): ICD-10-CM

## 2021-10-14 DIAGNOSIS — I42.8 OTHER CARDIOMYOPATHIES (H): ICD-10-CM

## 2021-10-14 DIAGNOSIS — D36.9 TUBULAR ADENOMA: ICD-10-CM

## 2021-10-14 DIAGNOSIS — C61 PROSTATE CANCER (H): ICD-10-CM

## 2021-10-14 DIAGNOSIS — K22.70 BARRETT'S ESOPHAGUS WITHOUT DYSPLASIA: ICD-10-CM

## 2021-10-14 PROBLEM — N18.30 CHRONIC KIDNEY DISEASE, STAGE 3 (H): Status: ACTIVE | Noted: 2021-10-14

## 2021-10-14 LAB — HBA1C MFR BLD: 6.5 % (ref 0–5.6)

## 2021-10-14 PROCEDURE — 83036 HEMOGLOBIN GLYCOSYLATED A1C: CPT | Performed by: INTERNAL MEDICINE

## 2021-10-14 PROCEDURE — 80053 COMPREHEN METABOLIC PANEL: CPT | Performed by: INTERNAL MEDICINE

## 2021-10-14 PROCEDURE — 90662 IIV NO PRSV INCREASED AG IM: CPT | Performed by: INTERNAL MEDICINE

## 2021-10-14 PROCEDURE — 99214 OFFICE O/P EST MOD 30 MIN: CPT | Mod: 25 | Performed by: INTERNAL MEDICINE

## 2021-10-14 PROCEDURE — 80061 LIPID PANEL: CPT | Performed by: INTERNAL MEDICINE

## 2021-10-14 PROCEDURE — 99207 PR FOOT EXAM NO CHARGE: CPT | Mod: 25 | Performed by: INTERNAL MEDICINE

## 2021-10-14 PROCEDURE — G0008 ADMIN INFLUENZA VIRUS VAC: HCPCS | Performed by: INTERNAL MEDICINE

## 2021-10-14 PROCEDURE — 82043 UR ALBUMIN QUANTITATIVE: CPT | Performed by: INTERNAL MEDICINE

## 2021-10-14 PROCEDURE — 36415 COLL VENOUS BLD VENIPUNCTURE: CPT | Performed by: INTERNAL MEDICINE

## 2021-10-14 RX ORDER — METFORMIN HCL 500 MG
500 TABLET, EXTENDED RELEASE 24 HR ORAL
Qty: 90 TABLET | Refills: 3 | Status: SHIPPED | OUTPATIENT
Start: 2021-10-14 | End: 2021-12-10

## 2021-10-14 ASSESSMENT — MIFFLIN-ST. JEOR: SCORE: 1736.44

## 2021-10-14 NOTE — PROGRESS NOTES
Assessment & Plan     Estrada was seen today for follow up, flu shot and imm/inj.    Diagnoses and all orders for this visit:    Type 2 diabetes mellitus without complication, without long-term current use of insulin (H)  -     metFORMIN (GLUCOPHAGE-XR) 500 MG 24 hr tablet; Take 1 tablet (500 mg) by mouth daily (with dinner) for diabetes  -     Lipid panel reflex to direct LDL Fasting; Future  -     Hemoglobin A1c; Future  -     Comprehensive metabolic panel; Future  -     Albumin Random Urine Quantitative with Creat Ratio; Future  -     MA FOOT EXAM NO CHARGE  -     MA FOOT EXAM NO CHARGE  -     Lipid panel reflex to direct LDL Fasting  -     Hemoglobin A1c  -     Comprehensive metabolic panel  -     Albumin Random Urine Quantitative with Creat Ratio  Lab Results   Component Value Date    A1C 6.5 10/14/2021    A1C 6.4 06/26/2021    A1C 6.7 03/18/2021    A1C 6.3 03/16/2020    A1C 5.8 03/13/2019    A1C 6.4 09/21/2018       NSVT (nonsustained ventricular tachycardia) (H)  Asymptomatic was seen on pacemaker  Interrogation    Other cardiomyopathies (H)  Currently doing well and well compensated    Paroxysmal atrial fibrillation (H)  Past history  Per cardiology he has been in sinus rhythm  A 2-hour episode of atrial fibrillation recorded by the pacemaker in 01/2018.  The patient was placed on apixaban.  There has been no subsequent documentation of atrial fibrillation on his device interrogations and Eliquis was stopped last year.    Chacon's esophagus without dysplasia  -     Adult Gastro Ref - Procedure Only; Future  He has a history of that  He was supposed to have another endoscopy    Last endoscopy was on March 2, 2017  He was supposed to have another one in a year  Reminded him about that  Reordered endoscopy  He is on Protonix    Gastroesophageal reflux disease with esophagitis without hemorrhage  -     Adult Gastro Ref - Procedure Only; Future  Symptoms are well controlled on Protonix    Hiatal hernia  -     " Adult Gastro Ref - Procedure Only; Future  Per previous endoscopy report    Prostate cancer (H)  He is following urology and has appointment tomorrow    Stage 3a chronic kidney disease (H)  We will continue to monitor  He has only one functioning kidney  Avoid over-the-counter NSAIDs  Stay well-hydrated    Tubular adenoma  Last colonoscopy was on May 2021    S/p nephrectomy  Comments:  right side     S/P colon resection    Need for prophylactic vaccination and inoculation against influenza  -     INFLUENZA, QUAD, HIGH DOSE, PF, 65YR + (FLUZONE HD)  -     ADMIN INFLUENZA (For MEDICARE Patients ONLY) []        :[id number]}     BMI:   Estimated body mass index is 33.08 kg/m  as calculated from the following:    Height as of this encounter: 1.753 m (5' 9\").    Weight as of this encounter: 101.6 kg (224 lb).   Discussed the importance of healthy diet and exercise      See Patient Instructions    Return in about 4 months (around 2/14/2022) for medication follow up, Hypertension, Diabetes.    Zoe Tomas MD  [facility] OLVIN Dorado is a 76 year old who presents for the following health issues     HPI     Medication Followup     Taking Medication as prescribed: yes    Side Effects:  None           Review of Systems   Constitutional, HEENT, cardiovascular, pulmonary, GI, , musculoskeletal, neuro, skin, endocrine and psych systems are negative, except as otherwise noted.      Objective    /89   Pulse 82   Temp 98  F (36.7  C) (Temporal)   Resp 16   Ht 1.753 m (5' 9\")   Wt 101.6 kg (224 lb)   SpO2 96%   BMI 33.08 kg/m    Body mass index is 33.08 kg/m .  Physical Exam       GENERAL APPEARANCE: healthy, alert and no distress  EYES: Eyes grossly normal to inspection, PERRL and conjunctivae and sclerae normal  HENT: ear canals and TM's normal and nose and mouth without ulcers or lesions  NECK: no adenopathy  RESP: lungs clear to auscultation - no rales, rhonchi or wheezes  CV: " regular rates and rhythm, normal S1 S2, no S3      Diabetic foot exam was performed.  Good dorsalis pedis and posterior tibial.  5 point sensory exam was normal.  skin is intact  Nails of big toe are slightly deformed due to age and possible onychomycosis        Disclaimer: This note consists of symbols derived from keyboarding, dictation and/or voice recognition software. As a result, there may be errors in the script that have gone undetected. Please consider this when interpreting information found in this chart.

## 2021-10-14 NOTE — PATIENT INSTRUCTIONS
Our goal of fasting is   mg/dL and post prandial < 180 mg/dL.   There is this is a new shingles vaccine available called shingrex  It is a series of 2 shots 2-6 months apart.  Considered more than 90% effective.  Please go to any pharmacy to get the  Vaccine  Monitor your blood pressure once a week  at home.  Bring those readings on your next visit.  Notify us if your blood pressure readings consistently stays greater than 140/90.  Labs today  Follow up in 4 months  Seek sooner medical attention if there is any worsening of symptoms or problems.       Patient Education   Sample Meal Plan for Diabetes  Breakfast (4 carb choices, 60 grams carbohydrate)  Coffee, tea or water  1 cup (8 ounces) skim or 1% milk (1-carb choice)  1 small piece of fresh fruit or 1 cup berries (1-carb choice)  Any one of the following (2-carb choice):    1 slice toast with 1 tablespoon of margarine or peanut butter and   cup of cereal with skim or 1% milk    1 cup cereal with skim or 1% milk    1 egg and 2 slices toast with 1 tablespoon margarine or peanut butter  Lunch (4 carb choices, 60 grams carbohydrate)  Coffee, tea or water  1 cup (8 ounces) skim or 1% milk (1-carb choice)  Small piece fresh fruit or 1 cup berries (1-carb choice)  Raw vegetables (add to sandwich or serve on the side)  Any one of the following (2-carb choice):     Rawlins (made with 2 slices whole-grain bread)      sandwich with 1 cup soup    2 corn tortillas with meat and vegetables  Dinner (4 carb choices, 60 grams carbohydrate)  Coffee, tea or water  Breast of chicken or pork chop (3 to 4 ounces)  Cooked vegetable  Tossed salad with small amount of low-fat dressing  1 cup (8 ounces) skim or 1% milk (1-carb choice)  1 small piece fruit or   cup canned fruit, packed in juice or light syrup (1-carb choice)  Any one of the following (2-carb choice):    Medium baked potato    1 cup mashed potato    2/3 cup rice or pasta  Snacks (1 or 2 carb choices, 15 to 30 grams  carbohydrate)  Any one or two of the following:    Small piece fresh fruit    3 graciela cracker squares    1 cup raw vegetables with low-fat dip    1 ounce (12 to 15) baked chips with salsa    Light yogurt (100 calories)    1 cup (8 ounces) skim or 1% milk    3 cups popped popcorn    6 vanilla wafers  For informational purposes only. Not to replace the advice of your health care provider.   Copyright   2007 [facility]. All rights reserved. [employer] 050476 - REV 04/16.

## 2021-10-15 ENCOUNTER — VIRTUAL VISIT (OUTPATIENT)
Dept: UROLOGY | Facility: CLINIC | Age: 76
End: 2021-10-15
Payer: MEDICARE

## 2021-10-15 VITALS — WEIGHT: 224 LBS | BODY MASS INDEX: 33.18 KG/M2 | HEIGHT: 69 IN

## 2021-10-15 DIAGNOSIS — R39.9 LOWER URINARY TRACT SYMPTOMS (LUTS): ICD-10-CM

## 2021-10-15 DIAGNOSIS — C61 PROSTATE CANCER (H): Primary | ICD-10-CM

## 2021-10-15 LAB
ALBUMIN SERPL-MCNC: 3.6 G/DL (ref 3.4–5)
ALP SERPL-CCNC: 83 U/L (ref 40–150)
ALT SERPL W P-5'-P-CCNC: 48 U/L (ref 0–70)
ANION GAP SERPL CALCULATED.3IONS-SCNC: 7 MMOL/L (ref 3–14)
AST SERPL W P-5'-P-CCNC: 23 U/L (ref 0–45)
BILIRUB SERPL-MCNC: 0.6 MG/DL (ref 0.2–1.3)
BUN SERPL-MCNC: 17 MG/DL (ref 7–30)
CALCIUM SERPL-MCNC: 9.2 MG/DL (ref 8.5–10.1)
CHLORIDE BLD-SCNC: 106 MMOL/L (ref 94–109)
CHOLEST SERPL-MCNC: 131 MG/DL
CO2 SERPL-SCNC: 25 MMOL/L (ref 20–32)
CREAT SERPL-MCNC: 1.22 MG/DL (ref 0.66–1.25)
CREAT UR-MCNC: 95 MG/DL
FASTING STATUS PATIENT QL REPORTED: YES
GFR SERPL CREATININE-BSD FRML MDRD: 57 ML/MIN/1.73M2
GLUCOSE BLD-MCNC: 103 MG/DL (ref 70–99)
HDLC SERPL-MCNC: 41 MG/DL
LDLC SERPL CALC-MCNC: 69 MG/DL
MICROALBUMIN UR-MCNC: 8 MG/L
MICROALBUMIN/CREAT UR: 8.42 MG/G CR (ref 0–17)
NONHDLC SERPL-MCNC: 90 MG/DL
POTASSIUM BLD-SCNC: 4.3 MMOL/L (ref 3.4–5.3)
PROT SERPL-MCNC: 7.6 G/DL (ref 6.8–8.8)
SODIUM SERPL-SCNC: 138 MMOL/L (ref 133–144)
TRIGL SERPL-MCNC: 103 MG/DL

## 2021-10-15 PROCEDURE — 99214 OFFICE O/P EST MOD 30 MIN: CPT | Mod: 95 | Performed by: UROLOGY

## 2021-10-15 ASSESSMENT — MIFFLIN-ST. JEOR: SCORE: 1736.44

## 2021-10-15 ASSESSMENT — PAIN SCALES - GENERAL: PAINLEVEL: NO PAIN (0)

## 2021-10-15 NOTE — RESULT ENCOUNTER NOTE
Woody Dorado,    This is to inform you regarding your test result.    HbA1c which is average glucose during last 3 months is 6.5%.      Sincerely,      Dr.Nasima Thom MD,FACP

## 2021-10-15 NOTE — LETTER
"10/15/2021       RE: Estrada Oseguera  [address]  [address]  [place] [address]     Dear Colleague,    Thank you for referring your patient, Estrada Oseguera, to the [facility] at [facility]. Please see a copy of my visit note below.    FABRICIO  CHIEF COMPLAINT   It was my pleasure to see Estrada Oseguera who is a 76 year old male for follow-up of Elevated PSA.      HPI   Estrada Oseguera is a very pleasant 76 year old male     Initially seen 4/7/21:  \"Estrada Oseguera is a 76 year old male who is being seen for evaluation of Elevated PSA      Has been having significant LUTS worsening over the past 6-12 months  Slow and weak stream  Nocturia x3/night  Having daytime frequency   Having some urgency   He is having to do some abdominal straining  No prior acute urinary retention, UTI, or gross hematuria      Also with some Elevated PSA      Father had low grade prostate cancer found at time of death at age 87 from a stroke\"    4/21/21:  Started on tamsulosin 0.4mg (Flomax) at last visit  Follow up to discuss MRI  Notes a slight change with tamsulosin 0.4mg (Flomax)     5/19/21:  Follow-up today to discuss his biopsy results  He did well after the biopsy with no issues  His wife joins him for this call    TODAY 10/15/21:  Follow-up today to discuss his recent PSA  He has been doing well with no change in symptoms    PHYSICAL EXAM  Patient is a 76 year old  male   Vitals: Height 1.753 m (5' 9\"), weight 101.6 kg (224 lb).  Body mass index is 33.08 kg/m .  General Appearance Adult:   Alert, no acute distress, oriented  HENT: throat/mouth:normal, good dentition  Lungs: no respiratory distress, or pursed lip breathing  Heart: No obvious jugular venous distension present  Abdomen: obesely - distended  Musculoskeltal: extremities normal, no peripheral edema  Skin: no suspicious lesions or rashes  Neuro: Alert, oriented, speech and " mentation normal  Psych: affect and mood normal  Gait: Normal     Component PSA   Latest Ref Rng & Units 0.00 - 4.00 ug/L   9/21/2018 5.22 (H)   3/13/2019 5.28 (H)   3/16/2020 4.90 (H)   3/18/2021 5.38 (H)   9/20/2021 6.10 (H)     PATHOLOGY:  MR-FUSION URONAV 5/12/21:  FINAL DIAGNOSIS:   A.  Prostate gland, left lateral base, needle biopsy:   - Negative for malignancy.   - Mild chronic inflammation.     B.  Prostate gland, left lateral mid, needle biopsy:   - Negative for malignancy.   - Glandular atrophy.     C.  Prostate gland, left lateral apex, needle biopsy:   - Negative for malignancy.   - Glandular atrophy.     D.  Prostate gland, left base, needle biopsy:   - Prostatic acinar adenocarcinoma.      - [place] score  6 (3+3):      - WHO Grade Group - 1      - Proportion of tissue involved by tumor:  Approximately 33%      - Number of tissue cores involved by tumor:  1 (out of 1)      - Angiolymphatic invasion:  Not identified      - Perineural invasion:  Not identified     E.  Prostate gland, left mid, needle biopsy:   - Prostatic acinar adenocarcinoma.      - [place] score  6 (3+3):      - WHO Grade Group - 1      - Proportion of tissue involved by tumor:  Approximately 33%      - Number of tissue cores involved by tumor:  1 (out of 1)      - Angiolymphatic invasion:  Not identified      - Perineural invasion:  Not identified     F.  Prostate gland, left apex, needle biopsy:]   - Negative for malignancy.     G.  Prostate gland, right lateral base, needle biopsy:   - Negative for malignancy.   - Glandular atrophy.     H.  Prostate gland, right lateral mid, needle biopsy:   - A minute focus of atypical small acinar proliferation, suspicious for   malignancy.   - Glandular atrophy.     I.  Prostate gland, right lateral apex, needle biopsy:   - Negative for malignancy.   - Glandular atrophy.     J.  Prostate gland, right base, needle biopsy:   - Negative for malignancy.   - Glandular atrophy.     K.  Prostate  "gland, right mid, needle biopsy:   - Negative for malignancy.   - Glandular atrophy.     L.  Prostate gland, right apex, needle biopsy:   - Negative for malignancy.   - Glandular atrophy.     M.  Prostate gland., right sided lesion, needle biopsies x 3:   - Prostatic acinar adenocarcinoma.      - [place] score:  6 (3+3)      - WHO Grade Group - 1      - Proportion of tissue involved by tumor:  approximately 40%      - Number of tissue cores involved by tumor: 3 (out of 3)      - Angiolymphatic invasion:  Not identified      - Perineural invasion:  Not identified       IMAGING:  All pertinent imaging reviewed:    All imaging studies reviewed by me.  I personally reviewed these imaging films.  A formal report from radiology will follow.    MRI PROSTATE 4/15/21:  \"FINDINGS:  Size: 6.1 x 4.8 x 4.7 cm; 72 grams  Hemorrhage: Absent   Peripheral zone: Heterogeneous on T2-weighted images. Suspicious  lesions as detailed below.  Transition zone: Enlarged with BPH changes. Transition zone nodules  which are circumscribed or mostly encapsulated without diffusion  restriction.  PI-RADS 2.  No highly suspicious nodules.     Lesion(s) in rank order of severity (highest score- to lowest score,  then by size)      Lesion 1:  Location: Left apex peripheral zone at the 5 o'clock position relative  to the urethra. Series 4 image 36.   Additional prostate regions involved: None   Size: 6  mm  T2 description: Rounded moderate hypointensity  T2 numerical assessment: 3  DWI description: Focal hypointensity on ADC and hyperintensity on DWI  DWI numerical assessment: 3  DCE assessment: Positive    Prostate margin: Capsular abutment<6 mm with smooth contour   Lesion overall PI-RADS category: 4     Neurovascular bundles: No neurovascular bundle involvement by  malignancy.    Seminal vesicles: No seminal vesicle involvement by malignancy.   Lymph nodes: No lymph node involvement   Bones: No suspicious lesions   Other pelvic organs: Prominent " fat in bilateral inguinal canal, left  more than right. Circumferential apparent urinary bladder wall  thickening with trabeculation/sacculations, likely representing post  chronic urinary outflow obstructive change.                                                         IMPRESSION:     1. Based on the most suspicious abnormality, this exam is  characterized as PIRADS 4 - Clinically significant cancer is likely to  be present.  The most suspicious abnormality is located at the left  apex peripheral zone, 5:00 position and there is minimal capsular  abutment with no convincing evidence of extraprostatic extension.  2. No suspicious adenopathy or evidence of pelvic metastases.    ASSESSMENT and PLAN  76-year-old man with elevated PSA and LUTS, now with an MRI showing a PI-RADS 4 lesion and a prostate volume of 72 g.  Status post an MR fusion biopsy with [place] 3+3 = 6 prostate cancer in 3/3 of the target lesions as well as 2/12 of the template biopsy    DISCUSSION PROSTATE CANCER     The natural history of this disease was explained to the patient at length and the treatment options discussed including radical prostatectomy by open or robotic-assisted laparoscopic approach, external-beam radiotherapy, brachytherapy, active surveillance and watchful waiting, including the probability of success and complications associated with these approaches.    With respect to watchful waiting, we discussed the difficulties of estimating the extent of disease preoperatively, the risk of cancer progression, and risk that salvage might not be possible with progression. However, the favorable 10-year outcomes of active surveillance in appropriately selected patients was conveyed.  We discussed that active surveillance has become the treatment of choice for men with low volume and low grade, Les[place] 3+3 = 6, prostate cancer.  We reviewed his PSA history and trend and that his most recent PSA from September demonstrates a slight  increase to 6.1.  Overall, he remains a good candidate for active surveillance and I recommend a PSA recheck in 6 months.  We discussed that if his PSA should rise further we would then plan for a repeat MRI and likely repeat biopsy.    We discussed that other treatment options would include surgery or radiation, however given his low volume of low-grade prostate cancer I would not recommend either modality at this time.    Plan:  -Follow-up in 6 months with a PSA prior.  This could be a virtual visit      LUTS  -He notes some improvement with voiding following initiation of tamsulosin 0.4 mg daily  -We discussed that his MRI did demonstrate prostatomegaly with a volume of 72 ml  -He is doing well and this remains stable    Chart documentation with Dragon Voice recognition Software. Although reviewed after completion, some words and grammatical errors may remain.     Time spent: 15 minutes spent on the date of the encounter doing chart review, history and exam, documentation and further activities as noted above.    Jono Billy MD   Urology  [facility]  [facility] Phone: [phone]  [facility] Phone: [phone]        Estrada is a 76 year old who is being evaluated via a billable video visit.      How would you like to obtain your AVS? MyChart  If the video visit is dropped, the invitation should be resent by: THIS IS HIS HOME NUMBER: [phone]  Will anyone else be joining your video visit? No        Video-Visit Details    Type of service:  Video Visit    Video Start Time: 4:45 PM    Video End Time:4:55 PM    Originating Location (pt. Location): Home    Distant Location (provider location):  [facility]OLOGY [facility]     Platform used for Video Visit: [employer]

## 2021-10-15 NOTE — RESULT ENCOUNTER NOTE
Woody Dorado,    This is to inform you regarding your test result.    Urine for Microalbumin is normal.  Your total cholesterol is normal.  HDL which is called good cholesterol is normal.  Your LDL cholesterol is normal.  This is often call bad cholesterol and high levels increase the risk for heart attacks and strokes.  Your triglycerides are normal.  The testing of your kidney function, liver function and electrolytes was satisfactory   HbA1c which is average glucose during last 3 months is 6.5%.  Your diabetes is under control  Continue low-cholesterol low-fat diet and avoid high sugar containing food        Sincerely,      Dr.Nasima Thom MD,FACP

## 2021-10-18 ENCOUNTER — ANCILLARY PROCEDURE (OUTPATIENT)
Dept: CARDIOLOGY | Facility: CLINIC | Age: 76
End: 2021-10-18
Attending: INTERNAL MEDICINE
Payer: MEDICARE

## 2021-10-18 DIAGNOSIS — I49.5 SSS (SICK SINUS SYNDROME) (H): ICD-10-CM

## 2021-10-18 DIAGNOSIS — Z95.0 CARDIAC PACEMAKER IN SITU: ICD-10-CM

## 2021-10-18 PROCEDURE — 93294 REM INTERROG EVL PM/LDLS PM: CPT | Performed by: INTERNAL MEDICINE

## 2021-10-18 PROCEDURE — 93296 REM INTERROG EVL PM/IDS: CPT | Performed by: INTERNAL MEDICINE

## 2021-10-19 ENCOUNTER — TELEPHONE (OUTPATIENT)
Dept: UROLOGY | Facility: CLINIC | Age: 76
End: 2021-10-19

## 2021-10-19 LAB
MDC_IDC_EPISODE_DTM: NORMAL
MDC_IDC_EPISODE_ID: NORMAL
MDC_IDC_EPISODE_TYPE: NORMAL
MDC_IDC_LEAD_IMPLANT_DT: NORMAL
MDC_IDC_LEAD_IMPLANT_DT: NORMAL
MDC_IDC_LEAD_LOCATION: NORMAL
MDC_IDC_LEAD_LOCATION: NORMAL
MDC_IDC_LEAD_LOCATION_DETAIL_1: NORMAL
MDC_IDC_LEAD_LOCATION_DETAIL_1: NORMAL
MDC_IDC_LEAD_MFG: NORMAL
MDC_IDC_LEAD_MFG: NORMAL
MDC_IDC_LEAD_MODEL: NORMAL
MDC_IDC_LEAD_MODEL: NORMAL
MDC_IDC_LEAD_POLARITY_TYPE: NORMAL
MDC_IDC_LEAD_POLARITY_TYPE: NORMAL
MDC_IDC_LEAD_SERIAL: NORMAL
MDC_IDC_LEAD_SERIAL: NORMAL
MDC_IDC_MSMT_BATTERY_DTM: NORMAL
MDC_IDC_MSMT_BATTERY_REMAINING_LONGEVITY: 120 MO
MDC_IDC_MSMT_BATTERY_REMAINING_PERCENTAGE: 100 %
MDC_IDC_MSMT_BATTERY_STATUS: NORMAL
MDC_IDC_MSMT_LEADCHNL_RA_IMPEDANCE_VALUE: 760 OHM
MDC_IDC_MSMT_LEADCHNL_RA_PACING_THRESHOLD_AMPLITUDE: 0.6 V
MDC_IDC_MSMT_LEADCHNL_RA_PACING_THRESHOLD_PULSEWIDTH: 0.4 MS
MDC_IDC_MSMT_LEADCHNL_RV_IMPEDANCE_VALUE: 635 OHM
MDC_IDC_MSMT_LEADCHNL_RV_PACING_THRESHOLD_AMPLITUDE: 1.1 V
MDC_IDC_MSMT_LEADCHNL_RV_PACING_THRESHOLD_PULSEWIDTH: 0.4 MS
MDC_IDC_PG_IMPLANT_DTM: NORMAL
MDC_IDC_PG_MFG: NORMAL
MDC_IDC_PG_MODEL: NORMAL
MDC_IDC_PG_SERIAL: NORMAL
MDC_IDC_PG_TYPE: NORMAL
MDC_IDC_SESS_CLINIC_NAME: NORMAL
MDC_IDC_SESS_DTM: NORMAL
MDC_IDC_SESS_TYPE: NORMAL
MDC_IDC_SET_BRADY_AT_MODE_SWITCH_MODE: NORMAL
MDC_IDC_SET_BRADY_AT_MODE_SWITCH_RATE: 170 {BEATS}/MIN
MDC_IDC_SET_BRADY_LOWRATE: 55 {BEATS}/MIN
MDC_IDC_SET_BRADY_MAX_SENSOR_RATE: 130 {BEATS}/MIN
MDC_IDC_SET_BRADY_MAX_TRACKING_RATE: 130 {BEATS}/MIN
MDC_IDC_SET_BRADY_MODE: NORMAL
MDC_IDC_SET_BRADY_PAV_DELAY_HIGH: 200 MS
MDC_IDC_SET_BRADY_PAV_DELAY_LOW: 300 MS
MDC_IDC_SET_BRADY_SAV_DELAY_HIGH: 200 MS
MDC_IDC_SET_BRADY_SAV_DELAY_LOW: 300 MS
MDC_IDC_SET_LEADCHNL_RA_PACING_AMPLITUDE: 2 V
MDC_IDC_SET_LEADCHNL_RA_PACING_CAPTURE_MODE: NORMAL
MDC_IDC_SET_LEADCHNL_RA_PACING_POLARITY: NORMAL
MDC_IDC_SET_LEADCHNL_RA_PACING_PULSEWIDTH: 0.4 MS
MDC_IDC_SET_LEADCHNL_RA_SENSING_ADAPTATION_MODE: NORMAL
MDC_IDC_SET_LEADCHNL_RA_SENSING_POLARITY: NORMAL
MDC_IDC_SET_LEADCHNL_RA_SENSING_SENSITIVITY: 0.25 MV
MDC_IDC_SET_LEADCHNL_RV_PACING_AMPLITUDE: 1.8 V
MDC_IDC_SET_LEADCHNL_RV_PACING_CAPTURE_MODE: NORMAL
MDC_IDC_SET_LEADCHNL_RV_PACING_POLARITY: NORMAL
MDC_IDC_SET_LEADCHNL_RV_PACING_PULSEWIDTH: 0.4 MS
MDC_IDC_SET_LEADCHNL_RV_SENSING_ADAPTATION_MODE: NORMAL
MDC_IDC_SET_LEADCHNL_RV_SENSING_POLARITY: NORMAL
MDC_IDC_SET_LEADCHNL_RV_SENSING_SENSITIVITY: 1.5 MV
MDC_IDC_SET_ZONE_DETECTION_INTERVAL: 375 MS
MDC_IDC_SET_ZONE_TYPE: NORMAL
MDC_IDC_SET_ZONE_VENDOR_TYPE: NORMAL
MDC_IDC_STAT_AT_BURDEN_PERCENT: 0 %
MDC_IDC_STAT_AT_DTM_END: NORMAL
MDC_IDC_STAT_AT_DTM_START: NORMAL
MDC_IDC_STAT_BRADY_DTM_END: NORMAL
MDC_IDC_STAT_BRADY_DTM_START: NORMAL
MDC_IDC_STAT_BRADY_RA_PERCENT_PACED: 27 %
MDC_IDC_STAT_BRADY_RV_PERCENT_PACED: 0 %
MDC_IDC_STAT_EPISODE_RECENT_COUNT: 0
MDC_IDC_STAT_EPISODE_RECENT_COUNT_DTM_END: NORMAL
MDC_IDC_STAT_EPISODE_RECENT_COUNT_DTM_START: NORMAL
MDC_IDC_STAT_EPISODE_TYPE: NORMAL
MDC_IDC_STAT_EPISODE_VENDOR_TYPE: NORMAL

## 2021-10-19 NOTE — TELEPHONE ENCOUNTER
----- Message from Evelyn Harrison sent at 10/19/2021  8:56 AM CDT -----  Return in about 6 months (around 4/15/2022).                                                        Follow-up in 6 months with a PSA prior.  This could be a virtual visit    [facility]

## 2021-12-07 DIAGNOSIS — E11.9 TYPE 2 DIABETES MELLITUS WITHOUT COMPLICATION, WITHOUT LONG-TERM CURRENT USE OF INSULIN (H): ICD-10-CM

## 2021-12-10 RX ORDER — METFORMIN HCL 500 MG
TABLET, EXTENDED RELEASE 24 HR ORAL
Qty: 90 TABLET | Refills: 2 | Status: SHIPPED | OUTPATIENT
Start: 2021-12-10 | End: 2021-12-15

## 2021-12-13 DIAGNOSIS — E11.9 TYPE 2 DIABETES MELLITUS WITHOUT COMPLICATION, WITHOUT LONG-TERM CURRENT USE OF INSULIN (H): ICD-10-CM

## 2021-12-15 RX ORDER — METFORMIN HCL 500 MG
TABLET, EXTENDED RELEASE 24 HR ORAL
Qty: 90 TABLET | Refills: 0 | Status: SHIPPED | OUTPATIENT
Start: 2021-12-15 | End: 2022-02-17

## 2022-01-20 ENCOUNTER — ANCILLARY PROCEDURE (OUTPATIENT)
Dept: CARDIOLOGY | Facility: CLINIC | Age: 77
End: 2022-01-20
Attending: INTERNAL MEDICINE
Payer: MEDICARE

## 2022-01-20 DIAGNOSIS — Z95.0 CARDIAC PACEMAKER IN SITU: ICD-10-CM

## 2022-01-20 PROCEDURE — 93294 REM INTERROG EVL PM/LDLS PM: CPT | Performed by: INTERNAL MEDICINE

## 2022-01-20 PROCEDURE — 93296 REM INTERROG EVL PM/IDS: CPT | Performed by: INTERNAL MEDICINE

## 2022-01-21 LAB
MDC_IDC_EPISODE_DTM: NORMAL
MDC_IDC_EPISODE_DTM: NORMAL
MDC_IDC_EPISODE_ID: NORMAL
MDC_IDC_EPISODE_ID: NORMAL
MDC_IDC_EPISODE_TYPE: NORMAL
MDC_IDC_EPISODE_TYPE: NORMAL
MDC_IDC_LEAD_IMPLANT_DT: NORMAL
MDC_IDC_LEAD_IMPLANT_DT: NORMAL
MDC_IDC_LEAD_LOCATION: NORMAL
MDC_IDC_LEAD_LOCATION: NORMAL
MDC_IDC_LEAD_LOCATION_DETAIL_1: NORMAL
MDC_IDC_LEAD_LOCATION_DETAIL_1: NORMAL
MDC_IDC_LEAD_MFG: NORMAL
MDC_IDC_LEAD_MFG: NORMAL
MDC_IDC_LEAD_MODEL: NORMAL
MDC_IDC_LEAD_MODEL: NORMAL
MDC_IDC_LEAD_POLARITY_TYPE: NORMAL
MDC_IDC_LEAD_POLARITY_TYPE: NORMAL
MDC_IDC_LEAD_SERIAL: NORMAL
MDC_IDC_LEAD_SERIAL: NORMAL
MDC_IDC_MSMT_BATTERY_DTM: NORMAL
MDC_IDC_MSMT_BATTERY_REMAINING_LONGEVITY: 114 MO
MDC_IDC_MSMT_BATTERY_REMAINING_PERCENTAGE: 100 %
MDC_IDC_MSMT_BATTERY_STATUS: NORMAL
MDC_IDC_MSMT_LEADCHNL_RA_IMPEDANCE_VALUE: 781 OHM
MDC_IDC_MSMT_LEADCHNL_RA_PACING_THRESHOLD_AMPLITUDE: 0.6 V
MDC_IDC_MSMT_LEADCHNL_RA_PACING_THRESHOLD_PULSEWIDTH: 0.4 MS
MDC_IDC_MSMT_LEADCHNL_RV_IMPEDANCE_VALUE: 660 OHM
MDC_IDC_MSMT_LEADCHNL_RV_PACING_THRESHOLD_AMPLITUDE: 1.4 V
MDC_IDC_MSMT_LEADCHNL_RV_PACING_THRESHOLD_PULSEWIDTH: 0.4 MS
MDC_IDC_PG_IMPLANT_DTM: NORMAL
MDC_IDC_PG_MFG: NORMAL
MDC_IDC_PG_MODEL: NORMAL
MDC_IDC_PG_SERIAL: NORMAL
MDC_IDC_PG_TYPE: NORMAL
MDC_IDC_SESS_CLINIC_NAME: NORMAL
MDC_IDC_SESS_DTM: NORMAL
MDC_IDC_SESS_TYPE: NORMAL
MDC_IDC_SET_BRADY_AT_MODE_SWITCH_MODE: NORMAL
MDC_IDC_SET_BRADY_AT_MODE_SWITCH_RATE: 170 {BEATS}/MIN
MDC_IDC_SET_BRADY_LOWRATE: 55 {BEATS}/MIN
MDC_IDC_SET_BRADY_MAX_SENSOR_RATE: 130 {BEATS}/MIN
MDC_IDC_SET_BRADY_MAX_TRACKING_RATE: 130 {BEATS}/MIN
MDC_IDC_SET_BRADY_MODE: NORMAL
MDC_IDC_SET_BRADY_PAV_DELAY_HIGH: 200 MS
MDC_IDC_SET_BRADY_PAV_DELAY_LOW: 300 MS
MDC_IDC_SET_BRADY_SAV_DELAY_HIGH: 200 MS
MDC_IDC_SET_BRADY_SAV_DELAY_LOW: 300 MS
MDC_IDC_SET_LEADCHNL_RA_PACING_AMPLITUDE: 2 V
MDC_IDC_SET_LEADCHNL_RA_PACING_CAPTURE_MODE: NORMAL
MDC_IDC_SET_LEADCHNL_RA_PACING_POLARITY: NORMAL
MDC_IDC_SET_LEADCHNL_RA_PACING_PULSEWIDTH: 0.4 MS
MDC_IDC_SET_LEADCHNL_RA_SENSING_ADAPTATION_MODE: NORMAL
MDC_IDC_SET_LEADCHNL_RA_SENSING_POLARITY: NORMAL
MDC_IDC_SET_LEADCHNL_RA_SENSING_SENSITIVITY: 0.25 MV
MDC_IDC_SET_LEADCHNL_RV_PACING_AMPLITUDE: 1.6 V
MDC_IDC_SET_LEADCHNL_RV_PACING_CAPTURE_MODE: NORMAL
MDC_IDC_SET_LEADCHNL_RV_PACING_POLARITY: NORMAL
MDC_IDC_SET_LEADCHNL_RV_PACING_PULSEWIDTH: 0.4 MS
MDC_IDC_SET_LEADCHNL_RV_SENSING_ADAPTATION_MODE: NORMAL
MDC_IDC_SET_LEADCHNL_RV_SENSING_POLARITY: NORMAL
MDC_IDC_SET_LEADCHNL_RV_SENSING_SENSITIVITY: 1.5 MV
MDC_IDC_SET_ZONE_DETECTION_INTERVAL: 375 MS
MDC_IDC_SET_ZONE_TYPE: NORMAL
MDC_IDC_SET_ZONE_VENDOR_TYPE: NORMAL
MDC_IDC_STAT_AT_BURDEN_PERCENT: 0 %
MDC_IDC_STAT_AT_DTM_END: NORMAL
MDC_IDC_STAT_AT_DTM_START: NORMAL
MDC_IDC_STAT_BRADY_DTM_END: NORMAL
MDC_IDC_STAT_BRADY_DTM_START: NORMAL
MDC_IDC_STAT_BRADY_RA_PERCENT_PACED: 25 %
MDC_IDC_STAT_BRADY_RV_PERCENT_PACED: 0 %
MDC_IDC_STAT_EPISODE_RECENT_COUNT: 0
MDC_IDC_STAT_EPISODE_RECENT_COUNT_DTM_END: NORMAL
MDC_IDC_STAT_EPISODE_RECENT_COUNT_DTM_START: NORMAL
MDC_IDC_STAT_EPISODE_TYPE: NORMAL
MDC_IDC_STAT_EPISODE_VENDOR_TYPE: NORMAL

## 2022-02-17 ENCOUNTER — OFFICE VISIT (OUTPATIENT)
Dept: FAMILY MEDICINE | Facility: CLINIC | Age: 77
End: 2022-02-17
Payer: MEDICARE

## 2022-02-17 VITALS
TEMPERATURE: 97.3 F | HEIGHT: 69 IN | BODY MASS INDEX: 33.77 KG/M2 | DIASTOLIC BLOOD PRESSURE: 70 MMHG | RESPIRATION RATE: 16 BRPM | SYSTOLIC BLOOD PRESSURE: 120 MMHG | HEART RATE: 75 BPM | WEIGHT: 228 LBS | OXYGEN SATURATION: 98 %

## 2022-02-17 DIAGNOSIS — E11.9 TYPE 2 DIABETES MELLITUS WITHOUT COMPLICATION, WITHOUT LONG-TERM CURRENT USE OF INSULIN (H): Primary | ICD-10-CM

## 2022-02-17 DIAGNOSIS — Z13.0 SCREENING FOR DEFICIENCY ANEMIA: ICD-10-CM

## 2022-02-17 DIAGNOSIS — C61 PROSTATE CANCER (H): ICD-10-CM

## 2022-02-17 DIAGNOSIS — Z90.5 S/P NEPHRECTOMY: ICD-10-CM

## 2022-02-17 DIAGNOSIS — I47.29 NSVT (NONSUSTAINED VENTRICULAR TACHYCARDIA) (H): ICD-10-CM

## 2022-02-17 DIAGNOSIS — Z90.5 SOLITARY KIDNEY, ACQUIRED: ICD-10-CM

## 2022-02-17 DIAGNOSIS — N18.31 STAGE 3A CHRONIC KIDNEY DISEASE (H): ICD-10-CM

## 2022-02-17 DIAGNOSIS — I42.8 OTHER CARDIOMYOPATHIES (H): ICD-10-CM

## 2022-02-17 DIAGNOSIS — I48.0 PAROXYSMAL ATRIAL FIBRILLATION (H): ICD-10-CM

## 2022-02-17 DIAGNOSIS — E78.5 HYPERLIPIDEMIA, UNSPECIFIED HYPERLIPIDEMIA TYPE: ICD-10-CM

## 2022-02-17 DIAGNOSIS — K22.70 BARRETT'S ESOPHAGUS WITHOUT DYSPLASIA: ICD-10-CM

## 2022-02-17 DIAGNOSIS — K21.00 GASTROESOPHAGEAL REFLUX DISEASE WITH ESOPHAGITIS WITHOUT HEMORRHAGE: ICD-10-CM

## 2022-02-17 DIAGNOSIS — Z90.49 S/P COLON RESECTION: ICD-10-CM

## 2022-02-17 LAB
ALBUMIN SERPL-MCNC: 3.5 G/DL (ref 3.4–5)
ALP SERPL-CCNC: 84 U/L (ref 40–150)
ALT SERPL W P-5'-P-CCNC: 42 U/L (ref 0–70)
ANION GAP SERPL CALCULATED.3IONS-SCNC: 7 MMOL/L (ref 3–14)
AST SERPL W P-5'-P-CCNC: 23 U/L (ref 0–45)
BILIRUB SERPL-MCNC: 0.6 MG/DL (ref 0.2–1.3)
BUN SERPL-MCNC: 19 MG/DL (ref 7–30)
CALCIUM SERPL-MCNC: 9.4 MG/DL (ref 8.5–10.1)
CHLORIDE BLD-SCNC: 109 MMOL/L (ref 94–109)
CHOLEST SERPL-MCNC: 125 MG/DL
CO2 SERPL-SCNC: 22 MMOL/L (ref 20–32)
CREAT SERPL-MCNC: 1.12 MG/DL (ref 0.66–1.25)
ERYTHROCYTE [DISTWIDTH] IN BLOOD BY AUTOMATED COUNT: 14 % (ref 10–15)
FASTING STATUS PATIENT QL REPORTED: YES
GFR SERPL CREATININE-BSD FRML MDRD: 68 ML/MIN/1.73M2
GLUCOSE BLD-MCNC: 110 MG/DL (ref 70–99)
HBA1C MFR BLD: 6.5 % (ref 0–5.6)
HCT VFR BLD AUTO: 47.4 % (ref 40–53)
HDLC SERPL-MCNC: 44 MG/DL
HGB BLD-MCNC: 15.8 G/DL (ref 13.3–17.7)
LDLC SERPL CALC-MCNC: 64 MG/DL
MCH RBC QN AUTO: 30.7 PG (ref 26.5–33)
MCHC RBC AUTO-ENTMCNC: 33.3 G/DL (ref 31.5–36.5)
MCV RBC AUTO: 92 FL (ref 78–100)
NONHDLC SERPL-MCNC: 81 MG/DL
PLATELET # BLD AUTO: 182 10E3/UL (ref 150–450)
POTASSIUM BLD-SCNC: 4.3 MMOL/L (ref 3.4–5.3)
PROT SERPL-MCNC: 7.7 G/DL (ref 6.8–8.8)
RBC # BLD AUTO: 5.15 10E6/UL (ref 4.4–5.9)
SODIUM SERPL-SCNC: 138 MMOL/L (ref 133–144)
TRIGL SERPL-MCNC: 84 MG/DL
TSH SERPL DL<=0.005 MIU/L-ACNC: 2.28 MU/L (ref 0.4–4)
WBC # BLD AUTO: 6.3 10E3/UL (ref 4–11)

## 2022-02-17 PROCEDURE — 85027 COMPLETE CBC AUTOMATED: CPT | Performed by: INTERNAL MEDICINE

## 2022-02-17 PROCEDURE — 80061 LIPID PANEL: CPT | Performed by: INTERNAL MEDICINE

## 2022-02-17 PROCEDURE — 99214 OFFICE O/P EST MOD 30 MIN: CPT | Performed by: INTERNAL MEDICINE

## 2022-02-17 PROCEDURE — 84443 ASSAY THYROID STIM HORMONE: CPT | Performed by: INTERNAL MEDICINE

## 2022-02-17 PROCEDURE — 36415 COLL VENOUS BLD VENIPUNCTURE: CPT | Performed by: INTERNAL MEDICINE

## 2022-02-17 PROCEDURE — 83036 HEMOGLOBIN GLYCOSYLATED A1C: CPT | Performed by: INTERNAL MEDICINE

## 2022-02-17 PROCEDURE — 80053 COMPREHEN METABOLIC PANEL: CPT | Performed by: INTERNAL MEDICINE

## 2022-02-17 RX ORDER — METFORMIN HCL 500 MG
500 TABLET, EXTENDED RELEASE 24 HR ORAL
Qty: 90 TABLET | Refills: 3 | Status: SHIPPED | OUTPATIENT
Start: 2022-02-17 | End: 2023-03-03

## 2022-02-17 RX ORDER — PANTOPRAZOLE SODIUM 40 MG/1
40 TABLET, DELAYED RELEASE ORAL DAILY
Qty: 90 TABLET | Refills: 3 | Status: SHIPPED | OUTPATIENT
Start: 2022-02-17 | End: 2023-03-03

## 2022-02-17 RX ORDER — METOPROLOL SUCCINATE 50 MG/1
50 TABLET, EXTENDED RELEASE ORAL DAILY
Qty: 90 TABLET | Refills: 3 | Status: SHIPPED | OUTPATIENT
Start: 2022-02-17 | End: 2023-03-03

## 2022-02-17 RX ORDER — ATORVASTATIN CALCIUM 10 MG/1
10 TABLET, FILM COATED ORAL DAILY
Qty: 90 TABLET | Refills: 3 | Status: SHIPPED | OUTPATIENT
Start: 2022-02-17 | End: 2022-08-05

## 2022-02-17 NOTE — PATIENT INSTRUCTIONS
Start taking Jardiance 10 mg daily  Avoid dehydration  Hold Jardiance if you are acutely sick  Hold Jardiance 4 days before any elective surgery  Labs today  Monitor your blood pressure once a week  at home.  Bring those readings on your next visit.  Notify us if your blood pressure readings consistently stays greater than 140/90.  Schedule endoscopy at your earliest convenience by calling the following number:  [place]  :[phone].    Follow up in 4 months  Seek sooner medical attention if there is any worsening of symptoms or problems.

## 2022-02-17 NOTE — PROGRESS NOTES
Assessment & Plan     Estrada was seen today for recheck.    Diagnoses and all orders for this visit:    Type 2 diabetes mellitus without complication, without long-term current use of insulin (H)  -     Comprehensive metabolic panel; Future  -     Lipid panel reflex to direct LDL Fasting; Future  -     TSH with free T4 reflex; Future  -     Hemoglobin A1c  -     metFORMIN (GLUCOPHAGE-XR) 500 MG 24 hr tablet; Take 1 tablet (500 mg) by mouth daily (with dinner)  -     empagliflozin (JARDIANCE) 10 MG TABS tablet; Take 1 tablet (10 mg) by mouth daily For diabetes  -     Comprehensive metabolic panel  -     Lipid panel reflex to direct LDL Fasting  -     TSH with free T4 reflex  Lab Results   Component Value Date    A1C 6.5 02/17/2022    A1C 6.5 10/14/2021    A1C 6.4 06/26/2021    A1C 6.7 03/18/2021    A1C 6.3 03/16/2020    A1C 5.8 03/13/2019    A1C 6.4 09/21/2018     Discussed about Jardiance which is a good choice for patients who have underlying heart disease  .  Educated him about this  Drink 1 extra glass of water to avoid dehydration  Hold if you are acutely sick  Hold for days before any elective procedure  It will make you urinate more  It increases the risk of bladder infection  He will take it if his insurance covers    Chacon's esophagus without dysplasia  Comments:  seen on EGD  Orders:  -     pantoprazole (PROTONIX) 40 MG EC tablet; Take 1 tablet (40 mg) by mouth daily for acid reflux  -     Adult Gastro Ref - Procedure Only; Future  Patient has history of Chacon's esophagus but did not get endoscopy done yet  He was referred previously  Educated him about this and discussed the importance of getting endoscopy done  He says because of Covid he kept postponing    Gastroesophageal reflux disease with esophagitis without hemorrhage  -     Adult Gastro Ref - Procedure Only; Future    Stage 3a chronic kidney disease (H)  Chronic and is stable     S/p nephrectomy  Comments:  Stable patient only has his left  "kidney in place     Hyperlipidemia, unspecified hyperlipidemia type  -     atorvastatin (LIPITOR) 10 MG tablet; Take 1 tablet (10 mg) by mouth daily for cholestrol    Paroxysmal atrial fibrillation (H)  -     metoprolol succinate ER (TOPROL-XL) 50 MG 24 hr tablet; Take 1 tablet (50 mg) by mouth daily for heart and Blood Pressure  Heart rate is under control  He is on anticoagulation    Prostate cancer (H)  He is followed by urologist  Currently they are doing watchful waiting  He is a scheduled to have another PCA an appointment  Note of urology was reviewed and discussed with the patient    Other cardiomyopathies (H)  Well compensated    Screening for deficiency anemia  -     CBC with platelets; Future  -     CBC with platelets    NSVT (nonsustained ventricular tachycardia) (H)  Past history currently under control with beta-blocker     S/P colon resection  Stable.   [id number]}     BMI:   Estimated body mass index is 33.67 kg/m  as calculated from the following:    Height as of this encounter: 1.753 m (5' 9\").    Weight as of this encounter: 103.4 kg (228 lb).   Weight management plan: Discussed healthy diet and exercise guidelines    See Patient Instructions  Patient Instructions   Start taking Jardiance 10 mg daily  Avoid dehydration  Hold Jardiance if you are acutely sick  Hold Jardiance 4 days before any elective surgery  Labs today  Monitor your blood pressure once a week  at home.  Bring those readings on your next visit.  Notify us if your blood pressure readings consistently stays greater than 140/90.  Schedule endoscopy at your earliest convenience by calling the following number:  [place]  :[phone].    Follow up in 4 months  Seek sooner medical attention if there is any worsening of symptoms or problems.        Return in about 4 months (around 6/17/2022) for Diabetes.    Zoe Tomas MD  [facility] Dorado is a 77 year old who presents for the following " "health issues     History of Present Illness       Diabetes:   He presents for follow up of diabetes.  He is not checking blood glucose. He has no concerns regarding his diabetes at this time.  He is not experiencing numbness or burning in feet, excessive thirst, blurry vision, weight changes or redness, sores or blisters on feet. The patient has not had a diabetic eye exam in the last 12 months.         Hyperlipidemia:  He presents for follow up of hyperlipidemia.  He is taking medication to lower cholesterol. He is not having myalgia or other side effects to statin medications.    Hypertension: He presents for follow up of hypertension.  He does not check blood pressure  regularly outside of the clinic. Outside blood pressures have been over 140/90. He follows a low salt diet.     He eats 2-3 servings of fruits and vegetables daily.He consumes 0 sweetened beverage(s) daily.He exercises with enough effort to increase his heart rate 30 to 60 minutes per day.  He exercises with enough effort to increase his heart rate 7 days per week.   He is taking medications regularly.             Review of Systems   Constitutional, HEENT, cardiovascular, pulmonary, GI, , musculoskeletal, neuro, skin, endocrine and psych systems are negative, except as otherwise noted.      Objective    /70   Pulse 75   Temp 97.3  F (36.3  C) (Temporal)   Resp 16   Ht 1.753 m (5' 9\")   Wt 103.4 kg (228 lb)   SpO2 98%   BMI 33.67 kg/m    Body mass index is 33.67 kg/m .  Physical Exam       GENERAL APPEARANCE: healthy, alert and no distress  EYES: Eyes grossly normal to inspection, PERRL and conjunctivae and sclerae normal  HENT: ear canals and TM's normal and nose and mouth without ulcers or lesions  NECK: no adenopathy  RESP: lungs clear to auscultation - no rales, rhonchi or wheezes  CV: regular rates and rhythm, normal S1 S2, no S3            "

## 2022-02-18 NOTE — RESULT ENCOUNTER NOTE
Woody Dorado,    This is to inform you regarding your test result.    TSH which is thyroid hormone is normal.  The testing of your kidney function, liver function and electrolytes was satisfactory   Glucose which is your blood sugar is slightly elevated.  Your total cholesterol is normal.  HDL which is called good cholesterol is normal.  Your LDL cholesterol is normal.  This is often call bad cholesterol and high levels increase the risk for heart attacks and strokes.  Your triglycerides are normal.  HbA1c which is average glucose during last 3 months is 6.5%.  CBC result which includes white count Hemoglobin and  Platelet Counts is normal.     Lab Results       Component                Value               Date                       A1C                      6.5                 02/17/2022                 A1C                      6.5                 10/14/2021                 A1C                      6.4                 06/26/2021                 A1C                      6.7                 03/18/2021                 A1C                      6.3                 03/16/2020                 A1C                      5.8                 03/13/2019                 A1C                      6.4                 09/21/2018                  Sincerely,      Dr.Nasima Thom MD,FACP

## 2022-02-22 ENCOUNTER — TELEPHONE (OUTPATIENT)
Dept: GASTROENTEROLOGY | Facility: CLINIC | Age: 77
End: 2022-02-22
Payer: MEDICARE

## 2022-02-22 DIAGNOSIS — Z11.59 ENCOUNTER FOR SCREENING FOR OTHER VIRAL DISEASES: Primary | ICD-10-CM

## 2022-02-22 NOTE — TELEPHONE ENCOUNTER
Screening Questions  Blue=prep questions Red=location Green=sedation   1. Are you active on mychart? Y    2. What insurance is in the chart? Medicare     3.  Ordering/Referring Provider: Zoe Tomas MD     4. BMI 30.4, If greater than 40 review exclusion criteria also will need EXTENDED PREP    5.  Respiratory Screening (If yes to any of the following HOSPITAL setting only):     Do you use daily home oxygen? N  Do you have mod to severe Obstructive Sleep Apnea? N (can be seen at [facility] or hospital setting)    Do you have Pulmonary Hypertension? Y   Do you have UNCONTROLLED asthma? N    6. Have you had a heart or lung transplant? N  (If yes, please review exclusion criteria)    7. Are you currently on dialysis? N  (If yes, schedule in HOSPITAL setting only)(If yes, please send Golytely prep)    8. Do you have chronic kidney disease? Y (If yes, please send Golytely prep)    9. Have you had a stroke or Transient ischemic attack (TIA) within 6 months? N (If yes, do not schedule at [facility])    10. In the past 6 months, have you had any heart related issues including cardiomyopathy or heart attack? N (If yes, please review exclusion criteria)           If yes, did it require cardiac stenting or other implantable device?  (If yes, please review exclusion criteria)      11. Do you have any implantable devices in your body (pacemaker, defib, LVAD)? Y (If yes, schedule at [facility])    12. Do you take nitroglycerin? If yes, how often? N (if yes, schedule at HOSPITAL setting)    13. Are you currently taking any blood thinners? N (If yes- inform patient to follow up with PCP or provider for follow up instructions)     14. Are you a diabetic? N (If yes, please send Golytely prep)    15. (Females) Are you currently pregnant?   If yes, how many weeks?      16. Are you taking any prescription pain medications on a routine schedule? N If yes, MAC sedation and patient will need EXTENDED PREP.    17. Do you have any chemical dependencies  such as alcohol, street drugs, or methadone? N If yes, MAC sedation     18. Do you have any history of post-traumatic stress syndrome, severe anxiety or history of psychosis? N  If yes, MAC sedation.     19. Do you transfer independently? Y    20.  Do you have any issues with constipation? N   If yes, pt will need EXTENDED PREP     21. Preferred Pharmacy for Pre Prescription [id number] [place] [address]    Scheduling Details    Which Colonoscopy Prep was Sent?: NA  Type of Procedure Scheduled: EGD  Surgeon: Fantasma  Date of Procedure: 3/3  Location: [facility]  Caller (Please ask for phone number if not scheduled by patient): Estrada Oseguera      Sedation Type: CS  Conscious Sedation- Needs  for 6 hours after the procedure  MAC/General-Needs  for 24 hours after procedure    Pre-op Required at [facility], [place], [place] and OR for MAC sedation:   (if yes advise patient they will need a pre-op prior to procedure)      Informed patient they will need an adult  Y    Cannot take any type of public or medical transportation alone    Pre-Procedure Covid test to be completed at [facility] or Externally: Yes, 2/28 [place]    Confirmed Nurse will call to complete assessment Y    Additional comments: Staff message sent for approval for patient to be schedule at [facility] with Dr. Meek (patient requested). Has pacemaker, based on exclusion criteria he should be seen at [facility].     ([facility] PATIENTS NEED EXTENDED PREP)

## 2022-02-24 NOTE — TELEPHONE ENCOUNTER
"Patient ok to be seen at [facility] with pacemaker.     From: Deondre Meek MD  To: Erika   Date/Time: 2/23/22 7:12 PM  Subject: Scheduling Question    \"He should be fine to be scheduled at [facility].   No worries   Thanks for letting me know     Deondre Meek MD\"    "

## 2022-02-28 ENCOUNTER — LAB (OUTPATIENT)
Dept: URGENT CARE | Facility: URGENT CARE | Age: 77
End: 2022-02-28
Payer: MEDICARE

## 2022-02-28 DIAGNOSIS — Z11.59 ENCOUNTER FOR SCREENING FOR OTHER VIRAL DISEASES: ICD-10-CM

## 2022-02-28 PROCEDURE — U0005 INFEC AGEN DETEC AMPLI PROBE: HCPCS

## 2022-02-28 PROCEDURE — U0003 INFECTIOUS AGENT DETECTION BY NUCLEIC ACID (DNA OR RNA); SEVERE ACUTE RESPIRATORY SYNDROME CORONAVIRUS 2 (SARS-COV-2) (CORONAVIRUS DISEASE [COVID-19]), AMPLIFIED PROBE TECHNIQUE, MAKING USE OF HIGH THROUGHPUT TECHNOLOGIES AS DESCRIBED BY CMS-2020-01-R: HCPCS

## 2022-03-01 LAB — SARS-COV-2 RNA RESP QL NAA+PROBE: NEGATIVE

## 2022-03-03 ENCOUNTER — HOSPITAL ENCOUNTER (OUTPATIENT)
Facility: CLINIC | Age: 77
Discharge: HOME | End: 2022-03-03
Attending: INTERNAL MEDICINE | Admitting: INTERNAL MEDICINE
Payer: MEDICARE

## 2022-03-03 VITALS
DIASTOLIC BLOOD PRESSURE: 69 MMHG | HEIGHT: 70 IN | OXYGEN SATURATION: 92 % | WEIGHT: 212 LBS | SYSTOLIC BLOOD PRESSURE: 108 MMHG | BODY MASS INDEX: 30.35 KG/M2 | HEART RATE: 56 BPM

## 2022-03-03 LAB — UPPER GI ENDOSCOPY: NORMAL

## 2022-03-03 PROCEDURE — 43239 EGD BIOPSY SINGLE/MULTIPLE: CPT | Performed by: INTERNAL MEDICINE

## 2022-03-03 PROCEDURE — G0500 MOD SEDAT ENDO SERVICE >5YRS: HCPCS | Performed by: INTERNAL MEDICINE

## 2022-03-03 PROCEDURE — 43249 ESOPH EGD DILATION <30 MM: CPT | Performed by: INTERNAL MEDICINE

## 2022-03-03 PROCEDURE — 250N000009 HC RX 250: Performed by: INTERNAL MEDICINE

## 2022-03-03 PROCEDURE — 250N000011 HC RX IP 250 OP 636: Performed by: INTERNAL MEDICINE

## 2022-03-03 PROCEDURE — 88305 TISSUE EXAM BY PATHOLOGIST: CPT | Mod: TC | Performed by: INTERNAL MEDICINE

## 2022-03-03 RX ORDER — FENTANYL CITRATE 50 UG/ML
INJECTION, SOLUTION INTRAMUSCULAR; INTRAVENOUS PRN
Status: COMPLETED | OUTPATIENT
Start: 2022-03-03 | End: 2022-03-03

## 2022-03-03 RX ADMIN — MIDAZOLAM 2 MG: 1 INJECTION INTRAMUSCULAR; INTRAVENOUS at 08:29

## 2022-03-03 RX ADMIN — MIDAZOLAM 2 MG: 1 INJECTION INTRAMUSCULAR; INTRAVENOUS at 08:30

## 2022-03-03 RX ADMIN — FENTANYL CITRATE 100 MCG: 50 INJECTION, SOLUTION INTRAMUSCULAR; INTRAVENOUS at 08:28

## 2022-03-03 RX ADMIN — TOPICAL ANESTHETIC 1 SPRAY: 200 SPRAY DENTAL; PERIODONTAL at 08:29

## 2022-03-03 NOTE — H&P
[facility]  Pre-Endoscopy History and Physical     Estrada Oseguera MRN# [id number]   YOB: 1945 Age: 77 year old     Date of Procedure: 3/3/2022  Primary care provider: Zoe Tomas  Type of Endoscopy: esophagogastroduodenoscopy (upper GI endoscopy)  Reason for Procedure: Chacon's esophagus  Type of Anesthesia Anticipated: Moderate Sedation    HPI:    Estrada is a 77 year old male who will be undergoing the above procedure.      A history and physical has been performed. The patient's medications and allergies have been reviewed. The risks and benefits of the procedure and the sedation options and risks were discussed with the patient.  All questions were answered and informed consent was obtained.      He denies a personal or family history of anesthesia complications or bleeding disorders.     No Known Allergies     Prior to Admission Medications   Prescriptions Last Dose Informant Patient Reported? Taking?   atorvastatin (LIPITOR) 10 MG tablet 3/2/2022 at Unknown time  No Yes   Sig: Take 1 tablet (10 mg) by mouth daily for cholestrol   blood glucose (NO BRAND SPECIFIED) lancets standard   No No   Sig: Use to test blood sugar 1 times daily or as directed.   blood glucose (NO BRAND SPECIFIED) test strip   No No   Sig: Use to test blood sugar 1 times daily   blood glucose monitoring (NO BRAND SPECIFIED) meter device kit   No No   Sig: Use to test blood sugar as directed.   empagliflozin (JARDIANCE) 10 MG TABS tablet 3/2/2022 at Unknown time  No Yes   Sig: Take 1 tablet (10 mg) by mouth daily For diabetes   lisinopril (ZESTRIL) 20 MG tablet 3/2/2022 at Unknown time  No Yes   Sig: Take 0.5 tablets (10 mg) by mouth daily for Blood Pressure   metFORMIN (GLUCOPHAGE-XR) 500 MG 24 hr tablet 3/2/2022 at Unknown time  No Yes   Sig: Take 1 tablet (500 mg) by mouth daily (with dinner)   metoprolol succinate ER (TOPROL-XL) 50 MG 24 hr tablet 3/2/2022 at Unknown time  No Yes   Sig:  Take 1 tablet (50 mg) by mouth daily for heart and Blood Pressure   pantoprazole (PROTONIX) 40 MG EC tablet 3/2/2022 at Unknown time  No Yes   Sig: Take 1 tablet (40 mg) by mouth daily for acid reflux   sildenafil (REVATIO) 20 MG tablet   No No   Sig: Take 1-2 tablet (20 mg) by mouth half to one hour before sexual intercourse .  Never use with nitroglycerin, terazosin or doxazosin.   tamsulosin (FLOMAX) 0.4 MG capsule 3/2/2022 at Unknown time  No Yes   Sig: Take 1 capsule (0.4 mg) by mouth daily      Facility-Administered Medications: None       Patient Active Problem List   Diagnosis     Solitary kidney, acquired     Benign essential hypertension     Hiatal hernia     Benign hypertension     GERD (gastroesophageal reflux disease)     CKD (chronic kidney disease)     S/P colon resection     S/p nephrectomy     Tubular adenoma     Iron deficiency     Stricture esophagus     Chacon's esophagus without dysplasia     Cardiac pacemaker     Hyperlipidemia, unspecified hyperlipidemia type     Elevated prostate specific antigen (PSA)     History of cardiomyopathy     Other cardiomyopathies (H)     NSVT (nonsustained ventricular tachycardia) (H)     Paroxysmal atrial fibrillation (H)     Type 2 diabetes mellitus without complication, without long-term current use of insulin (H)     Chronic kidney disease, stage 3     Prostate cancer (H)        Past Medical History:   Diagnosis Date     A-fib (H) 01/31/2018     Cancer (H)     skin     CKD (chronic kidney disease)      GERD (gastroesophageal reflux disease)      Hernia, abdominal      Hiatal hernia      Hypertension      Mumps      Pericarditis      S/P colon resection         Past Surgical History:   Procedure Laterality Date     CHOLECYSTECTOMY       COLONOSCOPY       COLONOSCOPY N/A 5/6/2021    Procedure: COLONOSCOPY, WITH POLYPECTOMY AND BIOPSY;  Surgeon: Deondre Meek MD;  Location:  GI     COMBINED COLONOSCOPY, MUCOSAL RESECTION  1993    ?diverticulitis       "ESOPHAGOSCOPY, GASTROSCOPY, DUODENOSCOPY (EGD), COMBINED N/A 11/3/2016    Procedure: COMBINED ESOPHAGOSCOPY, GASTROSCOPY, DUODENOSCOPY (EGD), BIOPSY SINGLE OR MULTIPLE;  Surgeon: Deondre Meek MD;  Location:  GI     ESOPHAGOSCOPY, GASTROSCOPY, DUODENOSCOPY (EGD), DILATATION, COMBINED N/A 2016    Procedure: COMBINED ESOPHAGOSCOPY, GASTROSCOPY, DUODENOSCOPY (EGD), DILATATION;  Surgeon: Deondre Meek MD;  Location:  GI     ESOPHAGOSCOPY, GASTROSCOPY, DUODENOSCOPY (EGD), DILATATION, COMBINED N/A 3/2/2017    Procedure: COMBINED ESOPHAGOSCOPY, GASTROSCOPY, DUODENOSCOPY (EGD), DILATATION;  Surgeon: Deondre Meek MD;  Location:  GI     NEPHRECTOMY       NEPHRECTOMY RT/LT      right     pace maker       VASECTOMY         Social History     Tobacco Use     Smoking status: Former Smoker     Years: 7.00     Types: Pipe     Quit date:      Years since quittin.1     Smokeless tobacco: Never Used     Tobacco comment: light smoker for few years only   Substance Use Topics     Alcohol use: Not Currently     Alcohol/week: 0.0 standard drinks       Family History   Problem Relation Age of Onset     Hyperlipidemia Mother      Cerebrovascular Disease Father      Prostate Cancer Father      Pulmonary Hypertension Brother      Breast Cancer Sister      Parkinsonism Sister      Colon Cancer No family hx of        REVIEW OF SYSTEMS:     5 point ROS negative except as noted above in HPI, including Gen., Resp., CV, GI &  system review.      PHYSICAL EXAM:   /77   Pulse 58   Resp 11   Ht 1.778 m (5' 10\")   Wt 96.2 kg (212 lb)   SpO2 91%   BMI 30.42 kg/m   Estimated body mass index is 30.42 kg/m  as calculated from the following:    Height as of this encounter: 1.778 m (5' 10\").    Weight as of this encounter: 96.2 kg (212 lb).   GENERAL APPEARANCE: healthy, alert and no distress  MENTAL STATUS: alert  AIRWAY EXAM: Mallampatti Class I (visualization of the soft palate, fauces, uvula, " anterior and posterior pillars)  RESP: lungs clear to auscultation - no rales, rhonchi or wheezes  CV: regular rates and rhythm      DIAGNOSTICS:    Not indicated      IMPRESSION   ASA Class 2 - Mild systemic disease        PLAN:       Plan for esophagogastroduodenoscopy (upper GI endoscopy). We discussed the risks, benefits and alternatives and the patient wished to proceed.    The above has been forwarded to the consulting provider.      Signed Electronically by: Deondre Meek MD,MD  March 3, 2022      [facility]  Ph: [phone] Fax: [phone]

## 2022-03-04 LAB
PATH REPORT.COMMENTS IMP SPEC: NORMAL
PATH REPORT.COMMENTS IMP SPEC: NORMAL
PATH REPORT.FINAL DX SPEC: NORMAL
PATH REPORT.GROSS SPEC: NORMAL
PATH REPORT.MICROSCOPIC SPEC OTHER STN: NORMAL
PATH REPORT.RELEVANT HX SPEC: NORMAL
PHOTO IMAGE: NORMAL

## 2022-03-04 PROCEDURE — 88305 TISSUE EXAM BY PATHOLOGIST: CPT | Mod: 26 | Performed by: PATHOLOGY

## 2022-03-21 ENCOUNTER — TRANSFERRED RECORDS (OUTPATIENT)
Dept: HEALTH INFORMATION MANAGEMENT | Facility: CLINIC | Age: 77
End: 2022-03-21

## 2022-03-21 LAB — RETINOPATHY: NEGATIVE

## 2022-04-15 DIAGNOSIS — R39.9 LOWER URINARY TRACT SYMPTOMS (LUTS): ICD-10-CM

## 2022-04-18 RX ORDER — TAMSULOSIN HYDROCHLORIDE 0.4 MG/1
CAPSULE ORAL
Qty: 90 CAPSULE | Refills: 1 | Status: SHIPPED | OUTPATIENT
Start: 2022-04-18 | End: 2022-04-22

## 2022-04-21 ENCOUNTER — ANCILLARY PROCEDURE (OUTPATIENT)
Dept: CARDIOLOGY | Facility: CLINIC | Age: 77
End: 2022-04-21
Attending: INTERNAL MEDICINE
Payer: MEDICARE

## 2022-04-21 DIAGNOSIS — I49.5 SSS (SICK SINUS SYNDROME) (H): ICD-10-CM

## 2022-04-21 DIAGNOSIS — Z95.0 CARDIAC PACEMAKER IN SITU: ICD-10-CM

## 2022-04-21 DIAGNOSIS — Z95.0 CARDIAC PACEMAKER IN SITU: Primary | ICD-10-CM

## 2022-04-21 PROCEDURE — 93296 REM INTERROG EVL PM/IDS: CPT | Performed by: INTERNAL MEDICINE

## 2022-04-21 PROCEDURE — 93294 REM INTERROG EVL PM/LDLS PM: CPT | Performed by: INTERNAL MEDICINE

## 2022-04-22 ENCOUNTER — OFFICE VISIT (OUTPATIENT)
Dept: UROLOGY | Facility: CLINIC | Age: 77
End: 2022-04-22
Payer: MEDICARE

## 2022-04-22 VITALS
SYSTOLIC BLOOD PRESSURE: 130 MMHG | HEIGHT: 70 IN | DIASTOLIC BLOOD PRESSURE: 70 MMHG | WEIGHT: 225 LBS | BODY MASS INDEX: 32.21 KG/M2

## 2022-04-22 DIAGNOSIS — R39.9 LOWER URINARY TRACT SYMPTOMS (LUTS): ICD-10-CM

## 2022-04-22 DIAGNOSIS — C61 PROSTATE CANCER (H): Primary | ICD-10-CM

## 2022-04-22 LAB — PSA SERPL-MCNC: 6.1 UG/L (ref 0–4)

## 2022-04-22 PROCEDURE — 84153 ASSAY OF PSA TOTAL: CPT | Performed by: UROLOGY

## 2022-04-22 PROCEDURE — 99214 OFFICE O/P EST MOD 30 MIN: CPT | Performed by: UROLOGY

## 2022-04-22 PROCEDURE — 36415 COLL VENOUS BLD VENIPUNCTURE: CPT | Performed by: UROLOGY

## 2022-04-22 RX ORDER — TAMSULOSIN HYDROCHLORIDE 0.4 MG/1
0.4 CAPSULE ORAL DAILY
Qty: 90 CAPSULE | Refills: 3 | Status: SHIPPED | OUTPATIENT
Start: 2022-04-22 | End: 2022-08-05

## 2022-04-22 ASSESSMENT — PAIN SCALES - GENERAL: PAINLEVEL: NO PAIN (0)

## 2022-04-22 NOTE — PROGRESS NOTES
"[facility]  CHIEF COMPLAINT   It was my pleasure to see Estrada Oseguera who is a 77 year old male for follow-up of prostate cancer on active surveillance with LUTS.      HPI   Estrada Oseguera is a very pleasant 77 year old male     Initially seen 4/7/21:  \"Estrada Oseguera is a 76 year old male who is being seen for evaluation of Elevated PSA      Has been having significant LUTS worsening over the past 6-12 months  Slow and weak stream  Nocturia x3/night  Having daytime frequency   Having some urgency   He is having to do some abdominal straining  No prior acute urinary retention, UTI, or gross hematuria      Also with some Elevated PSA      Father had low grade prostate cancer found at time of death at age 87 from a stroke\"    4/21/21:  Started on tamsulosin 0.4mg (Flomax) at last visit  Follow up to discuss MRI  Notes a slight change with tamsulosin 0.4mg (Flomax)     5/19/21:  Follow-up today to discuss his biopsy results  He did well after the biopsy with no issues  His wife joins him for this call    10/15/21:  Follow-up today to discuss his recent PSA  He has been doing well with no change in symptoms    TODAY 4/22/2022:   Follow-up today on active surveillance for his prostate cancer  He did run out of his tamsulosin a few weeks ago and noticed the effects of stopping the medication.  This has been refilled  His wife joins him for this visit    PHYSICAL EXAM  Patient is a 77 year old  male   Vitals: Blood pressure 130/70, height 1.778 m (5' 10\"), weight 102.1 kg (225 lb).  Body mass index is 32.28 kg/m .  General Appearance Adult:   Alert, no acute distress, oriented  HENT: throat/mouth:normal, good dentition  Lungs: no respiratory distress, or pursed lip breathing  Heart: No obvious jugular venous distension present  Abdomen: obesely - distended  Musculoskeltal: extremities normal, no peripheral edema  Skin: no suspicious lesions or rashes  Neuro: Alert, oriented, speech and mentation normal  Psych: " affect and mood normal  Gait: Normal     Component PSA   Latest Ref Rng & Units 0.00 - 4.00 ug/L   9/21/2018 5.22 (H)   3/13/2019 5.28 (H)   3/16/2020 4.90 (H)   3/18/2021 5.38 (H)   9/20/2021 6.10 (H)   4/22/2022 6.10 (H)     PATHOLOGY:  MR-FUSION URONAV 5/12/21:  FINAL DIAGNOSIS:   A.  Prostate gland, left lateral base, needle biopsy:   - Negative for malignancy.   - Mild chronic inflammation.     B.  Prostate gland, left lateral mid, needle biopsy:   - Negative for malignancy.   - Glandular atrophy.     C.  Prostate gland, left lateral apex, needle biopsy:   - Negative for malignancy.   - Glandular atrophy.     D.  Prostate gland, left base, needle biopsy:   - Prostatic acinar adenocarcinoma.      - Lesley score  6 (3+3):      - WHO Grade Group - 1      - Proportion of tissue involved by tumor:  Approximately 33%      - Number of tissue cores involved by tumor:  1 (out of 1)      - Angiolymphatic invasion:  Not identified      - Perineural invasion:  Not identified     E.  Prostate gland, left mid, needle biopsy:   - Prostatic acinar adenocarcinoma.      - Lesley score  6 (3+3):      - WHO Grade Group - 1      - Proportion of tissue involved by tumor:  Approximately 33%      - Number of tissue cores involved by tumor:  1 (out of 1)      - Angiolymphatic invasion:  Not identified      - Perineural invasion:  Not identified     F.  Prostate gland, left apex, needle biopsy:]   - Negative for malignancy.     G.  Prostate gland, right lateral base, needle biopsy:   - Negative for malignancy.   - Glandular atrophy.     H.  Prostate gland, right lateral mid, needle biopsy:   - A minute focus of atypical small acinar proliferation, suspicious for   malignancy.   - Glandular atrophy.     I.  Prostate gland, right lateral apex, needle biopsy:   - Negative for malignancy.   - Glandular atrophy.     J.  Prostate gland, right base, needle biopsy:   - Negative for malignancy.   - Glandular atrophy.     K.  Prostate gland,  "right mid, needle biopsy:   - Negative for malignancy.   - Glandular atrophy.     L.  Prostate gland, right apex, needle biopsy:   - Negative for malignancy.   - Glandular atrophy.     M.  Prostate gland., right sided lesion, needle biopsies x 3:   - Prostatic acinar adenocarcinoma.      - Genoa score:  6 (3+3)      - WHO Grade Group - 1      - Proportion of tissue involved by tumor:  approximately 40%      - Number of tissue cores involved by tumor: 3 (out of 3)      - Angiolymphatic invasion:  Not identified      - Perineural invasion:  Not identified       IMAGING:  All pertinent imaging reviewed:    All imaging studies reviewed by me.  I personally reviewed these imaging films.  A formal report from radiology will follow.    MRI PROSTATE 4/15/21:  \"FINDINGS:  Size: 6.1 x 4.8 x 4.7 cm; 72 grams  Hemorrhage: Absent   Peripheral zone: Heterogeneous on T2-weighted images. Suspicious  lesions as detailed below.  Transition zone: Enlarged with BPH changes. Transition zone nodules  which are circumscribed or mostly encapsulated without diffusion  restriction.  PI-RADS 2.  No highly suspicious nodules.     Lesion(s) in rank order of severity (highest score- to lowest score,  then by size)      Lesion 1:  Location: Left apex peripheral zone at the 5 o'clock position relative  to the urethra. Series 4 image 36.   Additional prostate regions involved: None   Size: 6  mm  T2 description: Rounded moderate hypointensity  T2 numerical assessment: 3  DWI description: Focal hypointensity on ADC and hyperintensity on DWI  DWI numerical assessment: 3  DCE assessment: Positive    Prostate margin: Capsular abutment<6 mm with smooth contour   Lesion overall PI-RADS category: 4     Neurovascular bundles: No neurovascular bundle involvement by  malignancy.    Seminal vesicles: No seminal vesicle involvement by malignancy.   Lymph nodes: No lymph node involvement   Bones: No suspicious lesions   Other pelvic organs: Prominent fat in " bilateral inguinal canal, left  more than right. Circumferential apparent urinary bladder wall  thickening with trabeculation/sacculations, likely representing post  chronic urinary outflow obstructive change.                                                         IMPRESSION:     1. Based on the most suspicious abnormality, this exam is  characterized as PIRADS 4 - Clinically significant cancer is likely to  be present.  The most suspicious abnormality is located at the left  apex peripheral zone, 5:00 position and there is minimal capsular  abutment with no convincing evidence of extraprostatic extension.  2. No suspicious adenopathy or evidence of pelvic metastases.    ASSESSMENT and PLAN  77-year-old man with elevated PSA and LUTS, now with an MRI showing a PI-RADS 4 lesion and a prostate volume of 72 g.  Status post an MR fusion biopsy with Lesley 3+3 = 6 prostate cancer in 3/3 of the target lesions as well as 2/12 of the template biopsy    DISCUSSION PROSTATE CANCER     The natural history of this disease was explained to the patient at length and the treatment options discussed including radical prostatectomy by open or robotic-assisted laparoscopic approach, external-beam radiotherapy, brachytherapy, active surveillance and watchful waiting, including the probability of success and complications associated with these approaches.    With respect to watchful waiting, we discussed the difficulties of estimating the extent of disease preoperatively, the risk of cancer progression, and risk that salvage might not be possible with progression. However, the favorable 10-year outcomes of active surveillance in appropriately selected patients was conveyed.  We discussed that active surveillance has become the treatment of choice for men with low volume and low grade, Romney 3+3 = 6, prostate cancer.  Reviewed his PSA history and trend.  His PSA in October had risen to 6.1, and this has remained stable today at 6.1.   We discussed that this is very reassuring and I would recommend continued surveillance with a PSA recheck in 6 months.  If this PSA remains fairly stable we will then plan for another PSA recheck and follow-up with me in 1 year from today    We discussed that other treatment options would include surgery or radiation, however given his low volume of low-grade prostate cancer I would not recommend either modality at this time.    Plan:  -PSA in 6 months    LUTS  -He notes some improvement with voiding following initiation of tamsulosin 0.4 mg daily  -We discussed that his MRI did demonstrate prostatomegaly with a volume of 72 ml  -His prescription was modified today for additional refills for 1 year of coverage    Chart documentation with Dragon Voice recognition Software. Although reviewed after completion, some words and grammatical errors may remain.     Time spent: 15 minutes spent on the date of the encounter doing chart review, history and exam, documentation and further activities as noted above.    Jono Billy MD   Urology  [facility]  [facility] Phone: [phone]  [facility] Phone: [phone]

## 2022-04-22 NOTE — LETTER
"4/22/2022       RE: Estrada Oseguera  [address]  [facility] [address]     Dear Colleague,    Thank you for referring your patient, Estrada Oseguera, to the [facility] at [facility]. Please see a copy of my visit note below.    SOUTHDARAFAL  CHIEF COMPLAINT   It was my pleasure to see Estrada Oseguera who is a 77 year old male for follow-up of prostate cancer on active surveillance with LUTS.      HPI   Estrada Oseguera is a very pleasant 77 year old male     Initially seen 4/7/21:  \"Estrada Oseguera is a 76 year old male who is being seen for evaluation of Elevated PSA      Has been having significant LUTS worsening over the past 6-12 months  Slow and weak stream  Nocturia x3/night  Having daytime frequency   Having some urgency   He is having to do some abdominal straining  No prior acute urinary retention, UTI, or gross hematuria      Also with some Elevated PSA      Father had low grade prostate cancer found at time of death at age 87 from a stroke\"    4/21/21:  Started on tamsulosin 0.4mg (Flomax) at last visit  Follow up to discuss MRI  Notes a slight change with tamsulosin 0.4mg (Flomax)     5/19/21:  Follow-up today to discuss his biopsy results  He did well after the biopsy with no issues  His wife joins him for this call    10/15/21:  Follow-up today to discuss his recent PSA  He has been doing well with no change in symptoms    TODAY 4/22/2022:   Follow-up today on active surveillance for his prostate cancer  He did run out of his tamsulosin a few weeks ago and noticed the effects of stopping the medication.  This has been refilled  His wife joins him for this visit    PHYSICAL EXAM  Patient is a 77 year old  male   Vitals: Blood pressure 130/70, height 1.778 m (5' 10\"), weight 102.1 kg (225 lb).  Body mass index is 32.28 kg/m .  General Appearance Adult:   Alert, no acute distress, oriented  HENT: " throat/mouth:normal, good dentition  Lungs: no respiratory distress, or pursed lip breathing  Heart: No obvious jugular venous distension present  Abdomen: obesely - distended  Musculoskeltal: extremities normal, no peripheral edema  Skin: no suspicious lesions or rashes  Neuro: Alert, oriented, speech and mentation normal  Psych: affect and mood normal  Gait: Normal     Component PSA   Latest Ref Rng & Units 0.00 - 4.00 ug/L   9/21/2018 5.22 (H)   3/13/2019 5.28 (H)   3/16/2020 4.90 (H)   3/18/2021 5.38 (H)   9/20/2021 6.10 (H)   4/22/2022 6.10 (H)     PATHOLOGY:  MR-FUSION URONAV 5/12/21:  FINAL DIAGNOSIS:   A.  Prostate gland, left lateral base, needle biopsy:   - Negative for malignancy.   - Mild chronic inflammation.     B.  Prostate gland, left lateral mid, needle biopsy:   - Negative for malignancy.   - Glandular atrophy.     C.  Prostate gland, left lateral apex, needle biopsy:   - Negative for malignancy.   - Glandular atrophy.     D.  Prostate gland, left base, needle biopsy:   - Prostatic acinar adenocarcinoma.      - Lesley score  6 (3+3):      - WHO Grade Group - 1      - Proportion of tissue involved by tumor:  Approximately 33%      - Number of tissue cores involved by tumor:  1 (out of 1)      - Angiolymphatic invasion:  Not identified      - Perineural invasion:  Not identified     E.  Prostate gland, left mid, needle biopsy:   - Prostatic acinar adenocarcinoma.      - Lesley score  6 (3+3):      - WHO Grade Group - 1      - Proportion of tissue involved by tumor:  Approximately 33%      - Number of tissue cores involved by tumor:  1 (out of 1)      - Angiolymphatic invasion:  Not identified      - Perineural invasion:  Not identified     F.  Prostate gland, left apex, needle biopsy:]   - Negative for malignancy.     G.  Prostate gland, right lateral base, needle biopsy:   - Negative for malignancy.   - Glandular atrophy.     H.  Prostate gland, right lateral mid, needle biopsy:   - A minute focus  "of atypical small acinar proliferation, suspicious for   malignancy.   - Glandular atrophy.     I.  Prostate gland, right lateral apex, needle biopsy:   - Negative for malignancy.   - Glandular atrophy.     J.  Prostate gland, right base, needle biopsy:   - Negative for malignancy.   - Glandular atrophy.     K.  Prostate gland, right mid, needle biopsy:   - Negative for malignancy.   - Glandular atrophy.     L.  Prostate gland, right apex, needle biopsy:   - Negative for malignancy.   - Glandular atrophy.     M.  Prostate gland., right sided lesion, needle biopsies x 3:   - Prostatic acinar adenocarcinoma.      - Lesley score:  6 (3+3)      - WHO Grade Group - 1      - Proportion of tissue involved by tumor:  approximately 40%      - Number of tissue cores involved by tumor: 3 (out of 3)      - Angiolymphatic invasion:  Not identified      - Perineural invasion:  Not identified       IMAGING:  All pertinent imaging reviewed:    All imaging studies reviewed by me.  I personally reviewed these imaging films.  A formal report from radiology will follow.    MRI PROSTATE 4/15/21:  \"FINDINGS:  Size: 6.1 x 4.8 x 4.7 cm; 72 grams  Hemorrhage: Absent   Peripheral zone: Heterogeneous on T2-weighted images. Suspicious  lesions as detailed below.  Transition zone: Enlarged with BPH changes. Transition zone nodules  which are circumscribed or mostly encapsulated without diffusion  restriction.  PI-RADS 2.  No highly suspicious nodules.     Lesion(s) in rank order of severity (highest score- to lowest score,  then by size)      Lesion 1:  Location: Left apex peripheral zone at the 5 o'clock position relative  to the urethra. Series 4 image 36.   Additional prostate regions involved: None   Size: 6  mm  T2 description: Rounded moderate hypointensity  T2 numerical assessment: 3  DWI description: Focal hypointensity on ADC and hyperintensity on DWI  DWI numerical assessment: 3  DCE assessment: Positive    Prostate margin: Capsular " abutment<6 mm with smooth contour   Lesion overall PI-RADS category: 4     Neurovascular bundles: No neurovascular bundle involvement by  malignancy.    Seminal vesicles: No seminal vesicle involvement by malignancy.   Lymph nodes: No lymph node involvement   Bones: No suspicious lesions   Other pelvic organs: Prominent fat in bilateral inguinal canal, left  more than right. Circumferential apparent urinary bladder wall  thickening with trabeculation/sacculations, likely representing post  chronic urinary outflow obstructive change.                                                         IMPRESSION:     1. Based on the most suspicious abnormality, this exam is  characterized as PIRADS 4 - Clinically significant cancer is likely to  be present.  The most suspicious abnormality is located at the left  apex peripheral zone, 5:00 position and there is minimal capsular  abutment with no convincing evidence of extraprostatic extension.  2. No suspicious adenopathy or evidence of pelvic metastases.    ASSESSMENT and PLAN  77-year-old man with elevated PSA and LUTS, now with an MRI showing a PI-RADS 4 lesion and a prostate volume of 72 g.  Status post an MR fusion biopsy with Lesley 3+3 = 6 prostate cancer in 3/3 of the target lesions as well as 2/12 of the template biopsy    DISCUSSION PROSTATE CANCER     The natural history of this disease was explained to the patient at length and the treatment options discussed including radical prostatectomy by open or robotic-assisted laparoscopic approach, external-beam radiotherapy, brachytherapy, active surveillance and watchful waiting, including the probability of success and complications associated with these approaches.    With respect to watchful waiting, we discussed the difficulties of estimating the extent of disease preoperatively, the risk of cancer progression, and risk that salvage might not be possible with progression. However, the favorable 10-year outcomes of  active surveillance in appropriately selected patients was conveyed.  We discussed that active surveillance has become the treatment of choice for men with low volume and low grade, [place] 3+3 = 6, prostate cancer.  Reviewed his PSA history and trend.  His PSA in October had risen to 6.1, and this has remained stable today at 6.1.  We discussed that this is very reassuring and I would recommend continued surveillance with a PSA recheck in 6 months.  If this PSA remains fairly stable we will then plan for another PSA recheck and follow-up with me in 1 year from today    We discussed that other treatment options would include surgery or radiation, however given his low volume of low-grade prostate cancer I would not recommend either modality at this time.    Plan:  -PSA in 6 months    LUTS  -He notes some improvement with voiding following initiation of tamsulosin 0.4 mg daily  -We discussed that his MRI did demonstrate prostatomegaly with a volume of 72 ml  -His prescription was modified today for additional refills for 1 year of coverage    Chart documentation with Dragon Voice recognition Software. Although reviewed after completion, some words and grammatical errors may remain.     Time spent: 15 minutes spent on the date of the encounter doing chart review, history and exam, documentation and further activities as noted above.    Jono Billy MD   Urology  [facility]  [facility] Phone: [phone]  [facility] Phone: [phone]

## 2022-05-05 LAB
MDC_IDC_EPISODE_DTM: NORMAL
MDC_IDC_EPISODE_ID: NORMAL
MDC_IDC_EPISODE_TYPE: NORMAL
MDC_IDC_LEAD_IMPLANT_DT: NORMAL
MDC_IDC_LEAD_IMPLANT_DT: NORMAL
MDC_IDC_LEAD_LOCATION: NORMAL
MDC_IDC_LEAD_LOCATION: NORMAL
MDC_IDC_LEAD_LOCATION_DETAIL_1: NORMAL
MDC_IDC_LEAD_LOCATION_DETAIL_1: NORMAL
MDC_IDC_LEAD_MFG: NORMAL
MDC_IDC_LEAD_MFG: NORMAL
MDC_IDC_LEAD_MODEL: NORMAL
MDC_IDC_LEAD_MODEL: NORMAL
MDC_IDC_LEAD_POLARITY_TYPE: NORMAL
MDC_IDC_LEAD_POLARITY_TYPE: NORMAL
MDC_IDC_LEAD_SERIAL: NORMAL
MDC_IDC_LEAD_SERIAL: NORMAL
MDC_IDC_MSMT_BATTERY_DTM: NORMAL
MDC_IDC_MSMT_BATTERY_REMAINING_LONGEVITY: 114 MO
MDC_IDC_MSMT_BATTERY_REMAINING_PERCENTAGE: 100 %
MDC_IDC_MSMT_BATTERY_STATUS: NORMAL
MDC_IDC_MSMT_LEADCHNL_RA_IMPEDANCE_VALUE: 812 OHM
MDC_IDC_MSMT_LEADCHNL_RA_PACING_THRESHOLD_AMPLITUDE: 0.5 V
MDC_IDC_MSMT_LEADCHNL_RA_PACING_THRESHOLD_PULSEWIDTH: 0.4 MS
MDC_IDC_MSMT_LEADCHNL_RV_IMPEDANCE_VALUE: 660 OHM
MDC_IDC_MSMT_LEADCHNL_RV_PACING_THRESHOLD_AMPLITUDE: 1.2 V
MDC_IDC_MSMT_LEADCHNL_RV_PACING_THRESHOLD_PULSEWIDTH: 0.4 MS
MDC_IDC_PG_IMPLANT_DTM: NORMAL
MDC_IDC_PG_MFG: NORMAL
MDC_IDC_PG_MODEL: NORMAL
MDC_IDC_PG_SERIAL: NORMAL
MDC_IDC_PG_TYPE: NORMAL
MDC_IDC_SESS_CLINIC_NAME: NORMAL
MDC_IDC_SESS_DTM: NORMAL
MDC_IDC_SESS_TYPE: NORMAL
MDC_IDC_SET_BRADY_AT_MODE_SWITCH_MODE: NORMAL
MDC_IDC_SET_BRADY_AT_MODE_SWITCH_RATE: 170 {BEATS}/MIN
MDC_IDC_SET_BRADY_LOWRATE: 55 {BEATS}/MIN
MDC_IDC_SET_BRADY_MAX_SENSOR_RATE: 130 {BEATS}/MIN
MDC_IDC_SET_BRADY_MAX_TRACKING_RATE: 130 {BEATS}/MIN
MDC_IDC_SET_BRADY_MODE: NORMAL
MDC_IDC_SET_BRADY_PAV_DELAY_HIGH: 200 MS
MDC_IDC_SET_BRADY_PAV_DELAY_LOW: 300 MS
MDC_IDC_SET_BRADY_SAV_DELAY_HIGH: 200 MS
MDC_IDC_SET_BRADY_SAV_DELAY_LOW: 300 MS
MDC_IDC_SET_LEADCHNL_RA_PACING_AMPLITUDE: 2 V
MDC_IDC_SET_LEADCHNL_RA_PACING_CAPTURE_MODE: NORMAL
MDC_IDC_SET_LEADCHNL_RA_PACING_POLARITY: NORMAL
MDC_IDC_SET_LEADCHNL_RA_PACING_PULSEWIDTH: 0.4 MS
MDC_IDC_SET_LEADCHNL_RA_SENSING_ADAPTATION_MODE: NORMAL
MDC_IDC_SET_LEADCHNL_RA_SENSING_POLARITY: NORMAL
MDC_IDC_SET_LEADCHNL_RA_SENSING_SENSITIVITY: 0.25 MV
MDC_IDC_SET_LEADCHNL_RV_PACING_AMPLITUDE: 1.8 V
MDC_IDC_SET_LEADCHNL_RV_PACING_CAPTURE_MODE: NORMAL
MDC_IDC_SET_LEADCHNL_RV_PACING_POLARITY: NORMAL
MDC_IDC_SET_LEADCHNL_RV_PACING_PULSEWIDTH: 0.4 MS
MDC_IDC_SET_LEADCHNL_RV_SENSING_ADAPTATION_MODE: NORMAL
MDC_IDC_SET_LEADCHNL_RV_SENSING_POLARITY: NORMAL
MDC_IDC_SET_LEADCHNL_RV_SENSING_SENSITIVITY: 1.5 MV
MDC_IDC_SET_ZONE_DETECTION_INTERVAL: 375 MS
MDC_IDC_SET_ZONE_TYPE: NORMAL
MDC_IDC_SET_ZONE_VENDOR_TYPE: NORMAL
MDC_IDC_STAT_AT_BURDEN_PERCENT: 0 %
MDC_IDC_STAT_AT_DTM_END: NORMAL
MDC_IDC_STAT_AT_DTM_START: NORMAL
MDC_IDC_STAT_BRADY_DTM_END: NORMAL
MDC_IDC_STAT_BRADY_DTM_START: NORMAL
MDC_IDC_STAT_BRADY_RA_PERCENT_PACED: 28 %
MDC_IDC_STAT_BRADY_RV_PERCENT_PACED: 0 %
MDC_IDC_STAT_EPISODE_RECENT_COUNT: 0
MDC_IDC_STAT_EPISODE_RECENT_COUNT_DTM_END: NORMAL
MDC_IDC_STAT_EPISODE_RECENT_COUNT_DTM_START: NORMAL
MDC_IDC_STAT_EPISODE_TYPE: NORMAL
MDC_IDC_STAT_EPISODE_VENDOR_TYPE: NORMAL

## 2022-08-05 ENCOUNTER — VIRTUAL VISIT (OUTPATIENT)
Dept: URGENT CARE | Facility: CLINIC | Age: 77
End: 2022-08-05
Payer: MEDICARE

## 2022-08-05 DIAGNOSIS — U07.1 COVID: Primary | ICD-10-CM

## 2022-08-05 PROCEDURE — 99441 PR PHYSICIAN TELEPHONE EVALUATION 5-10 MIN: CPT | Mod: CS | Performed by: EMERGENCY MEDICINE

## 2022-08-05 NOTE — PROGRESS NOTES
"  The patient has been notified of following:     \"This telephone visit will be conducted via a call between you and your physician/provider. We have found that certain health care needs can be provided without the need for a physical exam.  This service lets us provide the care you need with a short phone conversation.  If a prescription is necessary we can send it directly to your pharmacy.  If lab work is needed we can place an order for that and you can then stop by our lab to have the test done at a later time.    Telephone visits are billed at different rates depending on your insurance coverage. During this emergency period, for some insurers they may be billed the same as an in-person visit.  Please reach out to your insurance provider with any questions.    If during the course of the call the physician/provider feels a telephone visit is not appropriate, you will not be charged for this service.\"    Patient has given verbal consent for Telephone visit?  Yes    What phone number would you like to be contacted at?  [phone]    How would you like to obtain your AVS? MyChart    Subjective   CC: Estrada Oseguera  is a 77 year old male who presents via phone visit today for the following health issues:   Chief Complaint   Patient presents with     Infection        COVID-19 Symptom Review  How many days ago did these symptoms start? 3    Are any of the following symptoms significant for you?    New or worsening difficulty breathing? No    Worsening cough? Yes, it's a dry cough.     Fever or chills? Yes, I felt feverish or had chills.    Headache: No    Sore throat: No    Chest pain: No    Diarrhea: No    Body aches? No    What treatments has patient tried?    Does patient live in a nursing home, group home, or shelter? No  Does patient have a way to get food/medications during quarantined? Yes, I have a friend or family member who can help me. and Yes                       Reviewed and updated as needed " this visit by Provider                    Review of Systems         Objective    Gen: Patient is alert, oriented  Gen: No acute distress on phone appointment.  Nondyspneic sounding speaking in full sentences.              Assessment/Plan:  Patient is a 77-year-old male whose had COVID infection for 3 days.  Symptoms are mild including no shortness of breath.  Patient has multiple comorbidities including prostate cancer, diabetes, hypertension, coronary artery disease, and chronic kidney disease.     GFR: 57 (low)  Patient consents to paxlovid.  Low-dose protocol ordered.  Patient is instructed to withhold his Lipitor, sildenafil, and Flomax for the 5 days of paxlovid treatment.    Phone call duration:  10 minutes    Mart Valentin MD

## 2022-08-18 ENCOUNTER — NURSE TRIAGE (OUTPATIENT)
Dept: NURSING | Facility: CLINIC | Age: 77
End: 2022-08-18

## 2022-08-18 ENCOUNTER — HOSPITAL ENCOUNTER (EMERGENCY)
Facility: CLINIC | Age: 77
Discharge: HOME | End: 2022-08-18
Attending: EMERGENCY MEDICINE | Admitting: EMERGENCY MEDICINE
Payer: MEDICARE

## 2022-08-18 ENCOUNTER — MYC MEDICAL ADVICE (OUTPATIENT)
Dept: FAMILY MEDICINE | Facility: CLINIC | Age: 77
End: 2022-08-18

## 2022-08-18 VITALS
HEIGHT: 70 IN | WEIGHT: 215 LBS | OXYGEN SATURATION: 96 % | RESPIRATION RATE: 16 BRPM | HEART RATE: 96 BPM | BODY MASS INDEX: 30.78 KG/M2 | DIASTOLIC BLOOD PRESSURE: 95 MMHG | TEMPERATURE: 97.4 F | SYSTOLIC BLOOD PRESSURE: 139 MMHG

## 2022-08-18 DIAGNOSIS — I10 BENIGN HYPERTENSION: ICD-10-CM

## 2022-08-18 DIAGNOSIS — R42 VERTIGO: ICD-10-CM

## 2022-08-18 LAB
ANION GAP SERPL CALCULATED.3IONS-SCNC: 6 MMOL/L (ref 3–14)
ATRIAL RATE - MUSE: 98 BPM
BASOPHILS # BLD AUTO: 0 10E3/UL (ref 0–0.2)
BASOPHILS NFR BLD AUTO: 0 %
BUN SERPL-MCNC: 21 MG/DL (ref 7–30)
CALCIUM SERPL-MCNC: 9.5 MG/DL (ref 8.5–10.1)
CHLORIDE BLD-SCNC: 106 MMOL/L (ref 94–109)
CO2 SERPL-SCNC: 26 MMOL/L (ref 20–32)
CREAT SERPL-MCNC: 1.14 MG/DL (ref 0.66–1.25)
DIASTOLIC BLOOD PRESSURE - MUSE: NORMAL MMHG
EOSINOPHIL # BLD AUTO: 0.1 10E3/UL (ref 0–0.7)
EOSINOPHIL NFR BLD AUTO: 1 %
ERYTHROCYTE [DISTWIDTH] IN BLOOD BY AUTOMATED COUNT: 12.5 % (ref 10–15)
GFR SERPL CREATININE-BSD FRML MDRD: 66 ML/MIN/1.73M2
GLUCOSE BLD-MCNC: 142 MG/DL (ref 70–99)
HCT VFR BLD AUTO: 50.8 % (ref 40–53)
HGB BLD-MCNC: 16.9 G/DL (ref 13.3–17.7)
IMM GRANULOCYTES # BLD: 0 10E3/UL
IMM GRANULOCYTES NFR BLD: 0 %
INTERPRETATION ECG - MUSE: NORMAL
LYMPHOCYTES # BLD AUTO: 1.4 10E3/UL (ref 0.8–5.3)
LYMPHOCYTES NFR BLD AUTO: 19 %
MCH RBC QN AUTO: 30.8 PG (ref 26.5–33)
MCHC RBC AUTO-ENTMCNC: 33.3 G/DL (ref 31.5–36.5)
MCV RBC AUTO: 93 FL (ref 78–100)
MONOCYTES # BLD AUTO: 0.4 10E3/UL (ref 0–1.3)
MONOCYTES NFR BLD AUTO: 6 %
NEUTROPHILS # BLD AUTO: 5.5 10E3/UL (ref 1.6–8.3)
NEUTROPHILS NFR BLD AUTO: 74 %
NRBC # BLD AUTO: 0 10E3/UL
NRBC BLD AUTO-RTO: 0 /100
P AXIS - MUSE: NORMAL DEGREES
PLATELET # BLD AUTO: 203 10E3/UL (ref 150–450)
POTASSIUM BLD-SCNC: 4 MMOL/L (ref 3.4–5.3)
PR INTERVAL - MUSE: 172 MS
QRS DURATION - MUSE: 116 MS
QT - MUSE: 340 MS
QTC - MUSE: 434 MS
R AXIS - MUSE: 5 DEGREES
RBC # BLD AUTO: 5.49 10E6/UL (ref 4.4–5.9)
SODIUM SERPL-SCNC: 138 MMOL/L (ref 133–144)
SYSTOLIC BLOOD PRESSURE - MUSE: NORMAL MMHG
T AXIS - MUSE: 210 DEGREES
TROPONIN I SERPL HS-MCNC: 7 NG/L
VENTRICULAR RATE- MUSE: 98 BPM
WBC # BLD AUTO: 7.6 10E3/UL (ref 4–11)

## 2022-08-18 PROCEDURE — 93005 ELECTROCARDIOGRAM TRACING: CPT

## 2022-08-18 PROCEDURE — 85025 COMPLETE CBC W/AUTO DIFF WBC: CPT | Performed by: EMERGENCY MEDICINE

## 2022-08-18 PROCEDURE — 80048 BASIC METABOLIC PNL TOTAL CA: CPT | Performed by: EMERGENCY MEDICINE

## 2022-08-18 PROCEDURE — 36415 COLL VENOUS BLD VENIPUNCTURE: CPT | Performed by: EMERGENCY MEDICINE

## 2022-08-18 PROCEDURE — 250N000013 HC RX MED GY IP 250 OP 250 PS 637: Performed by: EMERGENCY MEDICINE

## 2022-08-18 PROCEDURE — 99284 EMERGENCY DEPT VISIT MOD MDM: CPT

## 2022-08-18 PROCEDURE — 84484 ASSAY OF TROPONIN QUANT: CPT | Performed by: EMERGENCY MEDICINE

## 2022-08-18 RX ORDER — MECLIZINE HYDROCHLORIDE 25 MG/1
25 TABLET ORAL ONCE
Status: COMPLETED | OUTPATIENT
Start: 2022-08-18 | End: 2022-08-18

## 2022-08-18 RX ORDER — MECLIZINE HYDROCHLORIDE 25 MG/1
25 TABLET ORAL 3 TIMES DAILY PRN
Qty: 30 TABLET | Refills: 0 | Status: SHIPPED | OUTPATIENT
Start: 2022-08-18 | End: 2023-08-22

## 2022-08-18 RX ADMIN — MECLIZINE HYDROCHLORIDE 25 MG: 25 TABLET ORAL at 12:10

## 2022-08-18 ASSESSMENT — ACTIVITIES OF DAILY LIVING (ADL): ADLS_ACUITY_SCORE: 35

## 2022-08-18 ASSESSMENT — ENCOUNTER SYMPTOMS
COUGH: 0
DIZZINESS: 1
SHORTNESS OF BREATH: 0

## 2022-08-18 NOTE — ED TRIAGE NOTES
Pt dx with covid on 8/3. Now been having vertigo x 1 week, let up a few days and now is back.  No falls.      Triage Assessment     Row Name 08/18/22 1057       Respiratory WDL    Respiratory WDL WDL       Cardiac WDL    Cardiac WDL X;all       Cognitive/Neuro/Behavioral WDL    Cognitive/Neuro/Behavioral WDL WDL

## 2022-08-18 NOTE — ED PROVIDER NOTES
History   Chief Complaint:  Dizziness       The history is provided by the patient.      Estrada Oseguera is a 77 year old male with history of type 2 diabetes mellitus and hyperlipidemia who presents with  dizziness. For the past week, he has been experiencing intermittent dizziness whenever he turns his head. It usually occurs at night when he gets up to use the bathroom, but will sometimes occurs during the day. He has not had vertigo previously, and does not have a history stroke.  Currently reports minimal dizziness/vertigo.    Also, he had a positive at-home Covid-19 on 8/3. During that time, he was experiencing myalgias. He denies recent cough, shortness of breath, or leg swelling. He is a former smoker.     Review of Systems   Respiratory: Negative for cough and shortness of breath.    Cardiovascular: Negative for leg swelling.   Neurological: Positive for dizziness.   All other systems reviewed and are negative.    Allergies:  No Known Allergies    Medications:  Jardiance   Zestril   Metformin   Toprol   Protonix     Past Medical History:     Solitary kidney   HTN   Hiatal hernia  Benign hypertension  GERD (gastroesophageal reflux disease)  CKD (chronic kidney disease)  Tubular adenoma  Iron deficiency  Stricture esophagus  Chacon's esophagus without dysplasia  Cardiac pacemaker  Hyperlipidemia, unspecified hyperlipidemia type  Elevated prostate specific antigen (PSA)  History of cardiomyopathy  NSVT (nonsustained ventricular tachycardia) (H)  Paroxysmal atrial fibrillation (H)  Type 2 diabetes mellitus without complication, without long-term current use of insulin (H)  Chronic kidney disease, stage 3  Prostate cancer (H)    Past Surgical History:    Cholecystectomy   Colon biopsy/polypectomy   EGD w/ biopsy   R nephrectomy   Pacemaker insertion   Vasectomy   Colon resection     Family History:    Mother - HLD   Father - cerebrovascular disease, prostate cancer   Brother - pulmonary HTN  Sister -  "breast cancer, Parkinsonism     Social History:  The patient presents to the ED alone via private vehicle   PCP: Zoe Tomas   Hx of tobacco use (former smoker)     Physical Exam     Patient Vitals for the past 24 hrs:   BP Temp Temp src Pulse Resp SpO2 Height Weight   08/18/22 1433 (!) 139/95 -- -- -- 16 96 % -- --   08/18/22 1344 -- -- -- 96 16 -- -- --   08/18/22 1300 (!) 144/92 -- -- 87 15 -- -- --   08/18/22 1137 (!) 154/96 -- -- 90 18 97 % -- --   08/18/22 1058 (!) 177/82 -- -- -- -- -- -- --   08/18/22 1056 -- 97.4  F (36.3  C) Temporal 113 18 97 % 1.778 m (5' 10\") 97.5 kg (215 lb)       Physical Exam  General: Alert and cooperative with exam. Patient in mild distress. Normal mentation.  Head:  Scalp is NC/AT  Eyes:  No scleral icterus, PERRL, EOMI   ENT:  The external nose and ears are normal. The oropharynx is normal and without erythema; mucus membranes are moist. Uvula midline, no evidence of deep space infection.  Neck:  Normal range of motion without rigidity.  CV:  Regular rate and rhythm, occasional PVCs  Resp:  Breath sounds are clear bilaterally    Non-labored, no retractions or accessory muscle use  GI:  Abdomen is soft, no distension, no tenderness. No peritoneal signs  MS:  No lower extremity edema   Skin:  Warm and dry, No rash or lesions noted.  Neuro: Oriented x 3. No gross motor deficits.    Strength and sensation grossly intact in all 4 extremities.      Cranial nerves 2-12 intact.    GCS: 15    Normal finger to nose and heel to shin testing    Gait normal    No nystagmus present        Emergency Department Course   ECG:  ECG taken at 1109, ECG read at 1120  Sinus rhythm with frequent and consecutive premature ventricular complexes   RSR' or QR pattern in V1 suggests right ventricular conduction delay   Abnormal ECG   Rate 98 bpm. PA interval 172 ms. QRS duration 116 ms. QT/QTc 340/434 ms. P-R-T axes * 5 210.     Laboratory:  Labs Ordered and Resulted from Time of ED Arrival to Time of " ED Departure   BASIC METABOLIC PANEL - Abnormal       Result Value    Sodium 138      Potassium 4.0      Chloride 106      Carbon Dioxide (CO2) 26      Anion Gap 6      Urea Nitrogen 21      Creatinine 1.14      Calcium 9.5      Glucose 142 (*)     GFR Estimate 66     TROPONIN I - Normal    Troponin I High Sensitivity 7     CBC WITH PLATELETS AND DIFFERENTIAL    WBC Count 7.6      RBC Count 5.49      Hemoglobin 16.9      Hematocrit 50.8      MCV 93      MCH 30.8      MCHC 33.3      RDW 12.5      Platelet Count 203      % Neutrophils 74      % Lymphocytes 19      % Monocytes 6      % Eosinophils 1      % Basophils 0      % Immature Granulocytes 0      NRBCs per 100 WBC 0      Absolute Neutrophils 5.5      Absolute Lymphocytes 1.4      Absolute Monocytes 0.4      Absolute Eosinophils 0.1      Absolute Basophils 0.0      Absolute Immature Granulocytes 0.0      Absolute NRBCs 0.0        Emergency Department Course:    Reviewed:  I reviewed nursing notes, vitals and past medical history    Assessments:  1129 I obtained history and examined the patient as noted above.   1355 I rechecked the patient and explained findings. I discussed plan for discharge home.    Interventions:  1210 Antivert 25 mg PO    Disposition:  The patient was discharged to home.     Impression & Plan   Medical Decision Making:  Estrada Oseguera is a 77 year old male who presents for evaluation of vertigo. The differential diagnosis of vertigo is broad and includes common etiologies such as menieres disease, labyrinthitis, benign positional vertigo, otitis media, etc.  More serious etiologies considered include central etiologies such as tumor, intracerebral bleed, dissection, ischemic cerebral vascular accident.  The history, physical exam including detailed neurologic exam, and workup in the emergency room suggests a benign cause of vertigo today.  Ambulated without difficulty prior to discharge.  Patient feels improved after interventions noted  above. Further outpatient management is indicated with vertigo medications. No indication for advanced imaging at this point (CT/MRI) as there are no definite signs of a central and concerning etiology for the vertigo. Vertigo precautions given for home.    Discharged with prescription for meclizine.  Additionally provided referral to vestibular rehab.  Patient to follow-up closely with PCP if symptoms not improving.  Return precautions discussed.  Patient discharged home.    Diagnosis:    ICD-10-CM    1. Vertigo  R42 Physical Therapy Referral       Discharge Medications:  Discharge Medication List as of 8/18/2022  2:15 PM      START taking these medications    Details   meclizine (ANTIVERT) 25 MG tablet Take 1 tablet (25 mg) by mouth 3 times daily as needed for dizziness, Disp-30 tablet, R-0, E-Prescribe             Scribe Disclosure:  Ramakrishna DUKE, am serving as a scribe at 11:24 AM on 8/18/2022 to document services personally performed by Tayo Mayes DO based on my observations and the provider's statements to me.        Tayo Mayes DO  08/18/22 1062

## 2022-08-18 NOTE — DISCHARGE INSTRUCTIONS
Meclizine as needed for vertigo    You should receive a phone call to set up appointment for outpatient balance physical therapy

## 2022-08-18 NOTE — TELEPHONE ENCOUNTER
S: Elevated blood pressure    B: patient is out of his Lisinopril and needs a visit before it is refilled. Pt states his BP was 120/70 last week. Today it is in the 170's/100's. Pt states his head feel tight and that he is unsteady and unable to walk. Pt denies chest pain, denies vision changes.     A: symptomatic hypertension    R: Pt instructed to go to the ED now. Pts wife is able to drive him. Pt and wife instructed to call 911 if he is unable to walk to the car. Pt and wife verbalize understanding.     RITO NOGUEIRA RN      Reason for Disposition    Systolic BP >= 160 OR Diastolic >= 100, and any cardiac or neurologic symptoms (e.g., chest pain, difficulty breathing, unsteady gait, blurred vision)    Additional Information    Negative: Sounds like a life-threatening emergency to the triager    Negative: Symptom is main concern (e.g., headache, chest pain)    Negative: Low blood pressure is main concern    Protocols used: BLOOD PRESSURE - HIGH-A-OH

## 2022-08-19 RX ORDER — LISINOPRIL 20 MG/1
TABLET ORAL
Qty: 45 TABLET | Refills: 3 | Status: SHIPPED | OUTPATIENT
Start: 2021-03-18 | End: 2022-08-22

## 2022-08-19 NOTE — TELEPHONE ENCOUNTER
45 tablets with 3 refills was sent on 3/18/2021.   Same pharmacy.   Re-sending with original start date.    Matilda Shaikh, RN BSN MSN  [facility]

## 2022-08-22 RX ORDER — LISINOPRIL 20 MG/1
TABLET ORAL
Qty: 45 TABLET | Refills: 0 | Status: SHIPPED | OUTPATIENT
Start: 2022-08-22 | End: 2023-03-03

## 2022-08-22 NOTE — TELEPHONE ENCOUNTER
Routing refill request to provider for review/approval because:        Please see patient's MyChart message.   Patient was in ED on 8/18/2022 for dizziness. Per ED notes, patient was advised to follow-up with Dr. Tomas in 5 days (around 8/23/2022).     MyChart message sent to advise patient of need to schedule appointment for hospital follow-up. Also advised patient to call or seek care should he develop worsening symptoms.     Matilda Shaikh RN BSN MSN  [facility]

## 2022-08-29 NOTE — TELEPHONE ENCOUNTER
Appointments in Next Year    Aug 30, 2022  8:30 AM  (Arrive by 8:15 AM)  ED/Hospital Follow Up with Osmin Licona MD  [facility] ([facility] ) [phone]     Lisinopril refill sent on 8/22/2022.    Matilda Shaikh RN BSN MSN  [facility]

## 2022-08-30 ENCOUNTER — OFFICE VISIT (OUTPATIENT)
Dept: FAMILY MEDICINE | Facility: CLINIC | Age: 77
End: 2022-08-30
Payer: MEDICARE

## 2022-08-30 VITALS
SYSTOLIC BLOOD PRESSURE: 147 MMHG | HEART RATE: 68 BPM | BODY MASS INDEX: 31.78 KG/M2 | RESPIRATION RATE: 17 BRPM | OXYGEN SATURATION: 94 % | WEIGHT: 222 LBS | DIASTOLIC BLOOD PRESSURE: 78 MMHG | HEIGHT: 70 IN

## 2022-08-30 DIAGNOSIS — R42 VERTIGO: ICD-10-CM

## 2022-08-30 DIAGNOSIS — U07.1 INFECTION DUE TO 2019 NOVEL CORONAVIRUS: Primary | ICD-10-CM

## 2022-08-30 PROCEDURE — 99213 OFFICE O/P EST LOW 20 MIN: CPT | Mod: CS | Performed by: INTERNAL MEDICINE

## 2022-08-30 RX ORDER — FLUTICASONE PROPIONATE 50 MCG
2 SPRAY, SUSPENSION (ML) NASAL DAILY
Qty: 16 G | Refills: 0 | Status: SHIPPED | OUTPATIENT
Start: 2022-08-30 | End: 2022-08-31

## 2022-08-30 ASSESSMENT — PAIN SCALES - GENERAL: PAINLEVEL: NO PAIN (0)

## 2022-08-30 NOTE — PROGRESS NOTES
"  Assessment & Plan     Infection due to 2019 novel coronavirus  Patient has rhinitis congestion postnasal drip left greater than right.  Suspect an element of eustachian tube dysfunction exacerbated his recent episode of vertigo.    Vertigo  Would recommend a trial of Flonase.  Continue to drink plenty of water.  No neurologic evidence for CVA.  - fluticasone (FLONASE) 50 MCG/ACT nasal spray; Spray 2 sprays into both nostrils daily       BMI:   Estimated body mass index is 31.85 kg/m  as calculated from the following:    Height as of this encounter: 1.778 m (5' 10\").    Weight as of this encounter: 100.7 kg (222 lb).       See Patient Instructions    No follow-ups on file.    Osmin Licona MD  [facility] OLVIN Venegas is a 77 year old accompanied by his spouse, presenting for the following health issues:  ER F/U      HPI this is a patient whom developed vertigo early in the morning of August 18.  Patient also has a concomitant COVID-19 infection.  He had complained of some congestion.  A bit of cough also was noted.    He had a definitive rotational abnormality when he tried to stand up and turn his head.  He is vertigo is exacerbated by having his left ear to the pillow.  This is slowly getting better.  Meclizine has been helpful.  Continues to have issues of congestion.    ED/UC Followup:    Facility:  [facility] Emergency Dept    Date of visit: 08/18/2023  Reason for visit: Vertigo  Current Status: Improving        Review of Systems   Constitutional, HEENT, cardiovascular, pulmonary, gi and gu systems are negative, except as otherwise noted.      Objective    BP (!) 147/78 (BP Location: Right arm, Patient Position: Chair, Cuff Size: Adult Large)   Pulse 68   Resp 17   Ht 1.778 m (5' 10\")   Wt 100.7 kg (222 lb)   SpO2 94%   BMI 31.85 kg/m    Body mass index is 31.85 kg/m .  Physical Exam   Tympanic membranes visualized and nonerythematous the patient's left " external auditory canal has what appears to be chronic scale/narrowing    Nasal turbinate erythematous on the left with edema oropharynx more erythematous on the left than the right.    Extraocular movements otherwise intact no significant nystagmus    Strength is symmetric    Admission on 08/18/2022, Discharged on 08/18/2022   Component Date Value Ref Range Status     Ventricular Rate 08/18/2022 98  BPM Final     Atrial Rate 08/18/2022 98  BPM Final     ME Interval 08/18/2022 172  ms Final     QRS Duration 08/18/2022 116  ms Final     QT 08/18/2022 340  ms Final     QTc 08/18/2022 434  ms Final     R AXIS 08/18/2022 5  degrees Final     T Axis 08/18/2022 210  degrees Final     Interpretation ECG 08/18/2022    Final                    Value:Sinus rhythm with frequent , and consecutive Premature ventricular complexes  RSR' or QR pattern in V1 suggests right ventricular conduction delay  Marked ST abnormality, possible inferior subendocardial injury  Marked ST abnormality, possible anteroseptal subendocardial injury  Abnormal ECG  When compared with ECG of 23-NOV-2016 11:24,  Premature ventricular complexes are now Present  Vent. rate has increased BY  37 BPM  RSR' pattern in V1 is now Present  Confirmed by GENERATED REPORT, COMPUTER ([id number]),  SAMIA MERAZ ([id number]) on 8/18/2022 11:13:16 AM       Sodium 08/18/2022 138  133 - 144 mmol/L Final     Potassium 08/18/2022 4.0  3.4 - 5.3 mmol/L Final     Chloride 08/18/2022 106  94 - 109 mmol/L Final     Carbon Dioxide (CO2) 08/18/2022 26  20 - 32 mmol/L Final     Anion Gap 08/18/2022 6  3 - 14 mmol/L Final     Urea Nitrogen 08/18/2022 21  7 - 30 mg/dL Final     Creatinine 08/18/2022 1.14  0.66 - 1.25 mg/dL Final     Calcium 08/18/2022 9.5  8.5 - 10.1 mg/dL Final     Glucose 08/18/2022 142 (A) 70 - 99 mg/dL Final     GFR Estimate 08/18/2022 66  >60 mL/min/1.73m2 Final    Effective December 21, 2021 eGFRcr in adults is calculated using the 2021 CKD-EPI creatinine equation  which includes age and gender (Jamil et al., [facility], DOI: 10.1056/JMEQjt0585992)     WBC Count 08/18/2022 7.6  4.0 - 11.0 10e3/uL Final     RBC Count 08/18/2022 5.49  4.40 - 5.90 10e6/uL Final     Hemoglobin 08/18/2022 16.9  13.3 - 17.7 g/dL Final     Hematocrit 08/18/2022 50.8  40.0 - 53.0 % Final     MCV 08/18/2022 93  78 - 100 fL Final     MCH 08/18/2022 30.8  26.5 - 33.0 pg Final     MCHC 08/18/2022 33.3  31.5 - 36.5 g/dL Final     RDW 08/18/2022 12.5  10.0 - 15.0 % Final     Platelet Count 08/18/2022 203  150 - 450 10e3/uL Final     % Neutrophils 08/18/2022 74  % Final     % Lymphocytes 08/18/2022 19  % Final     % Monocytes 08/18/2022 6  % Final     % Eosinophils 08/18/2022 1  % Final     % Basophils 08/18/2022 0  % Final     % Immature Granulocytes 08/18/2022 0  % Final     NRBCs per 100 WBC 08/18/2022 0  <1 /100 Final     Absolute Neutrophils 08/18/2022 5.5  1.6 - 8.3 10e3/uL Final     Absolute Lymphocytes 08/18/2022 1.4  0.8 - 5.3 10e3/uL Final     Absolute Monocytes 08/18/2022 0.4  0.0 - 1.3 10e3/uL Final     Absolute Eosinophils 08/18/2022 0.1  0.0 - 0.7 10e3/uL Final     Absolute Basophils 08/18/2022 0.0  0.0 - 0.2 10e3/uL Final     Absolute Immature Granulocytes 08/18/2022 0.0  <=0.4 10e3/uL Final     Absolute NRBCs 08/18/2022 0.0  10e3/uL Final     Troponin I High Sensitivity 08/18/2022 7  <79 ng/L Final    This Troponin-I result was obtained using a Siemens Dimension Vista High Sensitivity Troponin-I assay (TNIH). Effective 11/23/21, nine labs/sites in the [facility] switched from a Siemens Edgar Contemporary Troponin I assay (CTNI) to a Siemens Edgar High-Sensitivity Troponin I assay (TNIH).                   .  ..

## 2022-08-31 ENCOUNTER — TELEPHONE (OUTPATIENT)
Dept: FAMILY MEDICINE | Facility: CLINIC | Age: 77
End: 2022-08-31

## 2022-08-31 DIAGNOSIS — R42 VERTIGO: ICD-10-CM

## 2022-08-31 RX ORDER — FLUTICASONE PROPIONATE 50 MCG
2 SPRAY, SUSPENSION (ML) NASAL DAILY
Qty: 16 G | Refills: 0 | Status: SHIPPED | OUTPATIENT
Start: 2022-08-31 | End: 2024-01-16

## 2022-08-31 NOTE — TELEPHONE ENCOUNTER
Patient calling to request fluticasone to be sent to local pharmacy that patient prefers. Medication prescribed by Dr. Licona yesterday after office visit and sent to mail order pharmacy.    Pended medication with desired pharmacy - please review and sign if appropriate     Sheri Dent RN  [facility]

## 2022-09-22 ENCOUNTER — ANCILLARY PROCEDURE (OUTPATIENT)
Dept: CARDIOLOGY | Facility: CLINIC | Age: 77
End: 2022-09-22
Attending: INTERNAL MEDICINE
Payer: MEDICARE

## 2022-09-22 DIAGNOSIS — Z95.0 CARDIAC PACEMAKER IN SITU: ICD-10-CM

## 2022-09-22 DIAGNOSIS — I49.5 SSS (SICK SINUS SYNDROME) (H): ICD-10-CM

## 2022-09-22 PROCEDURE — 93296 REM INTERROG EVL PM/IDS: CPT | Performed by: INTERNAL MEDICINE

## 2022-09-22 PROCEDURE — 93294 REM INTERROG EVL PM/LDLS PM: CPT | Performed by: INTERNAL MEDICINE

## 2022-09-23 LAB
MDC_IDC_EPISODE_DTM: NORMAL
MDC_IDC_EPISODE_DURATION: 10 S
MDC_IDC_EPISODE_DURATION: 12 S
MDC_IDC_EPISODE_DURATION: 35 S
MDC_IDC_EPISODE_DURATION: 67 S
MDC_IDC_EPISODE_DURATION: 78 S
MDC_IDC_EPISODE_ID: NORMAL
MDC_IDC_EPISODE_TYPE: NORMAL
MDC_IDC_EPISODE_VENDOR_TYPE: NORMAL
MDC_IDC_LEAD_IMPLANT_DT: NORMAL
MDC_IDC_LEAD_IMPLANT_DT: NORMAL
MDC_IDC_LEAD_LOCATION: NORMAL
MDC_IDC_LEAD_LOCATION: NORMAL
MDC_IDC_LEAD_LOCATION_DETAIL_1: NORMAL
MDC_IDC_LEAD_LOCATION_DETAIL_1: NORMAL
MDC_IDC_LEAD_MFG: NORMAL
MDC_IDC_LEAD_MFG: NORMAL
MDC_IDC_LEAD_MODEL: NORMAL
MDC_IDC_LEAD_MODEL: NORMAL
MDC_IDC_LEAD_POLARITY_TYPE: NORMAL
MDC_IDC_LEAD_POLARITY_TYPE: NORMAL
MDC_IDC_LEAD_SERIAL: NORMAL
MDC_IDC_LEAD_SERIAL: NORMAL
MDC_IDC_MSMT_BATTERY_DTM: NORMAL
MDC_IDC_MSMT_BATTERY_REMAINING_LONGEVITY: 108 MO
MDC_IDC_MSMT_BATTERY_REMAINING_PERCENTAGE: 100 %
MDC_IDC_MSMT_BATTERY_STATUS: NORMAL
MDC_IDC_MSMT_LEADCHNL_RA_IMPEDANCE_VALUE: 776 OHM
MDC_IDC_MSMT_LEADCHNL_RA_PACING_THRESHOLD_AMPLITUDE: 0.5 V
MDC_IDC_MSMT_LEADCHNL_RA_PACING_THRESHOLD_PULSEWIDTH: 0.4 MS
MDC_IDC_MSMT_LEADCHNL_RV_IMPEDANCE_VALUE: 716 OHM
MDC_IDC_MSMT_LEADCHNL_RV_PACING_THRESHOLD_AMPLITUDE: 2.9 V
MDC_IDC_MSMT_LEADCHNL_RV_PACING_THRESHOLD_PULSEWIDTH: 0.4 MS
MDC_IDC_PG_IMPLANT_DTM: NORMAL
MDC_IDC_PG_MFG: NORMAL
MDC_IDC_PG_MODEL: NORMAL
MDC_IDC_PG_SERIAL: NORMAL
MDC_IDC_PG_TYPE: NORMAL
MDC_IDC_SESS_CLINIC_NAME: NORMAL
MDC_IDC_SESS_DTM: NORMAL
MDC_IDC_SESS_TYPE: NORMAL
MDC_IDC_SET_BRADY_AT_MODE_SWITCH_MODE: NORMAL
MDC_IDC_SET_BRADY_AT_MODE_SWITCH_RATE: 170 {BEATS}/MIN
MDC_IDC_SET_BRADY_LOWRATE: 55 {BEATS}/MIN
MDC_IDC_SET_BRADY_MAX_SENSOR_RATE: 130 {BEATS}/MIN
MDC_IDC_SET_BRADY_MAX_TRACKING_RATE: 130 {BEATS}/MIN
MDC_IDC_SET_BRADY_MODE: NORMAL
MDC_IDC_SET_BRADY_PAV_DELAY_HIGH: 200 MS
MDC_IDC_SET_BRADY_PAV_DELAY_LOW: 300 MS
MDC_IDC_SET_BRADY_SAV_DELAY_HIGH: 200 MS
MDC_IDC_SET_BRADY_SAV_DELAY_LOW: 300 MS
MDC_IDC_SET_LEADCHNL_RA_PACING_AMPLITUDE: 2 V
MDC_IDC_SET_LEADCHNL_RA_PACING_CAPTURE_MODE: NORMAL
MDC_IDC_SET_LEADCHNL_RA_PACING_POLARITY: NORMAL
MDC_IDC_SET_LEADCHNL_RA_PACING_PULSEWIDTH: 0.4 MS
MDC_IDC_SET_LEADCHNL_RA_SENSING_ADAPTATION_MODE: NORMAL
MDC_IDC_SET_LEADCHNL_RA_SENSING_POLARITY: NORMAL
MDC_IDC_SET_LEADCHNL_RA_SENSING_SENSITIVITY: 0.25 MV
MDC_IDC_SET_LEADCHNL_RV_PACING_AMPLITUDE: 1.8 V
MDC_IDC_SET_LEADCHNL_RV_PACING_CAPTURE_MODE: NORMAL
MDC_IDC_SET_LEADCHNL_RV_PACING_POLARITY: NORMAL
MDC_IDC_SET_LEADCHNL_RV_PACING_PULSEWIDTH: 0.4 MS
MDC_IDC_SET_LEADCHNL_RV_SENSING_ADAPTATION_MODE: NORMAL
MDC_IDC_SET_LEADCHNL_RV_SENSING_POLARITY: NORMAL
MDC_IDC_SET_LEADCHNL_RV_SENSING_SENSITIVITY: 1.5 MV
MDC_IDC_SET_ZONE_DETECTION_INTERVAL: 375 MS
MDC_IDC_SET_ZONE_TYPE: NORMAL
MDC_IDC_SET_ZONE_VENDOR_TYPE: NORMAL
MDC_IDC_STAT_AT_BURDEN_PERCENT: 1 %
MDC_IDC_STAT_AT_DTM_END: NORMAL
MDC_IDC_STAT_AT_DTM_START: NORMAL
MDC_IDC_STAT_BRADY_DTM_END: NORMAL
MDC_IDC_STAT_BRADY_DTM_START: NORMAL
MDC_IDC_STAT_BRADY_RA_PERCENT_PACED: 30 %
MDC_IDC_STAT_BRADY_RV_PERCENT_PACED: 0 %
MDC_IDC_STAT_EPISODE_RECENT_COUNT: 0
MDC_IDC_STAT_EPISODE_RECENT_COUNT: 0
MDC_IDC_STAT_EPISODE_RECENT_COUNT: 1
MDC_IDC_STAT_EPISODE_RECENT_COUNT: 1
MDC_IDC_STAT_EPISODE_RECENT_COUNT: 3
MDC_IDC_STAT_EPISODE_RECENT_COUNT_DTM_END: NORMAL
MDC_IDC_STAT_EPISODE_RECENT_COUNT_DTM_START: NORMAL
MDC_IDC_STAT_EPISODE_TYPE: NORMAL
MDC_IDC_STAT_EPISODE_VENDOR_TYPE: NORMAL

## 2022-10-20 ENCOUNTER — OFFICE VISIT (OUTPATIENT)
Dept: CARDIOLOGY | Facility: CLINIC | Age: 77
End: 2022-10-20
Payer: MEDICARE

## 2022-10-20 ENCOUNTER — ANCILLARY PROCEDURE (OUTPATIENT)
Dept: CARDIOLOGY | Facility: CLINIC | Age: 77
End: 2022-10-20
Attending: INTERNAL MEDICINE
Payer: MEDICARE

## 2022-10-20 VITALS
WEIGHT: 216.8 LBS | SYSTOLIC BLOOD PRESSURE: 128 MMHG | BODY MASS INDEX: 31.04 KG/M2 | HEIGHT: 70 IN | DIASTOLIC BLOOD PRESSURE: 82 MMHG | HEART RATE: 69 BPM

## 2022-10-20 DIAGNOSIS — I48.0 PAROXYSMAL ATRIAL FIBRILLATION (H): Primary | ICD-10-CM

## 2022-10-20 DIAGNOSIS — Z86.79 HISTORY OF CARDIOMYOPATHY: ICD-10-CM

## 2022-10-20 DIAGNOSIS — Z95.0 CARDIAC PACEMAKER IN SITU: ICD-10-CM

## 2022-10-20 DIAGNOSIS — I49.5 SSS (SICK SINUS SYNDROME) (H): ICD-10-CM

## 2022-10-20 PROCEDURE — 99213 OFFICE O/P EST LOW 20 MIN: CPT | Performed by: INTERNAL MEDICINE

## 2022-10-20 PROCEDURE — 93280 PM DEVICE PROGR EVAL DUAL: CPT | Performed by: INTERNAL MEDICINE

## 2022-10-20 NOTE — PATIENT INSTRUCTIONS
Your blood pressure was elevated at your appointment today.  Elevated blood pressure can increase your risk of a heart attack, stroke and heart failure.  For this reason, we feel it is important to monitor your blood pressure closely.  If you have access to a home blood pressure monitor or are able to check your blood pressure at a local pharmacy in the next week we would like you to do so and call our clinic with those readings. Please call [phone] (María) and leave a message with your name, date of birth, blood pressure reading, date and location it was completed. If your blood pressure remains elevated your care team will be notified.  We appreciate being a part of your healthcare team and look forward to hearing from you soon.

## 2022-10-20 NOTE — LETTER
10/20/2022    Zoe Tomas MD  [address]  [facility] [address]    RE: Estrada Oseguera       Dear Colleague,     I had the pleasure of seeing Estrada Oseguera in the [facility].  PHYSICIAN NOTE:  This visit was completed in person at the [facility].      I had the pleasure evaluating Mr. Theo Oseguera today.      He is a delightful 75-year-old male with the following medical issues:   1.  Symptomatic sinus node dysfunction.  Implantation of dual-chamber pacemaker in 11/2016 ([employer]).   2.  A 2-hour episode of atrial fibrillation recorded by the pacemaker in 01/2018.  The patient was placed on apixaban but without subsequent documentation of AF..  After discussion with the patient, AC was stopped in 03/2020.  So far no documentation of clinically significant AF.   3.  PVCs, occasionally symptomatic.  Nonsustained VT.   4.  EF 50%-55%.   5.  Chronic kidney disease, stage III.  6.  DM type II, on metformin.    Theo is feeling well.  No chest pain, unusual dyspnea, LE edema, orthopnea, PND or other cardiac symptoms.    We have not documented AF over 30 seconds on his pacemaker.  He has been off anticoagulation since early 2020.      PHYSICAL EXAMINATION:  Vital signs: 122/82, 69, 98 kg, BMI 31  General:  in no apparent distress, mildly overweight  ENT/Mouth:  no nasal discharge.  Eyes:  normal conjunctivae.   Neck:  no thyromegaly or lymphadenopathy.  Chest/Lungs:  patient is not dyspneic.  Lungs CTA, without rales or wheezing.    Cardiovascular: Normal JVP, rate is regular.  No gallop, murmur or rub.  Well-healed right pectoral incision.  Abdomen: Obese, soft.    Extremities:  no edema  Neurologic:  alert & oriented x 3.  Normal arm motion bilateral, no tremors.    Vascular:  2+ carotids without bruits.  2+ radials.        DIAGNOSTIC STUDIES:  Laboratory studies: Sodium 138, potassium 4.0, creatinine 1.14, hematocrit 50.8%  Device  interrogation: Estimated battery life 9 years.  No AF over 30 seconds in 2022.  Atrial pacing 30%, ventricular pacing <1%      IMPRESSION:  1. Sick sinus syndrome.  Pacemaker is functioning normally.  Continue routine device clinic checks.  2. Paroxysmal atrial fibrillation.  [id number] score is at least 3.  However the patient has had no significant AF in over 2 years.  We will keep a close eye on AF burden.  Currently off anticoagulation.      RECOMMENDATIONS:  1. Routine device clinic checks.  2. Follow-up with KEEGAN in 1 year.    It was my pleasure seeing this delightful patient.  Please feel free to call with any questions.   Total time spent today, including time for chart review and documentation, was 20 minutes.      Lynn Valle MD, [facility]      CURRENT MEDICATIONS:  Current Outpatient Medications   Medication Sig Dispense Refill     lisinopril (ZESTRIL) 20 MG tablet TAKE 1/2 TABLET (=10MG)    DAILY FOR BLOOD PRESSURE 45 tablet 0     metFORMIN (GLUCOPHAGE-XR) 500 MG 24 hr tablet Take 1 tablet (500 mg) by mouth daily (with dinner) 90 tablet 3     metoprolol succinate ER (TOPROL-XL) 50 MG 24 hr tablet Take 1 tablet (50 mg) by mouth daily for heart and Blood Pressure 90 tablet 3     pantoprazole (PROTONIX) 40 MG EC tablet Take 1 tablet (40 mg) by mouth daily for acid reflux 90 tablet 3     blood glucose (NO BRAND SPECIFIED) lancets standard Use to test blood sugar 1 times daily or as directed. (Patient not taking: Reported on 4/22/2022) 100 each 1     blood glucose (NO BRAND SPECIFIED) test strip Use to test blood sugar 1 times daily (Patient not taking: Reported on 4/22/2022) 100 strip 1     blood glucose monitoring (NO BRAND SPECIFIED) meter device kit Use to test blood sugar as directed. (Patient not taking: Reported on 4/22/2022) 1 kit 0     empagliflozin (JARDIANCE) 10 MG TABS tablet Take 1 tablet (10 mg) by mouth daily For diabetes (Patient not taking: Reported on 4/22/2022) 90 tablet 1      fluticasone (FLONASE) 50 MCG/ACT nasal spray Spray 2 sprays into both nostrils daily (Patient not taking: Reported on 10/20/2022) 16 g 0     meclizine (ANTIVERT) 25 MG tablet Take 1 tablet (25 mg) by mouth 3 times daily as needed for dizziness (Patient not taking: Reported on 10/20/2022) 30 tablet 0       ALLERGIES   No Known Allergies    PAST MEDICAL HISTORY:  Past Medical History:   Diagnosis Date     A-fib (H) 01/31/2018     Cancer (H)     skin     CKD (chronic kidney disease)      GERD (gastroesophageal reflux disease)      Hernia, abdominal      Hiatal hernia      Hypertension      Mumps      Pericarditis      S/P colon resection        PAST SURGICAL HISTORY:  Past Surgical History:   Procedure Laterality Date     CHOLECYSTECTOMY       COLONOSCOPY       COLONOSCOPY N/A 5/6/2021    Procedure: COLONOSCOPY, WITH POLYPECTOMY AND BIOPSY;  Surgeon: Deondre Meek MD;  Location: [facility]     COMBINED COLONOSCOPY, MUCOSAL RESECTION  1993    ?diverticulitis      ESOPHAGOSCOPY, GASTROSCOPY, DUODENOSCOPY (EGD), COMBINED N/A 11/3/2016    Procedure: COMBINED ESOPHAGOSCOPY, GASTROSCOPY, DUODENOSCOPY (EGD), BIOPSY SINGLE OR MULTIPLE;  Surgeon: Deondre Meek MD;  Location: [facility]     ESOPHAGOSCOPY, GASTROSCOPY, DUODENOSCOPY (EGD), COMBINED N/A 3/3/2022    Procedure: ESOPHAGOGASTRODUODENOSCOPY, WITH BIOPSY;  Surgeon: Deondre Meek MD;  Location: [facility]     ESOPHAGOSCOPY, GASTROSCOPY, DUODENOSCOPY (EGD), DILATATION, COMBINED N/A 12/1/2016    Procedure: COMBINED ESOPHAGOSCOPY, GASTROSCOPY, DUODENOSCOPY (EGD), DILATATION;  Surgeon: Deondre Meek MD;  Location: [facility]     ESOPHAGOSCOPY, GASTROSCOPY, DUODENOSCOPY (EGD), DILATATION, COMBINED N/A 3/2/2017    Procedure: COMBINED ESOPHAGOSCOPY, GASTROSCOPY, DUODENOSCOPY (EGD), DILATATION;  Surgeon: Deondre Meek MD;  Location: [facility]     NEPHRECTOMY       NEPHRECTOMY RT/LT      right     pace maker       VASECTOMY         FAMILY HISTORY:  Family  "History   Problem Relation Age of Onset     Hyperlipidemia Mother      Cerebrovascular Disease Father      Prostate Cancer Father      Pulmonary Hypertension Brother      Breast Cancer Sister      Parkinsonism Sister      Colon Cancer No family hx of        SOCIAL HISTORY:  Social History     Socioeconomic History     Marital status:      Spouse name: None     Number of children: None     Years of education: None     Highest education level: None   Tobacco Use     Smoking status: Former     Types: Pipe     Quit date:      Years since quittin.8     Smokeless tobacco: Never     Tobacco comments:     light smoker for few years only   Substance and Sexual Activity     Alcohol use: Not Currently     Alcohol/week: 0.0 standard drinks     Drug use: No     Sexual activity: Not Currently   Other Topics Concern     Special Diet No     Exercise No       Review of Systems:  Skin:  not assessed       Eyes:  not assessed      ENT:  not assessed      Respiratory:  Positive for sleep apnea Possible sleep apnea   Cardiovascular:    dizziness Pt claims vertigo and only in August after covid- not ongoing  Gastroenterology: not assessed      Genitourinary:  not assessed      Musculoskeletal:  not assessed      Neurologic:  not assessed      Psychiatric:  not assessed      Heme/Lymph/Imm:  not assessed      Endocrine:  Positive for diabetes Type 2    Physical Exam:  Vitals: BP (!) 150/90   Pulse 69   Ht 1.778 m (5' 10\")   Wt 98.3 kg (216 lb 12.8 oz)   BMI 31.11 kg/m      Constitutional:           Skin:             Head:           Eyes:           Lymph:      ENT:           Neck:           Respiratory:            Cardiac:                                                           GI:           Extremities and Muscular Skeletal:                 Neurological:           Psych:           CC  No referring provider defined for this encounter.    Thank you for allowing me to participate in the care of your " patient.      Sincerely,     Lynn Valle MD     [facility]

## 2022-10-20 NOTE — PROGRESS NOTES
PHYSICIAN NOTE:  This visit was completed in person at the [facility].      I had the pleasure evaluating Mr. Theo Oseguera today.      He is a delightful 75-year-old male with the following medical issues:   1.  Symptomatic sinus node dysfunction.  Implantation of dual-chamber pacemaker in 11/2016 ([employer]).   2.  A 2-hour episode of atrial fibrillation recorded by the pacemaker in 01/2018.  The patient was placed on apixaban but without subsequent documentation of AF..  After discussion with the patient, AC was stopped in 03/2020.  So far no documentation of clinically significant AF.   3.  PVCs, occasionally symptomatic.  Nonsustained VT.   4.  EF 50%-55%.   5.  Chronic kidney disease, stage III.  6.  DM type II, on metformin.    Theo is feeling well.  No chest pain, unusual dyspnea, LE edema, orthopnea, PND or other cardiac symptoms.    We have not documented AF over 30 seconds on his pacemaker.  He has been off anticoagulation since early 2020.      PHYSICAL EXAMINATION:  Vital signs: 122/82, 69, 98 kg, BMI 31  General:  in no apparent distress, mildly overweight  ENT/Mouth:  no nasal discharge.  Eyes:  normal conjunctivae.   Neck:  no thyromegaly or lymphadenopathy.  Chest/Lungs:  patient is not dyspneic.  Lungs CTA, without rales or wheezing.    Cardiovascular: Normal JVP, rate is regular.  No gallop, murmur or rub.  Well-healed right pectoral incision.  Abdomen: Obese, soft.    Extremities:  no edema  Neurologic:  alert & oriented x 3.  Normal arm motion bilateral, no tremors.    Vascular:  2+ carotids without bruits.  2+ radials.        DIAGNOSTIC STUDIES:  Laboratory studies: Sodium 138, potassium 4.0, creatinine 1.14, hematocrit 50.8%  Device interrogation: Estimated battery life 9 years.  No AF over 30 seconds in 2022.  Atrial pacing 30%, ventricular pacing <1%      IMPRESSION:  1. Sick sinus syndrome.  Pacemaker is functioning normally.  Continue routine device clinic  checks.  2. Paroxysmal atrial fibrillation.  [id number] score is at least 3.  However the patient has had no significant AF in over 2 years.  We will keep a close eye on AF burden.  Currently off anticoagulation.      RECOMMENDATIONS:  1. Routine device clinic checks.  2. Follow-up with KEEGAN in 1 year.    It was my pleasure seeing this delightful patient.  Please feel free to call with any questions.   Total time spent today, including time for chart review and documentation, was 20 minutes.      Lynn Valle MD, [facility]      CURRENT MEDICATIONS:  Current Outpatient Medications   Medication Sig Dispense Refill     lisinopril (ZESTRIL) 20 MG tablet TAKE 1/2 TABLET (=10MG)    DAILY FOR BLOOD PRESSURE 45 tablet 0     metFORMIN (GLUCOPHAGE-XR) 500 MG 24 hr tablet Take 1 tablet (500 mg) by mouth daily (with dinner) 90 tablet 3     metoprolol succinate ER (TOPROL-XL) 50 MG 24 hr tablet Take 1 tablet (50 mg) by mouth daily for heart and Blood Pressure 90 tablet 3     pantoprazole (PROTONIX) 40 MG EC tablet Take 1 tablet (40 mg) by mouth daily for acid reflux 90 tablet 3     blood glucose (NO BRAND SPECIFIED) lancets standard Use to test blood sugar 1 times daily or as directed. (Patient not taking: Reported on 4/22/2022) 100 each 1     blood glucose (NO BRAND SPECIFIED) test strip Use to test blood sugar 1 times daily (Patient not taking: Reported on 4/22/2022) 100 strip 1     blood glucose monitoring (NO BRAND SPECIFIED) meter device kit Use to test blood sugar as directed. (Patient not taking: Reported on 4/22/2022) 1 kit 0     empagliflozin (JARDIANCE) 10 MG TABS tablet Take 1 tablet (10 mg) by mouth daily For diabetes (Patient not taking: Reported on 4/22/2022) 90 tablet 1     fluticasone (FLONASE) 50 MCG/ACT nasal spray Spray 2 sprays into both nostrils daily (Patient not taking: Reported on 10/20/2022) 16 g 0     meclizine (ANTIVERT) 25 MG tablet Take 1 tablet (25 mg) by mouth 3 times daily as needed for  dizziness (Patient not taking: Reported on 10/20/2022) 30 tablet 0       ALLERGIES   No Known Allergies    PAST MEDICAL HISTORY:  Past Medical History:   Diagnosis Date     A-fib (H) 01/31/2018     Cancer (H)     skin     CKD (chronic kidney disease)      GERD (gastroesophageal reflux disease)      Hernia, abdominal      Hiatal hernia      Hypertension      Mumps      Pericarditis      S/P colon resection        PAST SURGICAL HISTORY:  Past Surgical History:   Procedure Laterality Date     CHOLECYSTECTOMY       COLONOSCOPY       COLONOSCOPY N/A 5/6/2021    Procedure: COLONOSCOPY, WITH POLYPECTOMY AND BIOPSY;  Surgeon: Deondre Meek MD;  Location: [facility]     COMBINED COLONOSCOPY, MUCOSAL RESECTION  1993    ?diverticulitis      ESOPHAGOSCOPY, GASTROSCOPY, DUODENOSCOPY (EGD), COMBINED N/A 11/3/2016    Procedure: COMBINED ESOPHAGOSCOPY, GASTROSCOPY, DUODENOSCOPY (EGD), BIOPSY SINGLE OR MULTIPLE;  Surgeon: Deondre Meek MD;  Location: [facility]     ESOPHAGOSCOPY, GASTROSCOPY, DUODENOSCOPY (EGD), COMBINED N/A 3/3/2022    Procedure: ESOPHAGOGASTRODUODENOSCOPY, WITH BIOPSY;  Surgeon: Deondre Meek MD;  Location: [facility]     ESOPHAGOSCOPY, GASTROSCOPY, DUODENOSCOPY (EGD), DILATATION, COMBINED N/A 12/1/2016    Procedure: COMBINED ESOPHAGOSCOPY, GASTROSCOPY, DUODENOSCOPY (EGD), DILATATION;  Surgeon: Deondre Meek MD;  Location: [facility]     ESOPHAGOSCOPY, GASTROSCOPY, DUODENOSCOPY (EGD), DILATATION, COMBINED N/A 3/2/2017    Procedure: COMBINED ESOPHAGOSCOPY, GASTROSCOPY, DUODENOSCOPY (EGD), DILATATION;  Surgeon: Deondre Meek MD;  Location: [facility]     NEPHRECTOMY       NEPHRECTOMY RT/LT      right     pace maker       VASECTOMY         FAMILY HISTORY:  Family History   Problem Relation Age of Onset     Hyperlipidemia Mother      Cerebrovascular Disease Father      Prostate Cancer Father      Pulmonary Hypertension Brother      Breast Cancer Sister      Parkinsonism Sister      Colon Cancer No  "family hx of        SOCIAL HISTORY:  Social History     Socioeconomic History     Marital status:      Spouse name: None     Number of children: None     Years of education: None     Highest education level: None   Tobacco Use     Smoking status: Former     Types: Pipe     Quit date:      Years since quittin.8     Smokeless tobacco: Never     Tobacco comments:     light smoker for few years only   Substance and Sexual Activity     Alcohol use: Not Currently     Alcohol/week: 0.0 standard drinks     Drug use: No     Sexual activity: Not Currently   Other Topics Concern     Special Diet No     Exercise No       Review of Systems:  Skin:  not assessed       Eyes:  not assessed      ENT:  not assessed      Respiratory:  Positive for sleep apnea Possible sleep apnea   Cardiovascular:    dizziness Pt claims vertigo and only in August after covid- not ongoing  Gastroenterology: not assessed      Genitourinary:  not assessed      Musculoskeletal:  not assessed      Neurologic:  not assessed      Psychiatric:  not assessed      Heme/Lymph/Imm:  not assessed      Endocrine:  Positive for diabetes Type 2    Physical Exam:  Vitals: BP (!) 150/90   Pulse 69   Ht 1.778 m (5' 10\")   Wt 98.3 kg (216 lb 12.8 oz)   BMI 31.11 kg/m      Constitutional:           Skin:             Head:           Eyes:           Lymph:      ENT:           Neck:           Respiratory:            Cardiac:                                                           GI:           Extremities and Muscular Skeletal:                 Neurological:           Psych:           CC  No referring provider defined for this encounter.              "

## 2022-10-21 ENCOUNTER — LAB (OUTPATIENT)
Dept: LAB | Facility: CLINIC | Age: 77
End: 2022-10-21
Payer: MEDICARE

## 2022-10-21 DIAGNOSIS — C61 PROSTATE CANCER (H): ICD-10-CM

## 2022-10-21 LAB — PSA SERPL-MCNC: 7.1 UG/L (ref 0–4)

## 2022-10-21 PROCEDURE — 84153 ASSAY OF PSA TOTAL: CPT

## 2022-10-21 PROCEDURE — 36415 COLL VENOUS BLD VENIPUNCTURE: CPT

## 2022-10-28 LAB
MDC_IDC_LEAD_IMPLANT_DT: NORMAL
MDC_IDC_LEAD_IMPLANT_DT: NORMAL
MDC_IDC_LEAD_LOCATION: NORMAL
MDC_IDC_LEAD_LOCATION: NORMAL
MDC_IDC_LEAD_LOCATION_DETAIL_1: NORMAL
MDC_IDC_LEAD_LOCATION_DETAIL_1: NORMAL
MDC_IDC_LEAD_MFG: NORMAL
MDC_IDC_LEAD_MFG: NORMAL
MDC_IDC_LEAD_MODEL: NORMAL
MDC_IDC_LEAD_MODEL: NORMAL
MDC_IDC_LEAD_POLARITY_TYPE: NORMAL
MDC_IDC_LEAD_POLARITY_TYPE: NORMAL
MDC_IDC_LEAD_SERIAL: NORMAL
MDC_IDC_LEAD_SERIAL: NORMAL
MDC_IDC_MSMT_BATTERY_DTM: NORMAL
MDC_IDC_MSMT_BATTERY_REMAINING_LONGEVITY: 108 MO
MDC_IDC_MSMT_BATTERY_REMAINING_PERCENTAGE: 100 %
MDC_IDC_MSMT_BATTERY_STATUS: NORMAL
MDC_IDC_MSMT_LEADCHNL_RA_IMPEDANCE_VALUE: 788 OHM
MDC_IDC_MSMT_LEADCHNL_RA_LEAD_CHANNEL_STATUS: NORMAL
MDC_IDC_MSMT_LEADCHNL_RA_PACING_THRESHOLD_AMPLITUDE: 0.9 V
MDC_IDC_MSMT_LEADCHNL_RA_PACING_THRESHOLD_PULSEWIDTH: 0.4 MS
MDC_IDC_MSMT_LEADCHNL_RV_IMPEDANCE_VALUE: 697 OHM
MDC_IDC_MSMT_LEADCHNL_RV_PACING_THRESHOLD_AMPLITUDE: 0.8 V
MDC_IDC_MSMT_LEADCHNL_RV_PACING_THRESHOLD_PULSEWIDTH: 0.4 MS
MDC_IDC_PG_IMPLANT_DTM: NORMAL
MDC_IDC_PG_MFG: NORMAL
MDC_IDC_PG_MODEL: NORMAL
MDC_IDC_PG_SERIAL: NORMAL
MDC_IDC_PG_TYPE: NORMAL
MDC_IDC_SESS_CLINIC_NAME: NORMAL
MDC_IDC_SESS_DTM: NORMAL
MDC_IDC_SESS_TYPE: NORMAL
MDC_IDC_SET_BRADY_AT_MODE_SWITCH_MODE: NORMAL
MDC_IDC_SET_BRADY_AT_MODE_SWITCH_RATE: 170 {BEATS}/MIN
MDC_IDC_SET_BRADY_LOWRATE: 55 {BEATS}/MIN
MDC_IDC_SET_BRADY_MAX_SENSOR_RATE: 130 {BEATS}/MIN
MDC_IDC_SET_BRADY_MAX_TRACKING_RATE: 130 {BEATS}/MIN
MDC_IDC_SET_BRADY_MODE: NORMAL
MDC_IDC_SET_BRADY_PAV_DELAY_HIGH: 200 MS
MDC_IDC_SET_BRADY_PAV_DELAY_LOW: 300 MS
MDC_IDC_SET_BRADY_SAV_DELAY_HIGH: 200 MS
MDC_IDC_SET_BRADY_SAV_DELAY_LOW: 300 MS
MDC_IDC_SET_LEADCHNL_RA_PACING_AMPLITUDE: 2 V
MDC_IDC_SET_LEADCHNL_RA_PACING_CAPTURE_MODE: NORMAL
MDC_IDC_SET_LEADCHNL_RA_PACING_POLARITY: NORMAL
MDC_IDC_SET_LEADCHNL_RA_PACING_PULSEWIDTH: 0.4 MS
MDC_IDC_SET_LEADCHNL_RA_SENSING_ADAPTATION_MODE: NORMAL
MDC_IDC_SET_LEADCHNL_RA_SENSING_POLARITY: NORMAL
MDC_IDC_SET_LEADCHNL_RA_SENSING_SENSITIVITY: 0.25 MV
MDC_IDC_SET_LEADCHNL_RV_PACING_AMPLITUDE: 1.5 V
MDC_IDC_SET_LEADCHNL_RV_PACING_CAPTURE_MODE: NORMAL
MDC_IDC_SET_LEADCHNL_RV_PACING_POLARITY: NORMAL
MDC_IDC_SET_LEADCHNL_RV_PACING_PULSEWIDTH: 0.4 MS
MDC_IDC_SET_LEADCHNL_RV_SENSING_ADAPTATION_MODE: NORMAL
MDC_IDC_SET_LEADCHNL_RV_SENSING_POLARITY: NORMAL
MDC_IDC_SET_LEADCHNL_RV_SENSING_SENSITIVITY: 1.5 MV
MDC_IDC_SET_ZONE_DETECTION_INTERVAL: 375 MS
MDC_IDC_SET_ZONE_TYPE: NORMAL
MDC_IDC_SET_ZONE_VENDOR_TYPE: NORMAL
MDC_IDC_STAT_AT_BURDEN_PERCENT: 1 %
MDC_IDC_STAT_AT_DTM_END: NORMAL
MDC_IDC_STAT_AT_DTM_START: NORMAL
MDC_IDC_STAT_BRADY_DTM_END: NORMAL
MDC_IDC_STAT_BRADY_DTM_START: NORMAL
MDC_IDC_STAT_BRADY_RA_PERCENT_PACED: 30 %
MDC_IDC_STAT_BRADY_RV_PERCENT_PACED: 0 %
MDC_IDC_STAT_EPISODE_RECENT_COUNT: 0
MDC_IDC_STAT_EPISODE_RECENT_COUNT: 0
MDC_IDC_STAT_EPISODE_RECENT_COUNT: 1
MDC_IDC_STAT_EPISODE_RECENT_COUNT: 2
MDC_IDC_STAT_EPISODE_RECENT_COUNT: 3
MDC_IDC_STAT_EPISODE_RECENT_COUNT_DTM_END: NORMAL
MDC_IDC_STAT_EPISODE_RECENT_COUNT_DTM_START: NORMAL
MDC_IDC_STAT_EPISODE_TYPE: NORMAL
MDC_IDC_STAT_EPISODE_VENDOR_TYPE: NORMAL

## 2022-11-02 ENCOUNTER — VIRTUAL VISIT (OUTPATIENT)
Dept: UROLOGY | Facility: CLINIC | Age: 77
End: 2022-11-02
Payer: MEDICARE

## 2022-11-02 VITALS — HEIGHT: 70 IN | BODY MASS INDEX: 30.35 KG/M2 | WEIGHT: 212 LBS

## 2022-11-02 DIAGNOSIS — R39.9 LOWER URINARY TRACT SYMPTOMS (LUTS): ICD-10-CM

## 2022-11-02 DIAGNOSIS — C61 PROSTATE CANCER (H): Primary | ICD-10-CM

## 2022-11-02 PROCEDURE — 99214 OFFICE O/P EST MOD 30 MIN: CPT | Mod: 95 | Performed by: UROLOGY

## 2022-11-02 RX ORDER — TAMSULOSIN HYDROCHLORIDE 0.4 MG/1
0.4 CAPSULE ORAL DAILY
Qty: 90 CAPSULE | Refills: 3 | Status: SHIPPED | OUTPATIENT
Start: 2022-11-02 | End: 2023-08-22

## 2022-11-02 RX ORDER — TAMSULOSIN HYDROCHLORIDE 0.4 MG/1
0.4 CAPSULE ORAL DAILY
COMMUNITY
End: 2022-11-02

## 2022-11-02 ASSESSMENT — PAIN SCALES - GENERAL: PAINLEVEL: NO PAIN (0)

## 2022-11-02 NOTE — LETTER
"11/2/2022       RE: Estrada Oseguera  [address]  [facility] [address]     Dear Colleague,    Thank you for referring your patient, Estrada Oseguera, to the [facility] at [facility]. Please see a copy of my visit note below.    [facility]  CHIEF COMPLAINT   It was my pleasure to see Estrada Oseguera who is a 77 year old male for follow-up of prostate cancer on active surveillance with LUTS.      HPI   Estrada Oseguera is a very pleasant 77 year old male     Initially seen 4/7/21:  \"Estrada Oseguera is a 76 year old male who is being seen for evaluation of Elevated PSA      Has been having significant LUTS worsening over the past 6-12 months  Slow and weak stream  Nocturia x3/night  Having daytime frequency   Having some urgency   He is having to do some abdominal straining  No prior acute urinary retention, UTI, or gross hematuria      Also with some Elevated PSA      Father had low grade prostate cancer found at time of death at age 87 from a stroke\"    4/21/21:  Started on tamsulosin 0.4mg (Flomax) at last visit  Follow up to discuss MRI  Notes a slight change with tamsulosin 0.4mg (Flomax)     5/19/21:  Follow-up today to discuss his biopsy results  He did well after the biopsy with no issues  His wife joins him for this call    10/15/21:  Follow-up today to discuss his recent PSA  He has been doing well with no change in symptoms    4/22/2022:   Follow-up today on active surveillance for his prostate cancer  He did run out of his tamsulosin a few weeks ago and noticed the effects of stopping the medication.  This has been refilled  His wife joins him for this visit    TODAY 11/2/2022:  Doing very well  He ran out of Flomax about a week ago and has noticed a decrease in his urine stream and that it is a little bit harder to start urination  His wife joins for this visit    PHYSICAL EXAM  Patient is a 77 year old  male " "  Vitals: Height 1.778 m (5' 10\"), weight 96.2 kg (212 lb).  Body mass index is 30.42 kg/m .  General Appearance Adult:   Alert, no acute distress, oriented  HENT: throat/mouth:normal, good dentition  Lungs: no respiratory distress, or pursed lip breathing  Heart: No obvious jugular venous distension present  Abdomen: obesely - distended  Musculoskeltal: extremities normal, no peripheral edema  Skin: no suspicious lesions or rashes  Neuro: Alert, oriented, speech and mentation normal  Psych: affect and mood normal  Gait: Normal     Component PSA   Latest Ref Rng & Units 0.00 - 4.00 ug/L   9/21/2018 5.22 (H)   3/13/2019 5.28 (H)   3/16/2020 4.90 (H)   3/18/2021 5.38 (H)   9/20/2021 6.10 (H)   4/22/2022 6.10 (H)   10/21/2022 7.10 (H)     PATHOLOGY:  MR-FUSION URONAV 5/12/21:  FINAL DIAGNOSIS:   A.  Prostate gland, left lateral base, needle biopsy:   - Negative for malignancy.   - Mild chronic inflammation.     B.  Prostate gland, left lateral mid, needle biopsy:   - Negative for malignancy.   - Glandular atrophy.     C.  Prostate gland, left lateral apex, needle biopsy:   - Negative for malignancy.   - Glandular atrophy.     D.  Prostate gland, left base, needle biopsy:   - Prostatic acinar adenocarcinoma.      - Lesley score  6 (3+3):      - WHO Grade Group - 1      - Proportion of tissue involved by tumor:  Approximately 33%      - Number of tissue cores involved by tumor:  1 (out of 1)      - Angiolymphatic invasion:  Not identified      - Perineural invasion:  Not identified     E.  Prostate gland, left mid, needle biopsy:   - Prostatic acinar adenocarcinoma.      - Lesley score  6 (3+3):      - WHO Grade Group - 1      - Proportion of tissue involved by tumor:  Approximately 33%      - Number of tissue cores involved by tumor:  1 (out of 1)      - Angiolymphatic invasion:  Not identified      - Perineural invasion:  Not identified     F.  Prostate gland, left apex, needle biopsy:]   - Negative for malignancy. " "    G.  Prostate gland, right lateral base, needle biopsy:   - Negative for malignancy.   - Glandular atrophy.     H.  Prostate gland, right lateral mid, needle biopsy:   - A minute focus of atypical small acinar proliferation, suspicious for   malignancy.   - Glandular atrophy.     I.  Prostate gland, right lateral apex, needle biopsy:   - Negative for malignancy.   - Glandular atrophy.     J.  Prostate gland, right base, needle biopsy:   - Negative for malignancy.   - Glandular atrophy.     K.  Prostate gland, right mid, needle biopsy:   - Negative for malignancy.   - Glandular atrophy.     L.  Prostate gland, right apex, needle biopsy:   - Negative for malignancy.   - Glandular atrophy.     M.  Prostate gland., right sided lesion, needle biopsies x 3:   - Prostatic acinar adenocarcinoma.      - [place] score:  6 (3+3)      - WHO Grade Group - 1      - Proportion of tissue involved by tumor:  approximately 40%      - Number of tissue cores involved by tumor: 3 (out of 3)      - Angiolymphatic invasion:  Not identified      - Perineural invasion:  Not identified       IMAGING:  All pertinent imaging reviewed:    All imaging studies reviewed by me.  I personally reviewed these imaging films.  A formal report from radiology will follow.    MRI PROSTATE 4/15/21:  \"FINDINGS:  Size: 6.1 x 4.8 x 4.7 cm; 72 grams  Hemorrhage: Absent   Peripheral zone: Heterogeneous on T2-weighted images. Suspicious  lesions as detailed below.  Transition zone: Enlarged with BPH changes. Transition zone nodules  which are circumscribed or mostly encapsulated without diffusion  restriction.  PI-RADS 2.  No highly suspicious nodules.     Lesion(s) in rank order of severity (highest score- to lowest score,  then by size)      Lesion 1:  Location: Left apex peripheral zone at the 5 o'clock position relative  to the urethra. Series 4 image 36.   Additional prostate regions involved: None   Size: 6  mm  T2 description: Rounded moderate " hypointensity  T2 numerical assessment: 3  DWI description: Focal hypointensity on ADC and hyperintensity on DWI  DWI numerical assessment: 3  DCE assessment: Positive    Prostate margin: Capsular abutment<6 mm with smooth contour   Lesion overall PI-RADS category: 4     Neurovascular bundles: No neurovascular bundle involvement by  malignancy.    Seminal vesicles: No seminal vesicle involvement by malignancy.   Lymph nodes: No lymph node involvement   Bones: No suspicious lesions   Other pelvic organs: Prominent fat in bilateral inguinal canal, left  more than right. Circumferential apparent urinary bladder wall  thickening with trabeculation/sacculations, likely representing post  chronic urinary outflow obstructive change.                                                         IMPRESSION:     1. Based on the most suspicious abnormality, this exam is  characterized as PIRADS 4 - Clinically significant cancer is likely to  be present.  The most suspicious abnormality is located at the left  apex peripheral zone, 5:00 position and there is minimal capsular  abutment with no convincing evidence of extraprostatic extension.  2. No suspicious adenopathy or evidence of pelvic metastases.      ASSESSMENT and PLAN  77-year-old man with elevated PSA and LUTS, now with an MRI showing a PI-RADS 4 lesion and a prostate volume of 72 g.  Status post an MR fusion biopsy with Lesley 3+3 = 6 prostate cancer in 3/3 of the target lesions as well as 2/12 of the template biopsy    DISCUSSION PROSTATE CANCER     The natural history of this disease was explained to the patient at length and the treatment options discussed including radical prostatectomy by open or robotic-assisted laparoscopic approach, external-beam radiotherapy, brachytherapy, active surveillance and watchful waiting, including the probability of success and complications associated with these approaches.    With respect to watchful waiting, we discussed the  difficulties of estimating the extent of disease preoperatively, the risk of cancer progression, and risk that salvage might not be possible with progression. However, the favorable 10-year outcomes of active surveillance in appropriately selected patients was conveyed.  We discussed that active surveillance has become the treatment of choice for men with low volume and low grade, [place] 3+3 = 6, prostate cancer.      Reviewed his PSA history and trend.  We discussed that his PSA most recently has risen to 7.1 from 6.1 last April.  Looking at his MRI his prostate volume is 72 g and his PSA density is therefore still slightly less than 0.1.  I reviewed his prior MRI and agree with radiologist interpretations.  We discussed that options at this point would be continued observation with a PSA recheck in 6 months versus a repeat MRI at this point.  He would like to continue with observation which I think is very reasonable    We discussed that other treatment options would include surgery or radiation, however given his low volume of low-grade prostate cancer I would not recommend either modality at this time.    Plan:  -PSA in 6 months    LUTS  -He notes some improvement with voiding following initiation of tamsulosin 0.4 mg daily  -We discussed that his MRI did demonstrate prostatomegaly with a volume of 72 ml  -His prescription was refilled today    Chart documentation with Dragon Voice recognition Software. Although reviewed after completion, some words and grammatical errors may remain.     Time spent: 15 minutes spent on the date of the encounter doing chart review, history and exam, documentation and further activities as noted above.    Jono Billy MD   Urology  [facility]  [facility] Phone: [phone]  [facility] Phone: [phone]    Theo is a 77 year old who is being evaluated via a billable video visit.      How would you like  to obtain your AVS? [employer] Mobile  If the video visit is dropped, the invitation should be resent by: Text to cell phone: [phone]  Will anyone else be joining your video visit? No        Video-Visit Details    Video Start Time: 2:48 PM    Type of service:  Video Visit    Video End Time:2:56 PM    Originating Location (pt. Location): Home        Distant Location (provider location):  On-site    Platform used for Video Visit: Majo

## 2022-11-02 NOTE — PROGRESS NOTES
"[facility]  CHIEF COMPLAINT   It was my pleasure to see Estrada Oseguera who is a 77 year old male for follow-up of prostate cancer on active surveillance with LUTS.      HPI   Estrada Oseguera is a very pleasant 77 year old male     Initially seen 4/7/21:  \"Estrada Oseguera is a 76 year old male who is being seen for evaluation of Elevated PSA      Has been having significant LUTS worsening over the past 6-12 months  Slow and weak stream  Nocturia x3/night  Having daytime frequency   Having some urgency   He is having to do some abdominal straining  No prior acute urinary retention, UTI, or gross hematuria      Also with some Elevated PSA      Father had low grade prostate cancer found at time of death at age 87 from a stroke\"    4/21/21:  Started on tamsulosin 0.4mg (Flomax) at last visit  Follow up to discuss MRI  Notes a slight change with tamsulosin 0.4mg (Flomax)     5/19/21:  Follow-up today to discuss his biopsy results  He did well after the biopsy with no issues  His wife joins him for this call    10/15/21:  Follow-up today to discuss his recent PSA  He has been doing well with no change in symptoms    4/22/2022:   Follow-up today on active surveillance for his prostate cancer  He did run out of his tamsulosin a few weeks ago and noticed the effects of stopping the medication.  This has been refilled  His wife joins him for this visit    TODAY 11/2/2022:  Doing very well  He ran out of Flomax about a week ago and has noticed a decrease in his urine stream and that it is a little bit harder to start urination  His wife joins for this visit    PHYSICAL EXAM  Patient is a 77 year old  male   Vitals: Height 1.778 m (5' 10\"), weight 96.2 kg (212 lb).  Body mass index is 30.42 kg/m .  General Appearance Adult:   Alert, no acute distress, oriented  HENT: throat/mouth:normal, good dentition  Lungs: no respiratory distress, or pursed lip breathing  Heart: No obvious jugular venous distension present  Abdomen: " obesely - distended  Musculoskeltal: extremities normal, no peripheral edema  Skin: no suspicious lesions or rashes  Neuro: Alert, oriented, speech and mentation normal  Psych: affect and mood normal  Gait: Normal     Component PSA   Latest Ref Rng & Units 0.00 - 4.00 ug/L   9/21/2018 5.22 (H)   3/13/2019 5.28 (H)   3/16/2020 4.90 (H)   3/18/2021 5.38 (H)   9/20/2021 6.10 (H)   4/22/2022 6.10 (H)   10/21/2022 7.10 (H)     PATHOLOGY:  MR-FUSION URONAV 5/12/21:  FINAL DIAGNOSIS:   A.  Prostate gland, left lateral base, needle biopsy:   - Negative for malignancy.   - Mild chronic inflammation.     B.  Prostate gland, left lateral mid, needle biopsy:   - Negative for malignancy.   - Glandular atrophy.     C.  Prostate gland, left lateral apex, needle biopsy:   - Negative for malignancy.   - Glandular atrophy.     D.  Prostate gland, left base, needle biopsy:   - Prostatic acinar adenocarcinoma.      - [place] score  6 (3+3):      - WHO Grade Group - 1      - Proportion of tissue involved by tumor:  Approximately 33%      - Number of tissue cores involved by tumor:  1 (out of 1)      - Angiolymphatic invasion:  Not identified      - Perineural invasion:  Not identified     E.  Prostate gland, left mid, needle biopsy:   - Prostatic acinar adenocarcinoma.      - Lesley score  6 (3+3):      - WHO Grade Group - 1      - Proportion of tissue involved by tumor:  Approximately 33%      - Number of tissue cores involved by tumor:  1 (out of 1)      - Angiolymphatic invasion:  Not identified      - Perineural invasion:  Not identified     F.  Prostate gland, left apex, needle biopsy:]   - Negative for malignancy.     G.  Prostate gland, right lateral base, needle biopsy:   - Negative for malignancy.   - Glandular atrophy.     H.  Prostate gland, right lateral mid, needle biopsy:   - A minute focus of atypical small acinar proliferation, suspicious for   malignancy.   - Glandular atrophy.     I.  Prostate gland, right lateral  "apex, needle biopsy:   - Negative for malignancy.   - Glandular atrophy.     J.  Prostate gland, right base, needle biopsy:   - Negative for malignancy.   - Glandular atrophy.     K.  Prostate gland, right mid, needle biopsy:   - Negative for malignancy.   - Glandular atrophy.     L.  Prostate gland, right apex, needle biopsy:   - Negative for malignancy.   - Glandular atrophy.     M.  Prostate gland., right sided lesion, needle biopsies x 3:   - Prostatic acinar adenocarcinoma.      - [place] score:  6 (3+3)      - WHO Grade Group - 1      - Proportion of tissue involved by tumor:  approximately 40%      - Number of tissue cores involved by tumor: 3 (out of 3)      - Angiolymphatic invasion:  Not identified      - Perineural invasion:  Not identified       IMAGING:  All pertinent imaging reviewed:    All imaging studies reviewed by me.  I personally reviewed these imaging films.  A formal report from radiology will follow.    MRI PROSTATE 4/15/21:  \"FINDINGS:  Size: 6.1 x 4.8 x 4.7 cm; 72 grams  Hemorrhage: Absent   Peripheral zone: Heterogeneous on T2-weighted images. Suspicious  lesions as detailed below.  Transition zone: Enlarged with BPH changes. Transition zone nodules  which are circumscribed or mostly encapsulated without diffusion  restriction.  PI-RADS 2.  No highly suspicious nodules.     Lesion(s) in rank order of severity (highest score- to lowest score,  then by size)      Lesion 1:  Location: Left apex peripheral zone at the 5 o'clock position relative  to the urethra. Series 4 image 36.   Additional prostate regions involved: None   Size: 6  mm  T2 description: Rounded moderate hypointensity  T2 numerical assessment: 3  DWI description: Focal hypointensity on ADC and hyperintensity on DWI  DWI numerical assessment: 3  DCE assessment: Positive    Prostate margin: Capsular abutment<6 mm with smooth contour   Lesion overall PI-RADS category: 4     Neurovascular bundles: No neurovascular bundle " involvement by  malignancy.    Seminal vesicles: No seminal vesicle involvement by malignancy.   Lymph nodes: No lymph node involvement   Bones: No suspicious lesions   Other pelvic organs: Prominent fat in bilateral inguinal canal, left  more than right. Circumferential apparent urinary bladder wall  thickening with trabeculation/sacculations, likely representing post  chronic urinary outflow obstructive change.                                                         IMPRESSION:     1. Based on the most suspicious abnormality, this exam is  characterized as PIRADS 4 - Clinically significant cancer is likely to  be present.  The most suspicious abnormality is located at the left  apex peripheral zone, 5:00 position and there is minimal capsular  abutment with no convincing evidence of extraprostatic extension.  2. No suspicious adenopathy or evidence of pelvic metastases.      ASSESSMENT and PLAN  77-year-old man with elevated PSA and LUTS, now with an MRI showing a PI-RADS 4 lesion and a prostate volume of 72 g.  Status post an MR fusion biopsy with [place] 3+3 = 6 prostate cancer in 3/3 of the target lesions as well as 2/12 of the template biopsy    DISCUSSION PROSTATE CANCER     The natural history of this disease was explained to the patient at length and the treatment options discussed including radical prostatectomy by open or robotic-assisted laparoscopic approach, external-beam radiotherapy, brachytherapy, active surveillance and watchful waiting, including the probability of success and complications associated with these approaches.    With respect to watchful waiting, we discussed the difficulties of estimating the extent of disease preoperatively, the risk of cancer progression, and risk that salvage might not be possible with progression. However, the favorable 10-year outcomes of active surveillance in appropriately selected patients was conveyed.  We discussed that active surveillance has become the  treatment of choice for men with low volume and low grade, Lesley 3+3 = 6, prostate cancer.      Reviewed his PSA history and trend.  We discussed that his PSA most recently has risen to 7.1 from 6.1 last April.  Looking at his MRI his prostate volume is 72 g and his PSA density is therefore still slightly less than 0.1.  I reviewed his prior MRI and agree with radiologist interpretations.  We discussed that options at this point would be continued observation with a PSA recheck in 6 months versus a repeat MRI at this point.  He would like to continue with observation which I think is very reasonable    We discussed that other treatment options would include surgery or radiation, however given his low volume of low-grade prostate cancer I would not recommend either modality at this time.    Plan:  -PSA in 6 months    LUTS  -He notes some improvement with voiding following initiation of tamsulosin 0.4 mg daily  -We discussed that his MRI did demonstrate prostatomegaly with a volume of 72 ml  -His prescription was refilled today    Chart documentation with Dragon Voice recognition Software. Although reviewed after completion, some words and grammatical errors may remain.     Time spent: 15 minutes spent on the date of the encounter doing chart review, history and exam, documentation and further activities as noted above.    Jono Billy MD   Urology  [facility]  [facility] Phone: [phone]  [facility] Phone: [phone]    Theo is a 77 year old who is being evaluated via a billable video visit.      How would you like to obtain your AVS? MyChart  If the video visit is dropped, the invitation should be resent by: Text to cell phone: [phone]  Will anyone else be joining your video visit? No        Video-Visit Details    Video Start Time: 2:48 PM    Type of service:  Video Visit    Video End Time:2:56 PM    Originating Location (pt.  Location): Home        Distant Location (provider location):  On-site    Platform used for Video Visit: Majo

## 2023-01-23 ENCOUNTER — HEALTH MAINTENANCE LETTER (OUTPATIENT)
Age: 78
End: 2023-01-23

## 2023-01-23 ENCOUNTER — ANCILLARY PROCEDURE (OUTPATIENT)
Dept: CARDIOLOGY | Facility: CLINIC | Age: 78
End: 2023-01-23
Attending: INTERNAL MEDICINE
Payer: MEDICARE

## 2023-01-23 DIAGNOSIS — I49.5 SSS (SICK SINUS SYNDROME) (H): ICD-10-CM

## 2023-01-23 DIAGNOSIS — Z95.0 CARDIAC PACEMAKER IN SITU: ICD-10-CM

## 2023-01-23 PROCEDURE — 93296 REM INTERROG EVL PM/IDS: CPT | Performed by: INTERNAL MEDICINE

## 2023-01-23 PROCEDURE — 93294 REM INTERROG EVL PM/LDLS PM: CPT | Performed by: INTERNAL MEDICINE

## 2023-01-26 LAB
MDC_IDC_EPISODE_DTM: NORMAL
MDC_IDC_EPISODE_DTM: NORMAL
MDC_IDC_EPISODE_ID: NORMAL
MDC_IDC_EPISODE_ID: NORMAL
MDC_IDC_EPISODE_TYPE: NORMAL
MDC_IDC_EPISODE_TYPE: NORMAL
MDC_IDC_LEAD_IMPLANT_DT: NORMAL
MDC_IDC_LEAD_IMPLANT_DT: NORMAL
MDC_IDC_LEAD_LOCATION: NORMAL
MDC_IDC_LEAD_LOCATION: NORMAL
MDC_IDC_LEAD_LOCATION_DETAIL_1: NORMAL
MDC_IDC_LEAD_LOCATION_DETAIL_1: NORMAL
MDC_IDC_LEAD_MFG: NORMAL
MDC_IDC_LEAD_MFG: NORMAL
MDC_IDC_LEAD_MODEL: NORMAL
MDC_IDC_LEAD_MODEL: NORMAL
MDC_IDC_LEAD_POLARITY_TYPE: NORMAL
MDC_IDC_LEAD_POLARITY_TYPE: NORMAL
MDC_IDC_LEAD_SERIAL: NORMAL
MDC_IDC_LEAD_SERIAL: NORMAL
MDC_IDC_MSMT_BATTERY_DTM: NORMAL
MDC_IDC_MSMT_BATTERY_REMAINING_LONGEVITY: 102 MO
MDC_IDC_MSMT_BATTERY_REMAINING_PERCENTAGE: 100 %
MDC_IDC_MSMT_BATTERY_STATUS: NORMAL
MDC_IDC_MSMT_LEADCHNL_RA_IMPEDANCE_VALUE: 782 OHM
MDC_IDC_MSMT_LEADCHNL_RA_PACING_THRESHOLD_AMPLITUDE: 0.6 V
MDC_IDC_MSMT_LEADCHNL_RA_PACING_THRESHOLD_PULSEWIDTH: 0.4 MS
MDC_IDC_MSMT_LEADCHNL_RV_IMPEDANCE_VALUE: 627 OHM
MDC_IDC_MSMT_LEADCHNL_RV_PACING_THRESHOLD_AMPLITUDE: 1 V
MDC_IDC_MSMT_LEADCHNL_RV_PACING_THRESHOLD_PULSEWIDTH: 0.4 MS
MDC_IDC_PG_IMPLANT_DTM: NORMAL
MDC_IDC_PG_MFG: NORMAL
MDC_IDC_PG_MODEL: NORMAL
MDC_IDC_PG_SERIAL: NORMAL
MDC_IDC_PG_TYPE: NORMAL
MDC_IDC_SESS_CLINIC_NAME: NORMAL
MDC_IDC_SESS_DTM: NORMAL
MDC_IDC_SESS_TYPE: NORMAL
MDC_IDC_SET_BRADY_AT_MODE_SWITCH_MODE: NORMAL
MDC_IDC_SET_BRADY_AT_MODE_SWITCH_RATE: 170 {BEATS}/MIN
MDC_IDC_SET_BRADY_LOWRATE: 55 {BEATS}/MIN
MDC_IDC_SET_BRADY_MAX_SENSOR_RATE: 130 {BEATS}/MIN
MDC_IDC_SET_BRADY_MAX_TRACKING_RATE: 130 {BEATS}/MIN
MDC_IDC_SET_BRADY_MODE: NORMAL
MDC_IDC_SET_BRADY_PAV_DELAY_HIGH: 200 MS
MDC_IDC_SET_BRADY_PAV_DELAY_LOW: 300 MS
MDC_IDC_SET_BRADY_SAV_DELAY_HIGH: 200 MS
MDC_IDC_SET_BRADY_SAV_DELAY_LOW: 300 MS
MDC_IDC_SET_LEADCHNL_RA_PACING_AMPLITUDE: 2 V
MDC_IDC_SET_LEADCHNL_RA_PACING_CAPTURE_MODE: NORMAL
MDC_IDC_SET_LEADCHNL_RA_PACING_POLARITY: NORMAL
MDC_IDC_SET_LEADCHNL_RA_PACING_PULSEWIDTH: 0.4 MS
MDC_IDC_SET_LEADCHNL_RA_SENSING_ADAPTATION_MODE: NORMAL
MDC_IDC_SET_LEADCHNL_RA_SENSING_POLARITY: NORMAL
MDC_IDC_SET_LEADCHNL_RA_SENSING_SENSITIVITY: 0.25 MV
MDC_IDC_SET_LEADCHNL_RV_PACING_AMPLITUDE: 1.5 V
MDC_IDC_SET_LEADCHNL_RV_PACING_CAPTURE_MODE: NORMAL
MDC_IDC_SET_LEADCHNL_RV_PACING_POLARITY: NORMAL
MDC_IDC_SET_LEADCHNL_RV_PACING_PULSEWIDTH: 0.4 MS
MDC_IDC_SET_LEADCHNL_RV_SENSING_ADAPTATION_MODE: NORMAL
MDC_IDC_SET_LEADCHNL_RV_SENSING_POLARITY: NORMAL
MDC_IDC_SET_LEADCHNL_RV_SENSING_SENSITIVITY: 1.5 MV
MDC_IDC_SET_ZONE_DETECTION_INTERVAL: 375 MS
MDC_IDC_SET_ZONE_TYPE: NORMAL
MDC_IDC_SET_ZONE_VENDOR_TYPE: NORMAL
MDC_IDC_STAT_AT_BURDEN_PERCENT: 0 %
MDC_IDC_STAT_AT_DTM_END: NORMAL
MDC_IDC_STAT_AT_DTM_START: NORMAL
MDC_IDC_STAT_BRADY_DTM_END: NORMAL
MDC_IDC_STAT_BRADY_DTM_START: NORMAL
MDC_IDC_STAT_BRADY_RA_PERCENT_PACED: 32 %
MDC_IDC_STAT_BRADY_RV_PERCENT_PACED: 0 %
MDC_IDC_STAT_EPISODE_RECENT_COUNT: 0
MDC_IDC_STAT_EPISODE_RECENT_COUNT_DTM_END: NORMAL
MDC_IDC_STAT_EPISODE_RECENT_COUNT_DTM_START: NORMAL
MDC_IDC_STAT_EPISODE_TYPE: NORMAL
MDC_IDC_STAT_EPISODE_VENDOR_TYPE: NORMAL

## 2023-03-03 ENCOUNTER — OFFICE VISIT (OUTPATIENT)
Dept: FAMILY MEDICINE | Facility: CLINIC | Age: 78
End: 2023-03-03
Payer: MEDICARE

## 2023-03-03 VITALS
TEMPERATURE: 98 F | DIASTOLIC BLOOD PRESSURE: 89 MMHG | HEART RATE: 81 BPM | HEIGHT: 70 IN | RESPIRATION RATE: 16 BRPM | SYSTOLIC BLOOD PRESSURE: 143 MMHG | WEIGHT: 219.1 LBS | OXYGEN SATURATION: 97 % | BODY MASS INDEX: 31.37 KG/M2

## 2023-03-03 DIAGNOSIS — E11.9 TYPE 2 DIABETES MELLITUS WITHOUT COMPLICATION, WITHOUT LONG-TERM CURRENT USE OF INSULIN (H): ICD-10-CM

## 2023-03-03 DIAGNOSIS — K22.70 BARRETT'S ESOPHAGUS WITHOUT DYSPLASIA: ICD-10-CM

## 2023-03-03 DIAGNOSIS — Z90.5 S/P NEPHRECTOMY: ICD-10-CM

## 2023-03-03 DIAGNOSIS — Z90.49 S/P COLON RESECTION: ICD-10-CM

## 2023-03-03 DIAGNOSIS — I10 BENIGN HYPERTENSION: ICD-10-CM

## 2023-03-03 DIAGNOSIS — Z00.00 ENCOUNTER FOR MEDICARE ANNUAL WELLNESS EXAM: Primary | ICD-10-CM

## 2023-03-03 DIAGNOSIS — I48.0 PAROXYSMAL ATRIAL FIBRILLATION (H): ICD-10-CM

## 2023-03-03 DIAGNOSIS — I42.8 OTHER CARDIOMYOPATHIES (H): ICD-10-CM

## 2023-03-03 DIAGNOSIS — I47.29 NSVT (NONSUSTAINED VENTRICULAR TACHYCARDIA) (H): ICD-10-CM

## 2023-03-03 DIAGNOSIS — C61 PROSTATE CANCER (H): ICD-10-CM

## 2023-03-03 DIAGNOSIS — N18.31 STAGE 3A CHRONIC KIDNEY DISEASE (H): ICD-10-CM

## 2023-03-03 LAB
ALBUMIN SERPL BCG-MCNC: 4.3 G/DL (ref 3.5–5.2)
ALBUMIN UR-MCNC: NEGATIVE MG/DL
ALP SERPL-CCNC: 66 U/L (ref 40–129)
ALT SERPL W P-5'-P-CCNC: 33 U/L (ref 10–50)
ANION GAP SERPL CALCULATED.3IONS-SCNC: 11 MMOL/L (ref 7–15)
APPEARANCE UR: CLEAR
AST SERPL W P-5'-P-CCNC: 31 U/L (ref 10–50)
BACTERIA #/AREA URNS HPF: NORMAL /HPF
BILIRUB SERPL-MCNC: 0.7 MG/DL
BILIRUB UR QL STRIP: NEGATIVE
BUN SERPL-MCNC: 21 MG/DL (ref 8–23)
CALCIUM SERPL-MCNC: 9.5 MG/DL (ref 8.8–10.2)
CHLORIDE SERPL-SCNC: 104 MMOL/L (ref 98–107)
CHOLEST SERPL-MCNC: 135 MG/DL
COLOR UR AUTO: YELLOW
CREAT SERPL-MCNC: 1.47 MG/DL (ref 0.67–1.17)
CREAT UR-MCNC: 78.9 MG/DL
DEPRECATED HCO3 PLAS-SCNC: 25 MMOL/L (ref 22–29)
GFR SERPL CREATININE-BSD FRML MDRD: 49 ML/MIN/1.73M2
GLUCOSE SERPL-MCNC: 127 MG/DL (ref 70–99)
GLUCOSE UR STRIP-MCNC: NEGATIVE MG/DL
HBA1C MFR BLD: 6.4 % (ref 0–5.6)
HDLC SERPL-MCNC: 47 MG/DL
HGB UR QL STRIP: ABNORMAL
KETONES UR STRIP-MCNC: NEGATIVE MG/DL
LDLC SERPL CALC-MCNC: 71 MG/DL
LEUKOCYTE ESTERASE UR QL STRIP: NEGATIVE
MICROALBUMIN UR-MCNC: <12 MG/L
MICROALBUMIN/CREAT UR: NORMAL MG/G{CREAT}
NITRATE UR QL: NEGATIVE
NONHDLC SERPL-MCNC: 88 MG/DL
PH UR STRIP: 5.5 [PH] (ref 5–7)
POTASSIUM SERPL-SCNC: 5 MMOL/L (ref 3.4–5.3)
PROT SERPL-MCNC: 7.2 G/DL (ref 6.4–8.3)
RBC #/AREA URNS AUTO: NORMAL /HPF
SODIUM SERPL-SCNC: 140 MMOL/L (ref 136–145)
SP GR UR STRIP: 1.01 (ref 1–1.03)
SQUAMOUS #/AREA URNS AUTO: NORMAL /LPF
TRIGL SERPL-MCNC: 83 MG/DL
TSH SERPL DL<=0.005 MIU/L-ACNC: 2.52 UIU/ML (ref 0.3–4.2)
UROBILINOGEN UR STRIP-ACNC: 0.2 E.U./DL
WBC #/AREA URNS AUTO: NORMAL /HPF

## 2023-03-03 PROCEDURE — 82570 ASSAY OF URINE CREATININE: CPT | Performed by: INTERNAL MEDICINE

## 2023-03-03 PROCEDURE — 99214 OFFICE O/P EST MOD 30 MIN: CPT | Mod: 25 | Performed by: INTERNAL MEDICINE

## 2023-03-03 PROCEDURE — G0439 PPPS, SUBSEQ VISIT: HCPCS | Performed by: INTERNAL MEDICINE

## 2023-03-03 PROCEDURE — 82043 UR ALBUMIN QUANTITATIVE: CPT | Performed by: INTERNAL MEDICINE

## 2023-03-03 PROCEDURE — 99207 PR FOOT EXAM NO CHARGE: CPT | Performed by: INTERNAL MEDICINE

## 2023-03-03 PROCEDURE — 80061 LIPID PANEL: CPT | Performed by: INTERNAL MEDICINE

## 2023-03-03 PROCEDURE — 84443 ASSAY THYROID STIM HORMONE: CPT | Performed by: INTERNAL MEDICINE

## 2023-03-03 PROCEDURE — 80053 COMPREHEN METABOLIC PANEL: CPT | Performed by: INTERNAL MEDICINE

## 2023-03-03 PROCEDURE — 81001 URINALYSIS AUTO W/SCOPE: CPT | Performed by: INTERNAL MEDICINE

## 2023-03-03 PROCEDURE — 36415 COLL VENOUS BLD VENIPUNCTURE: CPT | Performed by: INTERNAL MEDICINE

## 2023-03-03 PROCEDURE — 83036 HEMOGLOBIN GLYCOSYLATED A1C: CPT | Performed by: INTERNAL MEDICINE

## 2023-03-03 RX ORDER — LISINOPRIL 20 MG/1
20 TABLET ORAL DAILY
Qty: 90 TABLET | Refills: 3 | Status: SHIPPED | OUTPATIENT
Start: 2023-03-03 | End: 2023-08-22

## 2023-03-03 RX ORDER — METFORMIN HCL 500 MG
500 TABLET, EXTENDED RELEASE 24 HR ORAL
Qty: 90 TABLET | Refills: 3 | Status: SHIPPED | OUTPATIENT
Start: 2023-03-03 | End: 2023-08-22

## 2023-03-03 RX ORDER — PANTOPRAZOLE SODIUM 40 MG/1
40 TABLET, DELAYED RELEASE ORAL DAILY
Qty: 90 TABLET | Refills: 3 | Status: SHIPPED | OUTPATIENT
Start: 2023-03-03 | End: 2023-08-22

## 2023-03-03 RX ORDER — METOPROLOL SUCCINATE 50 MG/1
50 TABLET, EXTENDED RELEASE ORAL DAILY
Qty: 90 TABLET | Refills: 3 | Status: SHIPPED | OUTPATIENT
Start: 2023-03-03 | End: 2024-01-16

## 2023-03-03 ASSESSMENT — PAIN SCALES - GENERAL: PAINLEVEL: NO PAIN (0)

## 2023-03-03 ASSESSMENT — ACTIVITIES OF DAILY LIVING (ADL): CURRENT_FUNCTION: NO ASSISTANCE NEEDED

## 2023-03-03 NOTE — PROGRESS NOTES
"SUBJECTIVE:   Theo is a 78 year old who presents for Preventive Visit.  Patient has been advised of split billing requirements and indicates understanding: Yes  Are you in the first 12 months of your Medicare coverage?  No    Healthy Habits:     In general, how would you rate your overall health?  Good    Frequency of exercise:  6-7 days/week    Duration of exercise:  30-45 minutes    Do you usually eat at least 4 servings of fruit and vegetables a day, include whole grains    & fiber and avoid regularly eating high fat or \"junk\" foods?  Yes    Taking medications regularly:  Yes    Barriers to taking medications:  Lost prescription    Medication side effects:  None    Ability to successfully perform activities of daily living:  No assistance needed    Home Safety:  No safety concerns identified    Hearing Impairment:  No hearing concerns    In the past 6 months, have you been bothered by leaking of urine?  No    In general, how would you rate your overall mental or emotional health?  Excellent      PHQ-2 Total Score: 0    Additional concerns today:  No      Have you ever done Advance Care Planning? (For example, a Health Directive, POLST, or a discussion with a medical provider or your loved ones about your wishes): Yes, advance care planning is on file.       Fall risk  Fallen 2 or more times in the past year?: No  Any fall with injury in the past year?: No    Cognitive Screening   1) Repeat 3 items (Leader, Season, Table)    2) Clock draw: NORMAL  3) 3 item recall: Recalls 3 objects  Results: 3 items recalled: COGNITIVE IMPAIRMENT LESS LIKELY    Mini-CogTM Copyright HAWA Esparza. Licensed by the author for use in [facility]; reprinted with permission (helena@.[facility]). All rights reserved.      Do you have sleep apnea, excessive snoring or daytime drowsiness?: no    Reviewed and updated as needed this visit by clinical staff   Tobacco  Allergies  Meds              Reviewed and updated as needed this visit " by Provider                 Social History     Tobacco Use     Smoking status: Former     Types: Pipe     Quit date:      Years since quittin.1     Smokeless tobacco: Never     Tobacco comments:     light smoker for few years only   Substance Use Topics     Alcohol use: Not Currently     Alcohol/week: 0.0 standard drinks         Alcohol Use 3/3/2023   Prescreen: >3 drinks/day or >7 drinks/week? Not Applicable         Current providers sharing in care for this patient include:   Patient Care Team:  Zoe Tomas MD as PCP - General (Internal Medicine)  Zoe Tomas MD as Assigned PCP  Jono Billy MD as Assigned Surgical Provider  Marcella Florian RD as Diabetes Educator (Dietitian, Registered)  Lynn Valle MD as MD (Cardiovascular Disease)  Lynn Valle MD as Assigned Heart and Vascular Provider    The following health maintenance items are reviewed in Epic and correct as of today:  Health Maintenance   Topic Date Due     URINALYSIS  Never done     ZOSTER IMMUNIZATION (1 of 2) Never done     A1C  2022     MICROALBUMIN  10/14/2022     LIPID  2023     EYE EXAM  2023     BMP  2023     HEMOGLOBIN  2023     PSA  10/21/2023     MEDICARE ANNUAL WELLNESS VISIT  2024     DIABETIC FOOT EXAM  2024     ANNUAL REVIEW OF HM ORDERS  2024     FALL RISK ASSESSMENT  2024     COLORECTAL CANCER SCREENING  2024     DTAP/TDAP/TD IMMUNIZATION (2 - Td or Tdap) 10/27/2026     ADVANCE CARE PLANNING  2028     HEPATITIS C SCREENING  Completed     PHQ-2 (once per calendar year)  Completed     INFLUENZA VACCINE  Completed     Pneumococcal Vaccine: 65+ Years  Completed     COVID-19 Vaccine  Completed     IPV IMMUNIZATION  Aged Out     MENINGITIS IMMUNIZATION  Aged Out     Labs reviewed in EPIC    Review of Systems  Constitutional, HEENT, cardiovascular, pulmonary, GI, , musculoskeletal, neuro, skin, endocrine and psych  "systems are negative, except as otherwise noted.    OBJECTIVE:   BP (!) 143/89 (BP Location: Right arm, Patient Position: Sitting)   Pulse 81   Temp 98  F (36.7  C) (Oral)   Resp 16   Ht 1.778 m (5' 10\")   Wt 99.4 kg (219 lb 1.6 oz)   SpO2 97%   BMI 31.44 kg/m   Estimated body mass index is 31.44 kg/m  as calculated from the following:    Height as of this encounter: 1.778 m (5' 10\").    Weight as of this encounter: 99.4 kg (219 lb 1.6 oz).     Physical Exam  GENERAL: healthy, alert and no distress  EYES: Eyes grossly normal to inspection, PERRL and conjunctivae and sclerae normal  HENT: ear canals and TM's normal, nose and mouth without ulcers or lesions  NECK: no adenopathy, no asymmetry, masses, or scars and thyroid normal to palpation  RESP: lungs clear to auscultation - no rales, rhonchi or wheezes  CV: regular rate and rhythm, normal S1 S2, no S3   ABDOMEN: soft, nontender, no hepatosplenomegaly, no masses and bowel sounds normal  MS: no gross musculoskeletal defects noted, no edema  SKIN: There is a scar below the umbilici - longitudinal scar because of colon resection. There is also a scar in the RLQ related to nephrectomy. Seborric keratosic lesions on the back, numerous freckles and hypopigmented spots on the abdomen and lower extremities, spider veins with slight edema at the lower extremities  NEURO:  mentation intact and speech normal  PSYCH: mentation appears normal, affect normal/bright    ASSESSMENT / PLAN:   Estrada was seen today for physical.    Diagnoses and all orders for this visit:    Encounter for Medicare annual wellness exam   Preventive health counseling was also done.    Stage 3a chronic kidney disease (H)  -     UA reflex to Microscopic and Culture ([id number]); Future  -     Albumin Random Urine Quantitative with Creat Ratio; Future    Type 2 diabetes mellitus without complication, without long-term current use of insulin (H)  -     HEMOGLOBIN A1C; Future  -     Lipid panel reflex " to direct LDL Non-fasting; Future  -     Comprehensive metabolic panel (BMP + Alb, Alk Phos, ALT, AST, Total. Bili, TP); Future  -     TSH with free T4 reflex; Future  -     empagliflozin (JARDIANCE) 10 MG TABS tablet; Take 1 tablet (10 mg) by mouth daily For diabetes  -     metFORMIN (GLUCOPHAGE XR) 500 MG 24 hr tablet; Take 1 tablet (500 mg) by mouth daily (with dinner)  -     KS FOOT EXAM NO CHARGE  Lab Results   Component Value Date    A1C 6.4 03/03/2023    A1C 6.5 02/17/2022    A1C 6.5 10/14/2021    A1C 6.4 06/26/2021    A1C 6.7 03/18/2021    A1C 6.3 03/16/2020    A1C 5.8 03/13/2019    A1C 6.4 09/21/2018       He is on metformin.   He is not taking Jardiance because of the cost. Discussed about Jardiance which is a good choice for patients who have underlying heart disease.   Patient will check again and update us with the result     He does not monitor his blood glucose at home since he does not have a meter; I offered one but he declined.     Patient shows understanding of the benefits of dietary and activity changes    Benign hypertension  -     Comprehensive metabolic panel (BMP + Alb, Alk Phos, ALT, AST, Total. Bili, TP); Future  -     lisinopril (ZESTRIL) 20 MG tablet; Take 1 tablet (20 mg) by mouth daily for Blood Pressure    His home blood pressures are normal per his report - around 140/78. Today blood pressure is high at 162/76 and he notes that he has white coat syndrome. Rechecked blood pressure - 157/82.     Patient agrees to increase the dosage of lisinopril to 20 mg    Chacon's esophagus without dysplasia  Comments:   seen on EGD  last endosopy on 3/3/22   Good controlled with pantoprazole   Orders:  -     pantoprazole (PROTONIX) 40 MG EC tablet; Take 1 tablet (40 mg) by mouth daily for acid reflux     Paroxysmal atrial fibrillation (H)  Comments:  seen on pacemaker interogation but now resolved see note of cardiology  Orders:  -     metoprolol succinate ER (TOPROL XL) 50 MG 24 hr tablet; Take 1  "tablet (50 mg) by mouth daily for heart and Blood Pressure   He is not on baby aspirin or any anticoagulation I recommended him to take aspirin 81 mg once a day    Prostate cancer (H)  Comments:  S/P MR fusion biopsy with Lesley 3+3 = 6 prostate cancer in 3/3 of the target lesions as well as 2/12 of the template biopsy. 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     Other orders  -     REVIEW OF HEALTH MAINTENANCE PROTOCOL ORDERS    Other   He complains of legs swelling; recommended him to wear compression socks    Patient has been advised of split billing requirements and indicates understanding: Yes      COUNSELING:  Reviewed preventive health counseling, as reflected in patient instructions  Special attention given to:       Regular exercise       Healthy diet/nutrition       Immunizations       Colon cancer screening       Prostate cancer screening      BMI:   Estimated body mass index is 31.44 kg/m  as calculated from the following:    Height as of this encounter: 1.778 m (5' 10\").    Weight as of this encounter: 99.4 kg (219 lb 1.6 oz).   Weight management plan: Discussed healthy diet and exercise guidelines      He reports that he quit smoking about 38 years ago. His smoking use included pipe. He has never used smokeless tobacco.      Appropriate preventive services were discussed with this patient, including applicable screening as appropriate for cardiovascular disease, diabetes, osteopenia/osteoporosis, and glaucoma.  As appropriate for age/gender, discussed screening for colorectal cancer, prostate cancer, breast cancer, and cervical cancer. Checklist reviewing preventive services available has been given to " the patient.    Reviewed patients plan of care and provided an AVS. The Basic Care Plan (routine screening as documented in Health Maintenance) for Estrada meets the Care Plan requirement. This Care Plan has been established and reviewed with the Patient.      Zoe Tomas MD  [facility]    This document serves as a record of the services and decisions personally performed and made by Dr. Tomas. It was created on her behalf by Diallo Carballo, a trained medical scribe. The creation of this document is based the provider's statements to the medical scribe.      Identified Health Risks:

## 2023-03-03 NOTE — PATIENT INSTRUCTIONS
The dose of lisinopril is changed to 20 mg daily( you were on half tablet so we switched you to whole tablet daily)  Jardiance is good medication for diabetes, kidneys and heart.  If increases the risk of vagina yeast infection and bladder infection in some patients  Drink extra glass of water when you take this medication   Hold Jardiance if you are acutely sick.  Hold this medication 4 days before any elective procedure.     Labs today    Monitor your blood pressure once a week  at home.  Bring those readings on your next visit.  Notify us if your blood pressure readings consistently stays greater than 140/90.     Benefits of regular physical activity  Reduces the risk of dying prematurely.  Reduces the risk of dying from heart disease.  Reduces the risk of stroke.  Reduces the risk of developing diabetes.  Reduces the risk of developing high blood pressure.  Helps reduce blood pressure in people who already have high blood pressure.  Reduces the risk of developing colon cancer.  Reduces feelings of depression and anxiety  Helps control weight.  Helps build and maintain healthy bones, muscles and joints.  Helps older adults become stronger and better able to move about without falling.  Promotes psychological well-being.    Having high blood pressure puts you at risk for heart attack, stroke, kidney damage, and other serious problems.   High blood pressure doesn't usually cause symptoms, so people sometimes don't take it seriously  The medicines your doctor or nurse prescribes to treat high blood pressure can help reduce the risk of these problems and even help you live longer.    You have a lot of control over your blood pressure. To lower it:  ?Lose weight (if you are overweight)  ?Choose a diet low in fat and rich in fruits, vegetables, and low-fat dairy products  ?Reduce the amount of salt you eat  ?Do something active for at least 30 minutes a day on most days of the week  ?Cut down on alcohol (if you drink  more than 2 alcoholic drinks per day)    There is a new shingles vaccine available called shingrex  It is a series of 2 shots 2-6 months apart.  Considered more than 90% effective.  Please go to any pharmacy to get the  vaccine      Patient Education   Personalized Prevention Plan  You are due for the preventive services outlined below.  Your care team is available to assist you in scheduling these services.  If you have already completed any of these items, please share that information with your care team to update in your medical record.  Health Maintenance Due   Topic Date Due    ANNUAL REVIEW OF HM ORDERS  Never done    Urine Test  Never done    Zoster (Shingles) Vaccine (1 of 2) Never done    A1C Lab  05/17/2022    Kidney Microalbumin Urine Test  10/14/2022    Diabetic Foot Exam  10/14/2022    Cholesterol Lab  02/17/2023    Eye Exam  03/21/2023

## 2023-03-04 NOTE — RESULT ENCOUNTER NOTE
Woody Dorado    This is to inform you regarding your test result.    Your chronic kidney disease is getting slightly worse  We will continue to monitor  Recheck BMP in 2 months  Stay well-hydrated  Make lab appointment  You will benefit by taking Jardiance which is a good medication for diabetes and kidneys  Urine test is negative for infection  HbA1c which is average glucose during last 3 months is 6.4%  Your diabetes is under control  Urine for Microalbumin is normal.  TSH which is thyroid hormone is normal.  Your total cholesterol is normal.  HDL which is called good cholesterol is normal.  Your LDL cholesterol is normal.  This is often call bad cholesterol and high levels increase the risk for heart attacks and strokes.  Your triglycerides are normal.        Sincerely,      Dr.Nasima Thom MD,FACP

## 2023-04-24 ENCOUNTER — TRANSFERRED RECORDS (OUTPATIENT)
Dept: HEALTH INFORMATION MANAGEMENT | Facility: CLINIC | Age: 78
End: 2023-04-24
Payer: MEDICARE

## 2023-04-24 LAB — RETINOPATHY: NEGATIVE

## 2023-04-25 ENCOUNTER — LAB (OUTPATIENT)
Dept: LAB | Facility: CLINIC | Age: 78
End: 2023-04-25
Payer: MEDICARE

## 2023-04-25 DIAGNOSIS — C61 PROSTATE CANCER (H): ICD-10-CM

## 2023-04-25 LAB — PSA SERPL-MCNC: 7.4 UG/L (ref 0–4)

## 2023-04-25 PROCEDURE — 84153 ASSAY OF PSA TOTAL: CPT

## 2023-04-25 PROCEDURE — 36415 COLL VENOUS BLD VENIPUNCTURE: CPT

## 2023-05-01 ENCOUNTER — VIRTUAL VISIT (OUTPATIENT)
Dept: UROLOGY | Facility: CLINIC | Age: 78
End: 2023-05-01
Payer: MEDICARE

## 2023-05-01 ENCOUNTER — ANCILLARY PROCEDURE (OUTPATIENT)
Dept: CARDIOLOGY | Facility: CLINIC | Age: 78
End: 2023-05-01
Attending: INTERNAL MEDICINE
Payer: MEDICARE

## 2023-05-01 VITALS — BODY MASS INDEX: 30.8 KG/M2 | WEIGHT: 220 LBS | HEIGHT: 71 IN

## 2023-05-01 DIAGNOSIS — R97.20 ELEVATED PROSTATE SPECIFIC ANTIGEN (PSA): ICD-10-CM

## 2023-05-01 DIAGNOSIS — R39.9 LOWER URINARY TRACT SYMPTOMS (LUTS): ICD-10-CM

## 2023-05-01 DIAGNOSIS — I49.5 SSS (SICK SINUS SYNDROME) (H): ICD-10-CM

## 2023-05-01 DIAGNOSIS — Z95.0 CARDIAC PACEMAKER IN SITU: ICD-10-CM

## 2023-05-01 DIAGNOSIS — C61 PROSTATE CANCER (H): Primary | ICD-10-CM

## 2023-05-01 PROCEDURE — 93294 REM INTERROG EVL PM/LDLS PM: CPT | Performed by: INTERNAL MEDICINE

## 2023-05-01 PROCEDURE — 93296 REM INTERROG EVL PM/IDS: CPT | Performed by: INTERNAL MEDICINE

## 2023-05-01 PROCEDURE — 99214 OFFICE O/P EST MOD 30 MIN: CPT | Mod: VID | Performed by: UROLOGY

## 2023-05-01 ASSESSMENT — PAIN SCALES - GENERAL: PAINLEVEL: NO PAIN (0)

## 2023-05-01 NOTE — PROGRESS NOTES
"[facility]  CHIEF COMPLAINT   It was my pleasure to see Estrada Oseguera who is a 77 year old male for follow-up of prostate cancer on active surveillance with LUTS.      HPI   Estrada Oseguera is a very pleasant 78 year old male     Initially seen 4/7/21:  \"Estrada Oseguera is a 76 year old male who is being seen for evaluation of Elevated PSA      Has been having significant LUTS worsening over the past 6-12 months  Slow and weak stream  Nocturia x3/night  Having daytime frequency   Having some urgency   He is having to do some abdominal straining  No prior acute urinary retention, UTI, or gross hematuria      Also with some Elevated PSA      Father had low grade prostate cancer found at time of death at age 87 from a stroke\"    4/21/21:  Started on tamsulosin 0.4mg (Flomax) at last visit  Follow up to discuss MRI  Notes a slight change with tamsulosin 0.4mg (Flomax)     5/19/21:  Follow-up today to discuss his biopsy results  He did well after the biopsy with no issues  His wife joins him for this call    10/15/21:  Follow-up today to discuss his recent PSA  He has been doing well with no change in symptoms    4/22/2022:   Follow-up today on active surveillance for his prostate cancer  He did run out of his tamsulosin a few weeks ago and noticed the effects of stopping the medication.  This has been refilled  His wife joins him for this visit    11/2/2022:  Doing very well  He ran out of Flomax about a week ago and has noticed a decrease in his urine stream and that it is a little bit harder to start urination  His wife joins for this visit    TODAY 5/1/2023:  Follow-up today for PSA recheck  He is doing well with stable urine symptoms on Flomax  He does have nocturia 1-2 times per night    PHYSICAL EXAM  Patient is a 78 year old  male   Vitals: Height 1.803 m (5' 11\"), weight 99.8 kg (220 lb).  Body mass index is 30.68 kg/m .  General Appearance Adult:   Alert, no acute distress, oriented  HENT: " throat/mouth:normal, good dentition  Lungs: no respiratory distress, or pursed lip breathing  Heart: No obvious jugular venous distension present  Abdomen: obesely - distended  Musculoskeltal: extremities normal, no peripheral edema  Skin: no suspicious lesions or rashes  Neuro: Alert, oriented, speech and mentation normal  Psych: affect and mood normal     PSA   Latest Ref Rng 0.00 - 4.00 ug/L   9/21/2018 5.22 (H)    3/13/2019 5.28 (H)    3/16/2020 4.90 (H)    3/18/2021 5.38 (H)    9/20/2021 6.10 (H)    4/22/2022 6.10 (H)    10/21/2022 7.10 (H)    4/25/2023 7.40 (H)         PATHOLOGY:  MR-FUSION URONAV 5/12/21:  FINAL DIAGNOSIS:   A.  Prostate gland, left lateral base, needle biopsy:   - Negative for malignancy.   - Mild chronic inflammation.     B.  Prostate gland, left lateral mid, needle biopsy:   - Negative for malignancy.   - Glandular atrophy.     C.  Prostate gland, left lateral apex, needle biopsy:   - Negative for malignancy.   - Glandular atrophy.     D.  Prostate gland, left base, needle biopsy:   - Prostatic acinar adenocarcinoma.      - Lesley score  6 (3+3):      - WHO Grade Group - 1      - Proportion of tissue involved by tumor:  Approximately 33%      - Number of tissue cores involved by tumor:  1 (out of 1)      - Angiolymphatic invasion:  Not identified      - Perineural invasion:  Not identified     E.  Prostate gland, left mid, needle biopsy:   - Prostatic acinar adenocarcinoma.      - Lesley score  6 (3+3):      - WHO Grade Group - 1      - Proportion of tissue involved by tumor:  Approximately 33%      - Number of tissue cores involved by tumor:  1 (out of 1)      - Angiolymphatic invasion:  Not identified      - Perineural invasion:  Not identified     F.  Prostate gland, left apex, needle biopsy:]   - Negative for malignancy.     G.  Prostate gland, right lateral base, needle biopsy:   - Negative for malignancy.   - Glandular atrophy.     H.  Prostate gland, right lateral mid, needle  "biopsy:   - A minute focus of atypical small acinar proliferation, suspicious for   malignancy.   - Glandular atrophy.     I.  Prostate gland, right lateral apex, needle biopsy:   - Negative for malignancy.   - Glandular atrophy.     J.  Prostate gland, right base, needle biopsy:   - Negative for malignancy.   - Glandular atrophy.     K.  Prostate gland, right mid, needle biopsy:   - Negative for malignancy.   - Glandular atrophy.     L.  Prostate gland, right apex, needle biopsy:   - Negative for malignancy.   - Glandular atrophy.     M.  Prostate gland., right sided lesion, needle biopsies x 3:   - Prostatic acinar adenocarcinoma.      - Lesley score:  6 (3+3)      - WHO Grade Group - 1      - Proportion of tissue involved by tumor:  approximately 40%      - Number of tissue cores involved by tumor: 3 (out of 3)      - Angiolymphatic invasion:  Not identified      - Perineural invasion:  Not identified       IMAGING:  All pertinent imaging reviewed:    All imaging studies reviewed by me.  I personally reviewed these imaging films.  A formal report from radiology will follow.    MRI PROSTATE 4/15/21:  \"FINDINGS:  Size: 6.1 x 4.8 x 4.7 cm; 72 grams  Hemorrhage: Absent   Peripheral zone: Heterogeneous on T2-weighted images. Suspicious  lesions as detailed below.  Transition zone: Enlarged with BPH changes. Transition zone nodules  which are circumscribed or mostly encapsulated without diffusion  restriction.  PI-RADS 2.  No highly suspicious nodules.     Lesion(s) in rank order of severity (highest score- to lowest score,  then by size)      Lesion 1:  Location: Left apex peripheral zone at the 5 o'clock position relative  to the urethra. Series 4 image 36.   Additional prostate regions involved: None   Size: 6  mm  T2 description: Rounded moderate hypointensity  T2 numerical assessment: 3  DWI description: Focal hypointensity on ADC and hyperintensity on DWI  DWI numerical assessment: 3  DCE assessment: Positive  "   Prostate margin: Capsular abutment<6 mm with smooth contour   Lesion overall PI-RADS category: 4     Neurovascular bundles: No neurovascular bundle involvement by  malignancy.    Seminal vesicles: No seminal vesicle involvement by malignancy.   Lymph nodes: No lymph node involvement   Bones: No suspicious lesions   Other pelvic organs: Prominent fat in bilateral inguinal canal, left  more than right. Circumferential apparent urinary bladder wall  thickening with trabeculation/sacculations, likely representing post  chronic urinary outflow obstructive change.                                                         IMPRESSION:     1. Based on the most suspicious abnormality, this exam is  characterized as PIRADS 4 - Clinically significant cancer is likely to  be present.  The most suspicious abnormality is located at the left  apex peripheral zone, 5:00 position and there is minimal capsular  abutment with no convincing evidence of extraprostatic extension.  2. No suspicious adenopathy or evidence of pelvic metastases.      ASSESSMENT and PLAN  78-year-old man with elevated PSA and LUTS, now with an MRI showing a PI-RADS 4 lesion and a prostate volume of 72 g.  Status post an MR fusion biopsy with Lesley 3+3 = 6 prostate cancer in 3/3 of the target lesions as well as 2/12 of the template biopsy    DISCUSSION PROSTATE CANCER     The natural history of this disease was explained to the patient at length and the treatment options discussed including radical prostatectomy by open or robotic-assisted laparoscopic approach, external-beam radiotherapy, brachytherapy, active surveillance and watchful waiting, including the probability of success and complications associated with these approaches.    With respect to watchful waiting, we discussed the difficulties of estimating the extent of disease preoperatively, the risk of cancer progression, and risk that salvage might not be possible with progression. However, the  favorable 10-year outcomes of active surveillance in appropriately selected patients was conveyed.  We discussed that active surveillance has become the treatment of choice for men with low volume and low grade, Lesley 3+3 = 6, prostate cancer.      We reviewed his PSA history and trend.  This has been slowly rising, though the rate of rise decreased on his most recent check.  PSA now 7.4.  We discussed that we will likely need to repeat a prostate MRI with or without a follow-up biopsy and that typically this is done in the first 3 years after initial diagnosis.  Given that his PSA has not changed dramatically, we will plan for continued surveillance.    We discussed that other treatment options would include surgery or radiation, however given his low volume of low-grade prostate cancer I would not recommend either modality at this time.    Plan:  -PSA in 6 months and this will be a lab visit and I will send him the results on [facility] [facility]  - Follow-up with me in 1 year with a repeat PSA    LUTS  -He notes some improvement with voiding following initiation of tamsulosin 0.4 mg daily  -We discussed that his MRI did demonstrate prostatomegaly with a volume of 72 ml  -His prescription was refilled at his last visit and we will continue to refill as needed    Chart documentation with Dragon Voice recognition Software. Although reviewed after completion, some words and grammatical errors may remain.     Time spent: 15 minutes spent on the date of the encounter doing chart review, history and exam, documentation and further activities as noted above.    Jono Billy MD   Urology  [facility]  [facility] Phone: [phone]  [facility] Phone: [phone]      Theo is a 78 year old who is being evaluated via a billable video visit.      How would you like to obtain your AVS? MyChart  If the video visit is dropped, the invitation should be resent by:  Text to cell phone: [phone]  Will anyone else be joining your video visit? No        Video-Visit Details    Type of service:  Video Visit   Video Start Time: 11:13 AM  Video End Time:11:21 AM    Originating Location (pt. Location): Home    Distant Location (provider location):  On-site  Platform used for Video Visit: Well

## 2023-05-01 NOTE — LETTER
"5/1/2023       RE: Estrada Oseguera  [address]  [place] [address]     Dear Colleague,    Thank you for referring your patient, Estrada Oseguera, to the [facility] at [facility]. Please see a copy of my visit note below.    [facility]  CHIEF COMPLAINT   It was my pleasure to see Estrada Oseguera who is a 77 year old male for follow-up of prostate cancer on active surveillance with LUTS.      HPI   Estrada Oseguera is a very pleasant 78 year old male     Initially seen 4/7/21:  \"Estrada Oseguera is a 76 year old male who is being seen for evaluation of Elevated PSA      Has been having significant LUTS worsening over the past 6-12 months  Slow and weak stream  Nocturia x3/night  Having daytime frequency   Having some urgency   He is having to do some abdominal straining  No prior acute urinary retention, UTI, or gross hematuria      Also with some Elevated PSA      Father had low grade prostate cancer found at time of death at age 87 from a stroke\"    4/21/21:  Started on tamsulosin 0.4mg (Flomax) at last visit  Follow up to discuss MRI  Notes a slight change with tamsulosin 0.4mg (Flomax)     5/19/21:  Follow-up today to discuss his biopsy results  He did well after the biopsy with no issues  His wife joins him for this call    10/15/21:  Follow-up today to discuss his recent PSA  He has been doing well with no change in symptoms    4/22/2022:   Follow-up today on active surveillance for his prostate cancer  He did run out of his tamsulosin a few weeks ago and noticed the effects of stopping the medication.  This has been refilled  His wife joins him for this visit    11/2/2022:  Doing very well  He ran out of Flomax about a week ago and has noticed a decrease in his urine stream and that it is a little bit harder to start urination  His wife joins for this visit    TODAY 5/1/2023:  Follow-up today for PSA recheck  He is " "doing well with stable urine symptoms on Flomax  He does have nocturia 1-2 times per night    PHYSICAL EXAM  Patient is a 78 year old  male   Vitals: Height 1.803 m (5' 11\"), weight 99.8 kg (220 lb).  Body mass index is 30.68 kg/m .  General Appearance Adult:   Alert, no acute distress, oriented  HENT: throat/mouth:normal, good dentition  Lungs: no respiratory distress, or pursed lip breathing  Heart: No obvious jugular venous distension present  Abdomen: obesely - distended  Musculoskeltal: extremities normal, no peripheral edema  Skin: no suspicious lesions or rashes  Neuro: Alert, oriented, speech and mentation normal  Psych: affect and mood normal     PSA   Latest Ref Rng 0.00 - 4.00 ug/L   9/21/2018 5.22 (H)    3/13/2019 5.28 (H)    3/16/2020 4.90 (H)    3/18/2021 5.38 (H)    9/20/2021 6.10 (H)    4/22/2022 6.10 (H)    10/21/2022 7.10 (H)    4/25/2023 7.40 (H)         PATHOLOGY:  MR-FUSION URONAV 5/12/21:  FINAL DIAGNOSIS:   A.  Prostate gland, left lateral base, needle biopsy:   - Negative for malignancy.   - Mild chronic inflammation.     B.  Prostate gland, left lateral mid, needle biopsy:   - Negative for malignancy.   - Glandular atrophy.     C.  Prostate gland, left lateral apex, needle biopsy:   - Negative for malignancy.   - Glandular atrophy.     D.  Prostate gland, left base, needle biopsy:   - Prostatic acinar adenocarcinoma.      - [place] score  6 (3+3):      - WHO Grade Group - 1      - Proportion of tissue involved by tumor:  Approximately 33%      - Number of tissue cores involved by tumor:  1 (out of 1)      - Angiolymphatic invasion:  Not identified      - Perineural invasion:  Not identified     E.  Prostate gland, left mid, needle biopsy:   - Prostatic acinar adenocarcinoma.      - Lesley score  6 (3+3):      - WHO Grade Group - 1      - Proportion of tissue involved by tumor:  Approximately 33%      - Number of tissue cores involved by tumor:  1 (out of 1)      - Angiolymphatic invasion:  " "Not identified      - Perineural invasion:  Not identified     F.  Prostate gland, left apex, needle biopsy:]   - Negative for malignancy.     G.  Prostate gland, right lateral base, needle biopsy:   - Negative for malignancy.   - Glandular atrophy.     H.  Prostate gland, right lateral mid, needle biopsy:   - A minute focus of atypical small acinar proliferation, suspicious for   malignancy.   - Glandular atrophy.     I.  Prostate gland, right lateral apex, needle biopsy:   - Negative for malignancy.   - Glandular atrophy.     J.  Prostate gland, right base, needle biopsy:   - Negative for malignancy.   - Glandular atrophy.     K.  Prostate gland, right mid, needle biopsy:   - Negative for malignancy.   - Glandular atrophy.     L.  Prostate gland, right apex, needle biopsy:   - Negative for malignancy.   - Glandular atrophy.     M.  Prostate gland., right sided lesion, needle biopsies x 3:   - Prostatic acinar adenocarcinoma.      - [place] score:  6 (3+3)      - WHO Grade Group - 1      - Proportion of tissue involved by tumor:  approximately 40%      - Number of tissue cores involved by tumor: 3 (out of 3)      - Angiolymphatic invasion:  Not identified      - Perineural invasion:  Not identified       IMAGING:  All pertinent imaging reviewed:    All imaging studies reviewed by me.  I personally reviewed these imaging films.  A formal report from radiology will follow.    MRI PROSTATE 4/15/21:  \"FINDINGS:  Size: 6.1 x 4.8 x 4.7 cm; 72 grams  Hemorrhage: Absent   Peripheral zone: Heterogeneous on T2-weighted images. Suspicious  lesions as detailed below.  Transition zone: Enlarged with BPH changes. Transition zone nodules  which are circumscribed or mostly encapsulated without diffusion  restriction.  PI-RADS 2.  No highly suspicious nodules.     Lesion(s) in rank order of severity (highest score- to lowest score,  then by size)      Lesion 1:  Location: Left apex peripheral zone at the 5 o'clock position " relative  to the urethra. Series 4 image 36.   Additional prostate regions involved: None   Size: 6  mm  T2 description: Rounded moderate hypointensity  T2 numerical assessment: 3  DWI description: Focal hypointensity on ADC and hyperintensity on DWI  DWI numerical assessment: 3  DCE assessment: Positive    Prostate margin: Capsular abutment<6 mm with smooth contour   Lesion overall PI-RADS category: 4     Neurovascular bundles: No neurovascular bundle involvement by  malignancy.    Seminal vesicles: No seminal vesicle involvement by malignancy.   Lymph nodes: No lymph node involvement   Bones: No suspicious lesions   Other pelvic organs: Prominent fat in bilateral inguinal canal, left  more than right. Circumferential apparent urinary bladder wall  thickening with trabeculation/sacculations, likely representing post  chronic urinary outflow obstructive change.                                                         IMPRESSION:     1. Based on the most suspicious abnormality, this exam is  characterized as PIRADS 4 - Clinically significant cancer is likely to  be present.  The most suspicious abnormality is located at the left  apex peripheral zone, 5:00 position and there is minimal capsular  abutment with no convincing evidence of extraprostatic extension.  2. No suspicious adenopathy or evidence of pelvic metastases.      ASSESSMENT and PLAN  78-year-old man with elevated PSA and LUTS, now with an MRI showing a PI-RADS 4 lesion and a prostate volume of 72 g.  Status post an MR fusion biopsy with [place] 3+3 = 6 prostate cancer in 3/3 of the target lesions as well as 2/12 of the template biopsy    DISCUSSION PROSTATE CANCER     The natural history of this disease was explained to the patient at length and the treatment options discussed including radical prostatectomy by open or robotic-assisted laparoscopic approach, external-beam radiotherapy, brachytherapy, active surveillance and watchful waiting, including  the probability of success and complications associated with these approaches.    With respect to watchful waiting, we discussed the difficulties of estimating the extent of disease preoperatively, the risk of cancer progression, and risk that salvage might not be possible with progression. However, the favorable 10-year outcomes of active surveillance in appropriately selected patients was conveyed.  We discussed that active surveillance has become the treatment of choice for men with low volume and low grade, Lesley 3+3 = 6, prostate cancer.      We reviewed his PSA history and trend.  This has been slowly rising, though the rate of rise decreased on his most recent check.  PSA now 7.4.  We discussed that we will likely need to repeat a prostate MRI with or without a follow-up biopsy and that typically this is done in the first 3 years after initial diagnosis.  Given that his PSA has not changed dramatically, we will plan for continued surveillance.    We discussed that other treatment options would include surgery or radiation, however given his low volume of low-grade prostate cancer I would not recommend either modality at this time.    Plan:  -PSA in 6 months and this will be a lab visit and I will send him the results on [email or web address]  - Follow-up with me in 1 year with a repeat PSA    LUTS  -He notes some improvement with voiding following initiation of tamsulosin 0.4 mg daily  -We discussed that his MRI did demonstrate prostatomegaly with a volume of 72 ml  -His prescription was refilled at his last visit and we will continue to refill as needed    Chart documentation with Dragon Voice recognition Software. Although reviewed after completion, some words and grammatical errors may remain.     Time spent: 15 minutes spent on the date of the encounter doing chart review, history and exam, documentation and further activities as noted above.    Jono Billy MD   Urology  [facility]  [place]  [facility] Phone: [phone]  [facility] Phone: [phone]      Theo is a 78 year old who is being evaluated via a billable video visit.      How would you like to obtain your AVS? MyChart  If the video visit is dropped, the invitation should be resent by: Text to cell phone: [phone]  Will anyone else be joining your video visit? No        Video-Visit Details    Type of service:  Video Visit   Video Start Time: 11:13 AM  Video End Time:11:21 AM    Originating Location (pt. Location): Home    Distant Location (provider location):  On-site  Platform used for Video Visit: [email or web address]

## 2023-05-02 ENCOUNTER — TELEPHONE (OUTPATIENT)
Dept: UROLOGY | Facility: CLINIC | Age: 78
End: 2023-05-02

## 2023-05-02 NOTE — TELEPHONE ENCOUNTER
----- Message from Evelyn Harrison sent at 2023  9:39 AM CDT -----  Regardin month PSA  Lab visit for PSA in 6 months and I will send the results on [facility]  23

## 2023-05-11 LAB
MDC_IDC_EPISODE_DTM: NORMAL
MDC_IDC_EPISODE_ID: NORMAL
MDC_IDC_EPISODE_TYPE: NORMAL
MDC_IDC_LEAD_IMPLANT_DT: NORMAL
MDC_IDC_LEAD_IMPLANT_DT: NORMAL
MDC_IDC_LEAD_LOCATION: NORMAL
MDC_IDC_LEAD_LOCATION: NORMAL
MDC_IDC_LEAD_LOCATION_DETAIL_1: NORMAL
MDC_IDC_LEAD_LOCATION_DETAIL_1: NORMAL
MDC_IDC_LEAD_MFG: NORMAL
MDC_IDC_LEAD_MFG: NORMAL
MDC_IDC_LEAD_MODEL: NORMAL
MDC_IDC_LEAD_MODEL: NORMAL
MDC_IDC_LEAD_POLARITY_TYPE: NORMAL
MDC_IDC_LEAD_POLARITY_TYPE: NORMAL
MDC_IDC_LEAD_SERIAL: NORMAL
MDC_IDC_LEAD_SERIAL: NORMAL
MDC_IDC_MSMT_BATTERY_DTM: NORMAL
MDC_IDC_MSMT_BATTERY_REMAINING_LONGEVITY: 102 MO
MDC_IDC_MSMT_BATTERY_REMAINING_PERCENTAGE: 98 %
MDC_IDC_MSMT_BATTERY_STATUS: NORMAL
MDC_IDC_MSMT_LEADCHNL_RA_IMPEDANCE_VALUE: 781 OHM
MDC_IDC_MSMT_LEADCHNL_RA_PACING_THRESHOLD_AMPLITUDE: 0.5 V
MDC_IDC_MSMT_LEADCHNL_RA_PACING_THRESHOLD_PULSEWIDTH: 0.4 MS
MDC_IDC_MSMT_LEADCHNL_RV_IMPEDANCE_VALUE: 648 OHM
MDC_IDC_MSMT_LEADCHNL_RV_PACING_THRESHOLD_AMPLITUDE: 1.1 V
MDC_IDC_MSMT_LEADCHNL_RV_PACING_THRESHOLD_PULSEWIDTH: 0.4 MS
MDC_IDC_PG_IMPLANT_DTM: NORMAL
MDC_IDC_PG_MFG: NORMAL
MDC_IDC_PG_MODEL: NORMAL
MDC_IDC_PG_SERIAL: NORMAL
MDC_IDC_PG_TYPE: NORMAL
MDC_IDC_SESS_CLINIC_NAME: NORMAL
MDC_IDC_SESS_DTM: NORMAL
MDC_IDC_SESS_TYPE: NORMAL
MDC_IDC_SET_BRADY_AT_MODE_SWITCH_MODE: NORMAL
MDC_IDC_SET_BRADY_AT_MODE_SWITCH_RATE: 170 {BEATS}/MIN
MDC_IDC_SET_BRADY_LOWRATE: 55 {BEATS}/MIN
MDC_IDC_SET_BRADY_MAX_SENSOR_RATE: 130 {BEATS}/MIN
MDC_IDC_SET_BRADY_MAX_TRACKING_RATE: 130 {BEATS}/MIN
MDC_IDC_SET_BRADY_MODE: NORMAL
MDC_IDC_SET_BRADY_PAV_DELAY_HIGH: 200 MS
MDC_IDC_SET_BRADY_PAV_DELAY_LOW: 300 MS
MDC_IDC_SET_BRADY_SAV_DELAY_HIGH: 200 MS
MDC_IDC_SET_BRADY_SAV_DELAY_LOW: 300 MS
MDC_IDC_SET_LEADCHNL_RA_PACING_AMPLITUDE: 2 V
MDC_IDC_SET_LEADCHNL_RA_PACING_CAPTURE_MODE: NORMAL
MDC_IDC_SET_LEADCHNL_RA_PACING_POLARITY: NORMAL
MDC_IDC_SET_LEADCHNL_RA_PACING_PULSEWIDTH: 0.4 MS
MDC_IDC_SET_LEADCHNL_RA_SENSING_ADAPTATION_MODE: NORMAL
MDC_IDC_SET_LEADCHNL_RA_SENSING_POLARITY: NORMAL
MDC_IDC_SET_LEADCHNL_RA_SENSING_SENSITIVITY: 0.25 MV
MDC_IDC_SET_LEADCHNL_RV_PACING_AMPLITUDE: 1.6 V
MDC_IDC_SET_LEADCHNL_RV_PACING_CAPTURE_MODE: NORMAL
MDC_IDC_SET_LEADCHNL_RV_PACING_POLARITY: NORMAL
MDC_IDC_SET_LEADCHNL_RV_PACING_PULSEWIDTH: 0.4 MS
MDC_IDC_SET_LEADCHNL_RV_SENSING_ADAPTATION_MODE: NORMAL
MDC_IDC_SET_LEADCHNL_RV_SENSING_POLARITY: NORMAL
MDC_IDC_SET_LEADCHNL_RV_SENSING_SENSITIVITY: 1.5 MV
MDC_IDC_SET_ZONE_DETECTION_INTERVAL: 375 MS
MDC_IDC_SET_ZONE_TYPE: NORMAL
MDC_IDC_SET_ZONE_VENDOR_TYPE: NORMAL
MDC_IDC_STAT_AT_BURDEN_PERCENT: 0 %
MDC_IDC_STAT_AT_DTM_END: NORMAL
MDC_IDC_STAT_AT_DTM_START: NORMAL
MDC_IDC_STAT_BRADY_DTM_END: NORMAL
MDC_IDC_STAT_BRADY_DTM_START: NORMAL
MDC_IDC_STAT_BRADY_RA_PERCENT_PACED: 32 %
MDC_IDC_STAT_BRADY_RV_PERCENT_PACED: 0 %
MDC_IDC_STAT_EPISODE_RECENT_COUNT: 0
MDC_IDC_STAT_EPISODE_RECENT_COUNT_DTM_END: NORMAL
MDC_IDC_STAT_EPISODE_RECENT_COUNT_DTM_START: NORMAL
MDC_IDC_STAT_EPISODE_TYPE: NORMAL
MDC_IDC_STAT_EPISODE_VENDOR_TYPE: NORMAL

## 2023-06-19 ENCOUNTER — APPOINTMENT (OUTPATIENT)
Dept: URBAN - METROPOLITAN AREA CLINIC 256 | Age: 78
Setting detail: DERMATOLOGY
End: 2023-06-19

## 2023-06-19 VITALS — WEIGHT: 220 LBS | HEIGHT: 70 IN

## 2023-06-19 DIAGNOSIS — L57.0 ACTINIC KERATOSIS: ICD-10-CM

## 2023-06-19 DIAGNOSIS — D22 MELANOCYTIC NEVI: ICD-10-CM

## 2023-06-19 DIAGNOSIS — L91.8 OTHER HYPERTROPHIC DISORDERS OF THE SKIN: ICD-10-CM

## 2023-06-19 DIAGNOSIS — L82.1 OTHER SEBORRHEIC KERATOSIS: ICD-10-CM

## 2023-06-19 DIAGNOSIS — D18.0 HEMANGIOMA: ICD-10-CM

## 2023-06-19 DIAGNOSIS — L81.4 OTHER MELANIN HYPERPIGMENTATION: ICD-10-CM

## 2023-06-19 DIAGNOSIS — L24 IRRITANT CONTACT DERMATITIS: ICD-10-CM

## 2023-06-19 DIAGNOSIS — Z85.828 PERSONAL HISTORY OF OTHER MALIGNANT NEOPLASM OF SKIN: ICD-10-CM

## 2023-06-19 DIAGNOSIS — L57.8 OTHER SKIN CHANGES DUE TO CHRONIC EXPOSURE TO NONIONIZING RADIATION: ICD-10-CM

## 2023-06-19 PROBLEM — D22.61 MELANOCYTIC NEVI OF RIGHT UPPER LIMB, INCLUDING SHOULDER: Status: ACTIVE | Noted: 2023-06-19

## 2023-06-19 PROBLEM — L24.9 IRRITANT CONTACT DERMATITIS, UNSPECIFIED CAUSE: Status: ACTIVE | Noted: 2023-06-19

## 2023-06-19 PROBLEM — D18.01 HEMANGIOMA OF SKIN AND SUBCUTANEOUS TISSUE: Status: ACTIVE | Noted: 2023-06-19

## 2023-06-19 PROCEDURE — OTHER COUNSELING: OTHER

## 2023-06-19 PROCEDURE — OTHER MIPS QUALITY: OTHER

## 2023-06-19 PROCEDURE — OTHER LIQUID NITROGEN: OTHER

## 2023-06-19 PROCEDURE — 17000 DESTRUCT PREMALG LESION: CPT

## 2023-06-19 PROCEDURE — 99213 OFFICE O/P EST LOW 20 MIN: CPT | Mod: 25

## 2023-06-19 ASSESSMENT — LOCATION DETAILED DESCRIPTION DERM
LOCATION DETAILED: LEFT MID-UPPER BACK
LOCATION DETAILED: RIGHT POSTERIOR SHOULDER
LOCATION DETAILED: RIGHT CENTRAL TEMPLE
LOCATION DETAILED: LEFT SUPERIOR LATERAL UPPER BACK
LOCATION DETAILED: RIGHT AXILLARY VAULT
LOCATION DETAILED: NASAL DORSUM
LOCATION DETAILED: LEFT LATERAL SUPERIOR CHEST
LOCATION DETAILED: LEFT SUPERIOR UPPER BACK
LOCATION DETAILED: LEFT AXILLARY VAULT

## 2023-06-19 ASSESSMENT — LOCATION SIMPLE DESCRIPTION DERM
LOCATION SIMPLE: RIGHT TEMPLE
LOCATION SIMPLE: LEFT UPPER BACK
LOCATION SIMPLE: RIGHT AXILLARY VAULT
LOCATION SIMPLE: LEFT AXILLARY VAULT
LOCATION SIMPLE: CHEST
LOCATION SIMPLE: NOSE
LOCATION SIMPLE: RIGHT SHOULDER

## 2023-06-19 ASSESSMENT — LOCATION ZONE DERM
LOCATION ZONE: TRUNK
LOCATION ZONE: AXILLAE
LOCATION ZONE: NOSE
LOCATION ZONE: ARM
LOCATION ZONE: FACE

## 2023-07-29 ENCOUNTER — HEALTH MAINTENANCE LETTER (OUTPATIENT)
Age: 78
End: 2023-07-29

## 2023-08-07 ENCOUNTER — ANCILLARY PROCEDURE (OUTPATIENT)
Dept: CARDIOLOGY | Facility: CLINIC | Age: 78
End: 2023-08-07
Attending: INTERNAL MEDICINE
Payer: MEDICARE

## 2023-08-07 DIAGNOSIS — Z95.0 CARDIAC PACEMAKER IN SITU: Primary | ICD-10-CM

## 2023-08-07 DIAGNOSIS — Z95.0 CARDIAC PACEMAKER IN SITU: ICD-10-CM

## 2023-08-07 DIAGNOSIS — I49.5 SSS (SICK SINUS SYNDROME) (H): ICD-10-CM

## 2023-08-07 PROCEDURE — 93296 REM INTERROG EVL PM/IDS: CPT | Performed by: INTERNAL MEDICINE

## 2023-08-07 PROCEDURE — 93294 REM INTERROG EVL PM/LDLS PM: CPT | Performed by: INTERNAL MEDICINE

## 2023-08-08 LAB
MDC_IDC_EPISODE_DTM: NORMAL
MDC_IDC_EPISODE_ID: NORMAL
MDC_IDC_EPISODE_TYPE: NORMAL
MDC_IDC_LEAD_IMPLANT_DT: NORMAL
MDC_IDC_LEAD_IMPLANT_DT: NORMAL
MDC_IDC_LEAD_LOCATION: NORMAL
MDC_IDC_LEAD_LOCATION: NORMAL
MDC_IDC_LEAD_LOCATION_DETAIL_1: NORMAL
MDC_IDC_LEAD_LOCATION_DETAIL_1: NORMAL
MDC_IDC_LEAD_MFG: NORMAL
MDC_IDC_LEAD_MFG: NORMAL
MDC_IDC_LEAD_MODEL: NORMAL
MDC_IDC_LEAD_MODEL: NORMAL
MDC_IDC_LEAD_POLARITY_TYPE: NORMAL
MDC_IDC_LEAD_POLARITY_TYPE: NORMAL
MDC_IDC_LEAD_SERIAL: NORMAL
MDC_IDC_LEAD_SERIAL: NORMAL
MDC_IDC_MSMT_BATTERY_DTM: NORMAL
MDC_IDC_MSMT_BATTERY_REMAINING_LONGEVITY: 96 MO
MDC_IDC_MSMT_BATTERY_REMAINING_PERCENTAGE: 95 %
MDC_IDC_MSMT_BATTERY_STATUS: NORMAL
MDC_IDC_MSMT_LEADCHNL_RA_IMPEDANCE_VALUE: 794 OHM
MDC_IDC_MSMT_LEADCHNL_RA_PACING_THRESHOLD_AMPLITUDE: 0.6 V
MDC_IDC_MSMT_LEADCHNL_RA_PACING_THRESHOLD_PULSEWIDTH: 0.4 MS
MDC_IDC_MSMT_LEADCHNL_RV_IMPEDANCE_VALUE: 714 OHM
MDC_IDC_MSMT_LEADCHNL_RV_PACING_THRESHOLD_AMPLITUDE: 1 V
MDC_IDC_MSMT_LEADCHNL_RV_PACING_THRESHOLD_PULSEWIDTH: 0.4 MS
MDC_IDC_PG_IMPLANT_DTM: NORMAL
MDC_IDC_PG_MFG: NORMAL
MDC_IDC_PG_MODEL: NORMAL
MDC_IDC_PG_SERIAL: NORMAL
MDC_IDC_PG_TYPE: NORMAL
MDC_IDC_SESS_CLINIC_NAME: NORMAL
MDC_IDC_SESS_DTM: NORMAL
MDC_IDC_SESS_TYPE: NORMAL
MDC_IDC_SET_BRADY_AT_MODE_SWITCH_MODE: NORMAL
MDC_IDC_SET_BRADY_AT_MODE_SWITCH_RATE: 170 {BEATS}/MIN
MDC_IDC_SET_BRADY_LOWRATE: 55 {BEATS}/MIN
MDC_IDC_SET_BRADY_MAX_SENSOR_RATE: 130 {BEATS}/MIN
MDC_IDC_SET_BRADY_MAX_TRACKING_RATE: 130 {BEATS}/MIN
MDC_IDC_SET_BRADY_MODE: NORMAL
MDC_IDC_SET_BRADY_PAV_DELAY_HIGH: 200 MS
MDC_IDC_SET_BRADY_PAV_DELAY_LOW: 300 MS
MDC_IDC_SET_BRADY_SAV_DELAY_HIGH: 200 MS
MDC_IDC_SET_BRADY_SAV_DELAY_LOW: 300 MS
MDC_IDC_SET_LEADCHNL_RA_PACING_AMPLITUDE: 2 V
MDC_IDC_SET_LEADCHNL_RA_PACING_CAPTURE_MODE: NORMAL
MDC_IDC_SET_LEADCHNL_RA_PACING_POLARITY: NORMAL
MDC_IDC_SET_LEADCHNL_RA_PACING_PULSEWIDTH: 0.4 MS
MDC_IDC_SET_LEADCHNL_RA_SENSING_ADAPTATION_MODE: NORMAL
MDC_IDC_SET_LEADCHNL_RA_SENSING_POLARITY: NORMAL
MDC_IDC_SET_LEADCHNL_RA_SENSING_SENSITIVITY: 0.25 MV
MDC_IDC_SET_LEADCHNL_RV_PACING_AMPLITUDE: 1.5 V
MDC_IDC_SET_LEADCHNL_RV_PACING_CAPTURE_MODE: NORMAL
MDC_IDC_SET_LEADCHNL_RV_PACING_POLARITY: NORMAL
MDC_IDC_SET_LEADCHNL_RV_PACING_PULSEWIDTH: 0.4 MS
MDC_IDC_SET_LEADCHNL_RV_SENSING_ADAPTATION_MODE: NORMAL
MDC_IDC_SET_LEADCHNL_RV_SENSING_POLARITY: NORMAL
MDC_IDC_SET_LEADCHNL_RV_SENSING_SENSITIVITY: 1.5 MV
MDC_IDC_SET_ZONE_DETECTION_INTERVAL: 375 MS
MDC_IDC_SET_ZONE_TYPE: NORMAL
MDC_IDC_SET_ZONE_VENDOR_TYPE: NORMAL
MDC_IDC_STAT_AT_BURDEN_PERCENT: 0 %
MDC_IDC_STAT_AT_DTM_END: NORMAL
MDC_IDC_STAT_AT_DTM_START: NORMAL
MDC_IDC_STAT_BRADY_DTM_END: NORMAL
MDC_IDC_STAT_BRADY_DTM_START: NORMAL
MDC_IDC_STAT_BRADY_RA_PERCENT_PACED: 32 %
MDC_IDC_STAT_BRADY_RV_PERCENT_PACED: 0 %
MDC_IDC_STAT_EPISODE_RECENT_COUNT: 0
MDC_IDC_STAT_EPISODE_RECENT_COUNT_DTM_END: NORMAL
MDC_IDC_STAT_EPISODE_RECENT_COUNT_DTM_START: NORMAL
MDC_IDC_STAT_EPISODE_TYPE: NORMAL
MDC_IDC_STAT_EPISODE_VENDOR_TYPE: NORMAL

## 2023-08-18 ENCOUNTER — TELEPHONE (OUTPATIENT)
Dept: FAMILY MEDICINE | Facility: CLINIC | Age: 78
End: 2023-08-18
Payer: MEDICARE

## 2023-08-18 NOTE — TELEPHONE ENCOUNTER
Spoke with patient's wife Liz(consent to communicate in file) and scheduled an appointment on 08/22/2023 with .

## 2023-08-18 NOTE — TELEPHONE ENCOUNTER
Reason for Call:  Appointment Request    Patient requesting this type of appt:  Office visit    Requested provider: Zoe Tomas    Reason patient unable to be scheduled: Not within requested timeframe    When does patient want to be seen/preferred time: 1-2 weeks    Comments: Patient called and is questing to get worked in for for his diabetic check and hypertension and medications before [place]    Could we send this information to you in [facility] or would you prefer to receive a phone call?:   Patient would prefer a phone call   Okay to leave a detailed message?: Yes at Home number on file [phone] (home)    Call taken on 8/18/2023 at 12:59 PM by Mendy Negrete

## 2023-08-22 ENCOUNTER — OFFICE VISIT (OUTPATIENT)
Dept: FAMILY MEDICINE | Facility: CLINIC | Age: 78
End: 2023-08-22
Payer: MEDICARE

## 2023-08-22 VITALS
OXYGEN SATURATION: 96 % | SYSTOLIC BLOOD PRESSURE: 130 MMHG | RESPIRATION RATE: 16 BRPM | DIASTOLIC BLOOD PRESSURE: 80 MMHG | TEMPERATURE: 98 F | BODY MASS INDEX: 30.56 KG/M2 | WEIGHT: 219.1 LBS | HEART RATE: 84 BPM

## 2023-08-22 DIAGNOSIS — L03.114 CELLULITIS OF HAND, LEFT: ICD-10-CM

## 2023-08-22 DIAGNOSIS — R39.9 LOWER URINARY TRACT SYMPTOMS (LUTS): ICD-10-CM

## 2023-08-22 DIAGNOSIS — I48.0 PAROXYSMAL ATRIAL FIBRILLATION (H): ICD-10-CM

## 2023-08-22 DIAGNOSIS — E11.9 TYPE 2 DIABETES MELLITUS WITHOUT COMPLICATION, WITHOUT LONG-TERM CURRENT USE OF INSULIN (H): Primary | ICD-10-CM

## 2023-08-22 DIAGNOSIS — C61 PROSTATE CANCER (H): ICD-10-CM

## 2023-08-22 DIAGNOSIS — K22.70 BARRETT'S ESOPHAGUS WITHOUT DYSPLASIA: ICD-10-CM

## 2023-08-22 DIAGNOSIS — I10 BENIGN HYPERTENSION: ICD-10-CM

## 2023-08-22 DIAGNOSIS — N18.31 STAGE 3A CHRONIC KIDNEY DISEASE (H): ICD-10-CM

## 2023-08-22 DIAGNOSIS — Z23 NEED FOR SHINGLES VACCINE: ICD-10-CM

## 2023-08-22 LAB
ANION GAP SERPL CALCULATED.3IONS-SCNC: 12 MMOL/L (ref 7–15)
BUN SERPL-MCNC: 18.1 MG/DL (ref 8–23)
CALCIUM SERPL-MCNC: 9.8 MG/DL (ref 8.8–10.2)
CHLORIDE SERPL-SCNC: 104 MMOL/L (ref 98–107)
CREAT SERPL-MCNC: 1.32 MG/DL (ref 0.67–1.17)
DEPRECATED HCO3 PLAS-SCNC: 25 MMOL/L (ref 22–29)
ERYTHROCYTE [DISTWIDTH] IN BLOOD BY AUTOMATED COUNT: 12.8 % (ref 10–15)
GFR SERPL CREATININE-BSD FRML MDRD: 55 ML/MIN/1.73M2
GLUCOSE SERPL-MCNC: 126 MG/DL (ref 70–99)
HBA1C MFR BLD: 6.7 % (ref 0–5.6)
HCT VFR BLD AUTO: 50.1 % (ref 40–53)
HGB BLD-MCNC: 16.2 G/DL (ref 13.3–17.7)
MCH RBC QN AUTO: 30.1 PG (ref 26.5–33)
MCHC RBC AUTO-ENTMCNC: 32.3 G/DL (ref 31.5–36.5)
MCV RBC AUTO: 93 FL (ref 78–100)
PLATELET # BLD AUTO: 193 10E3/UL (ref 150–450)
POTASSIUM SERPL-SCNC: 4.4 MMOL/L (ref 3.4–5.3)
RBC # BLD AUTO: 5.39 10E6/UL (ref 4.4–5.9)
SODIUM SERPL-SCNC: 141 MMOL/L (ref 136–145)
WBC # BLD AUTO: 6.9 10E3/UL (ref 4–11)

## 2023-08-22 PROCEDURE — 99214 OFFICE O/P EST MOD 30 MIN: CPT | Performed by: INTERNAL MEDICINE

## 2023-08-22 PROCEDURE — 36415 COLL VENOUS BLD VENIPUNCTURE: CPT | Performed by: INTERNAL MEDICINE

## 2023-08-22 PROCEDURE — 83036 HEMOGLOBIN GLYCOSYLATED A1C: CPT | Performed by: INTERNAL MEDICINE

## 2023-08-22 PROCEDURE — 80048 BASIC METABOLIC PNL TOTAL CA: CPT | Performed by: INTERNAL MEDICINE

## 2023-08-22 PROCEDURE — 85027 COMPLETE CBC AUTOMATED: CPT | Performed by: INTERNAL MEDICINE

## 2023-08-22 RX ORDER — LISINOPRIL 20 MG/1
20 TABLET ORAL DAILY
Qty: 90 TABLET | Refills: 3 | Status: SHIPPED | OUTPATIENT
Start: 2023-08-22 | End: 2023-11-01

## 2023-08-22 RX ORDER — PANTOPRAZOLE SODIUM 40 MG/1
40 TABLET, DELAYED RELEASE ORAL DAILY
Qty: 90 TABLET | Refills: 3 | Status: SHIPPED | OUTPATIENT
Start: 2023-08-22 | End: 2023-11-28

## 2023-08-22 RX ORDER — CEPHALEXIN 500 MG/1
500 CAPSULE ORAL 2 TIMES DAILY
Qty: 14 CAPSULE | Refills: 0 | Status: SHIPPED | OUTPATIENT
Start: 2023-08-22 | End: 2023-10-16

## 2023-08-22 RX ORDER — TAMSULOSIN HYDROCHLORIDE 0.4 MG/1
0.4 CAPSULE ORAL DAILY
Qty: 90 CAPSULE | Refills: 3 | Status: SHIPPED | OUTPATIENT
Start: 2023-08-22 | End: 2023-11-28

## 2023-08-22 RX ORDER — METFORMIN HCL 500 MG
500 TABLET, EXTENDED RELEASE 24 HR ORAL
Qty: 90 TABLET | Refills: 3 | Status: SHIPPED | OUTPATIENT
Start: 2023-08-22 | End: 2023-11-28

## 2023-08-22 ASSESSMENT — PAIN SCALES - GENERAL: PAINLEVEL: MILD PAIN (2)

## 2023-08-22 NOTE — PROGRESS NOTES
Assessment & Plan   Estrada was seen today for hypertension.    Diagnoses and all orders for this visit:    Type 2 diabetes mellitus without complication, without long-term current use of insulin (H)  -     HEMOGLOBIN A1C; Future  -     metFORMIN (GLUCOPHAGE XR) 500 MG 24 hr tablet; Take 1 tablet (500 mg) by mouth daily (with dinner)  -     empagliflozin (JARDIANCE) 10 MG TABS tablet; Take 1 tablet (10 mg) by mouth daily For diabetes  -     HEMOGLOBIN A1C  He has not started the Jardiance yet he was concerned about side effects    Educated him about Jardiance  This is a good medication for her kidneys and diabetes  He thinks he gets thirsty with Jardiance  He took it only for few days and then he stopped  He agreed to resume after discussing with me    Stage 3a chronic kidney disease (H)  -     CBC with platelets; Future  -     Basic metabolic panel  (Ca, Cl, CO2, Creat, Gluc, K, Na, BUN); Future  -     CBC with platelets  -     Basic metabolic panel  (Ca, Cl, CO2, Creat, Gluc, K, Na, BUN)  Chronic and stable    Benign hypertension  -     lisinopril (ZESTRIL) 20 MG tablet; Take 1 tablet (20 mg) by mouth daily for Blood Pressure  -     Basic metabolic panel  (Ca, Cl, CO2, Creat, Gluc, K, Na, BUN); Future  -     Basic metabolic panel  (Ca, Cl, CO2, Creat, Gluc, K, Na, BUN)  Monitor your blood pressure once a week  at home.  Bring those readings on your next visit.  Notify us if your blood pressure readings consistently stays greater than 140/90.    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  Considered more than 90% effective.  Please go to any pharmacy to get the  vaccine    Paroxysmal atrial fibrillation (H)  Comments:  A 2-hour episode of atrial fibrillation recorded by the pacemaker in 01/2018.  The patient was placed on apixaban but without subsequent documentation of AF  Patient is followed by electrophysiologist  Per patient anticoagulation is not indicated    Prostate cancer (H)  He is followed by urology   he is on active surveillance    Cellulitis of hand, left  -     cephALEXin (KEFLEX) 500 MG capsule; Take 1 capsule (500 mg) by mouth 2 times daily    He was bitten by a jacket 2 days ago   left hand is slightly red and swollen   it is mild cellulitis  Keep elevated when you can  Antibiotic as prescribed  Seek medical attention if there is no improvement or worsening    956}     BMI:   Estimated body mass index is 30.56 kg/m  as calculated from the following:    Height as of 5/1/23: 1.803 m (5' 11\").    Weight as of this encounter: 99.4 kg (219 lb 1.6 oz).   Weight management plan: Discussed healthy diet and exercise guidelines    MEDICATIONS:  Continue current medications without change  See Patient Instructions  Patient Instructions   There is a new shingles vaccine available called shingrex  It is a series of 2 shots 2-6 months apart.  Considered more than 90% effective.  Please go to any pharmacy to get the  vaccine      Monitor your blood pressure once a week  at home.  Bring those readings on your next visit.  Notify us if your blood pressure readings consistently stays greater than 140/90.       Start taking jardiance      Jardiance is good medication for diabetes, kidneys and heart.  If increases the risk of vagina yeast infection and bladder infection in some patients  Drink extra glass of water when you take this medication   Hold Jardiance if you are acutely sick.  Hold this medication 4 days before any elective " procedure.     Hand elevation     Take cephalexin 500 mg twice a day for 7 days    Follow up in 6 months.  Seek sooner medical attention if there is any worsening of symptoms or problems.             Zoe Tomas MD  [facility] OLVIN Venegas is a 78 year old, presenting for the following health issues:  Hypertension         No data to display                History of Present Illness       Diabetes:   He presents for follow up of diabetes.    He is not checking blood glucose.         He has no concerns regarding his diabetes at this time.   He is not experiencing numbness or burning in feet, excessive thirst, blurry vision, weight changes or redness, sores or blisters on feet.           Hypertension: He presents for follow up of hypertension.  He does not check blood pressure  regularly outside of the clinic. Outside blood pressures have been over 140/90. He follows a low salt diet.     He eats 4 or more servings of fruits and vegetables daily.He consumes 0 sweetened beverage(s) daily.He exercises with enough effort to increase his heart rate 30 to 60 minutes per day.  He exercises with enough effort to increase his heart rate 6 days per week.   He is taking medications regularly.       Bit by yellow jacket two days ago  Left hand is swollen and red  He was in swimming pool   Did not take jardiance         Review of Systems   Constitutional, HEENT, cardiovascular, pulmonary, GI, , musculoskeletal, neuro, skin, endocrine and psych systems are negative, except as otherwise noted.      Objective    /80   Pulse 84   Temp 98  F (36.7  C) (Oral)   Resp 16   Wt 99.4 kg (219 lb 1.6 oz)   SpO2 96%   BMI 30.56 kg/m    Body mass index is 30.56 kg/m .  Physical Exam   GENERAL: healthy, alert and no distress  PSYCH: mentation appears normal, affect normal/bright  Left hand is swollen     Disclaimer: This note consists of symbols derived from keyboarding, dictation and/or voice  recognition software. As a result, there may be errors in the script that have gone undetected. Please consider this when interpreting information found in this chart.

## 2023-08-22 NOTE — RESULT ENCOUNTER NOTE
Woody Dorado    This is to inform you regarding your test result.    Chronic kidney disease is stable  HbA1c which is average glucose during last 3 months is 6.7%  Even though your diabetes is under good control but numbers have gone up   Start taking Jardiance as discussed during office visit  CBC result which includes white count Hemoglobin and  Platelet Counts is normal.         Sincerely,      Dr.Nasima Thom MD,FACP

## 2023-08-22 NOTE — PATIENT INSTRUCTIONS
There is a new shingles vaccine available called shingrex  It is a series of 2 shots 2-6 months apart.  Considered more than 90% effective.  Please go to any pharmacy to get the  vaccine      Monitor your blood pressure once a week  at home.  Bring those readings on your next visit.  Notify us if your blood pressure readings consistently stays greater than 140/90.       Start taking jardiance      Jardiance is good medication for diabetes, kidneys and heart.  If increases the risk of vagina yeast infection and bladder infection in some patients  Drink extra glass of water when you take this medication   Hold Jardiance if you are acutely sick.  Hold this medication 4 days before any elective procedure.     Hand elevation     Take cephalexin 500 mg twice a day for 7 days    Follow up in 6 months.  Seek sooner medical attention if there is any worsening of symptoms or problems.

## 2023-10-16 ENCOUNTER — ANCILLARY PROCEDURE (OUTPATIENT)
Dept: CARDIOLOGY | Facility: CLINIC | Age: 78
End: 2023-10-16
Attending: INTERNAL MEDICINE
Payer: MEDICARE

## 2023-10-16 ENCOUNTER — OFFICE VISIT (OUTPATIENT)
Dept: CARDIOLOGY | Facility: CLINIC | Age: 78
End: 2023-10-16
Payer: MEDICARE

## 2023-10-16 VITALS
HEIGHT: 71 IN | DIASTOLIC BLOOD PRESSURE: 97 MMHG | BODY MASS INDEX: 30.66 KG/M2 | HEART RATE: 76 BPM | OXYGEN SATURATION: 96 % | WEIGHT: 219 LBS | SYSTOLIC BLOOD PRESSURE: 159 MMHG

## 2023-10-16 DIAGNOSIS — I49.5 SSS (SICK SINUS SYNDROME) (H): ICD-10-CM

## 2023-10-16 DIAGNOSIS — I48.0 PAROXYSMAL ATRIAL FIBRILLATION (H): ICD-10-CM

## 2023-10-16 DIAGNOSIS — Z95.0 CARDIAC PACEMAKER IN SITU: ICD-10-CM

## 2023-10-16 PROCEDURE — 99213 OFFICE O/P EST LOW 20 MIN: CPT | Mod: 25 | Performed by: PHYSICIAN ASSISTANT

## 2023-10-16 PROCEDURE — 99207 CARDIAC DEVICE CHECK - IN CLINIC: CPT | Performed by: INTERNAL MEDICINE

## 2023-10-16 NOTE — PATIENT INSTRUCTIONS
Today's Plan:   Check BP and record at home 1-2x daily for the next week. My RN will call you to check numbers.   Return to see us again in one year.     If you have questions or concerns please call my nurse team at [phone].   Scheduling phone number: [phone]  For after hours urgent concerns call [phone] option 2.   Reminder: Please bring in all current medications, over the counter supplements and vitamin bottles to your next appointment.    It was a pleasure seeing you today!     Soraya García PA-C

## 2023-10-16 NOTE — LETTER
10/16/2023    Zoe Tomas MD  [address]  [place]                [place] [address]    RE: Estrada Oseguera       Dear Colleague,     I had the pleasure of seeing Estrada Oseguera in the [facility].    Electrophysiology Clinic Progress Note    Estrada Oseguera MRN# [id number]   YOB: 1945 Age: 78 year old     Primary cardiology team: Dr. Valle         Assessment and Plan     In summary, Estrada Oseguera presents today for an annual follow up visit regarding sinus node dysfunction s/p PPM, atrial and ventricular arrhythmias.  His device is functioning normally, with no evidence of atrial or ventricular arrhythmias on interrogation today. BP in clinic is mildly uncontrolled.     Plan:  Ambulatory BP's x 1 week. My RN will follow up to review.   Continue metoprolol and lisinopril.     Follow-up:  With me in 1 year, will again try to coordinate this with in clinic device interrogation.    OLIVIER Chou [facility]         History of Presenting Illness     Estrada Oseguera is a pleasant 78 year old patient with a pertinent history of the following -   Symptomatic sinus node dysfunction.  Implantation of dual-chamber pacemaker in 11/2016 ([employer]).   A 2-hour episode of atrial fibrillation recorded by the pacemaker in 01/2018.  The patient was placed on apixaban but without subsequent documentation of AF.  After discussion with the patient, AC was stopped in 03/2020.  So far no documentation of clinically significant AF.   PVCs, occasionally symptomatic.    Longstanding hx of nonsustained VT. Normal stress MPI in 2018.   EF 50%-55% (2020 TTE).   Chronic kidney disease, stage III.  DM type II, on metformin.  Chacon's esophagus.     Today, I am meeting Rich along with his wife, who are very pleasant. Patient denies chest pain, shortness of breath, PND, orthopnea, edema, claudication, palpitations, near syncope or syncope. Relatively  "active for his age. Does 40 minutes of swimming per day and feels good with it.     Device check today shows 31% atrial paced, less than 1% ventricular paced, normal sensing/threshold/impedance, no atrial or ventricular arrhythmias noted, and 8 years remaining on battery life.          Review of Systems     12-pt ROS is negative except for as noted in the HPI.          Physical Exam     Vitals: BP (!) 159/97   Pulse 76   Ht 1.803 m (5' 11\")   Wt 99.3 kg (219 lb)   SpO2 96%   BMI 30.54 kg/m    Wt Readings from Last 10 Encounters:   10/16/23 99.3 kg (219 lb)   08/22/23 99.4 kg (219 lb 1.6 oz)   05/01/23 99.8 kg (220 lb)   03/03/23 99.4 kg (219 lb 1.6 oz)   11/02/22 96.2 kg (212 lb)   10/20/22 98.3 kg (216 lb 12.8 oz)   08/30/22 100.7 kg (222 lb)   08/18/22 97.5 kg (215 lb)   04/22/22 102.1 kg (225 lb)   03/03/22 96.2 kg (212 lb)       Constitutional:  Patient is pleasant, alert, cooperative, and in NAD.  HEENT:  NCAT. PERRLA. EOM's intact.   Neck:  CVP appears normal. No carotid bruits.   Pulmonary: Normal respiratory effort. CTAB.   Cardiac: RRR, normal S1/S2, no S3/S4, no murmur or rub.   Abdomen:  Non-tender abdomen, no hepatosplenomegaly appreciated.   Vascular: Pulses in the upper and lower extremities are 2+ and equal bilaterally.  Extremities: No edema, erythema, cyanosis or tenderness appreciated.  Skin:  No rashes or lesions appreciated.   Neurological:  No gross motor or sensory deficits.   Psych: Appropriate affect.          Data   Labs reviewed:  Recent Labs   Lab Test 03/03/23  0941 02/17/22  1038 10/14/21  1229 03/18/21  0841   LDL 71 64 69 61   HDL 47 44 41 47   NHDL 88 81 90 85   CHOL 135 125 131 132   TRIG 83 84 103 118   TSH 2.52 2.28  --  3.03   LUCIANO  --   --   --  82       Lab Results   Component Value Date    WBC 6.9 08/22/2023    WBC 6.9 03/18/2021    RBC 5.39 08/22/2023    RBC 5.35 03/18/2021    HGB 16.2 08/22/2023    HGB 16.7 03/18/2021    HCT 50.1 08/22/2023    HCT 48.5 03/18/2021    MCV " 93 08/22/2023    MCV 91 03/18/2021    MCH 30.1 08/22/2023    MCH 31.2 03/18/2021    MCHC 32.3 08/22/2023    MCHC 34.4 03/18/2021    RDW 12.8 08/22/2023    RDW 13.9 03/18/2021     08/22/2023     03/18/2021       Lab Results   Component Value Date     08/22/2023     03/18/2021    POTASSIUM 4.4 08/22/2023    POTASSIUM 4.0 08/18/2022    POTASSIUM 4.2 03/18/2021    CHLORIDE 104 08/22/2023    CHLORIDE 106 08/18/2022    CHLORIDE 111 (H) 03/18/2021    CO2 25 08/22/2023    CO2 26 08/18/2022    CO2 21 03/18/2021    ANIONGAP 12 08/22/2023    ANIONGAP 6 08/18/2022    ANIONGAP 8 03/18/2021     (H) 08/22/2023     (H) 08/18/2022     (H) 03/18/2021    BUN 18.1 08/22/2023    BUN 21 08/18/2022    BUN 18 03/18/2021    CR 1.32 (H) 08/22/2023    CR 1.23 03/18/2021    GFRESTIMATED 55 (L) 08/22/2023    GFRESTIMATED 57 (L) 03/18/2021    GFRESTBLACK 66 03/18/2021    JOAQUIN 9.8 08/22/2023    JOAQUIN 9.0 03/18/2021      Lab Results   Component Value Date    AST 31 03/03/2023    AST 25 03/18/2021    ALT 33 03/03/2023    ALT 55 03/18/2021       Lab Results   Component Value Date    A1C 6.7 (H) 08/22/2023    A1C 6.4 (H) 06/26/2021       Lab Results   Component Value Date    INR 0.98 11/23/2016            Problem List     Patient Active Problem List   Diagnosis    Solitary kidney, acquired    Benign essential hypertension    Hiatal hernia    Benign hypertension    GERD (gastroesophageal reflux disease)    CKD (chronic kidney disease)    S/P colon resection    S/p nephrectomy    Tubular adenoma    Iron deficiency    Stricture esophagus    Chacon's esophagus without dysplasia    Cardiac pacemaker    Hyperlipidemia, unspecified hyperlipidemia type    Elevated prostate specific antigen (PSA)    History of cardiomyopathy    Other cardiomyopathies (H)    NSVT (nonsustained ventricular tachycardia) (H)    Paroxysmal atrial fibrillation (H)    Type 2 diabetes mellitus without complication, without long-term  current use of insulin (H)    Chronic kidney disease, stage 3    Prostate cancer (H)            Medications     Current Outpatient Medications   Medication Sig Dispense Refill    lisinopril (ZESTRIL) 20 MG tablet Take 1 tablet (20 mg) by mouth daily for Blood Pressure 90 tablet 3    metFORMIN (GLUCOPHAGE XR) 500 MG 24 hr tablet Take 1 tablet (500 mg) by mouth daily (with dinner) 90 tablet 3    metoprolol succinate ER (TOPROL XL) 50 MG 24 hr tablet Take 1 tablet (50 mg) by mouth daily for heart and Blood Pressure 90 tablet 3    pantoprazole (PROTONIX) 40 MG EC tablet Take 1 tablet (40 mg) by mouth daily for acid reflux 90 tablet 3    tamsulosin (FLOMAX) 0.4 MG capsule Take 1 capsule (0.4 mg) by mouth daily 90 capsule 3    blood glucose (NO BRAND SPECIFIED) lancets standard Use to test blood sugar 1 times daily or as directed. (Patient not taking: Reported on 10/16/2023) 100 each 1    blood glucose (NO BRAND SPECIFIED) test strip Use to test blood sugar 1 times daily (Patient not taking: Reported on 10/16/2023) 100 strip 1    blood glucose monitoring (NO BRAND SPECIFIED) meter device kit Use to test blood sugar as directed. (Patient not taking: Reported on 10/16/2023) 1 kit 0    cephALEXin (KEFLEX) 500 MG capsule Take 1 capsule (500 mg) by mouth 2 times daily (Patient not taking: Reported on 10/16/2023) 14 capsule 0    empagliflozin (JARDIANCE) 10 MG TABS tablet Take 1 tablet (10 mg) by mouth daily For diabetes (Patient not taking: Reported on 10/16/2023) 90 tablet 1    fluticasone (FLONASE) 50 MCG/ACT nasal spray Spray 2 sprays into both nostrils daily (Patient not taking: Reported on 10/16/2023) 16 g 0            Past Medical History     Past Medical History:   Diagnosis Date    A-fib (H) 01/31/2018    Cancer (H)     skin    CKD (chronic kidney disease)     GERD (gastroesophageal reflux disease)     Hernia, abdominal     Hiatal hernia     Hypertension     Mumps     Pericarditis     S/P colon resection      Past  Surgical History:   Procedure Laterality Date    CHOLECYSTECTOMY      COLONOSCOPY      COLONOSCOPY N/A 2021    Procedure: COLONOSCOPY, WITH POLYPECTOMY AND BIOPSY;  Surgeon: Deondre Meek MD;  Location: [facility]    COMBINED COLONOSCOPY, MUCOSAL RESECTION      ?diverticulitis     ESOPHAGOSCOPY, GASTROSCOPY, DUODENOSCOPY (EGD), COMBINED N/A 11/3/2016    Procedure: COMBINED ESOPHAGOSCOPY, GASTROSCOPY, DUODENOSCOPY (EGD), BIOPSY SINGLE OR MULTIPLE;  Surgeon: Deondre Meek MD;  Location: [facility]    ESOPHAGOSCOPY, GASTROSCOPY, DUODENOSCOPY (EGD), COMBINED N/A 3/3/2022    Procedure: ESOPHAGOGASTRODUODENOSCOPY, WITH BIOPSY;  Surgeon: Deondre Meek MD;  Location: [facility]    ESOPHAGOSCOPY, GASTROSCOPY, DUODENOSCOPY (EGD), DILATATION, COMBINED N/A 2016    Procedure: COMBINED ESOPHAGOSCOPY, GASTROSCOPY, DUODENOSCOPY (EGD), DILATATION;  Surgeon: Deondre Meek MD;  Location: [facility]    ESOPHAGOSCOPY, GASTROSCOPY, DUODENOSCOPY (EGD), DILATATION, COMBINED N/A 3/2/2017    Procedure: COMBINED ESOPHAGOSCOPY, GASTROSCOPY, DUODENOSCOPY (EGD), DILATATION;  Surgeon: Deondre Meek MD;  Location: [facility]    NEPHRECTOMY      NEPHRECTOMY RT/LT      right    pace maker      VASECTOMY       Family History   Problem Relation Age of Onset    Hyperlipidemia Mother     Cerebrovascular Disease Father     Prostate Cancer Father     Pulmonary Hypertension Brother     Breast Cancer Sister     Parkinsonism Sister     Colon Cancer No family hx of      Social History     Socioeconomic History    Marital status:      Spouse name: Not on file    Number of children: Not on file    Years of education: Not on file    Highest education level: Not on file   Occupational History    Not on file   Tobacco Use    Smoking status: Former     Types: Pipe     Quit date:      Years since quittin.8    Smokeless tobacco: Never    Tobacco comments:     light smoker for few years only   Vaping Use    Vaping Use:  Never used   Substance and Sexual Activity    Alcohol use: Not Currently     Alcohol/week: 0.0 standard drinks of alcohol    Drug use: No    Sexual activity: Not Currently   Other Topics Concern    Parent/sibling w/ CABG, MI or angioplasty before 65F 55M? Not Asked     Service Not Asked    Blood Transfusions Not Asked    Caffeine Concern Not Asked    Occupational Exposure Not Asked    Hobby Hazards Not Asked    Sleep Concern Not Asked    Stress Concern Not Asked    Weight Concern Not Asked    Special Diet No    Back Care Not Asked    Exercise No    Bike Helmet Not Asked    Seat Belt Not Asked    Self-Exams Not Asked   Social History Narrative    Not on file     Social Determinants of Health     Financial Resource Strain: Not on file   Food Insecurity: Not on file   Transportation Needs: Not on file   Physical Activity: Not on file   Stress: Not on file   Social Connections: Not on file   Interpersonal Safety: Not on file   Housing Stability: Not on file            Allergies   Patient has no known allergies.    Today's clinic visit entailed:  Review of the result(s) of each unique test - device interrogation, echocardiogram, stress test, BMP  I spent a total of 25 minutes on the day of the visit.   Time spent by me doing chart review, history and exam, documentation and further activities per the note  Provider  Link to [facility] Help Grid     The level of medical decision making during this visit was of moderate complexity.      Thank you for allowing me to participate in the care of your patient.      Sincerely,     Soraya García PA-C     [facility]  cc:   Lynn Valle MD  [address] [facility] [address]  [place] [address]

## 2023-10-16 NOTE — PROGRESS NOTES
Electrophysiology Clinic Progress Note    Estrada Oseguera MRN# [id number]   YOB: 1945 Age: 78 year old     Primary cardiology team: Dr. Valle         Assessment and Plan     In summary, Estrada Oseguera presents today for an annual follow up visit regarding sinus node dysfunction s/p PPM, atrial and ventricular arrhythmias.  His device is functioning normally, with no evidence of atrial or ventricular arrhythmias on interrogation today. BP in clinic is mildly uncontrolled.     Plan:  Ambulatory BP's x 1 week. My RN will follow up to review.   Continue metoprolol and lisinopril.     Follow-up:  With me in 1 year, will again try to coordinate this with in clinic device interrogation.    Soraya García PA-C  [facility] - Heart Clinic         History of Presenting Illness     Estrada Oseguera is a pleasant 78 year old patient with a pertinent history of the following -   Symptomatic sinus node dysfunction.  Implantation of dual-chamber pacemaker in 11/2016 ([employer]).   A 2-hour episode of atrial fibrillation recorded by the pacemaker in 01/2018.  The patient was placed on apixaban but without subsequent documentation of AF.  After discussion with the patient, AC was stopped in 03/2020.  So far no documentation of clinically significant AF.   PVCs, occasionally symptomatic.    Longstanding hx of nonsustained VT. Normal stress MPI in 2018.   EF 50%-55% (2020 TTE).   Chronic kidney disease, stage III.  DM type II, on metformin.  Chacon's esophagus.     Today, I am meeting Rich along with his wife, who are very pleasant. Patient denies chest pain, shortness of breath, PND, orthopnea, edema, claudication, palpitations, near syncope or syncope. Relatively active for his age. Does 40 minutes of swimming per day and feels good with it.     Device check today shows 31% atrial paced, less than 1% ventricular paced, normal sensing/threshold/impedance, no atrial or ventricular arrhythmias  "noted, and 8 years remaining on battery life.          Review of Systems     12-pt ROS is negative except for as noted in the HPI.          Physical Exam     Vitals: BP (!) 159/97   Pulse 76   Ht 1.803 m (5' 11\")   Wt 99.3 kg (219 lb)   SpO2 96%   BMI 30.54 kg/m    Wt Readings from Last 10 Encounters:   10/16/23 99.3 kg (219 lb)   08/22/23 99.4 kg (219 lb 1.6 oz)   05/01/23 99.8 kg (220 lb)   03/03/23 99.4 kg (219 lb 1.6 oz)   11/02/22 96.2 kg (212 lb)   10/20/22 98.3 kg (216 lb 12.8 oz)   08/30/22 100.7 kg (222 lb)   08/18/22 97.5 kg (215 lb)   04/22/22 102.1 kg (225 lb)   03/03/22 96.2 kg (212 lb)       Constitutional:  Patient is pleasant, alert, cooperative, and in NAD.  HEENT:  NCAT. PERRLA. EOM's intact.   Neck:  CVP appears normal. No carotid bruits.   Pulmonary: Normal respiratory effort. CTAB.   Cardiac: RRR, normal S1/S2, no S3/S4, no murmur or rub.   Abdomen:  Non-tender abdomen, no hepatosplenomegaly appreciated.   Vascular: Pulses in the upper and lower extremities are 2+ and equal bilaterally.  Extremities: No edema, erythema, cyanosis or tenderness appreciated.  Skin:  No rashes or lesions appreciated.   Neurological:  No gross motor or sensory deficits.   Psych: Appropriate affect.          Data   Labs reviewed:  Recent Labs   Lab Test 03/03/23  0941 02/17/22  1038 10/14/21  1229 03/18/21  0841   LDL 71 64 69 61   HDL 47 44 41 47   NHDL 88 81 90 85   CHOL 135 125 131 132   TRIG 83 84 103 118   TSH 2.52 2.28  --  3.03   LUCIANO  --   --   --  82       Lab Results   Component Value Date    WBC 6.9 08/22/2023    WBC 6.9 03/18/2021    RBC 5.39 08/22/2023    RBC 5.35 03/18/2021    HGB 16.2 08/22/2023    HGB 16.7 03/18/2021    HCT 50.1 08/22/2023    HCT 48.5 03/18/2021    MCV 93 08/22/2023    MCV 91 03/18/2021    MCH 30.1 08/22/2023    MCH 31.2 03/18/2021    MCHC 32.3 08/22/2023    MCHC 34.4 03/18/2021    RDW 12.8 08/22/2023    RDW 13.9 03/18/2021     08/22/2023     03/18/2021       Lab " Results   Component Value Date     08/22/2023     03/18/2021    POTASSIUM 4.4 08/22/2023    POTASSIUM 4.0 08/18/2022    POTASSIUM 4.2 03/18/2021    CHLORIDE 104 08/22/2023    CHLORIDE 106 08/18/2022    CHLORIDE 111 (H) 03/18/2021    CO2 25 08/22/2023    CO2 26 08/18/2022    CO2 21 03/18/2021    ANIONGAP 12 08/22/2023    ANIONGAP 6 08/18/2022    ANIONGAP 8 03/18/2021     (H) 08/22/2023     (H) 08/18/2022     (H) 03/18/2021    BUN 18.1 08/22/2023    BUN 21 08/18/2022    BUN 18 03/18/2021    CR 1.32 (H) 08/22/2023    CR 1.23 03/18/2021    GFRESTIMATED 55 (L) 08/22/2023    GFRESTIMATED 57 (L) 03/18/2021    GFRESTBLACK 66 03/18/2021    JOAQUIN 9.8 08/22/2023    JOAQUIN 9.0 03/18/2021      Lab Results   Component Value Date    AST 31 03/03/2023    AST 25 03/18/2021    ALT 33 03/03/2023    ALT 55 03/18/2021       Lab Results   Component Value Date    A1C 6.7 (H) 08/22/2023    A1C 6.4 (H) 06/26/2021       Lab Results   Component Value Date    INR 0.98 11/23/2016            Problem List     Patient Active Problem List   Diagnosis    Solitary kidney, acquired    Benign essential hypertension    Hiatal hernia    Benign hypertension    GERD (gastroesophageal reflux disease)    CKD (chronic kidney disease)    S/P colon resection    S/p nephrectomy    Tubular adenoma    Iron deficiency    Stricture esophagus    Chacon's esophagus without dysplasia    Cardiac pacemaker    Hyperlipidemia, unspecified hyperlipidemia type    Elevated prostate specific antigen (PSA)    History of cardiomyopathy    Other cardiomyopathies (H)    NSVT (nonsustained ventricular tachycardia) (H)    Paroxysmal atrial fibrillation (H)    Type 2 diabetes mellitus without complication, without long-term current use of insulin (H)    Chronic kidney disease, stage 3    Prostate cancer (H)            Medications     Current Outpatient Medications   Medication Sig Dispense Refill    lisinopril (ZESTRIL) 20 MG tablet Take 1 tablet (20 mg)  by mouth daily for Blood Pressure 90 tablet 3    metFORMIN (GLUCOPHAGE XR) 500 MG 24 hr tablet Take 1 tablet (500 mg) by mouth daily (with dinner) 90 tablet 3    metoprolol succinate ER (TOPROL XL) 50 MG 24 hr tablet Take 1 tablet (50 mg) by mouth daily for heart and Blood Pressure 90 tablet 3    pantoprazole (PROTONIX) 40 MG EC tablet Take 1 tablet (40 mg) by mouth daily for acid reflux 90 tablet 3    tamsulosin (FLOMAX) 0.4 MG capsule Take 1 capsule (0.4 mg) by mouth daily 90 capsule 3    blood glucose (NO BRAND SPECIFIED) lancets standard Use to test blood sugar 1 times daily or as directed. (Patient not taking: Reported on 10/16/2023) 100 each 1    blood glucose (NO BRAND SPECIFIED) test strip Use to test blood sugar 1 times daily (Patient not taking: Reported on 10/16/2023) 100 strip 1    blood glucose monitoring (NO BRAND SPECIFIED) meter device kit Use to test blood sugar as directed. (Patient not taking: Reported on 10/16/2023) 1 kit 0    cephALEXin (KEFLEX) 500 MG capsule Take 1 capsule (500 mg) by mouth 2 times daily (Patient not taking: Reported on 10/16/2023) 14 capsule 0    empagliflozin (JARDIANCE) 10 MG TABS tablet Take 1 tablet (10 mg) by mouth daily For diabetes (Patient not taking: Reported on 10/16/2023) 90 tablet 1    fluticasone (FLONASE) 50 MCG/ACT nasal spray Spray 2 sprays into both nostrils daily (Patient not taking: Reported on 10/16/2023) 16 g 0            Past Medical History     Past Medical History:   Diagnosis Date    A-fib (H) 01/31/2018    Cancer (H)     skin    CKD (chronic kidney disease)     GERD (gastroesophageal reflux disease)     Hernia, abdominal     Hiatal hernia     Hypertension     Mumps     Pericarditis     S/P colon resection      Past Surgical History:   Procedure Laterality Date    CHOLECYSTECTOMY      COLONOSCOPY      COLONOSCOPY N/A 5/6/2021    Procedure: COLONOSCOPY, WITH POLYPECTOMY AND BIOPSY;  Surgeon: Deondre Meek MD;  Location:  GI    COMBINED  COLONOSCOPY, MUCOSAL RESECTION      ?diverticulitis     ESOPHAGOSCOPY, GASTROSCOPY, DUODENOSCOPY (EGD), COMBINED N/A 11/3/2016    Procedure: COMBINED ESOPHAGOSCOPY, GASTROSCOPY, DUODENOSCOPY (EGD), BIOPSY SINGLE OR MULTIPLE;  Surgeon: Deondre Meek MD;  Location: [facility]    ESOPHAGOSCOPY, GASTROSCOPY, DUODENOSCOPY (EGD), COMBINED N/A 3/3/2022    Procedure: ESOPHAGOGASTRODUODENOSCOPY, WITH BIOPSY;  Surgeon: Deondre Meek MD;  Location:  GI    ESOPHAGOSCOPY, GASTROSCOPY, DUODENOSCOPY (EGD), DILATATION, COMBINED N/A 2016    Procedure: COMBINED ESOPHAGOSCOPY, GASTROSCOPY, DUODENOSCOPY (EGD), DILATATION;  Surgeon: Deondre Meek MD;  Location: [facility]    ESOPHAGOSCOPY, GASTROSCOPY, DUODENOSCOPY (EGD), DILATATION, COMBINED N/A 3/2/2017    Procedure: COMBINED ESOPHAGOSCOPY, GASTROSCOPY, DUODENOSCOPY (EGD), DILATATION;  Surgeon: Deondre Meek MD;  Location:  GI    NEPHRECTOMY      NEPHRECTOMY RT/LT      right    pace maker      VASECTOMY       Family History   Problem Relation Age of Onset    Hyperlipidemia Mother     Cerebrovascular Disease Father     Prostate Cancer Father     Pulmonary Hypertension Brother     Breast Cancer Sister     Parkinsonism Sister     Colon Cancer No family hx of      Social History     Socioeconomic History    Marital status:      Spouse name: Not on file    Number of children: Not on file    Years of education: Not on file    Highest education level: Not on file   Occupational History    Not on file   Tobacco Use    Smoking status: Former     Types: Pipe     Quit date:      Years since quittin.8    Smokeless tobacco: Never    Tobacco comments:     light smoker for few years only   Vaping Use    Vaping Use: Never used   Substance and Sexual Activity    Alcohol use: Not Currently     Alcohol/week: 0.0 standard drinks of alcohol    Drug use: No    Sexual activity: Not Currently   Other Topics Concern    Parent/sibling w/ CABG, MI or  angioplasty before 65F 55M? Not Asked     Service Not Asked    Blood Transfusions Not Asked    Caffeine Concern Not Asked    Occupational Exposure Not Asked    Hobby Hazards Not Asked    Sleep Concern Not Asked    Stress Concern Not Asked    Weight Concern Not Asked    Special Diet No    Back Care Not Asked    Exercise No    Bike Helmet Not Asked    Seat Belt Not Asked    Self-Exams Not Asked   Social History Narrative    Not on file     Social Determinants of Health     Financial Resource Strain: Not on file   Food Insecurity: Not on file   Transportation Needs: Not on file   Physical Activity: Not on file   Stress: Not on file   Social Connections: Not on file   Interpersonal Safety: Not on file   Housing Stability: Not on file            Allergies   Patient has no known allergies.    Today's clinic visit entailed:  Review of the result(s) of each unique test - device interrogation, echocardiogram, stress test, BMP  I spent a total of 25 minutes on the day of the visit.   Time spent by me doing chart review, history and exam, documentation and further activities per the note  Provider  Link to [facility] Help Grid     The level of medical decision making during this visit was of moderate complexity.

## 2023-10-17 LAB
MDC_IDC_LEAD_CONNECTION_STATUS: NORMAL
MDC_IDC_LEAD_CONNECTION_STATUS: NORMAL
MDC_IDC_LEAD_IMPLANT_DT: NORMAL
MDC_IDC_LEAD_IMPLANT_DT: NORMAL
MDC_IDC_LEAD_LOCATION: NORMAL
MDC_IDC_LEAD_LOCATION: NORMAL
MDC_IDC_LEAD_LOCATION_DETAIL_1: NORMAL
MDC_IDC_LEAD_LOCATION_DETAIL_1: NORMAL
MDC_IDC_LEAD_MFG: NORMAL
MDC_IDC_LEAD_MFG: NORMAL
MDC_IDC_LEAD_MODEL: NORMAL
MDC_IDC_LEAD_MODEL: NORMAL
MDC_IDC_LEAD_POLARITY_TYPE: NORMAL
MDC_IDC_LEAD_POLARITY_TYPE: NORMAL
MDC_IDC_LEAD_SERIAL: NORMAL
MDC_IDC_LEAD_SERIAL: NORMAL
MDC_IDC_MSMT_BATTERY_DTM: NORMAL
MDC_IDC_MSMT_BATTERY_REMAINING_LONGEVITY: 96 MO
MDC_IDC_MSMT_BATTERY_REMAINING_PERCENTAGE: 94 %
MDC_IDC_MSMT_BATTERY_STATUS: NORMAL
MDC_IDC_MSMT_LEADCHNL_RA_IMPEDANCE_VALUE: 799 OHM
MDC_IDC_MSMT_LEADCHNL_RA_PACING_THRESHOLD_AMPLITUDE: 0.5 V
MDC_IDC_MSMT_LEADCHNL_RA_PACING_THRESHOLD_PULSEWIDTH: 0.4 MS
MDC_IDC_MSMT_LEADCHNL_RV_IMPEDANCE_VALUE: 682 OHM
MDC_IDC_MSMT_LEADCHNL_RV_PACING_THRESHOLD_AMPLITUDE: 1 V
MDC_IDC_MSMT_LEADCHNL_RV_PACING_THRESHOLD_PULSEWIDTH: 0.4 MS
MDC_IDC_PG_IMPLANT_DTM: NORMAL
MDC_IDC_PG_MFG: NORMAL
MDC_IDC_PG_MODEL: NORMAL
MDC_IDC_PG_SERIAL: NORMAL
MDC_IDC_PG_TYPE: NORMAL
MDC_IDC_SESS_CLINIC_NAME: NORMAL
MDC_IDC_SESS_DTM: NORMAL
MDC_IDC_SESS_TYPE: NORMAL
MDC_IDC_SET_BRADY_AT_MODE_SWITCH_MODE: NORMAL
MDC_IDC_SET_BRADY_AT_MODE_SWITCH_RATE: 170 {BEATS}/MIN
MDC_IDC_SET_BRADY_LOWRATE: 55 {BEATS}/MIN
MDC_IDC_SET_BRADY_MAX_SENSOR_RATE: 130 {BEATS}/MIN
MDC_IDC_SET_BRADY_MAX_TRACKING_RATE: 130 {BEATS}/MIN
MDC_IDC_SET_BRADY_MODE: NORMAL
MDC_IDC_SET_BRADY_PAV_DELAY_HIGH: 200 MS
MDC_IDC_SET_BRADY_PAV_DELAY_LOW: 300 MS
MDC_IDC_SET_BRADY_SAV_DELAY_HIGH: 200 MS
MDC_IDC_SET_BRADY_SAV_DELAY_LOW: 300 MS
MDC_IDC_SET_LEADCHNL_RA_PACING_AMPLITUDE: 2 V
MDC_IDC_SET_LEADCHNL_RA_PACING_CAPTURE_MODE: NORMAL
MDC_IDC_SET_LEADCHNL_RA_PACING_POLARITY: NORMAL
MDC_IDC_SET_LEADCHNL_RA_PACING_PULSEWIDTH: 0.4 MS
MDC_IDC_SET_LEADCHNL_RA_SENSING_ADAPTATION_MODE: NORMAL
MDC_IDC_SET_LEADCHNL_RA_SENSING_POLARITY: NORMAL
MDC_IDC_SET_LEADCHNL_RA_SENSING_SENSITIVITY: 0.25 MV
MDC_IDC_SET_LEADCHNL_RV_PACING_AMPLITUDE: 1.5 V
MDC_IDC_SET_LEADCHNL_RV_PACING_CAPTURE_MODE: NORMAL
MDC_IDC_SET_LEADCHNL_RV_PACING_POLARITY: NORMAL
MDC_IDC_SET_LEADCHNL_RV_PACING_PULSEWIDTH: 0.4 MS
MDC_IDC_SET_LEADCHNL_RV_SENSING_ADAPTATION_MODE: NORMAL
MDC_IDC_SET_LEADCHNL_RV_SENSING_POLARITY: NORMAL
MDC_IDC_SET_LEADCHNL_RV_SENSING_SENSITIVITY: 1.5 MV
MDC_IDC_SET_ZONE_DETECTION_INTERVAL: 375 MS
MDC_IDC_SET_ZONE_STATUS: NORMAL
MDC_IDC_SET_ZONE_TYPE: NORMAL
MDC_IDC_SET_ZONE_VENDOR_TYPE: NORMAL
MDC_IDC_STAT_AT_BURDEN_PERCENT: 0 %
MDC_IDC_STAT_AT_DTM_END: NORMAL
MDC_IDC_STAT_AT_DTM_START: NORMAL
MDC_IDC_STAT_BRADY_DTM_END: NORMAL
MDC_IDC_STAT_BRADY_DTM_START: NORMAL
MDC_IDC_STAT_BRADY_RA_PERCENT_PACED: 31 %
MDC_IDC_STAT_BRADY_RV_PERCENT_PACED: 0 %
MDC_IDC_STAT_EPISODE_RECENT_COUNT: 0
MDC_IDC_STAT_EPISODE_RECENT_COUNT_DTM_END: NORMAL
MDC_IDC_STAT_EPISODE_RECENT_COUNT_DTM_START: NORMAL
MDC_IDC_STAT_EPISODE_TYPE: NORMAL
MDC_IDC_STAT_EPISODE_VENDOR_TYPE: NORMAL
MDC_IDC_STAT_EPISODE_VENDOR_TYPE: NORMAL

## 2023-10-30 ENCOUNTER — LAB (OUTPATIENT)
Dept: LAB | Facility: CLINIC | Age: 78
End: 2023-10-30
Payer: MEDICARE

## 2023-10-30 ENCOUNTER — TELEPHONE (OUTPATIENT)
Dept: CARDIOLOGY | Facility: CLINIC | Age: 78
End: 2023-10-30

## 2023-10-30 DIAGNOSIS — C61 PROSTATE CANCER (H): ICD-10-CM

## 2023-10-30 DIAGNOSIS — Z51.81 ENCOUNTER FOR MEDICATION MONITORING: Primary | ICD-10-CM

## 2023-10-30 LAB — PSA SERPL-MCNC: 8 UG/L (ref 0–4)

## 2023-10-30 PROCEDURE — 84153 ASSAY OF PSA TOTAL: CPT

## 2023-10-30 PROCEDURE — 36415 COLL VENOUS BLD VENIPUNCTURE: CPT

## 2023-10-30 NOTE — TELEPHONE ENCOUNTER
----- Message from Crystal Miles RN sent at 10/16/2023 10:19 AM CDT -----  Per Soraya García.  Today is the day to call and get BPs per her request.  ----- Message -----  From: Soraya García PA-C  Sent: 10/16/2023   9:59 AM CDT  To: [facility] Heart Device Nurse    Please call pt in two weeks to review ambulatory BP's - thanks!

## 2023-11-01 DIAGNOSIS — I10 BENIGN HYPERTENSION: ICD-10-CM

## 2023-11-01 RX ORDER — LISINOPRIL 20 MG/1
30 TABLET ORAL DAILY
Qty: 90 TABLET | Refills: 3 | Status: SHIPPED | OUTPATIENT
Start: 2023-11-01 | End: 2023-11-01 | Stop reason: DRUGHIGH

## 2023-11-01 RX ORDER — LISINOPRIL 30 MG/1
30 TABLET ORAL DAILY
Qty: 90 TABLET | Refills: 3 | Status: SHIPPED | OUTPATIENT
Start: 2023-11-01 | End: 2023-11-20 | Stop reason: DRUGHIGH

## 2023-11-01 NOTE — TELEPHONE ENCOUNTER
Called both Theo and his wife and left VM to call clinic back.      Would need to forward message to the pool to remind nurses to obtain BP's in 2 weeks also once we order and send new BP prescription.

## 2023-11-01 NOTE — TELEPHONE ENCOUNTER
Called pt and updated him in regards to Soraya's recommendations.  Ordered lisinopril and also sent delayed message to nursing pool to check bp's and then send to Soraya García.

## 2023-11-01 NOTE — TELEPHONE ENCOUNTER
Called pt and wife answered.  She stated he was at the pool.  Will call back in 30 minutes to get the bp readings.

## 2023-11-01 NOTE — TELEPHONE ENCOUNTER
"Was able to talk to pt.  Stated that he had only been able to take blood pressure the last couple days.     10/30:     146/92 (am), 147/84 (pm)     10/31:     150/86-pt stated that he took an extra 1/2 pill of lisinopril and rechecked BP and came back down to 133/87.  Stated that he could definitely feel the \"high pressure\" in his head.     11/1:     143/80        "

## 2023-11-01 NOTE — TELEPHONE ENCOUNTER
Let's have him increase the lisinopril from 20 to 30 mg daily and call him for another BP update in two weeks - thanks!

## 2023-11-02 NOTE — PROGRESS NOTES
[employer] (D) Pacemaker Device Check  AP: *** % : *** %  Mode: ***        Underlying Rhythm: ***  Heart Rate: ***  Sensing: ***    Pacing Threshold: ***   Impedance: ***  Battery Status: ***  Device Site: ***  Atrial Arrhythmia: ***  Ventricular Arrhythmia: ***  Setting Change: ***    Care Plan: ***     I have reviewed and interpreted the device interrogation, settings, programming and nurse's summary. The device is functioning within normal device parameters. I agree with the current findings, assessment and plan.    
yes

## 2023-11-15 NOTE — TELEPHONE ENCOUNTER
"Per Soraya SMITH, pt's lisinopril was increased on 11/1/2023, and we are to call today for updated BPs.     Called pt. He said his average systolic BPs have been 140-150's and average diastolic BPs have been 85-90. Heart rates have been mostly in the 50-60's. Pt confirms he is taking metoprolol succinate 50mg daily at night and lisinopril 30mg daily at night. He takes his BPs in the morning. He did have one outlier, this morning his BP was 160/100 and he felt tightness in his head, but then he rechecked later this morning when he felt better and got 121/80.     Pt said he felt a lot of tightness on Sunday when he was at a concert, he wonders if we can check his PPM. Pt has a [employer] PPM (DDD ), PII allowed. Phone call got disconnected at this point, so I attempted to call pt back, but it went straight to , left  that pt can send a remote transmission and we will take a look.     I was unable to discuss pt's symptoms of \"tightness\" further because our phone call was disconnected. Not sure if he means chest tightness or head tightness (as he mentioned previously) or something different.     Will update Soraya SMITH with BPs.       ADDENDUM 11:30AM. Manual remote PPM check received, shows stable lead measurements and no arrhythmias. PPM WNL.  Called pt and let him know the PPM is normal. Also let him know that Soraya SMITH is out of the office the rest of this week, so we probably won't heard about his medications until next week. Pt states understanding.   "

## 2023-11-19 NOTE — TELEPHONE ENCOUNTER
Please increase the lisinopril to 40 mg daily and check a BMP and review ambulatory BP's in two weeks. Thanks!

## 2023-11-20 RX ORDER — LISINOPRIL 40 MG/1
20 TABLET ORAL DAILY
COMMUNITY
Start: 2023-11-20 | End: 2024-03-04

## 2023-11-20 NOTE — TELEPHONE ENCOUNTER
Called pt and updated him in regards to recommendations below. Ordered 40mg of lisinopril, ordered BMP.  Forwarded messaged to our pool in 2 weeks to recheck BP's and forward to Soraya.  Sent message to scheduling to call pt to schedule BMP in 2 weeks.

## 2023-11-21 ENCOUNTER — LAB (OUTPATIENT)
Dept: LAB | Facility: CLINIC | Age: 78
End: 2023-11-21
Payer: MEDICARE

## 2023-11-21 DIAGNOSIS — Z51.81 ENCOUNTER FOR MEDICATION MONITORING: ICD-10-CM

## 2023-11-21 LAB
ANION GAP SERPL CALCULATED.3IONS-SCNC: 11 MMOL/L (ref 7–15)
BUN SERPL-MCNC: 21.4 MG/DL (ref 8–23)
CALCIUM SERPL-MCNC: 10.1 MG/DL (ref 8.8–10.2)
CHLORIDE SERPL-SCNC: 104 MMOL/L (ref 98–107)
CREAT SERPL-MCNC: 1.44 MG/DL (ref 0.67–1.17)
DEPRECATED HCO3 PLAS-SCNC: 26 MMOL/L (ref 22–29)
EGFRCR SERPLBLD CKD-EPI 2021: 50 ML/MIN/1.73M2
GLUCOSE SERPL-MCNC: 127 MG/DL (ref 70–99)
POTASSIUM SERPL-SCNC: 4.4 MMOL/L (ref 3.4–5.3)
SODIUM SERPL-SCNC: 141 MMOL/L (ref 135–145)

## 2023-11-21 PROCEDURE — 36415 COLL VENOUS BLD VENIPUNCTURE: CPT | Performed by: PHYSICIAN ASSISTANT

## 2023-11-21 PROCEDURE — 80048 BASIC METABOLIC PNL TOTAL CA: CPT | Performed by: PHYSICIAN ASSISTANT

## 2023-11-28 ENCOUNTER — TELEPHONE (OUTPATIENT)
Dept: FAMILY MEDICINE | Facility: CLINIC | Age: 78
End: 2023-11-28
Payer: MEDICARE

## 2023-11-28 DIAGNOSIS — E78.5 HYPERLIPIDEMIA, UNSPECIFIED HYPERLIPIDEMIA TYPE: ICD-10-CM

## 2023-11-28 NOTE — TELEPHONE ENCOUNTER
Called and spoke with pt. States he hasn't been taking atorvastatin since he got covid last year and he doesn't have any tablets left.     Note from med list states pt stopped 8/5/2022.    Do you want pt to resume atorvastatin medication?  Pharmacy pended     GEMA AREVALO RN on 11/28/2023 at 3:54 PM

## 2023-11-28 NOTE — TELEPHONE ENCOUNTER
Outpatient Medication Detail     Disp Refills Start End JOSÉ   atorvastatin (LIPITOR) 10 MG tablet (Discontinued) 90 tablet 3 2/17/2022 8/5/2022 No   Sig - Route: Take 1 tablet (10 mg) by mouth daily for cholestrol - Oral   Sent to pharmacy as: Atorvastatin Calcium 10 MG Oral Tablet (LIPITOR)   Class: E-Prescribe   Order: [id number]   E-Prescribing Status: Receipt confirmed by pharmacy (2/17/2022 10:08 AM CST)   E-Cancel Status: Request approved by pharmacy (8/5/2022  9:14 AM CDT)       E-Cancel Status Note: 3 FILLS DISPENSED, REMAINING FILLS CANCELLED SUCCESSFULLY.     Medication Notes         Mart Valentin MD   8/5/2022  9:09 AM (CDT)  resume in 5 days              This Order Has Been Discontinued    Order Status Reason By On   Discontinued None Mart Valentin MD 8/5/22 0909     Associated Diagnoses    Hyperlipidemia, unspecified hyperlipidemia type [E78.5]            Pharmacy requesting new Rx for atorvastatin 10mg.  Rx was discontinued in chart 8/5/2022 when patient diagnosed with COVID.  Was to resume in 5days, but Rx still inactive in chart.  Several office visits between PCP Dr Tomas and cardiology since medication was discontinued, however no mention of medication use or historical entry being made.    Needs triage with patient: is he still using medication?    Rosalie Everett, RT (R)

## 2023-11-29 RX ORDER — ATORVASTATIN CALCIUM 10 MG/1
10 TABLET, FILM COATED ORAL DAILY
Qty: 90 TABLET | Refills: 3 | Status: SHIPPED | OUTPATIENT
Start: 2023-11-29 | End: 2024-01-16 | Stop reason: SINTOL

## 2023-11-29 NOTE — TELEPHONE ENCOUNTER
I spoke to the patient regarding his Lipitor  He has been taking it  He was holding at the time of COVID infection  I renewed his prescription of 10 mg Lipitor daily  He confirmed the dosage by looking at his bottle  Dr.Nasima Thom MD

## 2023-12-16 ENCOUNTER — HEALTH MAINTENANCE LETTER (OUTPATIENT)
Age: 78
End: 2023-12-16

## 2023-12-20 ENCOUNTER — TELEPHONE (OUTPATIENT)
Dept: FAMILY MEDICINE | Facility: CLINIC | Age: 78
End: 2023-12-20
Payer: MEDICARE

## 2023-12-20 NOTE — TELEPHONE ENCOUNTER
Order/Referral Request    Who is requesting: Patient     Orders being requested: A1c    Reason service is needed/diagnosis: N/a    When are orders needed by: ASAP    Has this been discussed with Provider: Yes    Does patient have a preference on a Group/Provider/Facility? PCP    Does patient have an appointment scheduled?: No    Where to send orders: Place orders within Epic    Could we send this information to you in [facility] or would you prefer to receive a phone call?:   Patient would prefer a phone call   Okay to leave a detailed message?: Yes at Home number on file [phone] (home) or Cell number on file:    Telephone Information:   Mobile [phone]

## 2024-01-03 ENCOUNTER — TELEPHONE (OUTPATIENT)
Dept: OTOLARYNGOLOGY | Facility: CLINIC | Age: 79
End: 2024-01-03
Payer: MEDICARE

## 2024-01-03 NOTE — TELEPHONE ENCOUNTER
1. Have you noticed any changes in hearing? No  2. Do you have ringing, buzzing, or other sounds in your ears or head, this is also referred to as Tinnitus? No  3. When and where was your last hearing test? no  4. Do you feel lightheaded or foggy? Yes  5. Do you have a spinning sensation? Yes  6. Is there any specific position that can bring on dizziness? random  7. Does looking up cause dizziness? No  8. Does getting in and our of bed cause dizziness? No  9. Does turning over in bed increase or cause dizziness? No  10. Does bending over cause dizziness? Sometimes: little bit   11. Is there anything that you can do to prevent the dizziness? meds  12. Has the dizziness gotten better with time? No  13. Have you seen Physical Therapy for dizziness? (Please indicate clinic and as much of the location as possible): No  14. Are you being referred to a specific physician? No  15. Have you been evaluated/treated for your dizziness at any other location?  (If yes,obtian as much clinic/provider/locaiton as possible) No. (If yes answer the following questions:)   Have you seen any ENT, Neurology, or other providers for these symptoms?             No   Have you had any balance or Audiology testing? No Have you had an MRI or CT scan of your head or neck? No    Would you like to receive your Release of Information by mail or e-mail?  mail

## 2024-01-05 DIAGNOSIS — R42 DIZZINESS: Primary | ICD-10-CM

## 2024-01-05 NOTE — TELEPHONE ENCOUNTER
"Requesting orders for hearing test and VNG prior to patient's ENT appointment with Pamella Collier PA-C.      Chart Review: Per patient [employer] message 12/19/23:  \"The last half of year I've had dizzy spells like I had in August, 2022 after a bout of Covid.  At that time, I was given meclizine in the ER.  Follow-up with a [facility] doctor revealed inflammation in one ear and he recommended Flonase.  I've been using them but the dizziness persists.\"    -Was seen in ED 8/18/22 for vertigo after having Covid. Described as intermittent with head turns. Unknown etiology but was thought to likely be benign cause of vertigo. Was discharged with meclizine and referral to vestibular rehab.     -No previous hearing test available for review, but on Dizzy Intake Questionnaire patient denies changes in hearing or tinnitus. Reports lightheadedness/fogginess as well as spinning sensations. Reports dizziness is random, but sometimes bending over can cause symptoms. Has not seen PT for dizziness.       José Miguel Bill.  Licensed Audiologist  [place] # [id number]          "

## 2024-01-12 ENCOUNTER — APPOINTMENT (OUTPATIENT)
Dept: CT IMAGING | Facility: CLINIC | Age: 79
End: 2024-01-12
Attending: STUDENT IN AN ORGANIZED HEALTH CARE EDUCATION/TRAINING PROGRAM
Payer: MEDICARE

## 2024-01-12 ENCOUNTER — HOSPITAL ENCOUNTER (EMERGENCY)
Facility: CLINIC | Age: 79
Discharge: HOME | End: 2024-01-13
Attending: STUDENT IN AN ORGANIZED HEALTH CARE EDUCATION/TRAINING PROGRAM | Admitting: STUDENT IN AN ORGANIZED HEALTH CARE EDUCATION/TRAINING PROGRAM
Payer: MEDICARE

## 2024-01-12 DIAGNOSIS — I10 HYPERTENSION, UNSPECIFIED TYPE: ICD-10-CM

## 2024-01-12 DIAGNOSIS — I65.22 LEFT CAROTID ARTERY STENOSIS: ICD-10-CM

## 2024-01-12 DIAGNOSIS — E78.5 HYPERLIPIDEMIA, UNSPECIFIED HYPERLIPIDEMIA TYPE: ICD-10-CM

## 2024-01-12 DIAGNOSIS — R51.9 ACUTE NONINTRACTABLE HEADACHE, UNSPECIFIED HEADACHE TYPE: ICD-10-CM

## 2024-01-12 LAB
ANION GAP SERPL CALCULATED.3IONS-SCNC: 10 MMOL/L (ref 7–15)
BASOPHILS # BLD AUTO: 0 10E3/UL (ref 0–0.2)
BASOPHILS NFR BLD AUTO: 0 %
BUN SERPL-MCNC: 24.4 MG/DL (ref 8–23)
CALCIUM SERPL-MCNC: 9.6 MG/DL (ref 8.8–10.2)
CHLORIDE SERPL-SCNC: 104 MMOL/L (ref 98–107)
CREAT SERPL-MCNC: 1.31 MG/DL (ref 0.67–1.17)
DEPRECATED HCO3 PLAS-SCNC: 25 MMOL/L (ref 22–29)
EGFRCR SERPLBLD CKD-EPI 2021: 56 ML/MIN/1.73M2
EOSINOPHIL # BLD AUTO: 0.1 10E3/UL (ref 0–0.7)
EOSINOPHIL NFR BLD AUTO: 1 %
ERYTHROCYTE [DISTWIDTH] IN BLOOD BY AUTOMATED COUNT: 13.2 % (ref 10–15)
GLUCOSE SERPL-MCNC: 121 MG/DL (ref 70–99)
HCT VFR BLD AUTO: 48.4 % (ref 40–53)
HGB BLD-MCNC: 16.4 G/DL (ref 13.3–17.7)
IMM GRANULOCYTES # BLD: 0 10E3/UL
IMM GRANULOCYTES NFR BLD: 0 %
LYMPHOCYTES # BLD AUTO: 2.1 10E3/UL (ref 0.8–5.3)
LYMPHOCYTES NFR BLD AUTO: 24 %
MCH RBC QN AUTO: 30.7 PG (ref 26.5–33)
MCHC RBC AUTO-ENTMCNC: 33.9 G/DL (ref 31.5–36.5)
MCV RBC AUTO: 91 FL (ref 78–100)
MONOCYTES # BLD AUTO: 0.6 10E3/UL (ref 0–1.3)
MONOCYTES NFR BLD AUTO: 7 %
NEUTROPHILS # BLD AUTO: 6 10E3/UL (ref 1.6–8.3)
NEUTROPHILS NFR BLD AUTO: 68 %
NRBC # BLD AUTO: 0 10E3/UL
NRBC BLD AUTO-RTO: 0 /100
PLATELET # BLD AUTO: 174 10E3/UL (ref 150–450)
POTASSIUM SERPL-SCNC: 4.2 MMOL/L (ref 3.4–5.3)
RBC # BLD AUTO: 5.34 10E6/UL (ref 4.4–5.9)
SODIUM SERPL-SCNC: 139 MMOL/L (ref 135–145)
TROPONIN T SERPL HS-MCNC: 13 NG/L
WBC # BLD AUTO: 9.1 10E3/UL (ref 4–11)

## 2024-01-12 PROCEDURE — 99285 EMERGENCY DEPT VISIT HI MDM: CPT | Mod: 25

## 2024-01-12 PROCEDURE — 84484 ASSAY OF TROPONIN QUANT: CPT | Performed by: STUDENT IN AN ORGANIZED HEALTH CARE EDUCATION/TRAINING PROGRAM

## 2024-01-12 PROCEDURE — 85025 COMPLETE CBC W/AUTO DIFF WBC: CPT | Performed by: STUDENT IN AN ORGANIZED HEALTH CARE EDUCATION/TRAINING PROGRAM

## 2024-01-12 PROCEDURE — 36415 COLL VENOUS BLD VENIPUNCTURE: CPT | Performed by: STUDENT IN AN ORGANIZED HEALTH CARE EDUCATION/TRAINING PROGRAM

## 2024-01-12 PROCEDURE — 82465 ASSAY BLD/SERUM CHOLESTEROL: CPT

## 2024-01-12 PROCEDURE — 70450 CT HEAD/BRAIN W/O DYE: CPT | Mod: MA

## 2024-01-12 PROCEDURE — 83036 HEMOGLOBIN GLYCOSYLATED A1C: CPT

## 2024-01-12 PROCEDURE — 81001 URINALYSIS AUTO W/SCOPE: CPT | Performed by: STUDENT IN AN ORGANIZED HEALTH CARE EDUCATION/TRAINING PROGRAM

## 2024-01-12 PROCEDURE — 80048 BASIC METABOLIC PNL TOTAL CA: CPT | Performed by: STUDENT IN AN ORGANIZED HEALTH CARE EDUCATION/TRAINING PROGRAM

## 2024-01-12 PROCEDURE — 93005 ELECTROCARDIOGRAM TRACING: CPT

## 2024-01-12 PROCEDURE — 70496 CT ANGIOGRAPHY HEAD: CPT | Mod: MA

## 2024-01-12 RX ORDER — IOPAMIDOL 755 MG/ML
67 INJECTION, SOLUTION INTRAVASCULAR ONCE
Status: COMPLETED | OUTPATIENT
Start: 2024-01-12 | End: 2024-01-13

## 2024-01-12 ASSESSMENT — ACTIVITIES OF DAILY LIVING (ADL): ADLS_ACUITY_SCORE: 35

## 2024-01-13 VITALS
DIASTOLIC BLOOD PRESSURE: 104 MMHG | HEART RATE: 73 BPM | SYSTOLIC BLOOD PRESSURE: 145 MMHG | TEMPERATURE: 98.4 F | OXYGEN SATURATION: 95 % | RESPIRATION RATE: 19 BRPM

## 2024-01-13 LAB
ALBUMIN UR-MCNC: NEGATIVE MG/DL
APPEARANCE UR: CLEAR
ATRIAL RATE - MUSE: 85 BPM
BILIRUB UR QL STRIP: NEGATIVE
COLOR UR AUTO: ABNORMAL
DIASTOLIC BLOOD PRESSURE - MUSE: NORMAL MMHG
GLUCOSE UR STRIP-MCNC: NEGATIVE MG/DL
HGB UR QL STRIP: ABNORMAL
HOLD SPECIMEN: NORMAL
HOLD SPECIMEN: NORMAL
INTERPRETATION ECG - MUSE: NORMAL
KETONES UR STRIP-MCNC: NEGATIVE MG/DL
LEUKOCYTE ESTERASE UR QL STRIP: NEGATIVE
MUCOUS THREADS #/AREA URNS LPF: PRESENT /LPF
NITRATE UR QL: NEGATIVE
P AXIS - MUSE: 31 DEGREES
PH UR STRIP: 5.5 [PH] (ref 5–7)
PR INTERVAL - MUSE: 202 MS
QRS DURATION - MUSE: 140 MS
QT - MUSE: 402 MS
QTC - MUSE: 478 MS
R AXIS - MUSE: 92 DEGREES
RBC URINE: 1 /HPF
SP GR UR STRIP: 1.02 (ref 1–1.03)
SYSTOLIC BLOOD PRESSURE - MUSE: NORMAL MMHG
T AXIS - MUSE: -2 DEGREES
UROBILINOGEN UR STRIP-MCNC: NORMAL MG/DL
VENTRICULAR RATE- MUSE: 85 BPM
WBC URINE: <1 /HPF

## 2024-01-13 PROCEDURE — 250N000011 HC RX IP 250 OP 636: Performed by: STUDENT IN AN ORGANIZED HEALTH CARE EDUCATION/TRAINING PROGRAM

## 2024-01-13 PROCEDURE — 250N000009 HC RX 250: Performed by: STUDENT IN AN ORGANIZED HEALTH CARE EDUCATION/TRAINING PROGRAM

## 2024-01-13 RX ORDER — ACETAMINOPHEN 325 MG/1
325-650 TABLET ORAL EVERY 6 HOURS PRN
Qty: 60 TABLET | Refills: 0 | Status: SHIPPED | OUTPATIENT
Start: 2024-01-13 | End: 2024-05-06

## 2024-01-13 RX ADMIN — SODIUM CHLORIDE 100 ML: 9 INJECTION, SOLUTION INTRAVENOUS at 00:05

## 2024-01-13 RX ADMIN — IOPAMIDOL 67 ML: 755 INJECTION, SOLUTION INTRAVENOUS at 00:05

## 2024-01-13 ASSESSMENT — ACTIVITIES OF DAILY LIVING (ADL): ADLS_ACUITY_SCORE: 35

## 2024-01-13 NOTE — ED PROVIDER NOTES
"  History     Chief Complaint:  Hypertension       HPI   Estrada Oseguera is a 78 year old male who presents to the ER for hypertension with a feeling of \"tightness\" in the left side of his head since this morning when he was doing dishes.  He checked his blood pressure and noted it was 180s over 120s.  It seemed to go down without any intervention in the middle of the day although then went up again this evening.  This morning he had some difficulty walking which spontaneously resolved.  He has been having these episodes over the past couple of months but has never seen a doctor for this previously.         Independent Historian:   None - Patient Only    Review of External Notes:   None       Medications:    acetaminophen (TYLENOL) 325 MG tablet  atorvastatin (LIPITOR) 10 MG tablet  blood glucose (NO BRAND SPECIFIED) lancets standard  blood glucose (NO BRAND SPECIFIED) test strip  blood glucose monitoring (NO BRAND SPECIFIED) meter device kit  empagliflozin (JARDIANCE) 10 MG TABS tablet  fluticasone (FLONASE) 50 MCG/ACT nasal spray  lisinopril (ZESTRIL) 40 MG tablet  metFORMIN (GLUCOPHAGE XR) 500 MG 24 hr tablet  metoprolol succinate ER (TOPROL XL) 50 MG 24 hr tablet  pantoprazole (PROTONIX) 40 MG EC tablet  tamsulosin (FLOMAX) 0.4 MG capsule        Past Medical History:    Past Medical History:   Diagnosis Date    A-fib (H) 01/31/2018    Cancer (H)     CKD (chronic kidney disease)     GERD (gastroesophageal reflux disease)     Hernia, abdominal     Hiatal hernia     Hypertension     Mumps     Pericarditis     S/P colon resection        Past Surgical History:    Past Surgical History:   Procedure Laterality Date    CHOLECYSTECTOMY      COLONOSCOPY      COLONOSCOPY N/A 5/6/2021    Procedure: COLONOSCOPY, WITH POLYPECTOMY AND BIOPSY;  Surgeon: Deondre Meek MD;  Location:  GI    COMBINED COLONOSCOPY, MUCOSAL RESECTION  1993    ?diverticulitis     ESOPHAGOSCOPY, GASTROSCOPY, DUODENOSCOPY (EGD), COMBINED " N/A 11/3/2016    Procedure: COMBINED ESOPHAGOSCOPY, GASTROSCOPY, DUODENOSCOPY (EGD), BIOPSY SINGLE OR MULTIPLE;  Surgeon: Deondre Meek MD;  Location:  GI    ESOPHAGOSCOPY, GASTROSCOPY, DUODENOSCOPY (EGD), COMBINED N/A 3/3/2022    Procedure: ESOPHAGOGASTRODUODENOSCOPY, WITH BIOPSY;  Surgeon: Deondre Meek MD;  Location:  GI    ESOPHAGOSCOPY, GASTROSCOPY, DUODENOSCOPY (EGD), DILATATION, COMBINED N/A 12/1/2016    Procedure: COMBINED ESOPHAGOSCOPY, GASTROSCOPY, DUODENOSCOPY (EGD), DILATATION;  Surgeon: Deondre Meek MD;  Location:  GI    ESOPHAGOSCOPY, GASTROSCOPY, DUODENOSCOPY (EGD), DILATATION, COMBINED N/A 3/2/2017    Procedure: COMBINED ESOPHAGOSCOPY, GASTROSCOPY, DUODENOSCOPY (EGD), DILATATION;  Surgeon: Deondre Meek MD;  Location:  GI    NEPHRECTOMY      NEPHRECTOMY RT/LT      right    pace maker      VASECTOMY          Physical Exam   Patient Vitals for the past 24 hrs:   BP Temp Temp src Pulse Resp SpO2   01/13/24 0112 -- -- -- 73 19 95 %   01/13/24 0107 (!) 145/104 -- -- 79 12 93 %   01/13/24 0105 (!) 150/94 -- -- 68 19 94 %   01/13/24 0016 -- -- -- 77 17 94 %   01/12/24 2230 (!) 166/110 -- -- 82 11 96 %   01/12/24 2215 (!) 147/88 -- -- 67 19 96 %   01/12/24 2203 (!) 148/98 -- -- 77 -- 97 %   01/12/24 2022 (!) 179/109 98.4  F (36.9  C) Temporal 91 16 95 %        Physical Exam  General: Awake, alert, in no acute distress   HEENT: Atraumatic   EOM normal   External ears normal   Trachea midline  Neck: Supple, normal ROM  CV: Regular rate, regular rhythm   No murmur   No lower extremity edema  2+ radial and DP pulses  PULM: Breath sounds normal bilaterally  No wheezes or rales  ABD: Soft, non-tender, non-distended  Normal bowel sounds   No rebound or guarding   MSK: No gross deformities  NEURO: Alert, no focal deficits. 5/5 strength in bilateral upper and lower extremities.  No gross sensory deficits.  Patient moves in a coordinated fashion.  Cranial nerves 2-12  intact.  Cerebellar testing intact  Skin: Warm, dry and intact      Emergency Department Course   ECG  ECG taken at 2237, ECG read at 2249  Normal sinus. RBBB.    Similar as compared to prior, dated 03/06/20.      ECG results from 01/12/24   EKG 12-lead, tracing only     Value    Systolic Blood Pressure     Diastolic Blood Pressure     Ventricular Rate 85    Atrial Rate 85    FL Interval 202    QRS Duration 140        QTc 478    P Axis 31    R AXIS 92    T Axis -2    Interpretation ECG      Sinus rhythm  Right bundle branch block  Abnormal ECG  When compared with ECG of 12-JAN-2024 20:19, (unconfirmed)  Premature ventricular complexes are no longer Present           Imaging:  Head CT w/o contrast   Final Result   IMPRESSION:    HEAD CT:   1.  No acute intracranial process.      HEAD CTA:    1.  No significant stenosis, aneurysm, or high flow vascular malformation identified.   2.  Variant [place] anatomy as above.      NECK CTA:   1.  No large vessel occlusion or hemodynamically significant stenosis.      CTA Head Neck w Contrast   Final Result   IMPRESSION:    HEAD CT:   1.  No acute intracranial process.      HEAD CTA:    1.  No significant stenosis, aneurysm, or high flow vascular malformation identified.   2.  Variant [place] anatomy as above.      NECK CTA:   1.  No large vessel occlusion or hemodynamically significant stenosis.             Laboratory:  Labs Ordered and Resulted from Time of ED Arrival to Time of ED Departure   BASIC METABOLIC PANEL - Abnormal       Result Value    Sodium 139      Potassium 4.2      Chloride 104      Carbon Dioxide (CO2) 25      Anion Gap 10      Urea Nitrogen 24.4 (*)     Creatinine 1.31 (*)     GFR Estimate 56 (*)     Calcium 9.6      Glucose 121 (*)    ROUTINE UA WITH MICROSCOPIC REFLEX TO CULTURE - Abnormal    Color Urine Light Yellow      Appearance Urine Clear      Glucose Urine Negative      Bilirubin Urine Negative      Ketones Urine Negative       Specific Gravity Urine 1.019      Blood Urine Trace (*)     pH Urine 5.5      Protein Albumin Urine Negative      Urobilinogen Urine Normal      Nitrite Urine Negative      Leukocyte Esterase Urine Negative      Mucus Urine Present (*)     RBC Urine 1      WBC Urine <1     TROPONIN T, HIGH SENSITIVITY - Normal    Troponin T, High Sensitivity 13     CBC WITH PLATELETS AND DIFFERENTIAL    WBC Count 9.1      RBC Count 5.34      Hemoglobin 16.4      Hematocrit 48.4      MCV 91      MCH 30.7      MCHC 33.9      RDW 13.2      Platelet Count 174      % Neutrophils 68      % Lymphocytes 24      % Monocytes 7      % Eosinophils 1      % Basophils 0      % Immature Granulocytes 0      NRBCs per 100 WBC 0      Absolute Neutrophils 6.0      Absolute Lymphocytes 2.1      Absolute Monocytes 0.6      Absolute Eosinophils 0.1      Absolute Basophils 0.0      Absolute Immature Granulocytes 0.0      Absolute NRBCs 0.0          Procedures   None    Emergency Department Course & Assessments:             Interventions:  Medications   iopamidol (ISOVUE-370) solution 67 mL (67 mLs Intravenous $Given 1/13/24 0005)   Saline Flush (100 mLs Intravenous $Given 1/13/24 0005)        Assessments:  See ED course     Independent Interpretation (X-rays, CTs, rhythm strip):  None    Consultations/Discussion of Management or Tests:  None   ED Course as of 01/13/24 0255   Sat Jan 13, 2024   0105 Reevaluation.  Blood pressure 150s over 80s.  Patient states that he walked to the restroom with steady gait.       Social Determinants of Health affecting care:   None    Disposition:  The patient was discharged to home.     Impression & Plan    CMS Diagnoses: None      Medical Decision Making:  Estrada Oseguera is a 78 year old male who presents for evaluation of elevated blood pressure.  There is history of hypertension in the past.  The workup here is negative and the patient does not have any clinical, laboratory, ecg or historical signs of end-organ  dysfunction.  There is no signs of hypertensive emergency or urgency.  Supportive outpatient management is therefore indicated with close follow-up of primary care physician.  His orthostatics are negative.  His neuroexam is reassuring. Pacemaker interrogation without event. He has incidental carotid artery stenosis is less than 50%.  Doubt this is causing him symptoms at this time.   Any function is at baseline.  I encouraged him to follow-up closely with his primary care doctor for further management of hypertension.  Blood pressure did improve to the 140s over 100s/150s over 90s without any intervention prior to discharge. All questions answered. All results reviewed. Patient voices understanding and agreement of this plan.         Diagnosis:    ICD-10-CM    1. Hypertension, unspecified type  I10       2. Left carotid artery stenosis  I65.22       3. Acute nonintractable headache, unspecified headache type  R51.9            Discharge Medications:  Discharge Medication List as of 1/13/2024  1:20 AM        START taking these medications    Details   acetaminophen (TYLENOL) 325 MG tablet Take 1-2 tablets (325-650 mg) by mouth every 6 hours as needed for mild pain or headaches, Disp-60 tablet, R-0, E-Prescribe                Ariana Singleton,   1/12/2024   Ariana Ding, DO        Ariana Ding, DO  01/13/24 0255       Ariana Ding,   01/13/24 0318

## 2024-01-13 NOTE — ED TRIAGE NOTES
"Pt presents to triage with wife; pt C/O high BP, and \"tightness\" in head. Pt states he felt lightheaded washing dishes and decided to take his BP. /109 in triage. Pt's wife states his BP was low this morning.      Triage Assessment (Adult)       Row Name 01/12/24 2022          Triage Assessment    Airway WDL WDL        Respiratory WDL    Respiratory WDL WDL        Skin Circulation/Temperature WDL    Skin Circulation/Temperature WDL WDL        Cardiac WDL    Cardiac WDL X  HTN        Peripheral/Neurovascular WDL    Peripheral Neurovascular WDL WDL        Cognitive/Neuro/Behavioral WDL    Cognitive/Neuro/Behavioral WDL WDL                     "

## 2024-01-13 NOTE — ED NOTES
Received call from MRI, unable to do MRI tonight due to pacemaker and procedures that need to be followed to ensure pt safety. MD notified.

## 2024-01-16 ENCOUNTER — TELEPHONE (OUTPATIENT)
Dept: AUDIOLOGY | Facility: CLINIC | Age: 79
End: 2024-01-16

## 2024-01-16 ENCOUNTER — OFFICE VISIT (OUTPATIENT)
Dept: FAMILY MEDICINE | Facility: CLINIC | Age: 79
End: 2024-01-16
Payer: MEDICARE

## 2024-01-16 VITALS
OXYGEN SATURATION: 98 % | TEMPERATURE: 98.2 F | BODY MASS INDEX: 29.15 KG/M2 | WEIGHT: 209 LBS | DIASTOLIC BLOOD PRESSURE: 70 MMHG | RESPIRATION RATE: 18 BRPM | HEART RATE: 40 BPM | SYSTOLIC BLOOD PRESSURE: 140 MMHG

## 2024-01-16 DIAGNOSIS — N18.31 STAGE 3A CHRONIC KIDNEY DISEASE (H): ICD-10-CM

## 2024-01-16 DIAGNOSIS — C61 PROSTATE CANCER (H): ICD-10-CM

## 2024-01-16 DIAGNOSIS — I48.0 PAROXYSMAL ATRIAL FIBRILLATION (H): ICD-10-CM

## 2024-01-16 DIAGNOSIS — Z86.79 HISTORY OF ATRIAL FIBRILLATION: ICD-10-CM

## 2024-01-16 DIAGNOSIS — I10 BENIGN HYPERTENSION: ICD-10-CM

## 2024-01-16 DIAGNOSIS — E78.5 HYPERLIPIDEMIA, UNSPECIFIED HYPERLIPIDEMIA TYPE: ICD-10-CM

## 2024-01-16 DIAGNOSIS — N18.31 TYPE 2 DIABETES MELLITUS WITH STAGE 3A CHRONIC KIDNEY DISEASE, WITHOUT LONG-TERM CURRENT USE OF INSULIN (H): Primary | ICD-10-CM

## 2024-01-16 DIAGNOSIS — E11.22 TYPE 2 DIABETES MELLITUS WITH STAGE 3A CHRONIC KIDNEY DISEASE, WITHOUT LONG-TERM CURRENT USE OF INSULIN (H): Primary | ICD-10-CM

## 2024-01-16 DIAGNOSIS — Z90.49 S/P COLON RESECTION: ICD-10-CM

## 2024-01-16 DIAGNOSIS — I42.8 OTHER CARDIOMYOPATHIES (H): ICD-10-CM

## 2024-01-16 DIAGNOSIS — Z90.5 S/P NEPHRECTOMY: ICD-10-CM

## 2024-01-16 LAB
CHOLEST SERPL-MCNC: 144 MG/DL
HBA1C MFR BLD: 6.4 %
HDLC SERPL-MCNC: 43 MG/DL
LDLC SERPL CALC-MCNC: 80 MG/DL
NONHDLC SERPL-MCNC: 101 MG/DL
TRIGL SERPL-MCNC: 105 MG/DL

## 2024-01-16 PROCEDURE — 99214 OFFICE O/P EST MOD 30 MIN: CPT | Performed by: INTERNAL MEDICINE

## 2024-01-16 RX ORDER — ROSUVASTATIN CALCIUM 5 MG/1
5 TABLET, COATED ORAL DAILY
Qty: 90 TABLET | Refills: 1 | Status: SHIPPED | OUTPATIENT
Start: 2024-01-16 | End: 2024-03-04

## 2024-01-16 RX ORDER — METOPROLOL SUCCINATE 50 MG/1
25 TABLET, EXTENDED RELEASE ORAL DAILY
Start: 2024-01-16 | End: 2024-01-23

## 2024-01-16 RX ORDER — RESPIRATORY SYNCYTIAL VIRUS VACCINE 120MCG/0.5
0.5 KIT INTRAMUSCULAR ONCE
Qty: 1 EACH | Refills: 0 | Status: CANCELLED | OUTPATIENT
Start: 2024-01-16 | End: 2024-01-16

## 2024-01-16 RX ORDER — AMLODIPINE BESYLATE 2.5 MG/1
2.5 TABLET ORAL DAILY
Qty: 90 TABLET | Refills: 1 | Status: SHIPPED | OUTPATIENT
Start: 2024-01-16 | End: 2024-02-06 | Stop reason: SINTOL

## 2024-01-16 ASSESSMENT — PAIN SCALES - GENERAL: PAINLEVEL: NO PAIN (0)

## 2024-01-16 NOTE — RESULT ENCOUNTER NOTE
Woody Dorado    This is to inform you regarding your test result.    Your total cholesterol is normal.  HDL which is called good cholesterol is normal.  Your LDL cholesterol is normal.  This is often call bad cholesterol and high levels increase the risk for heart attacks and strokes.  Your triglycerides are normal.  HbA1c which is average glucose during last 3 months is 6.4%  Your diabetes is under control        Sincerely,      Dr.Nasima Thom MD,FACP

## 2024-01-16 NOTE — PROGRESS NOTES
Theo was seen today for hypertension.    Diagnoses and all orders for this visit:    Patient accompanied by his wife.    Type 2 diabetes mellitus with stage 3a chronic kidney disease, without long-term current use of insulin (H)  Patient confirmed he does not monitor blood sugar at home and takes metformin 500 mg 1x/day.   Renewed his meter and supplies     Discussed Ozempic and Jardiance, patient declined d/t cost.    Hyperlipidemia, unspecified hyperlipidemia type  -     rosuvastatin (CRESTOR) 5 MG tablet; Take 1 tablet (5 mg) by mouth daily for cholesterol  -     Lipid panel reflex to direct LDL Non-fasting; Future  Rx Crestor sent.  Discontinued Lipitor today as he does not feel good with lipitor    Other cardiomyopathies (H)  Followed by cardiology.    Prostate cancer (H)  Followed by urology.  Under surveillance     Stage 3a chronic kidney disease (H)  Stable patient has history of nephrectomy    History of paroxysmal atrial fibrillation (H)  -     metoprolol succinate ER (TOPROL XL) 50 MG 24 hr tablet; Take 0.5 tablets (25 mg) by mouth daily for heart and Blood Pressure  Lowered dose of metoprolol today because it was decreasing his HR. And he does not feel good  Patient reported that he does not have atrial fibrillation anymore he was seen by his electrophysiologist and they took him off of blood thinner    S/P colon resection  H.o colon resection d/t diverticulitis 1993    S/p nephrectomy  H.o Nephrectomy right side d/t CKD continue to hydrate kidney problem    History of paroxysmal atrial fibrillation (H)  Comments:  seen on pacemaker interogation but now resolved   Orders:  -     metoprolol succinate ER (TOPROL XL) 50 MG 24 hr tablet; Take 0.5 tablets (25 mg) by mouth daily for heart and Blood Pressure  Patient has pace maker.    Benign hypertension  -     amLODIPine (NORVASC) 2.5 MG tablet; Take 1 tablet (2.5 mg) by mouth daily For Blood Pressure  ER visit 1/12/24, admitted d/t tension HA, received a  head CT scan and it was nl. Providers did not change medication.  BP elevated at 157/73, rechecked at 140/70. Confirmed he monitors BP at home and readings have been up and down. Recommended following doctors regularly to avoid future ER visits. Recommended checking BP at home when patient is resting, comfortable and w/ an empty bladder.  Patient should f/up 7-10 days and continue to f/up 1/2 weeks until BP is stable.  Recommended Tylenol for tension HA.  Continuing lisinopril 40mg/day.  Rx amlodipine sent.  Amlodipine was a started as we lowered the dose of metoprolol    Type 2 diabetes mellitus   -     blood glucose (NO BRAND SPECIFIED) lancets standard; Use to test blood sugar 1 times daily or as directed.  -     blood glucose (NO BRAND SPECIFIED) test strip; Use to test blood sugar 1 times daily  -     blood glucose monitoring (NO BRAND SPECIFIED) meter device kit; Use to test blood sugar as directed.  Rx blood glucose monitor and strips ordered.    History of atrial fibrillation  Patient takes metoprolol   He was on 50 and we lowered the dose to 25 mg on his request as he was not feeling good with low heart rate    Other orders  -     HEMOGLOBIN A1C; Future     Other  Patient will receive RSV vaccine at a pharmacy once his BP is under control.    Dwayne Venegas is a 78 year old, presenting for the following health issues:  Hypertension    History of Present Illness       Reason for visit:  Tight and light headed  Symptom onset:  More than a month  Symptoms include:  Tight head light headedness-rapid change in blood pressure and pulse  Symptom intensity:  Moderate  Symptom progression:  Staying the same  Had these symptoms before:  Yes  Has tried/received treatment for these symptoms:  Yes  Previous treatment was successful:  No  What makes it worse:  Anxiety about symptoms  What makes it better:  Not really    He eats 2-3 servings of fruits and vegetables daily.He consumes 0 sweetened beverage(s) daily.He  exercises with enough effort to increase his heart rate 30 to 60 minutes per day.  He exercises with enough effort to increase his heart rate 5 days per week.   He is taking medications regularly.     ED/UC Followup:    Facility:  [facility]  Date of visit: 1/12/24  Reason for visit: Hypertension, unspecified type   Left carotid artery stenosis   Acute nonintractable headache, unspecified headache type   Current Status:     Review of Systems   Constitutional, HEENT, cardiovascular, pulmonary, GI, , musculoskeletal, neuro, skin, endocrine and psych systems are negative, except as otherwise noted.      Objective    BP (!) 140/70 (BP Location: Right arm, Patient Position: Sitting)   Pulse (!) 40   Temp 98.2  F (36.8  C) (Oral)   Resp 18   Wt 94.8 kg (209 lb)   SpO2 98%   BMI 29.15 kg/m    Body mass index is 29.15 kg/m .  Physical Exam   GENERAL APPEARANCE: healthy, alert and no distress  EYES: Eyes grossly normal to inspection, PERRL and conjunctivae and sclerae normal  HENT: ear canals and TM's normal and nose and mouth without ulcers or lesions  NECK: no adenopathy  RESP: lungs clear to auscultation - no rales, rhonchi or wheezes  CV: regular rates and rhythm, normal S1 S2, no S3    Labs pending    This document serves as a record of the services and decisions personally performed and made by Dr. Tomas. It was created on her behalf by Soraya Orellana, a trained medical scribe. The creation of this document is based the provider's statements to the medical scribe.

## 2024-01-16 NOTE — PATIENT INSTRUCTIONS
Discontinue atorvastatin   Start taking Crestor ( rosuvastatin ) 5 mg daily to replace atorvastatin    Lower the dose of metoprolol to half tablet daily    Continue Lisinopril    Add amlodipine 2.5 mg daily for Blood Pressure     Monitor your blood pressure once a week  at home.  Bring those readings on your next visit.  Notify us if your blood pressure readings consistently stays greater than 140/90.     Follow up in one week    Seek sooner medical attention if there is any worsening of symptoms or problems.

## 2024-01-16 NOTE — RESULT ENCOUNTER NOTE
Woody Dorado    This is to inform you regarding your test result.    HbA1c which is average glucose during last 3 months is 6.4%  Your diabetes is under control      Sincerely,      Dr.Nasima Thom MD,FACP

## 2024-01-23 ENCOUNTER — OFFICE VISIT (OUTPATIENT)
Dept: FAMILY MEDICINE | Facility: CLINIC | Age: 79
End: 2024-01-23
Payer: MEDICARE

## 2024-01-23 VITALS
RESPIRATION RATE: 14 BRPM | OXYGEN SATURATION: 96 % | TEMPERATURE: 97.5 F | SYSTOLIC BLOOD PRESSURE: 127 MMHG | HEART RATE: 48 BPM | BODY MASS INDEX: 28.94 KG/M2 | DIASTOLIC BLOOD PRESSURE: 87 MMHG | WEIGHT: 207.5 LBS

## 2024-01-23 DIAGNOSIS — R25.1 TREMOR: ICD-10-CM

## 2024-01-23 DIAGNOSIS — R00.1 BRADYCARDIA: ICD-10-CM

## 2024-01-23 DIAGNOSIS — Z90.5 S/P NEPHRECTOMY: ICD-10-CM

## 2024-01-23 DIAGNOSIS — M79.89 SWELLING OF RIGHT HAND: ICD-10-CM

## 2024-01-23 DIAGNOSIS — I10 BENIGN HYPERTENSION: Primary | ICD-10-CM

## 2024-01-23 PROCEDURE — 99214 OFFICE O/P EST MOD 30 MIN: CPT | Performed by: INTERNAL MEDICINE

## 2024-01-23 RX ORDER — METOPROLOL SUCCINATE 25 MG/1
12.5 TABLET, EXTENDED RELEASE ORAL DAILY
Qty: 30 TABLET | Refills: 1 | Status: SHIPPED | OUTPATIENT
Start: 2024-01-23 | End: 2024-02-06 | Stop reason: SINTOL

## 2024-01-23 ASSESSMENT — PAIN SCALES - GENERAL: PAINLEVEL: NO PAIN (0)

## 2024-01-23 NOTE — PROGRESS NOTES
Theo was seen today for hypertension.    Diagnoses and all orders for this visit:    Accompanied by wife today.    Benign hypertension  During last visit we lowered the dose of metoprolol from 50 mg to 25 as patient was having bradycardia and not feeling good  Patient noticed improvement with loading the dose of metoprolol  He is also taking amlodipine  He would like to slowly go off of metoprolol and his pulse is stays low  -     metoprolol succinate ER (TOPROL XL) 25 MG 24 hr tablet; Take 0.5 tablets (12.5 mg) by mouth daily for heart and Blood Pressure(we lowered the dose of metoprolol further)  BP at 127/87 today. Reported BP home readings have been up and down. Advised to monitor BP while relaxed and to recheck if it is high.   Lowered metoprolol dose to 12.5mg/day  new rx sent.  F/up and annual visit is scheduled 3/4/24  Depending on how he does  We may slowly take him off of metoprolol  We may go up on amlodipine if needed  Continue to monitor blood pressure at home  Home blood pressure readings were reviewed    S/p nephrectomy  H.o Nephrectomy right side d/t CKD, advised to stay well hydrated.    Bradycardia  Well controlled w/ pace maker    Tremor  Confirmed tremors in hands bilaterally, but mostly right hand and right ring finger, reported he can  and it does not affect his quality of life. He is left handed and noticed tremors 12/31/23. Upon examination it may have been injured/sprained d/t tightening a bolt. Confirmed he does not drink alcohol.  If tremors affect quality of life patient should alert me so we can try medication like primidone     Swelling of right hand  Confirmed edema in right hand, reported he can  and it does not affect his quality of life. Upon examination it may have bee injured/sprained d/t tightening a bolt. Confirmed he does not drink alcohol.  Try conservative treatment first and if problem persist then we will do hand doctor referral.    Other  Confirmed constipation,  explained this could be d/t amlodipine, advised to stay well hydrate and take stool softener.    Subjective   Theo is a 79 year old, presenting for the following health issues:  Hypertension    HPI   Theo is a 79 year old, presenting for the following health issues:  Hypertension    Review of Systems  Constitutional, HEENT, cardiovascular, pulmonary, GI, , musculoskeletal, neuro, skin, endocrine and psych systems are negative, except as otherwise noted.      Objective    /87 (BP Location: Left arm, Patient Position: Sitting, Cuff Size: Adult Large)   Pulse (!) 48   Temp 97.5  F (36.4  C) (Temporal)   Resp 14   Wt 94.1 kg (207 lb 8 oz)   SpO2 96%   BMI 28.94 kg/m    Body mass index is 28.94 kg/m .    Physical Exam   GENERAL: healthy, alert and no distress  PSYCH: mentation appears normal, affect normal/bright  Some tremors in both hands intentionally  Right hand is minimally swollen but no signs of infection  No acute tenderness      Signed Electronically by: Zoe Tomas MD    This document serves as a record of the services and decisions personally performed and made by Dr. Tomas. It was created on her behalf by Soraya Orellana, a trained medical scribe. The creation of this document is based the provider's statements to the medical scribe.

## 2024-01-23 NOTE — PATIENT INSTRUCTIONS
We are lowering the dose of metoprolol to 25 mg half tablet daily  I sent new script to pharmacy for 25 mg tablets  Monitor your blood pressure once a week  at home.  Bring those readings on your next visit.  Notify us if your blood pressure readings consistently stays greater than 140/90.     Follow up in 4 weeks.  Seek sooner medical attention if there is any worsening of symptoms or problems.

## 2024-02-06 ENCOUNTER — MYC MEDICAL ADVICE (OUTPATIENT)
Dept: FAMILY MEDICINE | Facility: CLINIC | Age: 79
End: 2024-02-06
Payer: MEDICARE

## 2024-02-06 NOTE — TELEPHONE ENCOUNTER
I spoke to this patient   advised him to stop amlodipine completely  As it is giving him constipation  He took Dulcolax and Metamucil and had a good bowel movement  He is not feeling good with metoprolol he is only on 12.5  Told him to stop the metoprolol  This makes his pulse very slow and he does not feel good  Update me on Thursday  His blood pressure is low  Advised him to check blood pressure   if blood pressure is below 100 then take only tablet of lisinopril  He denied any symptoms of infection  Avoid dehydration   will refer him to cardiology if needed  Dr.Nasima Thom MD

## 2024-02-09 ENCOUNTER — NURSE TRIAGE (OUTPATIENT)
Dept: CARDIOLOGY | Facility: CLINIC | Age: 79
End: 2024-02-09

## 2024-02-09 ENCOUNTER — HOSPITAL ENCOUNTER (EMERGENCY)
Facility: CLINIC | Age: 79
Discharge: HOME | End: 2024-02-09
Attending: EMERGENCY MEDICINE | Admitting: EMERGENCY MEDICINE
Payer: MEDICARE

## 2024-02-09 VITALS
DIASTOLIC BLOOD PRESSURE: 75 MMHG | WEIGHT: 209 LBS | BODY MASS INDEX: 29.92 KG/M2 | HEIGHT: 70 IN | HEART RATE: 80 BPM | SYSTOLIC BLOOD PRESSURE: 126 MMHG | RESPIRATION RATE: 16 BRPM | TEMPERATURE: 98.2 F | OXYGEN SATURATION: 93 %

## 2024-02-09 DIAGNOSIS — I10 HYPERTENSION, UNSPECIFIED TYPE: ICD-10-CM

## 2024-02-09 LAB
ALBUMIN SERPL BCG-MCNC: 4.4 G/DL (ref 3.5–5.2)
ALP SERPL-CCNC: 72 U/L (ref 40–150)
ALT SERPL W P-5'-P-CCNC: 41 U/L (ref 0–70)
ANION GAP SERPL CALCULATED.3IONS-SCNC: 12 MMOL/L (ref 7–15)
AST SERPL W P-5'-P-CCNC: 24 U/L (ref 0–45)
BASOPHILS # BLD AUTO: 0 10E3/UL (ref 0–0.2)
BASOPHILS NFR BLD AUTO: 1 %
BILIRUB SERPL-MCNC: 0.8 MG/DL
BUN SERPL-MCNC: 14.5 MG/DL (ref 8–23)
CALCIUM SERPL-MCNC: 9.7 MG/DL (ref 8.8–10.2)
CHLORIDE SERPL-SCNC: 105 MMOL/L (ref 98–107)
CREAT SERPL-MCNC: 1.12 MG/DL (ref 0.67–1.17)
DEPRECATED HCO3 PLAS-SCNC: 24 MMOL/L (ref 22–29)
EGFRCR SERPLBLD CKD-EPI 2021: 67 ML/MIN/1.73M2
EOSINOPHIL # BLD AUTO: 0.1 10E3/UL (ref 0–0.7)
EOSINOPHIL NFR BLD AUTO: 1 %
ERYTHROCYTE [DISTWIDTH] IN BLOOD BY AUTOMATED COUNT: 13 % (ref 10–15)
GLUCOSE SERPL-MCNC: 121 MG/DL (ref 70–99)
HCT VFR BLD AUTO: 48.6 % (ref 40–53)
HGB BLD-MCNC: 16.4 G/DL (ref 13.3–17.7)
HOLD SPECIMEN: NORMAL
IMM GRANULOCYTES # BLD: 0 10E3/UL
IMM GRANULOCYTES NFR BLD: 0 %
LYMPHOCYTES # BLD AUTO: 1.3 10E3/UL (ref 0.8–5.3)
LYMPHOCYTES NFR BLD AUTO: 18 %
MCH RBC QN AUTO: 30.7 PG (ref 26.5–33)
MCHC RBC AUTO-ENTMCNC: 33.7 G/DL (ref 31.5–36.5)
MCV RBC AUTO: 91 FL (ref 78–100)
MONOCYTES # BLD AUTO: 0.5 10E3/UL (ref 0–1.3)
MONOCYTES NFR BLD AUTO: 7 %
NEUTROPHILS # BLD AUTO: 5.3 10E3/UL (ref 1.6–8.3)
NEUTROPHILS NFR BLD AUTO: 73 %
NRBC # BLD AUTO: 0 10E3/UL
NRBC BLD AUTO-RTO: 0 /100
PLATELET # BLD AUTO: 186 10E3/UL (ref 150–450)
POTASSIUM SERPL-SCNC: 4.2 MMOL/L (ref 3.4–5.3)
PROT SERPL-MCNC: 7.6 G/DL (ref 6.4–8.3)
RBC # BLD AUTO: 5.35 10E6/UL (ref 4.4–5.9)
SODIUM SERPL-SCNC: 141 MMOL/L (ref 135–145)
TROPONIN T SERPL HS-MCNC: 12 NG/L
WBC # BLD AUTO: 7.2 10E3/UL (ref 4–11)

## 2024-02-09 PROCEDURE — 99285 EMERGENCY DEPT VISIT HI MDM: CPT | Mod: 25

## 2024-02-09 PROCEDURE — 96374 THER/PROPH/DIAG INJ IV PUSH: CPT

## 2024-02-09 PROCEDURE — 82040 ASSAY OF SERUM ALBUMIN: CPT | Performed by: EMERGENCY MEDICINE

## 2024-02-09 PROCEDURE — 99284 EMERGENCY DEPT VISIT MOD MDM: CPT | Mod: 25

## 2024-02-09 PROCEDURE — 85025 COMPLETE CBC W/AUTO DIFF WBC: CPT | Performed by: EMERGENCY MEDICINE

## 2024-02-09 PROCEDURE — 84484 ASSAY OF TROPONIN QUANT: CPT | Performed by: EMERGENCY MEDICINE

## 2024-02-09 PROCEDURE — 36415 COLL VENOUS BLD VENIPUNCTURE: CPT | Performed by: EMERGENCY MEDICINE

## 2024-02-09 PROCEDURE — 250N000011 HC RX IP 250 OP 636: Performed by: EMERGENCY MEDICINE

## 2024-02-09 RX ORDER — HYDRALAZINE HYDROCHLORIDE 20 MG/ML
10 INJECTION INTRAMUSCULAR; INTRAVENOUS ONCE
Status: COMPLETED | OUTPATIENT
Start: 2024-02-09 | End: 2024-02-09

## 2024-02-09 RX ORDER — HYDRALAZINE HYDROCHLORIDE 10 MG/1
10 TABLET, FILM COATED ORAL 3 TIMES DAILY
Qty: 90 TABLET | Refills: 0 | Status: SHIPPED | OUTPATIENT
Start: 2024-02-09 | End: 2024-02-16

## 2024-02-09 RX ADMIN — HYDRALAZINE HYDROCHLORIDE 10 MG: 20 INJECTION INTRAMUSCULAR; INTRAVENOUS at 14:10

## 2024-02-09 ASSESSMENT — ACTIVITIES OF DAILY LIVING (ADL)
ADLS_ACUITY_SCORE: 35
ADLS_ACUITY_SCORE: 35

## 2024-02-09 NOTE — ED TRIAGE NOTES
Pt stated that he has been having high bP for the last 3 months off and on - Md has beentaperingand changing his meds around .  He is here today because he has a tight headache , feels weak and his machine at home does not register past 180 SBP .      Triage Assessment (Adult)       Row Name 02/09/24 1100          Triage Assessment    Airway WDL WDL        Respiratory WDL    Respiratory WDL WDL        Skin Circulation/Temperature WDL    Skin Circulation/Temperature WDL WDL        Cardiac WDL    Cardiac WDL X  htn        Peripheral/Neurovascular WDL    Peripheral Neurovascular WDL WDL        Cognitive/Neuro/Behavioral WDL    Cognitive/Neuro/Behavioral WDL WDL

## 2024-02-09 NOTE — TELEPHONE ENCOUNTER
"Received a call from the call center to discuss high blood pressure.  Spoke to Theo, who says his PCP-Dr. Tomas has been adjusting his blood pressure medications. He says he stopped taking metoprolol and lisinopril dose has been adjusted due to low BP/HR. He says now it is \"spiking out\", and not feeling well. He says about 30 minutes ago, /100. He says he has taken lisinopril 20 mg at 5:30 am and took an additional lisinopril 20 mg at 7 am. He has not been taking metoprolol for a few days.   Attempted to check BP on the call x 2 and his machine would not register. He says his head feels \"tight\" and has lightheadedness. He denies visual disturbances.  Advised it is recommended to go to ED for an evaluation. He replied \"Dr. Tomas says don't go to the ED, and that I should contact cardiology\".  Advised again that it is recommended to go to ED for an evaluation. He verbalized agreement and understanding.  Will send a message to [facility] EP team for an update.    1. BLOOD PRESSURE: \"What is the blood pressure?\" \"Did you take at least two measurements 5 minutes apart?\"  2. ONSET: \"When did you take your blood pressure?\" Approximately 30 minutes ago  3. HOW: \"How did you take your blood pressure?\" (e.g., automatic home BP monitor, visiting nurse) BP monitor  4. HISTORY: \"Do you have a history of high blood pressure?\" yes  5. MEDICINES: \"Are you taking any medicines for blood pressure?\" \"Have you missed any doses recently?\" lisinopril  6. OTHER SYMPTOMS: \"Do you have any symptoms?\" (e.g., blurred vision, chest pain, difficulty breathing, headache, weakness) lightheadedness, head feels tight  7. PREGNANCY: \"Is there any chance you are pregnant?\" \"When was your last menstrual period?\"  Reason for Disposition    Systolic BP >= 160 OR Diastolic >= 100, and any cardiac (e.g., breathing difficulty, chest pain) or neurologic symptoms (e.g., new-onset blurred or double vision)    Protocols used: Blood Pressure - " High-A-OH

## 2024-02-09 NOTE — TELEPHONE ENCOUNTER
Patient also left a voicemail directly on the Device RN line regarding below.  Based on note, agree that patient needs to be seen in the ER. Called patient, no answer, LVM letting him know that I agree with recommendation for ER. Upon review of chart, appears that patient is currently in the ED waiting area.  ZAHRA RN

## 2024-02-09 NOTE — ED PROVIDER NOTES
"  History     Chief Complaint:  Hypertension     The history is provided by the patient.      Estrada Oseguera is a 79 year old male with a history of hypertension, hyperlipidemia, atrial fibrillation s/p pacemaker placement, cardiomyopathy, type 2 diabetes mellitus, prostate cancer, diverticulitis s/p colon resection, and CKD who presents with hypertension and head \"tightness\" yesterday and this morning. His blood pressure cuff was not registering a blood pressure at home. Patient denies chest pain or chest pressure today. He does not some tingling in his hands, which he has had with past hypertension. He reports he is often lightheaded and has head tightness with high blood pressure. He is working with his PCP to manage his blood pressure medication regimen. His blood pressure has been \"up and down\" for the past few months. He increased his lisinopril to 40 mg in the fall, and he decreased this to 20 mg for the past few days. This morning, he took 40 mg in response to high blood pressure. Patient notes he had COVID in August of 2022.    Independent Historian:   None - Patient Only    Review of External Notes:   1/23/24 Progress note reviewed. His metoprolol dose was lowered to 12.5 mg/day at that time and they planned to slowly wean him off metoprolol altogether.    Medications:    Lisinopril   Metformin   Crestor  Flomax  Protonix    Past Medical History:    Atrial fibrillation   CKD  GERD  Hiatal hernia  Mumps  Hypertension   Chacon's esophagus  Hyperlipidemia   Cardiomyopathy   Type 2 diabetes mellitus   Prostate cancer   Diverticulitis     Past Surgical History:    Colon resection  Nephrectomy  Cholecystectomy   Pacemaker placement  Vasectomy    Physical Exam   Patient Vitals for the past 24 hrs:   BP Temp Temp src Pulse Resp SpO2 Height Weight   02/09/24 1430 126/75 -- -- 80 -- 93 % -- --   02/09/24 1400 (!) 140/88 -- -- 79 -- 93 % -- --   02/09/24 1330 (!) 159/101 -- -- (!) 45 -- 92 % -- --   02/09/24 " "1100 (!) 183/113 98.2  F (36.8  C) Temporal 106 16 98 % 1.778 m (5' 10\") 94.8 kg (209 lb)      Physical Exam  Constitutional: Middle-aged white male, supine. No respiratory distress.  HENT: No signs of trauma.   Eyes: EOM are normal. Pupils are equal, round, and reactive to light.   Neck: Normal range of motion. No JVD present. No cervical adenopathy.  Cardiovascular: Regular rhythm.  Exam reveals no gallop and no friction rub.    No murmur heard.  Pulmonary/Chest: Bilateral breath sounds normal. No wheezes, rhonchi or rales. Pacemaker right chest.  Abdominal: Soft. No tenderness. No rebound or guarding.   Musculoskeletal: No edema. No tenderness.   Lymphadenopathy: No lymphadenopathy.   Neurological: Alert and oriented to person, place, and time. Normal strength. Coordination normal.   Skin: Skin is warm and dry. No rash noted. No erythema.     Emergency Department Course   Laboratory:  Labs Ordered and Resulted from Time of ED Arrival to Time of ED Departure   COMPREHENSIVE METABOLIC PANEL - Abnormal       Result Value    Sodium 141      Potassium 4.2      Carbon Dioxide (CO2) 24      Anion Gap 12      Urea Nitrogen 14.5      Creatinine 1.12      GFR Estimate 67      Calcium 9.7      Chloride 105      Glucose 121 (*)     Alkaline Phosphatase 72      AST 24      ALT 41      Protein Total 7.6      Albumin 4.4      Bilirubin Total 0.8     TROPONIN T, HIGH SENSITIVITY - Normal    Troponin T, High Sensitivity 12     CBC WITH PLATELETS AND DIFFERENTIAL    WBC Count 7.2      RBC Count 5.35      Hemoglobin 16.4      Hematocrit 48.6      MCV 91      MCH 30.7      MCHC 33.7      RDW 13.0      Platelet Count 186      % Neutrophils 73      % Lymphocytes 18      % Monocytes 7      % Eosinophils 1      % Basophils 1      % Immature Granulocytes 0      NRBCs per 100 WBC 0      Absolute Neutrophils 5.3      Absolute Lymphocytes 1.3      Absolute Monocytes 0.5      Absolute Eosinophils 0.1      Absolute Basophils 0.0      Absolute " Immature Granulocytes 0.0      Absolute NRBCs 0.0        Emergency Department Course & Assessments:       Interventions:  Medications   hydrALAZINE (APRESOLINE) injection 10 mg (10 mg Intravenous $Given 2/9/24 1410)        Independent Interpretation (X-rays, CTs, rhythm strip):  None    Assessments/Consultations/Discussion of Management or Tests:  ED Course as of 02/09/24 1511   Fri Feb 09, 2024   1336 I obtained the history and examined the patient as noted above.    1445 I rechecked and updated the patient. They were amenable to plan for discharge.        Social Determinants of Health affecting care:   None    Disposition:  The patient was discharged to home.     Impression & Plan    Medical Decision Making:  This is a 79 year old who has been working with his primary for the last several months to try and get his blood pressure under control. When is goes up he tends to feel tight in his head and tingling feeling in his fingers. He has been worked up with a CT scan which has been negative. He has been on a few different medications now including amlodipine, which makes him constipated. He has stopped that. He had been on metoprolol, which was dropping his pulse too low. He has stopped that. He is currently only on lisinopril 20mg a day but he took an extra 20 mg today when he felt his symptoms and saw that his blood pressure did not register on his reader at home. When he comes in here his initial blood pressure is 183/113. His exam though was unremarkable. He has no focal neurological problems. I did give him one dose of hydralazine and his blood pressure has come down very nicely and his symptoms have improved. I will start him on hydralazine 10 mg 3 times a day. I have told him that if his blood pressure seems to remain good and he is symptom free he may decrease that to twice a day since it would be much easier to take. He will follow up with his primary. If symptoms change or worsen, recheck in ED.      Diagnosis:    ICD-10-CM    1. Hypertension, unspecified type  I10            Discharge Medications:  Discharge Medication List as of 2/9/2024  2:51 PM        START taking these medications    Details   hydrALAZINE (APRESOLINE) 10 MG tablet Take 1 tablet (10 mg) by mouth 3 times daily, Disp-90 tablet, R-0, Local Print            Scribe Disclosure:  I, Sharmila Brito, am serving as a scribe at 1:33 PM on 2/9/2024 to document services personally performed by Alexey Estevez MD based on my observations and the provider's statements to me.     2/9/2024   Alexey Estevez MD Steinman, Randall Ira, MD  02/09/24 1898

## 2024-02-09 NOTE — ED NOTES
"Road test- patient ambulated 50 yards without issue. Stated he \"feels heavy\" but no dizziness or lightheadedness.   "

## 2024-02-16 ENCOUNTER — VIRTUAL VISIT (OUTPATIENT)
Dept: FAMILY MEDICINE | Facility: CLINIC | Age: 79
End: 2024-02-16
Payer: MEDICARE

## 2024-02-16 ENCOUNTER — ANCILLARY PROCEDURE (OUTPATIENT)
Dept: CARDIOLOGY | Facility: CLINIC | Age: 79
End: 2024-02-16
Attending: INTERNAL MEDICINE
Payer: MEDICARE

## 2024-02-16 DIAGNOSIS — I10 BENIGN HYPERTENSION: ICD-10-CM

## 2024-02-16 DIAGNOSIS — F41.9 ANXIETY: Primary | ICD-10-CM

## 2024-02-16 DIAGNOSIS — Z95.0 CARDIAC PACEMAKER IN SITU: ICD-10-CM

## 2024-02-16 DIAGNOSIS — I49.5 SSS (SICK SINUS SYNDROME) (H): ICD-10-CM

## 2024-02-16 PROCEDURE — 93294 REM INTERROG EVL PM/LDLS PM: CPT | Performed by: INTERNAL MEDICINE

## 2024-02-16 PROCEDURE — 99214 OFFICE O/P EST MOD 30 MIN: CPT | Mod: 95 | Performed by: INTERNAL MEDICINE

## 2024-02-16 PROCEDURE — 93296 REM INTERROG EVL PM/IDS: CPT | Performed by: INTERNAL MEDICINE

## 2024-02-16 RX ORDER — HYDROXYZINE HYDROCHLORIDE 10 MG/1
10 TABLET, FILM COATED ORAL 3 TIMES DAILY PRN
Qty: 30 TABLET | Refills: 1 | Status: SHIPPED | OUTPATIENT
Start: 2024-02-16 | End: 2024-03-04

## 2024-02-16 RX ORDER — MIRTAZAPINE 7.5 MG/1
7.5 TABLET, FILM COATED ORAL AT BEDTIME
Qty: 30 TABLET | Refills: 1 | Status: SHIPPED | OUTPATIENT
Start: 2024-02-16 | End: 2024-03-04

## 2024-02-16 RX ORDER — HYDRALAZINE HYDROCHLORIDE 10 MG/1
10 TABLET, FILM COATED ORAL 3 TIMES DAILY
Qty: 90 TABLET | Refills: 1 | Status: SHIPPED | OUTPATIENT
Start: 2024-02-16 | End: 2024-03-04

## 2024-02-16 NOTE — PATIENT INSTRUCTIONS
Continue lisinopril 20 mg daily  Continue hydralazine 10 mg three times a day  Take extra hydralazine if SBP > 160  Start taking mirtazapine 7.5 mg at bed time   Use hydroxyzine 10 mg three times  a day as needed for anxiety   This is rescue medication   Follow up in 2 weeks  Seek sooner medical attention if there is any worsening of symptoms or problems.'

## 2024-02-16 NOTE — PROGRESS NOTES
Theo is a 79 year old who is being evaluated via a billable video visit.      How would you like to obtain your AVS? MyChart  If the video visit is dropped, the invitation should be resent by: Text to cell phone: [phone]  Will anyone else be joining your video visit? No    Theo was seen today for follow up.    Diagnoses and all orders for this visit:    Anxiety  He c/o sensation of tightness in his head with tingling in his hands which he attributes to anxiety. He is not on any anxiety medication - he was on Prozac in the past but it did not work for him. He also reports difficulty sleeping.   -     mirtazapine (REMERON) 7.5 MG tablet; Take 1 tablet (7.5 mg) by mouth at bedtime  -     hydrOXYzine HCl (ATARAX) 10 MG tablet; Take 1 tablet (10 mg) by mouth 3 times daily as needed for anxiety  Discussed risks and benefits of buspar and mirtazapine.   He is started on mirtazapine 7.5 mg HS to help both with his anxiety and sleep. Patient understands that the medication takes time to control his symptom.   Also prescribed him hydroxyzine 10 mg for rescue medication  Follow up in 2 weeks     Benign hypertension  He recently visited ER for elevated blood pressure, which was recorded at 183/113. His blood pressure returned back to normal after hydralazine was given. He was then started on hydralazine 10 mg TID.  Blood pressure this morning at 159/84 and at 110/80 prior to visit. Currently he c/o sensation of tightness in his head with tingling in his hands which he believes, is due to anxiety.    -     hydrALAZINE (APRESOLINE) 10 MG tablet; Take 1 tablet (10 mg) by mouth 3 times daily  Refilled his hydralazine 10 mg TID. Advised patient to take extra pill if blood pressure is recorded at high > systolic 160.   Continue taking lisinopril 20 mg daily   Follow up in 2 weeks     Other orders  -     REVIEW OF HEALTH MAINTENANCE PROTOCOL ORDERS    Subjective   Theo is a 79 year old, presenting for the following health  issues:  Follow Up (bp)         No data to display              HPI     ED/UC Followup:    Facility:  [facility]  Date of visit: 2/9/24  Reason for visit: HTN and anxiety   Current Status: c/o feeling of tightness in his head    Review of Systems  Constitutional, HEENT, cardiovascular, pulmonary, GI, , musculoskeletal, neuro, skin, endocrine and psych systems are negative, except as otherwise noted.      Objective    Vitals - Patient Reported  Systolic (Patient Reported): 108  Diastolic (Patient Reported): 76  Pulse (Patient Reported): 88  Pain Score: Mild Pain (2)  Pain Loc: Head      Vitals:  No vitals were obtained today due to virtual visit.    Physical Exam   GENERAL: alert and no distress  EYES: Eyes grossly normal to inspection.  No discharge or erythema, or obvious scleral/conjunctival abnormalities.  RESP: No audible wheeze, cough, or visible cyanosis.    SKIN: Visible skin clear. No significant rash, abnormal pigmentation or lesions.  NEURO: Cranial nerves grossly intact.  Mentation and speech appropriate for age.  PSYCH: Appropriate affect, tone, and pace of words          Video-Visit Details    Type of service:  Video Visit   Video Start Time: 2:24 PM  Video End Time:2:36 PM    Originating Location (pt. Location): Home    Distant Location (provider location):  On-site  Platform used for Video Visit: Andi  Signed Electronically by: Zoe Tomas MD

## 2024-02-19 LAB
MDC_IDC_EPISODE_DTM: NORMAL
MDC_IDC_EPISODE_ID: NORMAL
MDC_IDC_EPISODE_TYPE: NORMAL
MDC_IDC_LEAD_CONNECTION_STATUS: NORMAL
MDC_IDC_LEAD_CONNECTION_STATUS: NORMAL
MDC_IDC_LEAD_IMPLANT_DT: NORMAL
MDC_IDC_LEAD_IMPLANT_DT: NORMAL
MDC_IDC_LEAD_LOCATION: NORMAL
MDC_IDC_LEAD_LOCATION: NORMAL
MDC_IDC_LEAD_LOCATION_DETAIL_1: NORMAL
MDC_IDC_LEAD_LOCATION_DETAIL_1: NORMAL
MDC_IDC_LEAD_MFG: NORMAL
MDC_IDC_LEAD_MFG: NORMAL
MDC_IDC_LEAD_MODEL: NORMAL
MDC_IDC_LEAD_MODEL: NORMAL
MDC_IDC_LEAD_POLARITY_TYPE: NORMAL
MDC_IDC_LEAD_POLARITY_TYPE: NORMAL
MDC_IDC_LEAD_SERIAL: NORMAL
MDC_IDC_LEAD_SERIAL: NORMAL
MDC_IDC_MSMT_BATTERY_DTM: NORMAL
MDC_IDC_MSMT_BATTERY_REMAINING_LONGEVITY: 90 MO
MDC_IDC_MSMT_BATTERY_REMAINING_PERCENTAGE: 90 %
MDC_IDC_MSMT_BATTERY_STATUS: NORMAL
MDC_IDC_MSMT_LEADCHNL_RA_IMPEDANCE_VALUE: 766 OHM
MDC_IDC_MSMT_LEADCHNL_RA_PACING_THRESHOLD_AMPLITUDE: 0.5 V
MDC_IDC_MSMT_LEADCHNL_RA_PACING_THRESHOLD_PULSEWIDTH: 0.4 MS
MDC_IDC_MSMT_LEADCHNL_RV_IMPEDANCE_VALUE: 638 OHM
MDC_IDC_MSMT_LEADCHNL_RV_PACING_THRESHOLD_AMPLITUDE: 1.4 V
MDC_IDC_MSMT_LEADCHNL_RV_PACING_THRESHOLD_PULSEWIDTH: 0.4 MS
MDC_IDC_PG_IMPLANT_DTM: NORMAL
MDC_IDC_PG_MFG: NORMAL
MDC_IDC_PG_MODEL: NORMAL
MDC_IDC_PG_SERIAL: NORMAL
MDC_IDC_PG_TYPE: NORMAL
MDC_IDC_SESS_CLINIC_NAME: NORMAL
MDC_IDC_SESS_DTM: NORMAL
MDC_IDC_SESS_TYPE: NORMAL
MDC_IDC_SET_BRADY_AT_MODE_SWITCH_MODE: NORMAL
MDC_IDC_SET_BRADY_AT_MODE_SWITCH_RATE: 170 {BEATS}/MIN
MDC_IDC_SET_BRADY_LOWRATE: 55 {BEATS}/MIN
MDC_IDC_SET_BRADY_MAX_SENSOR_RATE: 130 {BEATS}/MIN
MDC_IDC_SET_BRADY_MAX_TRACKING_RATE: 130 {BEATS}/MIN
MDC_IDC_SET_BRADY_MODE: NORMAL
MDC_IDC_SET_BRADY_PAV_DELAY_HIGH: 200 MS
MDC_IDC_SET_BRADY_PAV_DELAY_LOW: 300 MS
MDC_IDC_SET_BRADY_SAV_DELAY_HIGH: 200 MS
MDC_IDC_SET_BRADY_SAV_DELAY_LOW: 300 MS
MDC_IDC_SET_LEADCHNL_RA_PACING_AMPLITUDE: 2 V
MDC_IDC_SET_LEADCHNL_RA_PACING_CAPTURE_MODE: NORMAL
MDC_IDC_SET_LEADCHNL_RA_PACING_POLARITY: NORMAL
MDC_IDC_SET_LEADCHNL_RA_PACING_PULSEWIDTH: 0.4 MS
MDC_IDC_SET_LEADCHNL_RA_SENSING_ADAPTATION_MODE: NORMAL
MDC_IDC_SET_LEADCHNL_RA_SENSING_POLARITY: NORMAL
MDC_IDC_SET_LEADCHNL_RA_SENSING_SENSITIVITY: 0.25 MV
MDC_IDC_SET_LEADCHNL_RV_PACING_AMPLITUDE: 1.9 V
MDC_IDC_SET_LEADCHNL_RV_PACING_CAPTURE_MODE: NORMAL
MDC_IDC_SET_LEADCHNL_RV_PACING_POLARITY: NORMAL
MDC_IDC_SET_LEADCHNL_RV_PACING_PULSEWIDTH: 0.4 MS
MDC_IDC_SET_LEADCHNL_RV_SENSING_ADAPTATION_MODE: NORMAL
MDC_IDC_SET_LEADCHNL_RV_SENSING_POLARITY: NORMAL
MDC_IDC_SET_LEADCHNL_RV_SENSING_SENSITIVITY: 1.5 MV
MDC_IDC_SET_ZONE_DETECTION_INTERVAL: 375 MS
MDC_IDC_SET_ZONE_STATUS: NORMAL
MDC_IDC_SET_ZONE_TYPE: NORMAL
MDC_IDC_SET_ZONE_VENDOR_TYPE: NORMAL
MDC_IDC_STAT_AT_BURDEN_PERCENT: 0 %
MDC_IDC_STAT_AT_DTM_END: NORMAL
MDC_IDC_STAT_AT_DTM_START: NORMAL
MDC_IDC_STAT_BRADY_DTM_END: NORMAL
MDC_IDC_STAT_BRADY_DTM_START: NORMAL
MDC_IDC_STAT_BRADY_RA_PERCENT_PACED: 28 %
MDC_IDC_STAT_BRADY_RV_PERCENT_PACED: 0 %
MDC_IDC_STAT_EPISODE_RECENT_COUNT: 0
MDC_IDC_STAT_EPISODE_RECENT_COUNT_DTM_END: NORMAL
MDC_IDC_STAT_EPISODE_RECENT_COUNT_DTM_START: NORMAL
MDC_IDC_STAT_EPISODE_TYPE: NORMAL
MDC_IDC_STAT_EPISODE_VENDOR_TYPE: NORMAL
MDC_IDC_STAT_EPISODE_VENDOR_TYPE: NORMAL

## 2024-02-21 ENCOUNTER — TELEPHONE (OUTPATIENT)
Dept: OTOLARYNGOLOGY | Facility: CLINIC | Age: 79
End: 2024-02-21
Payer: MEDICARE

## 2024-02-21 NOTE — TELEPHONE ENCOUNTER
[facility] Call Center    Phone Message    May a detailed message be left on voicemail: yes     Reason for Call: Other: The pt has been referred to us for Vertigo and had appointments for an audiogram and balance testing. The pt is on some new medications and would like to reschedule his appointments out in the future. Please call the pt to discuss rescheduling. Thanks.     Action Taken: Message routed to:  [facility] ([facility] ENT    Travel Screening: Not Applicable

## 2024-03-02 SDOH — HEALTH STABILITY: PHYSICAL HEALTH: ON AVERAGE, HOW MANY MINUTES DO YOU ENGAGE IN EXERCISE AT THIS LEVEL?: 40 MIN

## 2024-03-02 SDOH — HEALTH STABILITY: PHYSICAL HEALTH: ON AVERAGE, HOW MANY DAYS PER WEEK DO YOU ENGAGE IN MODERATE TO STRENUOUS EXERCISE (LIKE A BRISK WALK)?: 6 DAYS

## 2024-03-02 ASSESSMENT — SOCIAL DETERMINANTS OF HEALTH (SDOH): HOW OFTEN DO YOU GET TOGETHER WITH FRIENDS OR RELATIVES?: ONCE A WEEK

## 2024-03-04 ENCOUNTER — OFFICE VISIT (OUTPATIENT)
Dept: FAMILY MEDICINE | Facility: CLINIC | Age: 79
End: 2024-03-04
Payer: MEDICARE

## 2024-03-04 VITALS
HEIGHT: 70 IN | TEMPERATURE: 97.5 F | HEART RATE: 74 BPM | OXYGEN SATURATION: 94 % | BODY MASS INDEX: 28.47 KG/M2 | WEIGHT: 198.9 LBS | DIASTOLIC BLOOD PRESSURE: 78 MMHG | SYSTOLIC BLOOD PRESSURE: 131 MMHG | RESPIRATION RATE: 18 BRPM

## 2024-03-04 DIAGNOSIS — E11.22 TYPE 2 DIABETES MELLITUS WITH STAGE 3A CHRONIC KIDNEY DISEASE, WITHOUT LONG-TERM CURRENT USE OF INSULIN (H): ICD-10-CM

## 2024-03-04 DIAGNOSIS — Z86.0100 HISTORY OF COLONIC POLYPS: ICD-10-CM

## 2024-03-04 DIAGNOSIS — K59.00 CONSTIPATION, UNSPECIFIED CONSTIPATION TYPE: ICD-10-CM

## 2024-03-04 DIAGNOSIS — N18.31 STAGE 3A CHRONIC KIDNEY DISEASE (H): ICD-10-CM

## 2024-03-04 DIAGNOSIS — R10.9 CHRONIC ABDOMINAL PAIN: ICD-10-CM

## 2024-03-04 DIAGNOSIS — I48.0 PAROXYSMAL ATRIAL FIBRILLATION (H): ICD-10-CM

## 2024-03-04 DIAGNOSIS — I10 BENIGN HYPERTENSION: ICD-10-CM

## 2024-03-04 DIAGNOSIS — E78.5 HYPERLIPIDEMIA, UNSPECIFIED HYPERLIPIDEMIA TYPE: ICD-10-CM

## 2024-03-04 DIAGNOSIS — Z90.49 S/P COLON RESECTION: ICD-10-CM

## 2024-03-04 DIAGNOSIS — F41.9 ANXIETY: ICD-10-CM

## 2024-03-04 DIAGNOSIS — E11.9 TYPE 2 DIABETES MELLITUS WITHOUT COMPLICATION, WITHOUT LONG-TERM CURRENT USE OF INSULIN (H): ICD-10-CM

## 2024-03-04 DIAGNOSIS — K22.70 BARRETT'S ESOPHAGUS WITHOUT DYSPLASIA: ICD-10-CM

## 2024-03-04 DIAGNOSIS — N18.31 TYPE 2 DIABETES MELLITUS WITH STAGE 3A CHRONIC KIDNEY DISEASE, WITHOUT LONG-TERM CURRENT USE OF INSULIN (H): ICD-10-CM

## 2024-03-04 DIAGNOSIS — R39.9 LOWER URINARY TRACT SYMPTOMS (LUTS): ICD-10-CM

## 2024-03-04 DIAGNOSIS — Z90.5 SOLITARY KIDNEY, ACQUIRED: ICD-10-CM

## 2024-03-04 DIAGNOSIS — C61 PROSTATE CANCER (H): ICD-10-CM

## 2024-03-04 DIAGNOSIS — Z90.5 S/P NEPHRECTOMY: ICD-10-CM

## 2024-03-04 DIAGNOSIS — Z79.899 MEDICATION MANAGEMENT: ICD-10-CM

## 2024-03-04 DIAGNOSIS — G89.29 CHRONIC ABDOMINAL PAIN: ICD-10-CM

## 2024-03-04 DIAGNOSIS — Z00.00 ENCOUNTER FOR MEDICARE ANNUAL WELLNESS EXAM: Primary | ICD-10-CM

## 2024-03-04 LAB
ALBUMIN SERPL BCG-MCNC: 4.2 G/DL (ref 3.5–5.2)
ALBUMIN UR-MCNC: NEGATIVE MG/DL
ALP SERPL-CCNC: 63 U/L (ref 40–150)
ALT SERPL W P-5'-P-CCNC: 33 U/L (ref 0–70)
ANION GAP SERPL CALCULATED.3IONS-SCNC: 12 MMOL/L (ref 7–15)
APPEARANCE UR: CLEAR
AST SERPL W P-5'-P-CCNC: 30 U/L (ref 0–45)
BASOPHILS # BLD AUTO: 0 10E3/UL (ref 0–0.2)
BASOPHILS NFR BLD AUTO: 0 %
BILIRUB SERPL-MCNC: 0.7 MG/DL
BILIRUB UR QL STRIP: NEGATIVE
BUN SERPL-MCNC: 18 MG/DL (ref 8–23)
CALCIUM SERPL-MCNC: 9.6 MG/DL (ref 8.8–10.2)
CHLORIDE SERPL-SCNC: 104 MMOL/L (ref 98–107)
COLOR UR AUTO: YELLOW
CREAT SERPL-MCNC: 1.32 MG/DL (ref 0.67–1.17)
CREAT UR-MCNC: 172 MG/DL
DEPRECATED HCO3 PLAS-SCNC: 23 MMOL/L (ref 22–29)
EGFRCR SERPLBLD CKD-EPI 2021: 55 ML/MIN/1.73M2
EOSINOPHIL # BLD AUTO: 0.1 10E3/UL (ref 0–0.7)
EOSINOPHIL NFR BLD AUTO: 1 %
ERYTHROCYTE [DISTWIDTH] IN BLOOD BY AUTOMATED COUNT: 13 % (ref 10–15)
GLUCOSE SERPL-MCNC: 126 MG/DL (ref 70–99)
GLUCOSE UR STRIP-MCNC: NEGATIVE MG/DL
HCT VFR BLD AUTO: 48.2 % (ref 40–53)
HGB BLD-MCNC: 15.7 G/DL (ref 13.3–17.7)
HGB UR QL STRIP: ABNORMAL
IMM GRANULOCYTES # BLD: 0 10E3/UL
IMM GRANULOCYTES NFR BLD: 0 %
KETONES UR STRIP-MCNC: ABNORMAL MG/DL
LEUKOCYTE ESTERASE UR QL STRIP: NEGATIVE
LYMPHOCYTES # BLD AUTO: 1.1 10E3/UL (ref 0.8–5.3)
LYMPHOCYTES NFR BLD AUTO: 13 %
MCH RBC QN AUTO: 30.3 PG (ref 26.5–33)
MCHC RBC AUTO-ENTMCNC: 32.6 G/DL (ref 31.5–36.5)
MCV RBC AUTO: 93 FL (ref 78–100)
MICROALBUMIN UR-MCNC: <12 MG/L
MICROALBUMIN/CREAT UR: NORMAL MG/G{CREAT}
MONOCYTES # BLD AUTO: 0.6 10E3/UL (ref 0–1.3)
MONOCYTES NFR BLD AUTO: 7 %
NEUTROPHILS # BLD AUTO: 6.5 10E3/UL (ref 1.6–8.3)
NEUTROPHILS NFR BLD AUTO: 79 %
NITRATE UR QL: NEGATIVE
PH UR STRIP: 5.5 [PH] (ref 5–7)
PLATELET # BLD AUTO: 177 10E3/UL (ref 150–450)
POTASSIUM SERPL-SCNC: 4.2 MMOL/L (ref 3.4–5.3)
PROT SERPL-MCNC: 7.1 G/DL (ref 6.4–8.3)
RBC # BLD AUTO: 5.19 10E6/UL (ref 4.4–5.9)
RBC #/AREA URNS AUTO: NORMAL /HPF
SODIUM SERPL-SCNC: 139 MMOL/L (ref 135–145)
SP GR UR STRIP: 1.02 (ref 1–1.03)
UROBILINOGEN UR STRIP-ACNC: 0.2 E.U./DL
WBC # BLD AUTO: 8.3 10E3/UL (ref 4–11)
WBC #/AREA URNS AUTO: NORMAL /HPF

## 2024-03-04 PROCEDURE — 82570 ASSAY OF URINE CREATININE: CPT | Performed by: INTERNAL MEDICINE

## 2024-03-04 PROCEDURE — 81001 URINALYSIS AUTO W/SCOPE: CPT | Performed by: INTERNAL MEDICINE

## 2024-03-04 PROCEDURE — G0439 PPPS, SUBSEQ VISIT: HCPCS | Performed by: INTERNAL MEDICINE

## 2024-03-04 PROCEDURE — 80053 COMPREHEN METABOLIC PANEL: CPT | Performed by: INTERNAL MEDICINE

## 2024-03-04 PROCEDURE — 82043 UR ALBUMIN QUANTITATIVE: CPT | Performed by: INTERNAL MEDICINE

## 2024-03-04 PROCEDURE — 85025 COMPLETE CBC W/AUTO DIFF WBC: CPT | Performed by: INTERNAL MEDICINE

## 2024-03-04 PROCEDURE — 99207 PR FOOT EXAM NO CHARGE: CPT | Performed by: INTERNAL MEDICINE

## 2024-03-04 PROCEDURE — 36415 COLL VENOUS BLD VENIPUNCTURE: CPT | Performed by: INTERNAL MEDICINE

## 2024-03-04 PROCEDURE — 99214 OFFICE O/P EST MOD 30 MIN: CPT | Mod: 25 | Performed by: INTERNAL MEDICINE

## 2024-03-04 RX ORDER — LISINOPRIL 20 MG/1
20 TABLET ORAL DAILY
Qty: 90 TABLET | Refills: 1 | Status: SHIPPED | OUTPATIENT
Start: 2024-03-04 | End: 2024-05-06

## 2024-03-04 RX ORDER — PANTOPRAZOLE SODIUM 40 MG/1
40 TABLET, DELAYED RELEASE ORAL DAILY
Qty: 90 TABLET | Refills: 1 | Status: SHIPPED | OUTPATIENT
Start: 2024-03-04 | End: 2024-05-06

## 2024-03-04 RX ORDER — HYDRALAZINE HYDROCHLORIDE 10 MG/1
10 TABLET, FILM COATED ORAL 3 TIMES DAILY
Qty: 90 TABLET | Refills: 1 | Status: SHIPPED | OUTPATIENT
Start: 2024-03-04 | End: 2024-05-06

## 2024-03-04 RX ORDER — MIRTAZAPINE 15 MG/1
15 TABLET, FILM COATED ORAL AT BEDTIME
Qty: 90 TABLET | Refills: 1 | Status: SHIPPED | OUTPATIENT
Start: 2024-03-04 | End: 2024-05-06

## 2024-03-04 RX ORDER — TAMSULOSIN HYDROCHLORIDE 0.4 MG/1
0.4 CAPSULE ORAL DAILY
Qty: 90 CAPSULE | Refills: 1 | Status: SHIPPED | OUTPATIENT
Start: 2024-03-04 | End: 2024-05-06

## 2024-03-04 RX ORDER — HYDROXYZINE HYDROCHLORIDE 10 MG/1
10 TABLET, FILM COATED ORAL 3 TIMES DAILY PRN
Qty: 30 TABLET | Refills: 3 | Status: SHIPPED | OUTPATIENT
Start: 2024-03-04

## 2024-03-04 RX ORDER — POLYETHYLENE GLYCOL 3350 17 G/17G
1 POWDER, FOR SOLUTION ORAL DAILY
Qty: 510 G | Refills: 1 | Status: SHIPPED | OUTPATIENT
Start: 2024-03-04

## 2024-03-04 RX ORDER — METFORMIN HCL 500 MG
500 TABLET, EXTENDED RELEASE 24 HR ORAL
Qty: 90 TABLET | Refills: 1 | Status: SHIPPED | OUTPATIENT
Start: 2024-03-04 | End: 2024-05-06

## 2024-03-04 RX ORDER — ROSUVASTATIN CALCIUM 5 MG/1
5 TABLET, COATED ORAL DAILY
Qty: 90 TABLET | Refills: 1 | Status: SHIPPED | OUTPATIENT
Start: 2024-03-04 | End: 2024-05-06

## 2024-03-04 ASSESSMENT — PAIN SCALES - GENERAL: PAINLEVEL: NO PAIN (0)

## 2024-03-04 NOTE — PATIENT INSTRUCTIONS
The dose of mirtazapine is increased to 15 mg at bed time   Use hydroxyzine as rescue medication for anxiety  Please call radiology by dialing  [phone] to schedule your  CT of abdomen    Use miralex ( polyethyleneglycol )as needed for constipation  Respiratory syncytial virus (RSV) is an important cause of lower respiratory tract disease in older adults.   In 2023, the [place] Food and Drug Administration approved two recombinant RSV vaccines for the prevention of lower respiratory tract disease in individuals 60 years of age and older [1,2].     this vaccine prevented RSV-related respiratory infection and lower respiratory tract disease in adults age 60 years and older who received one dose of an AS01E-adjuvanted RSV Prefusion F Protein Vaccine [51].     This infection may cause serious infection like pneumonia in older patients     So it is good idea to get this RSV vaccine from any local pharmacy     .  There is a new shingles vaccine available called shingrex  It is a series of 2 shots 2-6 months apart.  Considered more than 90% effective.  Please go to any pharmacy to get the  vaccine      Follow up in 2 months.  Seek sooner medical attention if there is any worsening of symptoms or problems.     Preventive Care Advice   This is general advice given by our system to help you stay healthy. However, your care team may have specific advice just for you. Please talk to your care team about your preventive care needs.  Nutrition  Eat 5 or more servings of fruits and vegetables each day.  Try wheat bread, brown rice and whole grain pasta (instead of white bread, rice, and pasta).  Get enough calcium and vitamin D. Check the label on foods and aim for 100% of the RDA (recommended daily allowance).  Lifestyle  Exercise at least 150 minutes each week   (30 minutes a day, 5 days a week).  Do muscle strengthening activities 2 days a week. These help control your weight and prevent disease.  No smoking.  Wear sunscreen to  prevent skin cancer.  Have a dental exam and cleaning every 6 months.  Yearly exams  See your health care team every year to talk about:  Any changes in your health.  Any medicines your care team has prescribed.  Preventive care, family planning, and ways to prevent chronic diseases.  Shots (vaccines)   HPV shots (up to age 26), if you've never had them before.  Hepatitis B shots (up to age 59), if you've never had them before.  COVID-19 shot: Get this shot when it's due.  Flu shot: Get a flu shot every year.  Tetanus shot: Get a tetanus shot every 10 years.  Pneumococcal, hepatitis A, and RSV shots: Ask your care team if you need these based on your risk.  Shingles shot (for age 50 and up).  General health tests  Diabetes screening:  Starting at age 35, Get screened for diabetes at least every 3 years.  If you are younger than age 35, ask your care team if you should be screened for diabetes.  Cholesterol test: At age 39, start having a cholesterol test every 5 years, or more often if advised.  Bone density scan (DEXA): At age 50, ask your care team if you should have this scan for osteoporosis (brittle bones).  Hepatitis C: Get tested at least once in your life.  STIs (sexually transmitted infections)  Before age 24: Ask your care team if you should be screened for STIs.  After age 24: Get screened for STIs if you're at risk. You are at risk for STIs (including HIV) if:  You are sexually active with more than one person.  You don't use condoms every time.  You or a partner was diagnosed with a sexually transmitted infection.  If you are at risk for HIV, ask about PrEP medicine to prevent HIV.  Get tested for HIV at least once in your life, whether you are at risk for HIV or not.  Cancer screening tests  Cervical cancer screening: If you have a cervix, begin getting regular cervical cancer screening tests at age 21. Most people who have regular screenings with normal results can stop after age 65. Talk about this  with your provider.  Breast cancer scan (mammogram): If you've ever had breasts, begin having regular mammograms starting at age 40. This is a scan to check for breast cancer.  Colon cancer screening: It is important to start screening for colon cancer at age 45.  Have a colonoscopy test every 10 years (or more often if you're at risk) Or, ask your provider about stool tests like a FIT test every year or Cologuard test every 3 years.  To learn more about your testing options, visit: [email or web address].  For help making a decision, visit: https://bit.ly/er70405.  Prostate cancer screening test: If you have a prostate and are age 55 to 69, ask your provider if you would benefit from a yearly prostate cancer screening test.  Lung cancer screening: If you are a current or former smoker age 50 to 80, ask your care team if ongoing lung cancer screenings are right for you.  For informational purposes only. Not to replace the advice of your health care provider. Copyright   2023 [facility] [employer]. All rights reserved. Clinically reviewed by the  [employer] [facility]. [employer] 446155 - REV 01/24.    Hearing Loss: Care Instructions  Overview     Hearing loss is a sudden or slow decrease in how well you hear. It can range from slight to profound. Permanent hearing loss can occur with aging. It also can happen when you are exposed long-term to loud noise. Examples include listening to loud music, riding motorcycles, or being around other loud machines.  Hearing loss can affect your work and home life. It can make you feel lonely or depressed. You may feel that you have lost your independence. But hearing aids and other devices can help you hear better and feel connected to others.  Follow-up care is a key part of your treatment and safety. Be sure to make and go to all appointments, and call your doctor if you are having problems. It's also a good idea to know your test results and keep  a list of the medicines you take.  How can you care for yourself at home?  Avoid loud noises whenever possible. This helps keep your hearing from getting worse.  Always wear hearing protection around loud noises.  Wear a hearing aid as directed.  A professional can help you pick a hearing aid that will work best for you.  You can also get hearing aids over the counter for mild to moderate hearing loss.  Have hearing tests as your doctor suggests. They can show whether your hearing has changed. Your hearing aid may need to be adjusted.  Use other devices as needed. These may include:  Telephone amplifiers and hearing aids that can connect to a television, stereo, radio, or microphone.  Devices that use lights or vibrations. These alert you to the doorbell, a ringing telephone, or a baby monitor.  Television closed-captioning. This shows the words at the bottom of the screen. Most new TVs can do this.  TTY (text telephone). This lets you type messages back and forth on the telephone instead of talking or listening. These devices are also called TDD. When messages are typed on the keyboard, they are sent over the phone line to a receiving TTY. The message is shown on a monitor.  Use text messaging, social media, and email if it is hard for you to communicate by telephone.  Try to learn a listening technique called speechreading. It is not lipreading. You pay attention to people's gestures, expressions, posture, and tone of voice. These clues can help you understand what a person is saying. Face the person you are talking to, and have them face you. Make sure the lighting is good. You need to see the other person's face clearly.  Think about counseling if you need help to adjust to your hearing loss.  When should you call for help?  Watch closely for changes in your health, and be sure to contact your doctor if:    You think your hearing is getting worse.     You have new symptoms, such as dizziness or nausea.   Where  "can you learn more?  Go to https://www.[employer].net/patiented  Enter R798 in the search box to learn more about \"Hearing Loss: Care Instructions.\"  Current as of: February 28, 2023               Content Version: 13.8    3362-1794 [employer].   Care instructions adapted under license by your healthcare professional. If you have questions about a medical condition or this instruction, always ask your healthcare professional. [employer] disclaims any warranty or liability for your use of this information.      Learning About Stress  What is stress?     Stress is your body's response to a hard situation. Your body can have a physical, emotional, or mental response. Stress is a fact of life for most people, and it affects everyone differently. What causes stress for you may not be stressful for someone else.  A lot of things can cause stress. You may feel stress when you go on a job interview, take a test, or run a race. This kind of short-term stress is normal and even useful. It can help you if you need to work hard or react quickly. For example, stress can help you finish an important job on time.  Long-term stress is caused by ongoing stressful situations or events. Examples of long-term stress include long-term health problems, ongoing problems at work, or conflicts in your family. Long-term stress can harm your health.  How does stress affect your health?  When you are stressed, your body responds as though you are in danger. It makes hormones that speed up your heart, make you breathe faster, and give you a burst of energy. This is called the fight-or-flight stress response. If the stress is over quickly, your body goes back to normal and no harm is done.  But if stress happens too often or lasts too long, it can have bad effects. Long-term stress can make you more likely to get sick, and it can make symptoms of some diseases worse. If you tense up when you are stressed, you may " develop neck, shoulder, or low back pain. Stress is linked to high blood pressure and heart disease.  Stress also harms your emotional health. It can make you malone, tense, or depressed. Your relationships may suffer, and you may not do well at work or school.  What can you do to manage stress?  You can try these things to help manage stress:   Do something active. Exercise or activity can help reduce stress. Walking is a great way to get started. Even everyday activities such as housecleaning or yard work can help.  Try yoga or joe chi. These techniques combine exercise and meditation. You may need some training at first to learn them.  Do something you enjoy. For example, listen to music or go to a movie. Practice your hobby or do volunteer work.  Meditate. This can help you relax, because you are not worrying about what happened before or what may happen in the future.  Do guided imagery. Imagine yourself in any setting that helps you feel calm. You can use online videos, books, or a teacher to guide you.  Do breathing exercises. For example:  From a standing position, bend forward from the waist with your knees slightly bent. Let your arms dangle close to the floor.  Breathe in slowly and deeply as you return to a standing position. Roll up slowly and lift your head last.  Hold your breath for just a few seconds in the standing position.  Breathe out slowly and bend forward from the waist.  Let your feelings out. Talk, laugh, cry, and express anger when you need to. Talking with supportive friends or family, a counselor, or a saloni leader about your feelings is a healthy way to relieve stress. Avoid discussing your feelings with people who make you feel worse.  Write. It may help to write about things that are bothering you. This helps you find out how much stress you feel and what is causing it. When you know this, you can find better ways to cope.  What can you do to prevent stress?  You might try some of  "these things to help prevent stress:  Manage your time. This helps you find time to do the things you want and need to do.  Get enough sleep. Your body recovers from the stresses of the day while you are sleeping.  Get support. Your family, friends, and community can make a difference in how you experience stress.  Limit your news feed. Avoid or limit time on social media or news that may make you feel stressed.  Do something active. Exercise or activity can help reduce stress. Walking is a great way to get started.  Where can you learn more?  Go to https://www.[employer]kaRx.net/patiented  Enter N032 in the search box to learn more about \"Learning About Stress.\"  Current as of: February 26, 2023               Content Version: 13.8    8880-7073 [employer].   Care instructions adapted under license by your healthcare professional. If you have questions about a medical condition or this instruction, always ask your healthcare professional. [employer] disclaims any warranty or liability for your use of this information.      Learning About Sleeping Well  What does sleeping well mean?     Sleeping well means getting enough sleep to feel good and stay healthy. How much sleep is enough varies among people.  The number of hours you sleep and how you feel when you wake up are both important. If you do not feel refreshed, you probably need more sleep. Another sign of not getting enough sleep is feeling tired during the day.  Experts recommend that adults get at least 7 or more hours of sleep per day. Children and older adults need more sleep.  Why is getting enough sleep important?  Getting enough quality sleep is a basic part of good health. When your sleep suffers, your physical health, mood, and your thoughts can suffer too. You may find yourself feeling more grumpy or stressed. Not getting enough sleep also can lead to serious problems, including injury, accidents, anxiety, and depression.  What " "might cause poor sleeping?  Many things can cause sleep problems, including:  Changes to your sleep schedule.  Stress. Stress can be caused by fear about a single event, such as giving a speech. Or you may have ongoing stress, such as worry about work or school.  Depression, anxiety, and other mental or emotional conditions.  Changes in your sleep habits or surroundings. This includes changes that happen where you sleep, such as noise, light, or sleeping in a different bed. It also includes changes in your sleep pattern, such as having jet lag or working a late shift.  Health problems, such as pain, breathing problems, and restless legs syndrome.  Lack of regular exercise.  Using alcohol, nicotine, or caffeine before bed.  How can you help yourself?  Here are some tips that may help you sleep more soundly and wake up feeling more refreshed.  Your sleeping area   Use your bedroom only for sleeping and sex. A bit of light reading may help you fall asleep. But if it doesn't, do your reading elsewhere in the house. Try not to use your TV, computer, smartphone, or tablet while you are in bed.  Be sure your bed is big enough to stretch out comfortably, especially if you have a sleep partner.  Keep your bedroom quiet, dark, and cool. Use curtains, blinds, or a sleep mask to block out light. To block out noise, use earplugs, soothing music, or a \"white noise\" machine.  Your evening and bedtime routine   Create a relaxing bedtime routine. You might want to take a warm shower or bath, or listen to soothing music.  Go to bed at the same time every night. And get up at the same time every morning, even if you feel tired.  What to avoid   Limit caffeine (coffee, tea, caffeinated sodas) during the day, and don't have any for at least 6 hours before bedtime.  Avoid drinking alcohol before bedtime. Alcohol can cause you to wake up more often during the night.  Try not to smoke or use tobacco, especially in the evening. Nicotine can " "keep you awake.  Limit naps during the day, especially close to bedtime.  Avoid lying in bed awake for too long. If you can't fall asleep or if you wake up in the middle of the night and can't get back to sleep within about 20 minutes, get out of bed and go to another room until you feel sleepy.  Avoid taking medicine right before bed that may keep you awake or make you feel hyper or energized. Your doctor can tell you if your medicine may do this and if you can take it earlier in the day.  If you can't sleep   Imagine yourself in a peaceful, pleasant scene. Focus on the details and feelings of being in a place that is relaxing.  Get up and do a quiet or boring activity until you feel sleepy.  Avoid drinking any liquids before going to bed to help prevent waking up often to use the bathroom.  Where can you learn more?  Go to https://www.[employer].net/patiented  Enter J942 in the search box to learn more about \"Learning About Sleeping Well.\"  Current as of: July 11, 2023               Content Version: 13.8    2067-1509 [employer].   Care instructions adapted under license by your healthcare professional. If you have questions about a medical condition or this instruction, always ask your healthcare professional. [employer] disclaims any warranty or liability for your use of this information.      "

## 2024-03-04 NOTE — PROGRESS NOTES
Preventive Care Visit  [facility]  Zoe Tomas MD, Internal Medicine  Mar 4, 2024    Assessment & Plan     Theo was seen today for physical.    Diagnoses and all orders for this visit:   He was accompanied by his wife    Encounter for Medicare annual wellness exam  Preventive health counseling was also done.  Reminded about shingles vaccine and RSV vaccine    Anxiety  -     mirtazapine (REMERON) 15 MG tablet; Take 1 tablet (15 mg) by mouth at bedtime  -     hydrOXYzine HCl (ATARAX) 10 MG tablet; Take 1 tablet (10 mg) by mouth 3 times daily as needed for anxiety  Anxiety is getting better but it is still significant  He would like to go up on the dose of mirtazapine  Does not want to see a counselor as his wife is helping and she is a therapist  Dose of mirtazapine is increased from 7.5 to 15 mg  Use of hydroxyzine as a rescue medication  I had discussion about the management of anxiety    Stage 3a chronic kidney disease (H)  -     Albumin Random Urine Quantitative with Creat Ratio; Future  -     Albumin Random Urine Quantitative with Creat Ratio    Benign hypertension  -     hydrALAZINE (APRESOLINE) 10 MG tablet; Take 1 tablet (10 mg) by mouth 3 times daily  -     lisinopril (ZESTRIL) 20 MG tablet; Take 1 tablet (20 mg) by mouth daily  Blood pressure is now under much better control  Most of the readings are below 140/90 at home    Chronic abdominal pain  -     CT Abdomen Pelvis w Contrast; Future  -     CBC with platelets and differential; Future  -     UA with Microscopic reflex to Culture - lab collect; Future  -     CBC with platelets and differential  -     UA with Microscopic reflex to Culture - lab collect  Patient has chronic issue of constipation  Discussed the management  He had nephrectomy and colon resection  He is having ongoing intermittent abdominal discomfort  CT of the abdomen was ordered    Type 2 diabetes mellitus with stage 3a chronic kidney disease, without long-term  current use of insulin (H)  -     WA FOOT EXAM NO CHARGE    Hyperlipidemia, unspecified hyperlipidemia type  -     rosuvastatin (CRESTOR) 5 MG tablet; Take 1 tablet (5 mg) by mouth daily for cholesterol  Discussed the importance of taking this medication    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    Paroxysmal atrial fibrillation (H)  Patient reported that he does not have atrial fibrillation anymore he was seen by his electrophysiologist and they took him off of blood thinner    metoprolol makes his pulse go down    Solitary kidney, acquired  Comments:  Due to history of nephrectomy on the right side    Prostate cancer (H)  Comments:  Has been following urologist    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    History of colonic polyps    Constipation, unspecified constipation type  -     polyethylene glycol (MIRALAX) 17 GM/Dose powder; Take 17 g (1 Capful) by mouth daily for constipation    Lower urinary tract symptoms (LUTS)  Comments:  Patient is on tamsulosin and follows urologist  Orders:  -     tamsulosin (FLOMAX) 0.4 MG capsule; Take 1 capsule (0.4 mg) by mouth daily    Chacon's esophagus without dysplasia  Comments:  seen on EGD and he is on pantoprazole  Orders:  -     pantoprazole (PROTONIX) 40 MG EC tablet; Take 1 tablet (40 mg) by mouth daily for acid reflux    Type 2 diabetes mellitus without complication, without long-term current use of insulin (H)  Comments:  Well-controlled on metformin  Orders:  -     metFORMIN (GLUCOPHAGE XR) 500 MG 24 hr tablet; Take 1 tablet (500 mg) by mouth daily (with dinner)    Medication management  -     Comprehensive metabolic panel (BMP + Alb, Alk Phos, ALT, AST, Total. Bili, TP); Future  -     Comprehensive metabolic panel (BMP + Alb, Alk Phos, ALT, AST, Total. Bili, TP)    Other orders  -     PRIMARY CARE FOLLOW-UP SCHEDULING; Future          Patient has been advised of split billing requirements  "and indicates understanding: Yes          BMI  Estimated body mass index is 28.54 kg/m  as calculated from the following:    Height as of this encounter: 1.778 m (5' 10\").    Weight as of this encounter: 90.2 kg (198 lb 14.4 oz).   Weight management plan: Discussed healthy diet and exercise guidelines    Counseling  Appropriate preventive services were discussed with this patient, including applicable screening as appropriate for fall prevention, nutrition, physical activity, Tobacco-use cessation, weight loss and cognition.  Checklist reviewing preventive services available has been given to the patient.  Reviewed patient's diet, addressing concerns and/or questions.   The patient was instructed to see the dentist every 6 months.   Discussed possible causes of fatigue. The patient was provided with written information regarding signs of hearing loss.       See Patient Instructions    Dwayne Venegas is a 79 year old, presenting for the following:  Physical (Fasting, pain in abdomen for 3 weeks, 3 score comes and goes)        3/4/2024     7:00 AM   Additional Questions   Roomed by marbella   Accompanied by benigno barros         Health Care Directive  Patient has a Health Care Directive on file  Advance care planning document is on file and is current.    HPI  Anxiety  Constipation   Lower abdominal pain  Wife is therapist   Does not want to see counselor            3/2/2024   General Health   How would you rate your overall physical health? Good   Feel stress (tense, anxious, or unable to sleep) Very much   (!) STRESS CONCERN      3/2/2024   Nutrition   Diet: Regular (no restrictions)         3/2/2024   Exercise   Days per week of moderate/strenous exercise 6 days   Average minutes spent exercising at this level 40 min         3/2/2024   Social Factors   Frequency of gathering with friends or relatives Once a week   Worry food won't last until get money to buy more No   Food not last or not have enough money for food? " No   Do you have housing?  Yes   Are you worried about losing your housing? No   Lack of transportation? No   Unable to get utilities (heat,electricity)? No         3/2/2024   Fall Risk   Fallen 2 or more times in the past year? No   Trouble with walking or balance? No          3/2/2024   Activities of Daily Living- Home Safety   Needs help with the following daily activites None of the above   Safety concerns in the home None of the above         3/2/2024   Dental   Dentist two times every year? (!) NO         3/2/2024   Hearing Screening   Hearing concerns? (!) IT'S HARD TO FOLLOW A CONVERSATION IN A NOISY RESTAURANT OR CROWDED ROOM.         3/2/2024   Driving Risk Screening   Patient/family members have concerns about driving No         3/2/2024   General Alertness/Fatigue Screening   Have you been more tired than usual lately? (!) YES         3/2/2024   Urinary Incontinence Screening   Bothered by leaking urine in past 6 months No         3/2/2024   TB Screening   Were you born outside of [place]?  No         Today's PHQ-2 Score:       3/4/2024     7:00 AM   PHQ-2 ( 1999 [employer])   Q1: Little interest or pleasure in doing things 0   Q2: Feeling down, depressed or hopeless 0   PHQ-2 Score 0   Q1: Little interest or pleasure in doing things Not at all   Q2: Feeling down, depressed or hopeless Not at all   PHQ-2 Score 0           3/2/2024   Substance Use   Alcohol more than 3/day or more than 7/wk Not Applicable   Do you have a current opioid prescription? No   How severe/bad is pain from 1 to 10? 2/10   Do you use any other substances recreationally? No     Social History     Tobacco Use    Smoking status: Former     Types: Cigars, Pipe, Dip, chew, snus or snuff     Quit date:      Years since quittin.1    Smokeless tobacco: Never    Tobacco comments:     Intermittent use, when using.  None since    Vaping Use    Vaping Use: Never used   Substance Use Topics    Alcohol use: Not Currently    Drug use: No        ASCVD Risk   The 10-year ASCVD risk score (Mark Anthony CAVAZOS, et al., 2019) is: 57.3%    Values used to calculate the score:      Age: 79 years      Sex: Male      Is Non- : No      Diabetic: Yes      Tobacco smoker: No      Systolic Blood Pressure: 131 mmHg      Is BP treated: Yes      HDL Cholesterol: 43 mg/dL      Total Cholesterol: 144 mg/dL            Reviewed and updated as needed this visit by Provider                      Current providers sharing in care for this patient include:  Patient Care Team:  Zoe Tomas MD as PCP - General (Internal Medicine)  Zoe Tomas MD as Assigned PCP  Jono Billy MD as Assigned Surgical Provider  Marcella Florian RD as Diabetes Educator (Dietitian, Registered)  Lynn Valle MD as MD (Cardiovascular Disease)  Soraya García PA-C as Physician Assistant (Cardiovascular Disease)  Soraya García PA-C as Assigned Heart and Vascular Provider    The following health maintenance items are reviewed in Epic and correct as of today:  Health Maintenance   Topic Date Due    ZOSTER IMMUNIZATION (1 of 2) Never done    RSV VACCINE (Pregnancy & 60+) (1 - 1-dose 60+ series) Never done    MICROALBUMIN  03/03/2024    A1C  04/12/2024    EYE EXAM  04/24/2024    PSA  10/30/2024    LIPID  01/12/2025    BMP  02/09/2025    HEMOGLOBIN  02/09/2025    ANNUAL REVIEW OF HM ORDERS  02/16/2025    MEDICARE ANNUAL WELLNESS VISIT  03/04/2025    DIABETIC FOOT EXAM  03/04/2025    FALL RISK ASSESSMENT  03/04/2025    COLORECTAL CANCER SCREENING  05/06/2026    DTAP/TDAP/TD IMMUNIZATION (2 - Td or Tdap) 10/27/2026    ADVANCE CARE PLANNING  03/04/2029    HEPATITIS C SCREENING  Completed    PHQ-2 (once per calendar year)  Completed    INFLUENZA VACCINE  Completed    Pneumococcal Vaccine: 65+ Years  Completed    URINALYSIS  Completed    COVID-19 Vaccine  Completed    IPV IMMUNIZATION  Aged Out    HPV IMMUNIZATION  Aged Out    MENINGITIS  "IMMUNIZATION  Aged Out    RSV MONOCLONAL ANTIBODY  Aged Out         Review of Systems  Constitutional, HEENT, cardiovascular, pulmonary, GI, , musculoskeletal, neuro, skin, endocrine and psych systems are negative, except as otherwise noted.     Objective    Exam  /78   Pulse 74   Temp 97.5  F (36.4  C) (Oral)   Resp 18   Ht 1.778 m (5' 10\")   Wt 90.2 kg (198 lb 14.4 oz)   SpO2 94%   BMI 28.54 kg/m     Estimated body mass index is 28.54 kg/m  as calculated from the following:    Height as of this encounter: 1.778 m (5' 10\").    Weight as of this encounter: 90.2 kg (198 lb 14.4 oz).    Physical Exam  GENERAL: alert and no distress  EYES: Eyes grossly normal to inspection, PERRL and conjunctivae and sclerae normal  HENT: ear canals and TM's normal, nose and mouth without ulcers or lesions  NECK: no adenopathy, no asymmetry, masses, or scars  RESP: lungs clear to auscultation - no rales, rhonchi or wheezes  CV: regular rate and rhythm, normal S1 S2, no S3   ABDOMEN: soft, nontender, no hepatosplenomegaly, no masses and bowel sounds normal  NEURO:  mentation intact and speech normal  PSYCH: mentation appears normal, affect normal/bright  Diabetic foot exam was performed.  Good dorsalis pedis and posterior tibial.  5 point sensory exam was normal.  skin is intact  Scar of colon resection midline and scar of nephrectomy on right side of lower abdomen      3/4/2024   Mini Cog   Clock Draw Score 2 Normal   3 Item Recall 3 objects recalled   Mini Cog Total Score 5          Disclaimer: This note consists of symbols derived from keyboarding, dictation and/or voice recognition software. As a result, there may be errors in the script that have gone undetected. Please consider this when interpreting information found in this chart.    Signed Electronically by: Zoe Tomas MD    "

## 2024-03-05 NOTE — RESULT ENCOUNTER NOTE
Woody Dorado    This is to inform you regarding your test result.    Urinalysis results are satisfactory.  The testing of your kidney function, liver function and electrolytes was satisfactory   Urine for Microalbumin is normal.  CBC result which includes white count Hemoglobin and  Platelet Counts is normal.         Sincerely,      Dr.Nasima Thom MD,FACP

## 2024-03-12 ENCOUNTER — HOSPITAL ENCOUNTER (OUTPATIENT)
Dept: CT IMAGING | Facility: CLINIC | Age: 79
Discharge: HOME | End: 2024-03-12
Attending: INTERNAL MEDICINE | Admitting: INTERNAL MEDICINE
Payer: MEDICARE

## 2024-03-12 DIAGNOSIS — R10.9 CHRONIC ABDOMINAL PAIN: ICD-10-CM

## 2024-03-12 DIAGNOSIS — G89.29 CHRONIC ABDOMINAL PAIN: ICD-10-CM

## 2024-03-12 PROCEDURE — 250N000009 HC RX 250: Performed by: INTERNAL MEDICINE

## 2024-03-12 PROCEDURE — 74177 CT ABD & PELVIS W/CONTRAST: CPT | Mod: MG

## 2024-03-12 PROCEDURE — 250N000011 HC RX IP 250 OP 636: Performed by: INTERNAL MEDICINE

## 2024-03-12 RX ORDER — IOPAMIDOL 755 MG/ML
97 INJECTION, SOLUTION INTRAVASCULAR ONCE
Status: COMPLETED | OUTPATIENT
Start: 2024-03-12 | End: 2024-03-12

## 2024-03-12 RX ADMIN — IOPAMIDOL 97 ML: 755 INJECTION, SOLUTION INTRAVENOUS at 08:15

## 2024-03-12 RX ADMIN — SODIUM CHLORIDE 68 ML: 9 INJECTION, SOLUTION INTRAVENOUS at 08:15

## 2024-03-13 NOTE — RESULT ENCOUNTER NOTE
Woody Dorado    This is to inform you regarding your test result.    I tried to contact you but got the VM.  Your CT scan did not show any alarming finding  You have large hiatal hernia   This can cause acid reflux  You are on pantoprazole  How your symptoms are ?  Please let me know  If hiatal hernia is causing any symptoms then it can be fixed surgically.      Sincerely,      Dr.Nasima Thom MD,FACP

## 2024-03-27 ENCOUNTER — PRE VISIT (OUTPATIENT)
Dept: OTOLARYNGOLOGY | Facility: CLINIC | Age: 79
End: 2024-03-27

## 2024-05-04 ENCOUNTER — HEALTH MAINTENANCE LETTER (OUTPATIENT)
Age: 79
End: 2024-05-04

## 2024-05-06 ENCOUNTER — OFFICE VISIT (OUTPATIENT)
Dept: FAMILY MEDICINE | Facility: CLINIC | Age: 79
End: 2024-05-06
Payer: MEDICARE

## 2024-05-06 VITALS
WEIGHT: 192.5 LBS | SYSTOLIC BLOOD PRESSURE: 135 MMHG | OXYGEN SATURATION: 97 % | TEMPERATURE: 97.5 F | BODY MASS INDEX: 27.62 KG/M2 | DIASTOLIC BLOOD PRESSURE: 87 MMHG | HEART RATE: 83 BPM | RESPIRATION RATE: 16 BRPM

## 2024-05-06 DIAGNOSIS — Z29.11 NEED FOR VACCINATION AGAINST RESPIRATORY SYNCYTIAL VIRUS: ICD-10-CM

## 2024-05-06 DIAGNOSIS — F41.9 ANXIETY: ICD-10-CM

## 2024-05-06 DIAGNOSIS — Z23 HIGH PRIORITY FOR 2019-NCOV VACCINE: ICD-10-CM

## 2024-05-06 DIAGNOSIS — K44.9 HIATAL HERNIA: ICD-10-CM

## 2024-05-06 DIAGNOSIS — C61 PROSTATE CANCER (H): ICD-10-CM

## 2024-05-06 DIAGNOSIS — K22.70 BARRETT'S ESOPHAGUS WITHOUT DYSPLASIA: ICD-10-CM

## 2024-05-06 DIAGNOSIS — I10 BENIGN HYPERTENSION: ICD-10-CM

## 2024-05-06 DIAGNOSIS — R39.9 LOWER URINARY TRACT SYMPTOMS (LUTS): ICD-10-CM

## 2024-05-06 DIAGNOSIS — R52 PAIN: ICD-10-CM

## 2024-05-06 DIAGNOSIS — Z23 NEED FOR SHINGLES VACCINE: ICD-10-CM

## 2024-05-06 DIAGNOSIS — E11.22 TYPE 2 DIABETES MELLITUS WITH STAGE 3A CHRONIC KIDNEY DISEASE, WITHOUT LONG-TERM CURRENT USE OF INSULIN (H): Primary | ICD-10-CM

## 2024-05-06 DIAGNOSIS — E78.5 HYPERLIPIDEMIA, UNSPECIFIED HYPERLIPIDEMIA TYPE: ICD-10-CM

## 2024-05-06 DIAGNOSIS — N18.31 TYPE 2 DIABETES MELLITUS WITH STAGE 3A CHRONIC KIDNEY DISEASE, WITHOUT LONG-TERM CURRENT USE OF INSULIN (H): Primary | ICD-10-CM

## 2024-05-06 LAB
ANION GAP SERPL CALCULATED.3IONS-SCNC: 11 MMOL/L (ref 7–15)
BUN SERPL-MCNC: 24.6 MG/DL (ref 8–23)
CALCIUM SERPL-MCNC: 10 MG/DL (ref 8.8–10.2)
CHLORIDE SERPL-SCNC: 106 MMOL/L (ref 98–107)
CREAT SERPL-MCNC: 1.33 MG/DL (ref 0.67–1.17)
DEPRECATED HCO3 PLAS-SCNC: 26 MMOL/L (ref 22–29)
EGFRCR SERPLBLD CKD-EPI 2021: 54 ML/MIN/1.73M2
GLUCOSE SERPL-MCNC: 115 MG/DL (ref 70–99)
HBA1C MFR BLD: 6 % (ref 0–5.6)
POTASSIUM SERPL-SCNC: 4.8 MMOL/L (ref 3.4–5.3)
PSA SERPL DL<=0.01 NG/ML-MCNC: 6.15 NG/ML (ref 0–6.5)
SODIUM SERPL-SCNC: 143 MMOL/L (ref 135–145)

## 2024-05-06 PROCEDURE — 83036 HEMOGLOBIN GLYCOSYLATED A1C: CPT | Performed by: INTERNAL MEDICINE

## 2024-05-06 PROCEDURE — 84153 ASSAY OF PSA TOTAL: CPT | Performed by: INTERNAL MEDICINE

## 2024-05-06 PROCEDURE — 91320 SARSCV2 VAC 30MCG TRS-SUC IM: CPT | Performed by: INTERNAL MEDICINE

## 2024-05-06 PROCEDURE — 99214 OFFICE O/P EST MOD 30 MIN: CPT | Mod: 25 | Performed by: INTERNAL MEDICINE

## 2024-05-06 PROCEDURE — 80048 BASIC METABOLIC PNL TOTAL CA: CPT | Performed by: INTERNAL MEDICINE

## 2024-05-06 PROCEDURE — 90480 ADMN SARSCOV2 VAC 1/ONLY CMP: CPT | Performed by: INTERNAL MEDICINE

## 2024-05-06 PROCEDURE — 36415 COLL VENOUS BLD VENIPUNCTURE: CPT | Performed by: INTERNAL MEDICINE

## 2024-05-06 RX ORDER — RESPIRATORY SYNCYTIAL VIRUS VACCINE 120MCG/0.5
0.5 KIT INTRAMUSCULAR ONCE
Qty: 1 EACH | Refills: 0 | Status: CANCELLED | OUTPATIENT
Start: 2024-05-06 | End: 2024-05-06

## 2024-05-06 RX ORDER — LISINOPRIL 20 MG/1
20 TABLET ORAL DAILY
Qty: 90 TABLET | Refills: 3 | Status: SHIPPED | OUTPATIENT
Start: 2024-05-06

## 2024-05-06 RX ORDER — ACETAMINOPHEN 325 MG/1
325-650 TABLET ORAL EVERY 6 HOURS PRN
Qty: 60 TABLET | Refills: 3 | Status: SHIPPED | OUTPATIENT
Start: 2024-05-06

## 2024-05-06 RX ORDER — MIRTAZAPINE 15 MG/1
15 TABLET, FILM COATED ORAL AT BEDTIME
Qty: 90 TABLET | Refills: 3 | Status: SHIPPED | OUTPATIENT
Start: 2024-05-06

## 2024-05-06 RX ORDER — TAMSULOSIN HYDROCHLORIDE 0.4 MG/1
0.4 CAPSULE ORAL DAILY
Qty: 90 CAPSULE | Refills: 3 | Status: SHIPPED | OUTPATIENT
Start: 2024-05-06

## 2024-05-06 RX ORDER — PANTOPRAZOLE SODIUM 40 MG/1
40 TABLET, DELAYED RELEASE ORAL DAILY
Qty: 90 TABLET | Refills: 3 | Status: SHIPPED | OUTPATIENT
Start: 2024-05-06

## 2024-05-06 RX ORDER — ROSUVASTATIN CALCIUM 5 MG/1
5 TABLET, COATED ORAL DAILY
Qty: 90 TABLET | Refills: 1 | Status: SHIPPED | OUTPATIENT
Start: 2024-05-06 | End: 2024-09-16

## 2024-05-06 RX ORDER — METFORMIN HCL 500 MG
500 TABLET, EXTENDED RELEASE 24 HR ORAL
Qty: 90 TABLET | Refills: 3 | Status: SHIPPED | OUTPATIENT
Start: 2024-05-06

## 2024-05-06 ASSESSMENT — ANXIETY QUESTIONNAIRES
IF YOU CHECKED OFF ANY PROBLEMS ON THIS QUESTIONNAIRE, HOW DIFFICULT HAVE THESE PROBLEMS MADE IT FOR YOU TO DO YOUR WORK, TAKE CARE OF THINGS AT HOME, OR GET ALONG WITH OTHER PEOPLE: NOT DIFFICULT AT ALL
6. BECOMING EASILY ANNOYED OR IRRITABLE: SEVERAL DAYS
1. FEELING NERVOUS, ANXIOUS, OR ON EDGE: SEVERAL DAYS
8. IF YOU CHECKED OFF ANY PROBLEMS, HOW DIFFICULT HAVE THESE MADE IT FOR YOU TO DO YOUR WORK, TAKE CARE OF THINGS AT HOME, OR GET ALONG WITH OTHER PEOPLE?: NOT DIFFICULT AT ALL
5. BEING SO RESTLESS THAT IT IS HARD TO SIT STILL: NOT AT ALL
7. FEELING AFRAID AS IF SOMETHING AWFUL MIGHT HAPPEN: SEVERAL DAYS
2. NOT BEING ABLE TO STOP OR CONTROL WORRYING: SEVERAL DAYS
4. TROUBLE RELAXING: SEVERAL DAYS
3. WORRYING TOO MUCH ABOUT DIFFERENT THINGS: SEVERAL DAYS
GAD7 TOTAL SCORE: 6
7. FEELING AFRAID AS IF SOMETHING AWFUL MIGHT HAPPEN: SEVERAL DAYS
GAD7 TOTAL SCORE: 6

## 2024-05-06 ASSESSMENT — PAIN SCALES - GENERAL: PAINLEVEL: MILD PAIN (3)

## 2024-05-06 NOTE — PROGRESS NOTES
Theo was seen today for recheck medication.    Diagnoses and all orders for this visit:    Type 2 diabetes mellitus with stage 3a chronic kidney disease, without long-term current use of insulin (H)  -     HEMOGLOBIN A1C; Future  -     metFORMIN (GLUCOPHAGE XR) 500 MG 24 hr tablet; Take 1 tablet (500 mg) by mouth daily (with dinner)  -     Basic metabolic panel  (Ca, Cl, CO2, Creat, Gluc, K, Na, BUN); Future  Advised patient to make appointment for diabetic eye exam.     Need for shingles vaccine  Advised patient to get RSV and shingles vaccine at pharmacy.     Need for vaccination against respiratory syncytial virus  Advised patient to get RSV and shingles vaccine at pharmacy.     Benign hypertension  -     lisinopril (ZESTRIL) 20 MG tablet; Take 1 tablet (20 mg) by mouth daily  He has stopped taking hydralazine x 1 month due to low blood pressure. He is now only taking lisinopril 20 mg daily. Blood pressure today at 135/87. Home blood pressure readings well controlled. Controlling his anxiety seems to have improved his blood pressure.     Anxiety  -     mirtazapine (REMERON) 15 MG tablet; Take 1 tablet (15 mg) by mouth at bedtime  Per patient, his anxiety has significantly improved after we increased remeron from 7.5 mg to 15 mg two months ago. He also has hydroxyzine as rescue.     Hyperlipidemia, unspecified hyperlipidemia type  -     rosuvastatin (CRESTOR) 5 MG tablet; Take 1 tablet (5 mg) by mouth daily for cholesterol    Chacon's esophagus without dysplasia  Comments:  seen on EGD and he is on pantoprazole; Patient is advised that PPI's affect calcium absorption so make sure take adequate calcium with vit D   Orders:  -     pantoprazole (PROTONIX) 40 MG EC tablet; Take 1 tablet (40 mg) by mouth daily for acid reflux    Lower urinary tract symptoms (LUTS)  Comments:  Patient is on tamsulosin and follows urologist  Orders:  -     tamsulosin (FLOMAX) 0.4 MG capsule; Take 1 capsule (0.4 mg) by mouth daily  Stay  well hydrated.     Pain  -     acetaminophen (TYLENOL) 325 MG tablet; Take 1-2 tablets (325-650 mg) by mouth every 6 hours as needed for mild pain or headaches  For back pain; He only takes the medication as needed.     Hiatal hernia  Noted on CT abd on 03/12/2024  Symptoms well controlled with pantoprazole. Patient is advised that PPI's affect calcium absorption so make sure take adequate calcium with vit D     Other  He receives COVID-19 booster today.     Dwayne Venegas is a 79 year old, presenting for the following health issues:  Recheck Medication        3/4/2024     7:00 AM   Additional Questions   Roomed by marbella   Accompanied by benigno wife     History of Present Illness       Mental Health Follow-up:  Patient presents to follow-up on Depression & Anxiety.Patient's depression since last visit has been:  Better  The patient is not having other symptoms associated with depression.  Patient's anxiety since last visit has been:  Better  The patient is having other symptoms associated with anxiety.  Any significant life events: health concerns  Patient is not feeling anxious or having panic attacks.  Patient has no concerns about alcohol or drug use.    Diabetes:   He presents for follow up of diabetes.  He is checking home blood glucose one time daily.   He checks blood glucose before and after meals.  Blood glucose is never over 200 and never under 70. He is aware of hypoglycemia symptoms including weakness.    He has no concerns regarding his diabetes at this time.  He is having weight loss.  The patient has not had a diabetic eye exam in the last 12 months.          Hypertension: He presents for follow up of hypertension.  He does check blood pressure  regularly outside of the clinic. Outside blood pressures have been over 140/90. He does not follow a low salt diet.     He eats 4 or more servings of fruits and vegetables daily.He consumes 0 sweetened beverage(s) daily.He exercises with enough effort to  increase his heart rate 30 to 60 minutes per day.  He exercises with enough effort to increase his heart rate 7 days per week.   He is taking medications regularly.         Review of Systems  Constitutional, HEENT, cardiovascular, pulmonary, GI, , musculoskeletal, neuro, skin, endocrine and psych systems are negative, except as otherwise noted.      Objective    /87 (BP Location: Right arm, Patient Position: Sitting, Cuff Size: Adult Large)   Pulse 83   Temp 97.5  F (36.4  C) (Temporal)   Resp 16   Wt 87.3 kg (192 lb 8 oz)   SpO2 97%   BMI 27.62 kg/m    Body mass index is 27.62 kg/m .  Physical Exam   GENERAL APPEARANCE: healthy, alert and no distress  EYES: Eyes grossly normal to inspection, PERRL and conjunctivae and sclerae normal  HENT: ear canals and TM's normal and nose and mouth without ulcers or lesions  NECK: no adenopathy  RESP: lungs clear to auscultation - no rales, rhonchi or wheezes  CV: regular rates and rhythm, normal S1 S2, no S3            Signed Electronically by: Zoe Tomas MD      This document serves as a record of the services and decisions personally performed and made by Dr. Tomas. It was created on her behalf by Diallo Carballo, a trained medical scribe. The creation of this document is based the provider's statements to the medical scribe.

## 2024-05-06 NOTE — PATIENT INSTRUCTIONS
Respiratory syncytial virus (RSV) is an important cause of lower respiratory tract disease in older adults.   In 2023, the [place] Food and Drug Administration approved two recombinant RSV vaccines for the prevention of lower respiratory tract disease in individuals 60 years of age and older [1,2].     this vaccine prevented RSV-related respiratory infection and lower respiratory tract disease in adults age 60 years and older who received one dose of an AS01E-adjuvanted RSV Prefusion F Protein Vaccine [51].     This infection may cause serious infection like pneumonia in older patients     So it is good idea to get this RSV vaccine from any local pharmacy .  There is a new shingles vaccine available called shingrex  It is a series of 2 shots 2-6 months apart.  Considered more than 90% effective.  Please go to any pharmacy to get the  vaccine    Make appointment with eye doctor for yearly diabetic eye exam    PPI's which is pantoprazole affect calcium absorption so make sure take adequate calcium with vit D     Labs today      Follow up in 4 months.  Seek sooner medical attention if there is any worsening of symptoms or problems.

## 2024-05-07 NOTE — RESULT ENCOUNTER NOTE
Woody Dorado    This is to inform you regarding your test result.    Chronic kidney disease is stable  Glucose which is your blood sugar is slightly elevated.  Avoid high sugar containing food.  HbA1c which is average glucose during last 3 months is 6%  Your diabetes is under control.      Sincerely,      Dr.Nasima Thom MD,FACP

## 2024-05-08 ENCOUNTER — DOCUMENTATION ONLY (OUTPATIENT)
Dept: LAB | Facility: CLINIC | Age: 79
End: 2024-05-08

## 2024-05-08 NOTE — PROGRESS NOTES
Patient has an upcomming lab visit without orders. Please place orders as needed.    Patient requesting: PSA

## 2024-05-10 ENCOUNTER — TELEPHONE (OUTPATIENT)
Dept: UROLOGY | Facility: CLINIC | Age: 79
End: 2024-05-10
Payer: MEDICARE

## 2024-05-10 NOTE — TELEPHONE ENCOUNTER
[facility] Call Center    Phone Message    May a detailed message be left on voicemail: yes     Reason for Call: Other: Pt had PSA lab done at his 5/6 appt. Pt is wondering if needs to do this again on 5/13 or if can just cancel that appt. Please call to advise. Thanks      Action Taken: Other: Uro    Travel Screening: Not Applicable

## 2024-05-20 ENCOUNTER — VIRTUAL VISIT (OUTPATIENT)
Dept: UROLOGY | Facility: CLINIC | Age: 79
End: 2024-05-20
Payer: MEDICARE

## 2024-05-20 DIAGNOSIS — N13.8 BPH WITH OBSTRUCTION/LOWER URINARY TRACT SYMPTOMS: ICD-10-CM

## 2024-05-20 DIAGNOSIS — C61 PROSTATE CANCER (H): Primary | ICD-10-CM

## 2024-05-20 DIAGNOSIS — N40.1 BPH WITH OBSTRUCTION/LOWER URINARY TRACT SYMPTOMS: ICD-10-CM

## 2024-05-20 PROCEDURE — 99214 OFFICE O/P EST MOD 30 MIN: CPT | Mod: 95 | Performed by: UROLOGY

## 2024-05-20 ASSESSMENT — PAIN SCALES - GENERAL: PAINLEVEL: NO PAIN (0)

## 2024-05-20 NOTE — PROGRESS NOTES
"[facility]  CHIEF COMPLAINT   It was my pleasure to see Estrada Oseguera who is a 79 year old male for follow-up of prostate cancer on active surveillance with LUTS.      HPI   Estrada Oseguera is a very pleasant 79 year old male     Initially seen 4/7/21:  \"Estrada Oseguera is a 76 year old male who is being seen for evaluation of Elevated PSA      Has been having significant LUTS worsening over the past 6-12 months  Slow and weak stream  Nocturia x3/night  Having daytime frequency   Having some urgency   He is having to do some abdominal straining  No prior acute urinary retention, UTI, or gross hematuria      Also with some Elevated PSA      Father had low grade prostate cancer found at time of death at age 87 from a stroke\"    4/21/21:  Started on tamsulosin 0.4mg (Flomax) at last visit  Follow up to discuss MRI  Notes a slight change with tamsulosin 0.4mg (Flomax)     5/19/21:  Follow-up today to discuss his biopsy results  He did well after the biopsy with no issues  His wife joins him for this call    10/15/21:  Follow-up today to discuss his recent PSA  He has been doing well with no change in symptoms    4/22/2022:   Follow-up today on active surveillance for his prostate cancer  He did run out of his tamsulosin a few weeks ago and noticed the effects of stopping the medication.  This has been refilled  His wife joins him for this visit    11/2/2022:  Doing very well  He ran out of Flomax about a week ago and has noticed a decrease in his urine stream and that it is a little bit harder to start urination  His wife joins for this visit    5/1/2023:  Follow-up today for PSA recheck  He is doing well with stable urine symptoms on Flomax  He does have nocturia 1-2 times per night    TODAY 5/20/2024:  Has been dealing with HTN and anxiety issues   No changes or issues with urination  Flow is good and strong  He does note some increased frequency  He is overall not very bothered by his urination and is happy " with the tamsulosin  His wife joins him for this visit    PHYSICAL EXAM  Patient is a 79 year old  male   Vitals: There were no vitals taken for this visit.  There is no height or weight on file to calculate BMI.  General Appearance Adult:   Alert, no acute distress, oriented  Neuro: Alert, oriented, speech and mentation normal  Psych: affect and mood normal        PSA PSA Tumor Marker   Latest Ref Rng 0.00 - 4.00 ug/L 0.00 - 6.50 ng/mL   9/21/2018 5.22 (H)     3/13/2019 5.28 (H)     3/16/2020 4.90 (H)     3/18/2021 5.38 (H)     9/20/2021 6.10 (H)     4/22/2022 6.10 (H)     10/21/2022 7.10 (H)     4/25/2023 7.40 (H)     10/30/2023 8.00 (H)     5/6/2024  6.15         PATHOLOGY:  MR-FUSION URONAV 5/12/21:  FINAL DIAGNOSIS:   A.  Prostate gland, left lateral base, needle biopsy:   - Negative for malignancy.   - Mild chronic inflammation.     B.  Prostate gland, left lateral mid, needle biopsy:   - Negative for malignancy.   - Glandular atrophy.     C.  Prostate gland, left lateral apex, needle biopsy:   - Negative for malignancy.   - Glandular atrophy.     D.  Prostate gland, left base, needle biopsy:   - Prostatic acinar adenocarcinoma.      - [place] score  6 (3+3):      - WHO Grade Group - 1      - Proportion of tissue involved by tumor:  Approximately 33%      - Number of tissue cores involved by tumor:  1 (out of 1)      - Angiolymphatic invasion:  Not identified      - Perineural invasion:  Not identified     E.  Prostate gland, left mid, needle biopsy:   - Prostatic acinar adenocarcinoma.      - [place] score  6 (3+3):      - WHO Grade Group - 1      - Proportion of tissue involved by tumor:  Approximately 33%      - Number of tissue cores involved by tumor:  1 (out of 1)      - Angiolymphatic invasion:  Not identified      - Perineural invasion:  Not identified     F.  Prostate gland, left apex, needle biopsy:]   - Negative for malignancy.     G.  Prostate gland, right lateral base, needle biopsy:   - Negative  "for malignancy.   - Glandular atrophy.     H.  Prostate gland, right lateral mid, needle biopsy:   - A minute focus of atypical small acinar proliferation, suspicious for   malignancy.   - Glandular atrophy.     I.  Prostate gland, right lateral apex, needle biopsy:   - Negative for malignancy.   - Glandular atrophy.     J.  Prostate gland, right base, needle biopsy:   - Negative for malignancy.   - Glandular atrophy.     K.  Prostate gland, right mid, needle biopsy:   - Negative for malignancy.   - Glandular atrophy.     L.  Prostate gland, right apex, needle biopsy:   - Negative for malignancy.   - Glandular atrophy.     M.  Prostate gland., right sided lesion, needle biopsies x 3:   - Prostatic acinar adenocarcinoma.      - [place] score:  6 (3+3)      - WHO Grade Group - 1      - Proportion of tissue involved by tumor:  approximately 40%      - Number of tissue cores involved by tumor: 3 (out of 3)      - Angiolymphatic invasion:  Not identified      - Perineural invasion:  Not identified       IMAGING:  All pertinent imaging reviewed:    All imaging studies reviewed by me.  I personally reviewed these imaging films.  A formal report from radiology will follow.    MRI PROSTATE 4/15/21:  \"FINDINGS:  Size: 6.1 x 4.8 x 4.7 cm; 72 grams  Hemorrhage: Absent   Peripheral zone: Heterogeneous on T2-weighted images. Suspicious  lesions as detailed below.  Transition zone: Enlarged with BPH changes. Transition zone nodules  which are circumscribed or mostly encapsulated without diffusion  restriction.  PI-RADS 2.  No highly suspicious nodules.     Lesion(s) in rank order of severity (highest score- to lowest score,  then by size)      Lesion 1:  Location: Left apex peripheral zone at the 5 o'clock position relative  to the urethra. Series 4 image 36.   Additional prostate regions involved: None   Size: 6  mm  T2 description: Rounded moderate hypointensity  T2 numerical assessment: 3  DWI description: Focal hypointensity " on ADC and hyperintensity on DWI  DWI numerical assessment: 3  DCE assessment: Positive    Prostate margin: Capsular abutment<6 mm with smooth contour   Lesion overall PI-RADS category: 4     Neurovascular bundles: No neurovascular bundle involvement by  malignancy.    Seminal vesicles: No seminal vesicle involvement by malignancy.   Lymph nodes: No lymph node involvement   Bones: No suspicious lesions   Other pelvic organs: Prominent fat in bilateral inguinal canal, left  more than right. Circumferential apparent urinary bladder wall  thickening with trabeculation/sacculations, likely representing post  chronic urinary outflow obstructive change.                                                         IMPRESSION:     1. Based on the most suspicious abnormality, this exam is  characterized as PIRADS 4 - Clinically significant cancer is likely to  be present.  The most suspicious abnormality is located at the left  apex peripheral zone, 5:00 position and there is minimal capsular  abutment with no convincing evidence of extraprostatic extension.  2. No suspicious adenopathy or evidence of pelvic metastases.    CT ABD/PEL 3/12/2024:  FINDINGS:   LOWER CHEST: Partially imaged moderate to large hiatal hernia.  Pacemaker lead terminates in the right ventricle at the  interventricular septum.     HEPATOBILIARY: Cholecystectomy. Calcified hepatic granuloma. Focal  fatty infiltration adjacent to the falciform ligament.     PANCREAS: No significant mass, duct dilatation, or inflammatory  change.     SPLEEN: Normal size. Multiple calcified splenic granulomas.     ADRENAL GLANDS: No significant nodules.     KIDNEYS/BLADDER: Right nephrectomy. Normal enhancement of the left  kidney without hydronephrosis. Left upper pole renal cyst and  additional too small to characterize hypoattenuating lesions, not  requiring specific follow-up. Trabeculated urinary bladder with  multiple diverticula suggestive of chronic outlet  obstruction.     BOWEL: Colonic diverticulosis without focal inflammatory changes.  Normal appendix. No evidence of bowel obstruction.     PELVIC ORGANS: Prostatomegaly. Mildly heterogeneous appearance of the  prostate in this patient with known prostate malignancy.     ADDITIONAL FINDINGS: No ascites. No adenopathy in the abdomen or  pelvis. Mild burden of atherosclerotic disease without abdominal  aortic aneurysm.     MUSCULOSKELETAL: No aggressive appearing osseous lesion. Degenerative  changes of the spine. Postsurgical changes of the ventral abdominal  wall. Moderate-sized fat-containing left inguinal hernia                                                             IMPRESSION:   1.  No acute findings in the abdomen or pelvis for abdominal pain.  2.  Moderate to large hiatal hernia.  3.  Colonic diverticulosis.  4.  Right nephrectomy.  5.  Prostatomegaly with findings of chronic bladder outlet  obstruction. Mildly heterogeneous appearance of the prostate in this  patient with known prostate malignancy.      ASSESSMENT and PLAN  79-year-old man with elevated PSA and LUTS, now with an MRI showing a PI-RADS 4 lesion and a prostate volume of 72 g.  Status post an MR fusion biopsy with [place] 3+3 = 6 prostate cancer in 3/3 of the target lesions as well as 2/12 of the template biopsy    Prostate cancer  - Again reviewed his PSA history and trend  - His most recent PSA is reassuring and has come down to 6.15 from 8.0 previously  - I reviewed his prior biopsy results which indicated [place] 3+ = 6 prostate cancer  - I reviewed his prior MRI and reviewed these images personally.  I agree with radiologist interpretation.  - I reviewed his recent CT scan and reviewed these images personally.  - Will plan for PSA check in 6 months and I will send him the results on [employer]  - Follow-up in 1 year with another PSA      BPH with LUTS  -He notes some improvement with voiding following initiation of tamsulosin 0.4 mg  daily  -We discussed that his MRI did demonstrate prostatomegaly with a volume of 72 ml  -Prescription was refilled through his PCP and I recommend he continue on this medication    Chart documentation with Dragon Voice recognition Software. Although reviewed after completion, some words and grammatical errors may remain.     Time spent: 14 minutes spent on the date of the encounter doing chart review, history and exam, documentation and further activities as noted above.    Note copy attestation: the elements have been reviewed, edited as needed, and remain pertinent to today's visit.     Jono Billy MD   Urology  [facility]  [facility] Phone: [phone]  [facility] Phone: [phone]        Virtual Visit Details    Type of service:  Video Visit   Video Start Time:  11:10 AM  Video End Time:11:19 AM    Originating Location (pt. Location): Home    Distant Location (provider location):  On-site  Platform used for Video Visit: Majo

## 2024-05-20 NOTE — LETTER
"5/20/2024       RE: Estrada Oseguera  [address]  [place] [address]     Dear Colleague,    Thank you for referring your patient, Estrada Oseguera, to the [facility] at [facility]. Please see a copy of my visit note below.    [facility]  CHIEF COMPLAINT   It was my pleasure to see Estrada Oseguera who is a 79 year old male for follow-up of prostate cancer on active surveillance with LUTS.      HPI   Estrada Oseguera is a very pleasant 79 year old male     Initially seen 4/7/21:  \"Estrada Oseguera is a 76 year old male who is being seen for evaluation of Elevated PSA      Has been having significant LUTS worsening over the past 6-12 months  Slow and weak stream  Nocturia x3/night  Having daytime frequency   Having some urgency   He is having to do some abdominal straining  No prior acute urinary retention, UTI, or gross hematuria      Also with some Elevated PSA      Father had low grade prostate cancer found at time of death at age 87 from a stroke\"    4/21/21:  Started on tamsulosin 0.4mg (Flomax) at last visit  Follow up to discuss MRI  Notes a slight change with tamsulosin 0.4mg (Flomax)     5/19/21:  Follow-up today to discuss his biopsy results  He did well after the biopsy with no issues  His wife joins him for this call    10/15/21:  Follow-up today to discuss his recent PSA  He has been doing well with no change in symptoms    4/22/2022:   Follow-up today on active surveillance for his prostate cancer  He did run out of his tamsulosin a few weeks ago and noticed the effects of stopping the medication.  This has been refilled  His wife joins him for this visit    11/2/2022:  Doing very well  He ran out of Flomax about a week ago and has noticed a decrease in his urine stream and that it is a little bit harder to start urination  His wife joins for this visit    5/1/2023:  Follow-up today for PSA recheck  He is doing " well with stable urine symptoms on Flomax  He does have nocturia 1-2 times per night    TODAY 5/20/2024:  Has been dealing with HTN and anxiety issues   No changes or issues with urination  Flow is good and strong  He does note some increased frequency  He is overall not very bothered by his urination and is happy with the tamsulosin  His wife joins him for this visit    PHYSICAL EXAM  Patient is a 79 year old  male   Vitals: There were no vitals taken for this visit.  There is no height or weight on file to calculate BMI.  General Appearance Adult:   Alert, no acute distress, oriented  Neuro: Alert, oriented, speech and mentation normal  Psych: affect and mood normal        PSA PSA Tumor Marker   Latest Ref Rng 0.00 - 4.00 ug/L 0.00 - 6.50 ng/mL   9/21/2018 5.22 (H)     3/13/2019 5.28 (H)     3/16/2020 4.90 (H)     3/18/2021 5.38 (H)     9/20/2021 6.10 (H)     4/22/2022 6.10 (H)     10/21/2022 7.10 (H)     4/25/2023 7.40 (H)     10/30/2023 8.00 (H)     5/6/2024  6.15         PATHOLOGY:  MR-FUSION URONAV 5/12/21:  FINAL DIAGNOSIS:   A.  Prostate gland, left lateral base, needle biopsy:   - Negative for malignancy.   - Mild chronic inflammation.     B.  Prostate gland, left lateral mid, needle biopsy:   - Negative for malignancy.   - Glandular atrophy.     C.  Prostate gland, left lateral apex, needle biopsy:   - Negative for malignancy.   - Glandular atrophy.     D.  Prostate gland, left base, needle biopsy:   - Prostatic acinar adenocarcinoma.      - [place] score  6 (3+3):      - WHO Grade Group - 1      - Proportion of tissue involved by tumor:  Approximately 33%      - Number of tissue cores involved by tumor:  1 (out of 1)      - Angiolymphatic invasion:  Not identified      - Perineural invasion:  Not identified     E.  Prostate gland, left mid, needle biopsy:   - Prostatic acinar adenocarcinoma.      - [place] score  6 (3+3):      - WHO Grade Group - 1      - Proportion of tissue involved by tumor:   "Approximately 33%      - Number of tissue cores involved by tumor:  1 (out of 1)      - Angiolymphatic invasion:  Not identified      - Perineural invasion:  Not identified     F.  Prostate gland, left apex, needle biopsy:]   - Negative for malignancy.     G.  Prostate gland, right lateral base, needle biopsy:   - Negative for malignancy.   - Glandular atrophy.     H.  Prostate gland, right lateral mid, needle biopsy:   - A minute focus of atypical small acinar proliferation, suspicious for   malignancy.   - Glandular atrophy.     I.  Prostate gland, right lateral apex, needle biopsy:   - Negative for malignancy.   - Glandular atrophy.     J.  Prostate gland, right base, needle biopsy:   - Negative for malignancy.   - Glandular atrophy.     K.  Prostate gland, right mid, needle biopsy:   - Negative for malignancy.   - Glandular atrophy.     L.  Prostate gland, right apex, needle biopsy:   - Negative for malignancy.   - Glandular atrophy.     M.  Prostate gland., right sided lesion, needle biopsies x 3:   - Prostatic acinar adenocarcinoma.      - [place] score:  6 (3+3)      - WHO Grade Group - 1      - Proportion of tissue involved by tumor:  approximately 40%      - Number of tissue cores involved by tumor: 3 (out of 3)      - Angiolymphatic invasion:  Not identified      - Perineural invasion:  Not identified       IMAGING:  All pertinent imaging reviewed:    All imaging studies reviewed by me.  I personally reviewed these imaging films.  A formal report from radiology will follow.    MRI PROSTATE 4/15/21:  \"FINDINGS:  Size: 6.1 x 4.8 x 4.7 cm; 72 grams  Hemorrhage: Absent   Peripheral zone: Heterogeneous on T2-weighted images. Suspicious  lesions as detailed below.  Transition zone: Enlarged with BPH changes. Transition zone nodules  which are circumscribed or mostly encapsulated without diffusion  restriction.  PI-RADS 2.  No highly suspicious nodules.     Lesion(s) in rank order of severity (highest score- to " lowest score,  then by size)      Lesion 1:  Location: Left apex peripheral zone at the 5 o'clock position relative  to the urethra. Series 4 image 36.   Additional prostate regions involved: None   Size: 6  mm  T2 description: Rounded moderate hypointensity  T2 numerical assessment: 3  DWI description: Focal hypointensity on ADC and hyperintensity on DWI  DWI numerical assessment: 3  DCE assessment: Positive    Prostate margin: Capsular abutment<6 mm with smooth contour   Lesion overall PI-RADS category: 4     Neurovascular bundles: No neurovascular bundle involvement by  malignancy.    Seminal vesicles: No seminal vesicle involvement by malignancy.   Lymph nodes: No lymph node involvement   Bones: No suspicious lesions   Other pelvic organs: Prominent fat in bilateral inguinal canal, left  more than right. Circumferential apparent urinary bladder wall  thickening with trabeculation/sacculations, likely representing post  chronic urinary outflow obstructive change.                                                         IMPRESSION:     1. Based on the most suspicious abnormality, this exam is  characterized as PIRADS 4 - Clinically significant cancer is likely to  be present.  The most suspicious abnormality is located at the left  apex peripheral zone, 5:00 position and there is minimal capsular  abutment with no convincing evidence of extraprostatic extension.  2. No suspicious adenopathy or evidence of pelvic metastases.    CT ABD/PEL 3/12/2024:  FINDINGS:   LOWER CHEST: Partially imaged moderate to large hiatal hernia.  Pacemaker lead terminates in the right ventricle at the  interventricular septum.     HEPATOBILIARY: Cholecystectomy. Calcified hepatic granuloma. Focal  fatty infiltration adjacent to the falciform ligament.     PANCREAS: No significant mass, duct dilatation, or inflammatory  change.     SPLEEN: Normal size. Multiple calcified splenic granulomas.     ADRENAL GLANDS: No significant nodules.      KIDNEYS/BLADDER: Right nephrectomy. Normal enhancement of the left  kidney without hydronephrosis. Left upper pole renal cyst and  additional too small to characterize hypoattenuating lesions, not  requiring specific follow-up. Trabeculated urinary bladder with  multiple diverticula suggestive of chronic outlet obstruction.     BOWEL: Colonic diverticulosis without focal inflammatory changes.  Normal appendix. No evidence of bowel obstruction.     PELVIC ORGANS: Prostatomegaly. Mildly heterogeneous appearance of the  prostate in this patient with known prostate malignancy.     ADDITIONAL FINDINGS: No ascites. No adenopathy in the abdomen or  pelvis. Mild burden of atherosclerotic disease without abdominal  aortic aneurysm.     MUSCULOSKELETAL: No aggressive appearing osseous lesion. Degenerative  changes of the spine. Postsurgical changes of the ventral abdominal  wall. Moderate-sized fat-containing left inguinal hernia                                                             IMPRESSION:   1.  No acute findings in the abdomen or pelvis for abdominal pain.  2.  Moderate to large hiatal hernia.  3.  Colonic diverticulosis.  4.  Right nephrectomy.  5.  Prostatomegaly with findings of chronic bladder outlet  obstruction. Mildly heterogeneous appearance of the prostate in this  patient with known prostate malignancy.      ASSESSMENT and PLAN  79-year-old man with elevated PSA and LUTS, now with an MRI showing a PI-RADS 4 lesion and a prostate volume of 72 g.  Status post an MR fusion biopsy with [place] 3+3 = 6 prostate cancer in 3/3 of the target lesions as well as 2/12 of the template biopsy    Prostate cancer  - Again reviewed his PSA history and trend  - His most recent PSA is reassuring and has come down to 6.15 from 8.0 previously  - I reviewed his prior biopsy results which indicated [place] 3+ = 6 prostate cancer  - I reviewed his prior MRI and reviewed these images personally.  I agree with radiologist  interpretation.  - I reviewed his recent CT scan and reviewed these images personally.  - Will plan for PSA check in 6 months and I will send him the results on [employer]  - Follow-up in 1 year with another PSA      BPH with LUTS  -He notes some improvement with voiding following initiation of tamsulosin 0.4 mg daily  -We discussed that his MRI did demonstrate prostatomegaly with a volume of 72 ml  -Prescription was refilled through his PCP and I recommend he continue on this medication    Chart documentation with Dragon Voice recognition Software. Although reviewed after completion, some words and grammatical errors may remain.     Time spent: 14 minutes spent on the date of the encounter doing chart review, history and exam, documentation and further activities as noted above.    Note copy attestation: the elements have been reviewed, edited as needed, and remain pertinent to today's visit.     Jono Billy MD   Urology  [facility]  [facility] Phone: [phone]  [facility] Phone: [phone]        Virtual Visit Details    Type of service:  Video Visit   Video Start Time:  11:10 AM  Video End Time:11:19 AM    Originating Location (pt. Location): Home    Distant Location (provider location):  On-site  Platform used for Video Visit: Majo

## 2024-05-20 NOTE — NURSING NOTE
Is the patient currently in the state of [place]? YES    Visit mode:VIDEO    If the visit is dropped, the patient can be reconnected by: VIDEO VISIT: Text to cell phone:   Telephone Information:   Mobile [phone]       Will anyone else be joining the visit? NO  (If patient encounters technical issues they should call [id number]    How would you like to obtain your AVS? MyChart    Are changes needed to the allergy or medication list? No    Are refills needed on medications prescribed by this physician? NO    Reason for visit: RECHECK (1 year follow-up, PSA prior)    Sandy SHAHF

## 2024-06-03 ENCOUNTER — ANCILLARY PROCEDURE (OUTPATIENT)
Dept: CARDIOLOGY | Facility: CLINIC | Age: 79
End: 2024-06-03
Attending: INTERNAL MEDICINE
Payer: MEDICARE

## 2024-06-03 DIAGNOSIS — I49.5 SSS (SICK SINUS SYNDROME) (H): ICD-10-CM

## 2024-06-03 DIAGNOSIS — Z95.0 CARDIAC PACEMAKER IN SITU: ICD-10-CM

## 2024-06-03 LAB
MDC_IDC_EPISODE_DTM: NORMAL
MDC_IDC_EPISODE_DURATION: 16 S
MDC_IDC_EPISODE_DURATION: 63 S
MDC_IDC_EPISODE_ID: NORMAL
MDC_IDC_EPISODE_TYPE: NORMAL
MDC_IDC_EPISODE_TYPE_INDUCED: NO
MDC_IDC_EPISODE_TYPE_INDUCED: NO
MDC_IDC_EPISODE_VENDOR_TYPE: NORMAL
MDC_IDC_LEAD_CONNECTION_STATUS: NORMAL
MDC_IDC_LEAD_CONNECTION_STATUS: NORMAL
MDC_IDC_LEAD_IMPLANT_DT: NORMAL
MDC_IDC_LEAD_IMPLANT_DT: NORMAL
MDC_IDC_LEAD_LOCATION: NORMAL
MDC_IDC_LEAD_LOCATION: NORMAL
MDC_IDC_LEAD_LOCATION_DETAIL_1: NORMAL
MDC_IDC_LEAD_LOCATION_DETAIL_1: NORMAL
MDC_IDC_LEAD_MFG: NORMAL
MDC_IDC_LEAD_MFG: NORMAL
MDC_IDC_LEAD_MODEL: NORMAL
MDC_IDC_LEAD_MODEL: NORMAL
MDC_IDC_LEAD_POLARITY_TYPE: NORMAL
MDC_IDC_LEAD_POLARITY_TYPE: NORMAL
MDC_IDC_LEAD_SERIAL: NORMAL
MDC_IDC_LEAD_SERIAL: NORMAL
MDC_IDC_MSMT_BATTERY_DTM: NORMAL
MDC_IDC_MSMT_BATTERY_REMAINING_LONGEVITY: 90 MO
MDC_IDC_MSMT_BATTERY_REMAINING_PERCENTAGE: 86 %
MDC_IDC_MSMT_BATTERY_STATUS: NORMAL
MDC_IDC_MSMT_LEADCHNL_RA_IMPEDANCE_VALUE: 764 OHM
MDC_IDC_MSMT_LEADCHNL_RA_PACING_THRESHOLD_AMPLITUDE: 0.6 V
MDC_IDC_MSMT_LEADCHNL_RA_PACING_THRESHOLD_PULSEWIDTH: 0.4 MS
MDC_IDC_MSMT_LEADCHNL_RV_IMPEDANCE_VALUE: 664 OHM
MDC_IDC_MSMT_LEADCHNL_RV_PACING_THRESHOLD_AMPLITUDE: 1 V
MDC_IDC_MSMT_LEADCHNL_RV_PACING_THRESHOLD_PULSEWIDTH: 0.4 MS
MDC_IDC_PG_IMPLANT_DTM: NORMAL
MDC_IDC_PG_MFG: NORMAL
MDC_IDC_PG_MODEL: NORMAL
MDC_IDC_PG_SERIAL: NORMAL
MDC_IDC_PG_TYPE: NORMAL
MDC_IDC_SESS_CLINIC_NAME: NORMAL
MDC_IDC_SESS_DTM: NORMAL
MDC_IDC_SESS_TYPE: NORMAL
MDC_IDC_SET_BRADY_AT_MODE_SWITCH_MODE: NORMAL
MDC_IDC_SET_BRADY_AT_MODE_SWITCH_RATE: 170 {BEATS}/MIN
MDC_IDC_SET_BRADY_LOWRATE: 55 {BEATS}/MIN
MDC_IDC_SET_BRADY_MAX_SENSOR_RATE: 130 {BEATS}/MIN
MDC_IDC_SET_BRADY_MAX_TRACKING_RATE: 130 {BEATS}/MIN
MDC_IDC_SET_BRADY_MODE: NORMAL
MDC_IDC_SET_BRADY_PAV_DELAY_HIGH: 200 MS
MDC_IDC_SET_BRADY_PAV_DELAY_LOW: 300 MS
MDC_IDC_SET_BRADY_SAV_DELAY_HIGH: 200 MS
MDC_IDC_SET_BRADY_SAV_DELAY_LOW: 300 MS
MDC_IDC_SET_LEADCHNL_RA_PACING_AMPLITUDE: 3 V
MDC_IDC_SET_LEADCHNL_RA_PACING_CAPTURE_MODE: NORMAL
MDC_IDC_SET_LEADCHNL_RA_PACING_POLARITY: NORMAL
MDC_IDC_SET_LEADCHNL_RA_PACING_PULSEWIDTH: 0.4 MS
MDC_IDC_SET_LEADCHNL_RA_SENSING_ADAPTATION_MODE: NORMAL
MDC_IDC_SET_LEADCHNL_RA_SENSING_POLARITY: NORMAL
MDC_IDC_SET_LEADCHNL_RA_SENSING_SENSITIVITY: 0.25 MV
MDC_IDC_SET_LEADCHNL_RV_PACING_AMPLITUDE: 1.6 V
MDC_IDC_SET_LEADCHNL_RV_PACING_CAPTURE_MODE: NORMAL
MDC_IDC_SET_LEADCHNL_RV_PACING_POLARITY: NORMAL
MDC_IDC_SET_LEADCHNL_RV_PACING_PULSEWIDTH: 0.4 MS
MDC_IDC_SET_LEADCHNL_RV_SENSING_ADAPTATION_MODE: NORMAL
MDC_IDC_SET_LEADCHNL_RV_SENSING_POLARITY: NORMAL
MDC_IDC_SET_LEADCHNL_RV_SENSING_SENSITIVITY: 1.5 MV
MDC_IDC_SET_ZONE_DETECTION_INTERVAL: 375 MS
MDC_IDC_SET_ZONE_STATUS: NORMAL
MDC_IDC_SET_ZONE_TYPE: NORMAL
MDC_IDC_SET_ZONE_VENDOR_TYPE: NORMAL
MDC_IDC_STAT_AT_BURDEN_PERCENT: 0 %
MDC_IDC_STAT_AT_DTM_END: NORMAL
MDC_IDC_STAT_AT_DTM_START: NORMAL
MDC_IDC_STAT_BRADY_DTM_END: NORMAL
MDC_IDC_STAT_BRADY_DTM_START: NORMAL
MDC_IDC_STAT_BRADY_RA_PERCENT_PACED: 23 %
MDC_IDC_STAT_BRADY_RV_PERCENT_PACED: 0 %
MDC_IDC_STAT_EPISODE_RECENT_COUNT: 0
MDC_IDC_STAT_EPISODE_RECENT_COUNT: 0
MDC_IDC_STAT_EPISODE_RECENT_COUNT: 1
MDC_IDC_STAT_EPISODE_RECENT_COUNT_DTM_END: NORMAL
MDC_IDC_STAT_EPISODE_RECENT_COUNT_DTM_START: NORMAL
MDC_IDC_STAT_EPISODE_TYPE: NORMAL
MDC_IDC_STAT_EPISODE_VENDOR_TYPE: NORMAL
MDC_IDC_STAT_EPISODE_VENDOR_TYPE: NORMAL

## 2024-06-03 PROCEDURE — 93294 REM INTERROG EVL PM/LDLS PM: CPT | Performed by: INTERNAL MEDICINE

## 2024-06-03 PROCEDURE — 93296 REM INTERROG EVL PM/IDS: CPT | Performed by: INTERNAL MEDICINE

## 2024-06-13 ENCOUNTER — TRANSFERRED RECORDS (OUTPATIENT)
Dept: HEALTH INFORMATION MANAGEMENT | Facility: CLINIC | Age: 79
End: 2024-06-13
Payer: MEDICARE

## 2024-06-18 ENCOUNTER — TRANSCRIBE ORDERS (OUTPATIENT)
Dept: OTHER | Age: 79
End: 2024-06-18

## 2024-06-18 DIAGNOSIS — G20.A1 PARKINSON'S DISEASE WITHOUT DYSKINESIA OR FLUCTUATING MANIFESTATIONS (H): Primary | ICD-10-CM

## 2024-06-28 ENCOUNTER — THERAPY VISIT (OUTPATIENT)
Dept: PHYSICAL THERAPY | Facility: CLINIC | Age: 79
End: 2024-06-28
Attending: STUDENT IN AN ORGANIZED HEALTH CARE EDUCATION/TRAINING PROGRAM
Payer: MEDICARE

## 2024-06-28 DIAGNOSIS — R26.89 IMPAIRED GAIT AND MOBILITY: Primary | ICD-10-CM

## 2024-06-28 DIAGNOSIS — G20.A1 PARKINSON'S DISEASE WITHOUT DYSKINESIA OR FLUCTUATING MANIFESTATIONS (H): ICD-10-CM

## 2024-06-28 PROCEDURE — 97110 THERAPEUTIC EXERCISES: CPT | Mod: GP | Performed by: PHYSICAL THERAPIST

## 2024-06-28 PROCEDURE — 97162 PT EVAL MOD COMPLEX 30 MIN: CPT | Mod: GP | Performed by: PHYSICAL THERAPIST

## 2024-06-28 NOTE — PROGRESS NOTES
PHYSICAL THERAPY EVALUATION  Type of Visit: Evaluation              Subjective       Presenting condition or subjective complaint: parkinson's  Date of onset: 06/18/24    Relevant medical history:   Pt presents with gait and balance concerns. Began noticing tremor in R hand 2 years ago. Sister diagnosed with PD. He notes changes with walking and more shuffling steps this past year. Voice is also softer. No falls but more off balance. Denies fine motor symptoms or difficulty/changes but will get some R hand swelling. Pt is L handed.  Up to about 1 year ago had been walking longer distances however has not gone walking in the past year because of decreased confidence in gait.  Diagnosis: Parkinson's disease  Date of diagnosis: 6/18/24  Patient's Neurologist: Christa Ortiz Date of last Neurologist visit: 6/18/24   First movement disorder symptom presentation and Date: tremor in R hand, 2 years prior  Non-motor symptoms: Anxiety, Orthostatic hypotension, Urinary symptoms, Constipation, Sleep disturbances, Fatigue    Taking Sinimet 3x/day  Time of last PD med: 9 pm Time of next PD med: when he gets home from [facility]. On/Off presentation/symptoms: none.     Current Activity Level: Swims every day- treads water, arm raises, water aerobice  Chief Mobility Complaint: walking, balance   Freezing: no    Dates & types of surgery: 1963 nephrectomy 1993colonectomy 2007 cholesystectomy 2012 cataract    Prior diagnostic imaging/testing results: CT scan     Prior therapy history for the same diagnosis, illness or injury: No      Prior Level of Function  Transfers: Independent  Ambulation: Independent but has trekking poles for walking outside      Living Environment  Social support: With a significant other or spouse   Type of home: Apartment/condo   Stairs to enter the home:         Ramp: Yes   Stairs inside the home: No       Help at home: None  Equipment owned:       Employment: No    Hobbies/Interests: reading swimming  singing    Patient goals for therapy: rajani    Pain assessment: Pain in the middle of his back     Objective   Cognitive Status Examination  Orientation: Oriented to person, place and time   Level of Consciousness: Alert      OBSERVATION  Bradykinesia and Hypokinesia (Combining slowness, hesitancy, decreased arm swing, small amplitude and poverty of movement in general):  Mild  Dyskinesia: Yes  Posture: forward head       STRENGTH: mild functional weakness with sit to stands  SENSATION:  denies N/T    BED MOBILITY: not formally assessed today but reports that this has become more effortful     TRANSFERS: Independent but mild LOB posteriorly on 2nd rep    GAIT ANALYSIS: decreased stride length, decreased R arm swing, slightly forward flexed posture      SPECIAL TESTS    360 degree turn (steps) 7 Clockwise  8 Counter Clockwise          With no AD    Timed Up and Go (TUG) 8.3 sec  With no AD Increased risk for falls with standard TUG >11.5secs for people with PD, >13.5secs for Community Dwelling Elders.   Cognitive TUG  10.8 sec    Task: counting backward by 3s from 30    5 Times Sit-to-Stand (5TSTS) - sec  11.57 Increased risk for falls 5x STS >16 secs for people with PD, >15 secs for Community Dwelling Elders.  5x STS Normative Values by Age Category (Healthy Population)( Age in years (n) Mean   SD): Ages 70-79: 9.3   2.1 sec    30 sec Chair Stand: 13 reps  Comments: mild LOB backward on 2nd rep Age/Gender Norm: Male 75-79: 14   Mini BESTest: TOTAL SCORE (out of 28): 23 <22/28 indicates fall risk; See full MiniBEST for details.   10 Meter Walk Test (Comfortable):  6.25= 0.96 m/s  # of steps: 11 Age/Gender Norm (meters/second): Men in their 70s: 1.262    10 Meter Walk Test (Fast):  4.6= 1.3 m/s  # of steps: 9    6 Minute Walk Test (6MWT)      Functional Gait Assessment (FGA): TOTAL SCORE: (MAXIMUM SCORE 30): 21   <22/30 indicates fall risk in community dwelling elder, PD <18/30           Assessment & Plan   CLINICAL  IMPRESSIONS  Medical Diagnosis: Parkinson's disease without dyskinesia or fluctuating manifestations    Treatment Diagnosis: Impaired mobility and bradykinesia   Impression/Assessment: Patient is a 79 year old male with walking and balance complaints.  The following significant findings have been identified: Pain, Decreased strength, Impaired balance, Impaired sensation, Impaired gait, Impaired muscle performance, Decreased activity tolerance, Impaired posture, and Instability. These impairments interfere with their ability to perform self care tasks, recreational activities, household chores, household mobility, community mobility, and increased fall risk  as compared to previous level of function.     Clinical Decision Making (Complexity):  Clinical Presentation: Evolving/Changing  Clinical Presentation Rationale: based on medical and personal factors listed in PT evaluation  Clinical Decision Making (Complexity): Moderate complexity    PLAN OF CARE  Treatment Interventions:  Interventions: Gait Training, Manual Therapy, Neuromuscular Re-education, Therapeutic Activity, Therapeutic Exercise, Self-Care/Home Management    Long Term Goals     PT Goal 1  Goal Identifier: FGA  Goal Description: The pt will improve FGA score to >23/30 indicating a reduction in fall risk  Goal Progress: baseline: 21/30  Target Date: 09/25/24  PT Goal 2  Goal Identifier: Gait speed  Goal Description: The pt will increase self selected gait speed to >1.1 m/s, improving ability to ambulate in the community  Goal Progress: baseline: 0.96 m/s  Target Date: 09/25/24  PT Goal 3  Goal Identifier: 5x sit to stand  Goal Description: The pt will complete 5 sit to stands in <9.5 seconds due to improvement in LE strength and stability with transfers  Goal Progress: baseline: 11.57 sec  Target Date: 09/25/24  PT Goal 4  Goal Identifier: HEP  Goal Description: The pt will be independent with an HEP in order to improve self management of  symptoms  Target Date: 09/25/24      Frequency of Treatment: 2x/week  Duration of Treatment: 90 days    Recommended Referrals to Other Professionals: Speech Language Pathology  Education Assessment:   Learner/Method: Patient;Significant Other  Education Comments: PT role, POC    Risks and benefits of evaluation/treatment have been explained.   Patient/Family/caregiver agrees with Plan of Care.     Evaluation Time:     PT Eval, Moderate Complexity Minutes (42813): 40       Signing Clinician: Cierra Langford, KENA        [facility]                                                                                   OUTPATIENT PHYSICAL THERAPY      PLAN OF TREATMENT FOR OUTPATIENT REHABILITATION   Patient's Last Name, First Name, Estrada Estrada YOB: 1945   Provider's Name   [facility]   Medical Record No.  [id number]     Onset Date: 06/18/24  Start of Care Date: 06/28/24     Medical Diagnosis:  Parkinson's disease without dyskinesia or fluctuating manifestations      PT Treatment Diagnosis:  Impaired mobility and bradykinesia Plan of Treatment  Frequency/Duration: 2x/week/ 90 days    Certification date from 06/28/24 to 09/25/24         See note for plan of treatment details and functional goals     Cierra Langford, PT                         I CERTIFY THE NEED FOR THESE SERVICES FURNISHED UNDER        THIS PLAN OF TREATMENT AND WHILE UNDER MY CARE     (Physician attestation of this document indicates review and certification of the therapy plan).              Referring Provider:  Christa Ortiz    Initial Assessment  See Epic Evaluation- Start of Care Date: 06/28/24

## 2024-07-01 NOTE — RESULT ENCOUNTER NOTE
Woody Dorado,    This is to inform you regarding your test result.    CBC result which includes white count Hemoglobin and  Platelet Counts is normal.   Other test results are pending.          Sincerely,      Dr.Nasima Thom MD,FACP       Patient/EMS

## 2024-07-19 ENCOUNTER — THERAPY VISIT (OUTPATIENT)
Dept: PHYSICAL THERAPY | Facility: CLINIC | Age: 79
End: 2024-07-19
Payer: MEDICARE

## 2024-07-19 DIAGNOSIS — R26.89 IMPAIRED GAIT AND MOBILITY: ICD-10-CM

## 2024-07-19 DIAGNOSIS — G20.A1 PARKINSON'S DISEASE WITHOUT DYSKINESIA OR FLUCTUATING MANIFESTATIONS (H): Primary | ICD-10-CM

## 2024-07-19 PROCEDURE — 97112 NEUROMUSCULAR REEDUCATION: CPT | Mod: GP | Performed by: PHYSICAL THERAPIST

## 2024-07-23 ENCOUNTER — THERAPY VISIT (OUTPATIENT)
Dept: PHYSICAL THERAPY | Facility: CLINIC | Age: 79
End: 2024-07-23
Payer: MEDICARE

## 2024-07-23 DIAGNOSIS — G20.A1 PARKINSON'S DISEASE WITHOUT DYSKINESIA OR FLUCTUATING MANIFESTATIONS (H): Primary | ICD-10-CM

## 2024-07-23 DIAGNOSIS — R26.89 IMPAIRED GAIT AND MOBILITY: ICD-10-CM

## 2024-07-23 PROCEDURE — 97110 THERAPEUTIC EXERCISES: CPT | Mod: GP | Performed by: PHYSICAL THERAPIST

## 2024-07-23 PROCEDURE — 97112 NEUROMUSCULAR REEDUCATION: CPT | Mod: GP | Performed by: PHYSICAL THERAPIST

## 2024-07-25 ENCOUNTER — THERAPY VISIT (OUTPATIENT)
Dept: PHYSICAL THERAPY | Facility: CLINIC | Age: 79
End: 2024-07-25
Payer: MEDICARE

## 2024-07-25 DIAGNOSIS — G20.A1 PARKINSON'S DISEASE WITHOUT DYSKINESIA OR FLUCTUATING MANIFESTATIONS (H): Primary | ICD-10-CM

## 2024-07-25 DIAGNOSIS — R26.89 IMPAIRED GAIT AND MOBILITY: ICD-10-CM

## 2024-07-25 PROCEDURE — 97110 THERAPEUTIC EXERCISES: CPT | Mod: GP | Performed by: PHYSICAL THERAPIST

## 2024-07-25 PROCEDURE — 97112 NEUROMUSCULAR REEDUCATION: CPT | Mod: GP | Performed by: PHYSICAL THERAPIST

## 2024-07-30 ENCOUNTER — THERAPY VISIT (OUTPATIENT)
Dept: PHYSICAL THERAPY | Facility: CLINIC | Age: 79
End: 2024-07-30
Payer: MEDICARE

## 2024-07-30 DIAGNOSIS — R26.89 IMPAIRED GAIT AND MOBILITY: ICD-10-CM

## 2024-07-30 DIAGNOSIS — G20.A1 PARKINSON'S DISEASE WITHOUT DYSKINESIA OR FLUCTUATING MANIFESTATIONS (H): Primary | ICD-10-CM

## 2024-07-30 PROCEDURE — 97112 NEUROMUSCULAR REEDUCATION: CPT | Mod: GP | Performed by: PHYSICAL THERAPIST

## 2024-07-30 PROCEDURE — 97530 THERAPEUTIC ACTIVITIES: CPT | Mod: GP | Performed by: PHYSICAL THERAPIST

## 2024-07-30 PROCEDURE — 97110 THERAPEUTIC EXERCISES: CPT | Mod: GP | Performed by: PHYSICAL THERAPIST

## 2024-07-31 DIAGNOSIS — E11.22 TYPE 2 DIABETES MELLITUS WITH STAGE 3A CHRONIC KIDNEY DISEASE, WITHOUT LONG-TERM CURRENT USE OF INSULIN (H): ICD-10-CM

## 2024-07-31 DIAGNOSIS — N18.31 TYPE 2 DIABETES MELLITUS WITH STAGE 3A CHRONIC KIDNEY DISEASE, WITHOUT LONG-TERM CURRENT USE OF INSULIN (H): ICD-10-CM

## 2024-07-31 RX ORDER — BLOOD SUGAR DIAGNOSTIC
STRIP MISCELLANEOUS
Qty: 100 STRIP | Refills: 2 | Status: SHIPPED | OUTPATIENT
Start: 2024-07-31

## 2024-08-01 ENCOUNTER — THERAPY VISIT (OUTPATIENT)
Dept: PHYSICAL THERAPY | Facility: CLINIC | Age: 79
End: 2024-08-01
Payer: MEDICARE

## 2024-08-01 DIAGNOSIS — R26.89 IMPAIRED GAIT AND MOBILITY: ICD-10-CM

## 2024-08-01 DIAGNOSIS — G20.A1 PARKINSON'S DISEASE WITHOUT DYSKINESIA OR FLUCTUATING MANIFESTATIONS (H): Primary | ICD-10-CM

## 2024-08-01 PROCEDURE — 97112 NEUROMUSCULAR REEDUCATION: CPT | Mod: GP | Performed by: PHYSICAL THERAPIST

## 2024-08-01 PROCEDURE — 97110 THERAPEUTIC EXERCISES: CPT | Mod: GP | Performed by: PHYSICAL THERAPIST

## 2024-08-06 ENCOUNTER — THERAPY VISIT (OUTPATIENT)
Dept: PHYSICAL THERAPY | Facility: CLINIC | Age: 79
End: 2024-08-06
Payer: MEDICARE

## 2024-08-06 DIAGNOSIS — G20.A1 PARKINSON'S DISEASE WITHOUT DYSKINESIA OR FLUCTUATING MANIFESTATIONS (H): Primary | ICD-10-CM

## 2024-08-06 DIAGNOSIS — R26.89 IMPAIRED GAIT AND MOBILITY: ICD-10-CM

## 2024-08-06 PROCEDURE — 97110 THERAPEUTIC EXERCISES: CPT | Mod: GP | Performed by: PHYSICAL THERAPIST

## 2024-08-06 PROCEDURE — 97116 GAIT TRAINING THERAPY: CPT | Mod: GP | Performed by: PHYSICAL THERAPIST

## 2024-08-06 PROCEDURE — 97112 NEUROMUSCULAR REEDUCATION: CPT | Mod: GP | Performed by: PHYSICAL THERAPIST

## 2024-08-08 ENCOUNTER — THERAPY VISIT (OUTPATIENT)
Dept: PHYSICAL THERAPY | Facility: CLINIC | Age: 79
End: 2024-08-08
Payer: MEDICARE

## 2024-08-08 DIAGNOSIS — G20.A1 PARKINSON'S DISEASE WITHOUT DYSKINESIA OR FLUCTUATING MANIFESTATIONS (H): Primary | ICD-10-CM

## 2024-08-08 DIAGNOSIS — R26.89 IMPAIRED GAIT AND MOBILITY: ICD-10-CM

## 2024-08-08 PROCEDURE — 97750 PHYSICAL PERFORMANCE TEST: CPT | Mod: GP | Performed by: PHYSICAL THERAPIST

## 2024-08-08 PROCEDURE — 97110 THERAPEUTIC EXERCISES: CPT | Mod: GP | Performed by: PHYSICAL THERAPIST

## 2024-08-08 NOTE — PROGRESS NOTES
08/08/24 0500   Appointment Info   Signing clinician's name / credentials AKHIL Langford DPT   Visits Used 8   Medical Diagnosis Parkinson's disease without dyskinesia or fluctuating manifestations   PT Tx Diagnosis Impaired mobility and bradykinesia   Progress Note/Certification   Start of Care Date 06/28/24   Onset of illness/injury or Date of Surgery 06/18/24   Therapy Frequency 2x/week   Predicted Duration 90 days   Certification date from 06/28/24   Certification date to 09/25/24   PT Goal 1   Goal Identifier FGA   Goal Description The pt will improve FGA score to >23/30 indicating a reduction in fall risk   Goal Progress baseline: 21/30, improved to 24/30   Target Date 09/25/24   Date Met 08/08/24   PT Goal 2   Goal Identifier Gait speed   Goal Description The pt will increase self selected gait speed to >1.1 m/s, improving ability to ambulate in the community   Goal Progress baseline: 0.96 m/s, improved to 1.3 m/s   Target Date 09/25/24   Date Met 08/08/24   PT Goal 3   Goal Identifier 5x sit to stand   Goal Description The pt will complete 5 sit to stands in <9.5 seconds due to improvement in LE strength and stability with transfers   Goal Progress baseline: 11.57 sec, improved to 8.9 sec   Target Date 09/25/24   Date Met 08/08/24   PT Goal 4   Goal Identifier HEP   Goal Description The pt will be independent with an HEP in order to improve self management of symptoms   Target Date 09/25/24   Date Met 08/08/24   Subjective Report   Subjective Report Doing well, feeling ready for discharge   Therapeutic Procedure/Exercise   Therapeutic Procedures: strength, endurance, ROM, flexibility minutes (96740) 20   Ther Proc 1 - Details Completed 5x sit to stand and gait speed. See above, meeting both goals. Gait outside overground x 15 min, focusing on tall posture, long stride. Performed while talking, able to maintain well. Encouraged pt do add walking back into routine, 1-2 days/week   PTRx Ther Proc 2 Hip AROM  Standing Abduction   PTRx Ther Proc 2 - Details x 10 reps with GTB around knees to reduce strain at knee, also extension x 2/side and sistepping x 3 step sR and YAMILE villar. Also provided blue band for home   Therapeutic Activity   Therapeutic Activities: dynamic activities to improve functional performance minutes (10415) 5   Ther Act 1 - Details Education on PD support groups and online exercise programs. Discussed when to return to PT- recommend 1 year or sooner if he is noticing a change in stride, balance/mobility. Pt expresses undersatnding   Physical Performance Test/measures   Physical Performance Test/Measurement, Minutes (94014) 10   Physical Performance Test/Measurement Details Retested FGA, see report   Progress 3 point improvement, meeting goal   Plan   Plan for next session d/c   Total Session Time   Timed Code Treatment Minutes 35   Total Treatment Time (sum of timed and untimed services) 35         DISCHARGE  Reason for Discharge: Patient has met all goals.    Equipment Issued: theraband    Discharge Plan: Patient to continue home program.    Referring Provider:  Christa Ortiz

## 2024-08-08 NOTE — PROGRESS NOTES
08/08/24 0800   Signing Clinician's Name / Credentials   Signing clinician's name / credentials AKHIL Langford, MJ   Functional Gait Assessment (VICKEY Cordon, KAMRON Verduzco, et al. (2004))   1. GAIT LEVEL SURFACE 3  (4.6)   2. CHANGE IN GAIT SPEED 3   3. GAIT WITH HORIZONTAL HEAD TURNS 2   4. GAIT WITH VERTICAL HEAD TURNS 3   5. GAIT AND PIVOT TURN 3   6. STEP OVER OBSTACLE 3   7. GAIT WITH NARROW BASE OF SUPPORT 0  (3 steps)   8. GAIT WITH EYES CLOSED 1  (9.6)   9. AMBULATING BACKWARDS 3  (8.6)   10. STEPS 3   Total Functional Gait Assessment Score   TOTAL SCORE: (MAXIMUM SCORE 30) 24     Functional Gait Assessment (FGA): The FGA assesses postural stability during various walking tasks.   Gait assistive device used: None    Scores of <22 /30 have been correlated with predicting falls in community-dwelling older adults according to Bravo & Matt 2010.   Scores of <18 /30 have been correlated with increased risk for falls in patients with Parkinsons Disease according to Omar Aviles, Devyn et al 2014.  Minimal Detectable Change for patients with acute/chronic stroke = 4.2 according to Chapin & Montrell 2009  Minimal Detectable Change for patients with vestibular disorder = 8 according to Bravo & Matt 2010    Assessment (rationale for performing, application to patient s function & care plan): The pt's score improved by 3 points, is now above the cut off for fall risk  (Minutes billed as physical performance test): 10

## 2024-08-20 ENCOUNTER — APPOINTMENT (OUTPATIENT)
Dept: URBAN - METROPOLITAN AREA CLINIC 256 | Age: 79
Setting detail: DERMATOLOGY
End: 2024-08-20

## 2024-08-20 DIAGNOSIS — L57.8 OTHER SKIN CHANGES DUE TO CHRONIC EXPOSURE TO NONIONIZING RADIATION: ICD-10-CM

## 2024-08-20 DIAGNOSIS — D22 MELANOCYTIC NEVI: ICD-10-CM

## 2024-08-20 DIAGNOSIS — L82.1 OTHER SEBORRHEIC KERATOSIS: ICD-10-CM

## 2024-08-20 DIAGNOSIS — Z71.89 OTHER SPECIFIED COUNSELING: ICD-10-CM

## 2024-08-20 DIAGNOSIS — L91.8 OTHER HYPERTROPHIC DISORDERS OF THE SKIN: ICD-10-CM

## 2024-08-20 DIAGNOSIS — D18.0 HEMANGIOMA: ICD-10-CM

## 2024-08-20 PROBLEM — D22.71 MELANOCYTIC NEVI OF RIGHT LOWER LIMB, INCLUDING HIP: Status: ACTIVE | Noted: 2024-08-20

## 2024-08-20 PROBLEM — D22.61 MELANOCYTIC NEVI OF RIGHT UPPER LIMB, INCLUDING SHOULDER: Status: ACTIVE | Noted: 2024-08-20

## 2024-08-20 PROBLEM — D22.72 MELANOCYTIC NEVI OF LEFT LOWER LIMB, INCLUDING HIP: Status: ACTIVE | Noted: 2024-08-20

## 2024-08-20 PROBLEM — D18.01 HEMANGIOMA OF SKIN AND SUBCUTANEOUS TISSUE: Status: ACTIVE | Noted: 2024-08-20

## 2024-08-20 PROBLEM — D22.62 MELANOCYTIC NEVI OF LEFT UPPER LIMB, INCLUDING SHOULDER: Status: ACTIVE | Noted: 2024-08-20

## 2024-08-20 PROBLEM — D22.5 MELANOCYTIC NEVI OF TRUNK: Status: ACTIVE | Noted: 2024-08-20

## 2024-08-20 PROCEDURE — OTHER COUNSELING: OTHER

## 2024-08-20 PROCEDURE — OTHER MIPS QUALITY: OTHER

## 2024-08-20 PROCEDURE — 99213 OFFICE O/P EST LOW 20 MIN: CPT | Mod: 25

## 2024-08-20 PROCEDURE — OTHER SKIN TAG REMOVAL: OTHER

## 2024-08-20 PROCEDURE — 11200 RMVL SKIN TAGS UP TO&INC 15: CPT

## 2024-08-20 ASSESSMENT — LOCATION ZONE DERM
LOCATION ZONE: FACE
LOCATION ZONE: LEG
LOCATION ZONE: EYELID
LOCATION ZONE: ARM
LOCATION ZONE: TRUNK

## 2024-08-20 ASSESSMENT — LOCATION DETAILED DESCRIPTION DERM
LOCATION DETAILED: SUPERIOR THORACIC SPINE
LOCATION DETAILED: RIGHT VENTRAL PROXIMAL FOREARM
LOCATION DETAILED: LEFT VENTRAL DISTAL FOREARM
LOCATION DETAILED: LEFT VENTRAL PROXIMAL FOREARM
LOCATION DETAILED: LEFT MEDIAL SUPERIOR TARSAL REGION
LOCATION DETAILED: RIGHT ANTERIOR DISTAL THIGH
LOCATION DETAILED: LEFT ANTERIOR DISTAL THIGH
LOCATION DETAILED: INFERIOR THORACIC SPINE
LOCATION DETAILED: LEFT INFERIOR LATERAL MIDBACK
LOCATION DETAILED: LEFT MEDIAL UPPER BACK
LOCATION DETAILED: RIGHT VENTRAL DISTAL FOREARM
LOCATION DETAILED: LEFT ANTECUBITAL SKIN
LOCATION DETAILED: LEFT INFERIOR CENTRAL MALAR CHEEK

## 2024-08-20 ASSESSMENT — LOCATION SIMPLE DESCRIPTION DERM
LOCATION SIMPLE: RIGHT THIGH
LOCATION SIMPLE: LEFT UPPER BACK
LOCATION SIMPLE: RIGHT FOREARM
LOCATION SIMPLE: LEFT THIGH
LOCATION SIMPLE: LEFT MEDIAL SUPERIOR TARSAL REGION
LOCATION SIMPLE: LEFT UPPER ARM
LOCATION SIMPLE: LEFT LOWER BACK
LOCATION SIMPLE: UPPER BACK
LOCATION SIMPLE: LEFT CHEEK
LOCATION SIMPLE: LEFT FOREARM

## 2024-08-20 NOTE — PROCEDURE: SKIN TAG REMOVAL
Detail Level: Detailed
Medical Necessity Information: It is in your best interest to select a reason for this procedure from the list below. All of these items fulfill various CMS LCD requirements except the new and changing color options.
Medical Necessity Clause: This procedure was medically necessary because the lesions that were treated were:
Anesthesia Volume In Cc: 3
Anesthesia Type: 1% lidocaine with epinephrine
Include Z78.9 (Other Specified Conditions Influencing Health Status) As An Associated Diagnosis?: No
Add Associated Diagnoses If Applicable When Selecting Medical Necessity: Yes
Consent: Written consent obtained and the risks of skin tag removal was reviewed with the patient including but not limited to bleeding, pigmentary change, infection, pain, and remote possibility of scarring.

## 2024-09-16 ENCOUNTER — OFFICE VISIT (OUTPATIENT)
Dept: FAMILY MEDICINE | Facility: CLINIC | Age: 79
End: 2024-09-16
Payer: MEDICARE

## 2024-09-16 ENCOUNTER — TRANSFERRED RECORDS (OUTPATIENT)
Dept: HEALTH INFORMATION MANAGEMENT | Facility: CLINIC | Age: 79
End: 2024-09-16

## 2024-09-16 VITALS
OXYGEN SATURATION: 96 % | HEART RATE: 79 BPM | RESPIRATION RATE: 16 BRPM | DIASTOLIC BLOOD PRESSURE: 70 MMHG | BODY MASS INDEX: 26.73 KG/M2 | SYSTOLIC BLOOD PRESSURE: 130 MMHG | TEMPERATURE: 97.5 F | WEIGHT: 186.3 LBS

## 2024-09-16 DIAGNOSIS — N18.31 TYPE 2 DIABETES MELLITUS WITH STAGE 3A CHRONIC KIDNEY DISEASE, WITHOUT LONG-TERM CURRENT USE OF INSULIN (H): Primary | ICD-10-CM

## 2024-09-16 DIAGNOSIS — Z23 NEED FOR PROPHYLACTIC VACCINATION AND INOCULATION AGAINST INFLUENZA: ICD-10-CM

## 2024-09-16 DIAGNOSIS — R26.81 UNSTEADINESS ON FEET: ICD-10-CM

## 2024-09-16 DIAGNOSIS — K22.70 BARRETT'S ESOPHAGUS WITHOUT DYSPLASIA: ICD-10-CM

## 2024-09-16 DIAGNOSIS — C61 PROSTATE CANCER (H): ICD-10-CM

## 2024-09-16 DIAGNOSIS — E78.5 HYPERLIPIDEMIA, UNSPECIFIED HYPERLIPIDEMIA TYPE: ICD-10-CM

## 2024-09-16 DIAGNOSIS — R25.1 TREMOR: ICD-10-CM

## 2024-09-16 DIAGNOSIS — E11.22 TYPE 2 DIABETES MELLITUS WITH STAGE 3A CHRONIC KIDNEY DISEASE, WITHOUT LONG-TERM CURRENT USE OF INSULIN (H): Primary | ICD-10-CM

## 2024-09-16 DIAGNOSIS — K44.9 HIATAL HERNIA: ICD-10-CM

## 2024-09-16 DIAGNOSIS — G20.A1 PARKINSON'S DISEASE WITHOUT DYSKINESIA OR FLUCTUATING MANIFESTATIONS (H): ICD-10-CM

## 2024-09-16 LAB
ANION GAP SERPL CALCULATED.3IONS-SCNC: 12 MMOL/L (ref 7–15)
BUN SERPL-MCNC: 23.5 MG/DL (ref 8–23)
CALCIUM SERPL-MCNC: 9.5 MG/DL (ref 8.8–10.4)
CHLORIDE SERPL-SCNC: 106 MMOL/L (ref 98–107)
CREAT SERPL-MCNC: 1.15 MG/DL (ref 0.67–1.17)
EGFRCR SERPLBLD CKD-EPI 2021: 65 ML/MIN/1.73M2
GLUCOSE SERPL-MCNC: 94 MG/DL (ref 70–99)
HBA1C MFR BLD: 5.9 % (ref 0–5.6)
HCO3 SERPL-SCNC: 23 MMOL/L (ref 22–29)
POTASSIUM SERPL-SCNC: 4.5 MMOL/L (ref 3.4–5.3)
SODIUM SERPL-SCNC: 141 MMOL/L (ref 135–145)

## 2024-09-16 PROCEDURE — 36415 COLL VENOUS BLD VENIPUNCTURE: CPT | Performed by: INTERNAL MEDICINE

## 2024-09-16 PROCEDURE — G0008 ADMIN INFLUENZA VIRUS VAC: HCPCS | Performed by: INTERNAL MEDICINE

## 2024-09-16 PROCEDURE — 83036 HEMOGLOBIN GLYCOSYLATED A1C: CPT | Performed by: INTERNAL MEDICINE

## 2024-09-16 PROCEDURE — 99214 OFFICE O/P EST MOD 30 MIN: CPT | Performed by: INTERNAL MEDICINE

## 2024-09-16 PROCEDURE — 90662 IIV NO PRSV INCREASED AG IM: CPT | Performed by: INTERNAL MEDICINE

## 2024-09-16 PROCEDURE — 80048 BASIC METABOLIC PNL TOTAL CA: CPT | Performed by: INTERNAL MEDICINE

## 2024-09-16 RX ORDER — ROSUVASTATIN CALCIUM 5 MG/1
5 TABLET, COATED ORAL DAILY
Qty: 90 TABLET | Refills: 3 | Status: SHIPPED | OUTPATIENT
Start: 2024-09-16

## 2024-09-16 RX ORDER — CARBIDOPA AND LEVODOPA 25; 100 MG/1; MG/1
1 TABLET ORAL 3 TIMES DAILY
COMMUNITY
Start: 2024-09-16

## 2024-09-16 ASSESSMENT — PAIN SCALES - GENERAL: PAINLEVEL: MILD PAIN (3)

## 2024-09-16 NOTE — PATIENT INSTRUCTIONS
Referred to  [phone]  Make appointment for endoscopy      spiratory syncytial virus (RSV) is an important cause of lower respiratory tract disease in older adults.   In 2023, the [place] Food and Drug Administration approved two recombinant RSV vaccines for the prevention of lower respiratory tract disease in individuals 60 years of age and older [1,2].     this vaccine prevented RSV-related respiratory infection and lower respiratory tract disease in adults age 60 years and older who received one dose of an AS01E-adjuvanted RSV Prefusion F Protein Vaccine [51].     This infection may cause serious infection like pneumonia in older patients     So it is good idea to get this RSV vaccine from any local pharmacy .      There is a new shingles vaccine available called shingrex  It is a series of 2 shots 2-6 months apart.  Considered more than 90% effective.  Please go to any pharmacy to get the  vaccine      Follow up in 4 months.  Seek sooner medical attention if there is any worsening of symptoms or problems.

## 2024-09-16 NOTE — PROGRESS NOTES
"  Assessment & Plan     Theo was seen today for follow up and imm/inj.    Diagnoses and all orders for this visit:    He came with his wife    Type 2 diabetes mellitus with stage 3a chronic kidney disease, without long-term current use of insulin (H)  -     HEMOGLOBIN A1C; Future  -     Basic metabolic panel  (Ca, Cl, CO2, Creat, Gluc, K, Na, BUN); Future  -     HEMOGLOBIN A1C  -     Basic metabolic panel  (Ca, Cl, CO2, Creat, Gluc, K, Na, BUN)  Lab Results   Component Value Date    A1C 5.9 09/16/2024    A1C 6.0 05/06/2024    A1C 6.4 01/12/2024    A1C 6.7 08/22/2023    A1C 6.4 03/03/2023    A1C 6.4 06/26/2021    A1C 6.7 03/18/2021    A1C 6.3 03/16/2020    A1C 5.8 03/13/2019    A1C 6.4 09/21/2018       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  he has seen neurologist and taking carbidopa levodopa  His symptoms have improved  Note of neurologist was reviewed and discussed with the patient    Unsteadiness on feet  Due to Parkinson's disease and has improved    Chacon's esophagus without dysplasia  -     Adult GI  Referral - Procedure Only; Future  Last endoscopy showed that and he is now overdue for surveillance endoscopy  Referral is done    Hiatal hernia  -     Adult GI  Referral - Procedure Only; Future  Seen on endoscopy    Prostate cancer (H)  Followed by urologist    Parkinson's disease without dyskinesia or fluctuating manifestations (H)  New diagnosis  He has seen neurologist and got a started on carbidopa levodopa which gave him symptomatic relief    Hyperlipidemia, unspecified hyperlipidemia type  -     rosuvastatin (CRESTOR) 5 MG tablet; Take 1 tablet (5 mg) by mouth daily. for cholesterol    Need for prophylactic vaccination and inoculation against influenza  He got the flu shot    Other orders  -     INFLUENZA HIGH DOSE, TRIVALENT, PF (FLUZONE)                BMI  Estimated body mass index is 26.73 kg/m  as calculated from the following:    Height as of 3/4/24: 1.778 m (5' 10\").    Weight as of " this encounter: 84.5 kg (186 lb 4.8 oz).         See Patient Instructions  Patient Instructions   Referred to  [phone]  Make appointment for endoscopy      spiratory syncytial virus (RSV) is an important cause of lower respiratory tract disease in older adults.   In 2023, the [place] Food and Drug Administration approved two recombinant RSV vaccines for the prevention of lower respiratory tract disease in individuals 60 years of age and older [1,2].     this vaccine prevented RSV-related respiratory infection and lower respiratory tract disease in adults age 60 years and older who received one dose of an AS01E-adjuvanted RSV Prefusion F Protein Vaccine [51].     This infection may cause serious infection like pneumonia in older patients     So it is good idea to get this RSV vaccine from any local pharmacy .      There is a new shingles vaccine available called shingrex  It is a series of 2 shots 2-6 months apart.  Considered more than 90% effective.  Please go to any pharmacy to get the  vaccine      Follow up in 4 months.  Seek sooner medical attention if there is any worsening of symptoms or problems.       Dwayne Venegas is a 79 year old, presenting for the following health issues:  Follow Up and Imm/Inj (Flu Shot)    HPI   Per patient since he started levodopa carbidopa his symptoms are improving he understands there is no cure but symptoms can be controlled for Parkinson's disease with medication      Review of Systems  Constitutional, HEENT, cardiovascular, pulmonary, GI, , musculoskeletal, neuro, skin, endocrine and psych systems are negative, except as otherwise noted.    Blood Pressure is well controlled at home      Objective    /70   Pulse 79   Temp 97.5  F (36.4  C) (Temporal)   Resp 16   Wt 84.5 kg (186 lb 4.8 oz)   SpO2 96%   BMI 26.73 kg/m    Body mass index is 26.73 kg/m .  Physical Exam   GENERAL: healthy, alert and no distress  PSYCH: mentation appears normal, affect  normal/bright      Disclaimer: This note consists of symbols derived from keyboarding, dictation and/or voice recognition software. As a result, there may be errors in the script that have gone undetected. Please consider this when interpreting information found in this chart.      Signed Electronically by: Zoe Tomas MD

## 2024-09-17 NOTE — RESULT ENCOUNTER NOTE
Woody Dorado    This is to inform you regarding your test result.    Basic metabolic panel which includes electrolytes and kidney fucntion is satisfactory .  HbA1c which is average glucose during last 3 months is 5.9%  Your diabetes is under good control.      Sincerely,      Dr.Nasima Thom MD,FACP

## 2024-09-18 ENCOUNTER — ANCILLARY PROCEDURE (OUTPATIENT)
Dept: CARDIOLOGY | Facility: CLINIC | Age: 79
End: 2024-09-18
Attending: INTERNAL MEDICINE
Payer: MEDICARE

## 2024-09-18 DIAGNOSIS — I49.5 SSS (SICK SINUS SYNDROME) (H): ICD-10-CM

## 2024-09-18 DIAGNOSIS — Z95.0 CARDIAC PACEMAKER IN SITU: ICD-10-CM

## 2024-09-18 LAB
MDC_IDC_EPISODE_DTM: NORMAL
MDC_IDC_EPISODE_DURATION: 30 S
MDC_IDC_EPISODE_ID: NORMAL
MDC_IDC_EPISODE_TYPE: NORMAL
MDC_IDC_EPISODE_TYPE_INDUCED: NO
MDC_IDC_EPISODE_VENDOR_TYPE: NORMAL
MDC_IDC_LEAD_CONNECTION_STATUS: NORMAL
MDC_IDC_LEAD_CONNECTION_STATUS: NORMAL
MDC_IDC_LEAD_IMPLANT_DT: NORMAL
MDC_IDC_LEAD_IMPLANT_DT: NORMAL
MDC_IDC_LEAD_LOCATION: NORMAL
MDC_IDC_LEAD_LOCATION: NORMAL
MDC_IDC_LEAD_LOCATION_DETAIL_1: NORMAL
MDC_IDC_LEAD_LOCATION_DETAIL_1: NORMAL
MDC_IDC_LEAD_MFG: NORMAL
MDC_IDC_LEAD_MFG: NORMAL
MDC_IDC_LEAD_MODEL: NORMAL
MDC_IDC_LEAD_MODEL: NORMAL
MDC_IDC_LEAD_POLARITY_TYPE: NORMAL
MDC_IDC_LEAD_POLARITY_TYPE: NORMAL
MDC_IDC_LEAD_SERIAL: NORMAL
MDC_IDC_LEAD_SERIAL: NORMAL
MDC_IDC_MSMT_BATTERY_DTM: NORMAL
MDC_IDC_MSMT_BATTERY_REMAINING_LONGEVITY: 84 MO
MDC_IDC_MSMT_BATTERY_REMAINING_PERCENTAGE: 83 %
MDC_IDC_MSMT_BATTERY_STATUS: NORMAL
MDC_IDC_MSMT_LEADCHNL_RA_IMPEDANCE_VALUE: 790 OHM
MDC_IDC_MSMT_LEADCHNL_RA_PACING_THRESHOLD_AMPLITUDE: 0.6 V
MDC_IDC_MSMT_LEADCHNL_RA_PACING_THRESHOLD_PULSEWIDTH: 0.4 MS
MDC_IDC_MSMT_LEADCHNL_RV_IMPEDANCE_VALUE: 654 OHM
MDC_IDC_MSMT_LEADCHNL_RV_PACING_THRESHOLD_AMPLITUDE: 1 V
MDC_IDC_MSMT_LEADCHNL_RV_PACING_THRESHOLD_PULSEWIDTH: 0.4 MS
MDC_IDC_PG_IMPLANT_DTM: NORMAL
MDC_IDC_PG_MFG: NORMAL
MDC_IDC_PG_MODEL: NORMAL
MDC_IDC_PG_SERIAL: NORMAL
MDC_IDC_PG_TYPE: NORMAL
MDC_IDC_SESS_CLINIC_NAME: NORMAL
MDC_IDC_SESS_DTM: NORMAL
MDC_IDC_SESS_TYPE: NORMAL
MDC_IDC_SET_BRADY_AT_MODE_SWITCH_MODE: NORMAL
MDC_IDC_SET_BRADY_AT_MODE_SWITCH_RATE: 170 {BEATS}/MIN
MDC_IDC_SET_BRADY_LOWRATE: 55 {BEATS}/MIN
MDC_IDC_SET_BRADY_MAX_SENSOR_RATE: 130 {BEATS}/MIN
MDC_IDC_SET_BRADY_MAX_TRACKING_RATE: 130 {BEATS}/MIN
MDC_IDC_SET_BRADY_MODE: NORMAL
MDC_IDC_SET_BRADY_PAV_DELAY_HIGH: 200 MS
MDC_IDC_SET_BRADY_PAV_DELAY_LOW: 300 MS
MDC_IDC_SET_BRADY_SAV_DELAY_HIGH: 200 MS
MDC_IDC_SET_BRADY_SAV_DELAY_LOW: 300 MS
MDC_IDC_SET_LEADCHNL_RA_PACING_AMPLITUDE: 2 V
MDC_IDC_SET_LEADCHNL_RA_PACING_CAPTURE_MODE: NORMAL
MDC_IDC_SET_LEADCHNL_RA_PACING_POLARITY: NORMAL
MDC_IDC_SET_LEADCHNL_RA_PACING_PULSEWIDTH: 0.4 MS
MDC_IDC_SET_LEADCHNL_RA_SENSING_ADAPTATION_MODE: NORMAL
MDC_IDC_SET_LEADCHNL_RA_SENSING_POLARITY: NORMAL
MDC_IDC_SET_LEADCHNL_RA_SENSING_SENSITIVITY: 0.25 MV
MDC_IDC_SET_LEADCHNL_RV_PACING_AMPLITUDE: 1.4 V
MDC_IDC_SET_LEADCHNL_RV_PACING_CAPTURE_MODE: NORMAL
MDC_IDC_SET_LEADCHNL_RV_PACING_POLARITY: NORMAL
MDC_IDC_SET_LEADCHNL_RV_PACING_PULSEWIDTH: 0.4 MS
MDC_IDC_SET_LEADCHNL_RV_SENSING_ADAPTATION_MODE: NORMAL
MDC_IDC_SET_LEADCHNL_RV_SENSING_POLARITY: NORMAL
MDC_IDC_SET_LEADCHNL_RV_SENSING_SENSITIVITY: 1.5 MV
MDC_IDC_SET_ZONE_DETECTION_INTERVAL: 375 MS
MDC_IDC_SET_ZONE_STATUS: NORMAL
MDC_IDC_SET_ZONE_TYPE: NORMAL
MDC_IDC_SET_ZONE_VENDOR_TYPE: NORMAL
MDC_IDC_STAT_AT_BURDEN_PERCENT: 0 %
MDC_IDC_STAT_AT_DTM_END: NORMAL
MDC_IDC_STAT_AT_DTM_START: NORMAL
MDC_IDC_STAT_BRADY_DTM_END: NORMAL
MDC_IDC_STAT_BRADY_DTM_START: NORMAL
MDC_IDC_STAT_BRADY_RA_PERCENT_PACED: 23 %
MDC_IDC_STAT_BRADY_RV_PERCENT_PACED: 0 %
MDC_IDC_STAT_EPISODE_RECENT_COUNT: 0
MDC_IDC_STAT_EPISODE_RECENT_COUNT: 0
MDC_IDC_STAT_EPISODE_RECENT_COUNT: 1
MDC_IDC_STAT_EPISODE_RECENT_COUNT_DTM_END: NORMAL
MDC_IDC_STAT_EPISODE_RECENT_COUNT_DTM_START: NORMAL
MDC_IDC_STAT_EPISODE_TYPE: NORMAL
MDC_IDC_STAT_EPISODE_VENDOR_TYPE: NORMAL
MDC_IDC_STAT_EPISODE_VENDOR_TYPE: NORMAL

## 2024-09-18 PROCEDURE — 93296 REM INTERROG EVL PM/IDS: CPT | Performed by: INTERNAL MEDICINE

## 2024-09-18 PROCEDURE — 93294 REM INTERROG EVL PM/LDLS PM: CPT | Performed by: INTERNAL MEDICINE

## 2024-11-05 ENCOUNTER — LAB (OUTPATIENT)
Dept: LAB | Facility: CLINIC | Age: 79
End: 2024-11-05
Payer: MEDICARE

## 2024-11-05 DIAGNOSIS — C61 PROSTATE CANCER (H): ICD-10-CM

## 2024-11-05 LAB — PSA SERPL-MCNC: 11.4 UG/L (ref 0–4)

## 2024-11-05 PROCEDURE — 84153 ASSAY OF PSA TOTAL: CPT

## 2024-11-05 PROCEDURE — 36415 COLL VENOUS BLD VENIPUNCTURE: CPT

## 2024-11-07 ENCOUNTER — TELEPHONE (OUTPATIENT)
Dept: UROLOGY | Facility: CLINIC | Age: 79
End: 2024-11-07
Payer: MEDICARE

## 2024-11-07 DIAGNOSIS — C61 PROSTATE CANCER (H): Primary | ICD-10-CM

## 2024-11-07 DIAGNOSIS — R97.20 ELEVATED PROSTATE SPECIFIC ANTIGEN (PSA): ICD-10-CM

## 2024-11-07 NOTE — TELEPHONE ENCOUNTER
[facility] Call Center    Phone Message    May a detailed message be left on voicemail: no     Reason for Call: Order(s): Other:   Reason for requested: MRI  Date needed: Soon as possible  Provider name: Dr Billy    Action Taken: Other: [facility]    Travel Screening: Not Applicable     Please call patient when orders are in

## 2024-11-17 NOTE — PROGRESS NOTES
PHYSICIAN NOTE:  This visit was completed in person at the [facility].      I had the pleasure of seeing Mr. Estrada Oseguera for evaluation of SSS and AF.      Delightful 75-year-old male with the following medical issues:   1.  Symptomatic sinus node dysfunction.  Dual-chamber pacemaker in 11/2016, R-sided implant ([employer]).   2.  2-hour episode of atrial fibrillation recorded by his device in 01/2018. Placed on apixaban. No subsequent AF documentation. After discussion with the patient, AC was stopped in 03/2020. No further documentation of significant AF burden.   3.  PVCs, occasionally symptomatic. Nonsustained VT.   4.  LVEF 50%-55%.   5.  Chronic kidney disease, stage III.  6.  DM type II, on metformin.  7.  Chacon's esophagus.  8.  Parkinson's.   9.  Prostate cancer.     Theo is generally feeling well. He has had concerns regarding his BP over the past year. He visited the ER twice because of elevated BP. He does not monitor his BP at home, it has been several weeks since he last checked it. He takes lisinopril 20 mg daily.    No chest pain, palpitation, unusual dyspnea on exertion. He swims ~1 hour almost every day, there is a swimming pool at his condo place. He enjoys swimming and has no symptoms during physical exertion.      PHYSICAL EXAMINATION:  Vital signs: 148/78 (second measurement), 55, 86 kg, BMI 27  General:  in no apparent distress  ENT/Mouth:  no nasal discharge.  Eyes:  normal conjunctivae.   Neck:  no thyromegaly or lymphadenopathy.  Chest/Lungs:  patient is not dyspneic.  Lungs CTA, without rales or wheezing  Cardiovascular: well healed right pectoral incision, nl JVP, rhythm is regular.  No gallop, murmur or rub.    Abdomen:  no abdominal distention.   Extremities:  no edema  Skin:  no xanthelasma.    Neurologic:  alert & oriented x 3.  No resting tremor.    Vascular:  1+ carotids without bruits.    DIAGNOSTIC STUDIES (reviewed/interpreted in the clinic  today):  Laboratory studies: potassium 4.5, sodium 141, creatinine 1.15, hemoglobin A1c 5.9, hematocrit 48%  PM interrogation (09/2024): estimated battery life 7 years. DDD 55/130 with Ap 23%.  0%.   Device interrogation today with almost identical findings as above. No atrial arrhythmias.      IMPRESSION:  SSS. Well-functioning pacemaker, continue routine device clinic visits.  Atrial fibrillation. No events in several years. He remains off anticoagulation.  Hypertension. Initial SBP was 162 today, after 20 minutes went down to 148 mmHg. I asked him to check his BP 3 times per day for the next 2-3 weeks, create a diary and call my nurse to review numbers.    RECOMMENDATIONS:  Monitor BP at home.  Follow-up with EP KEEGAN in one year.    It was my pleasure seeing this delightful patient.  Please feel free to call with any questions.   Total time spent today, including time for chart review and documentation, was 25 minutes.    The longitudinal plan of care for the diagnosis(es)/condition(s) as documented were addressed during this visit. Due to the added complexity in care, I will continue to support Rich in the subsequent management and with ongoing continuity of care.     Lynn Valle MD, FAC      (Chart documentation was completed, in part, using Dragon voice-recognition software. The note was reviewed, however grammatical and spelling errors may be present.)      CURRENT MEDICATIONS:  Current Outpatient Medications   Medication Sig Dispense Refill    acetaminophen (TYLENOL) 325 MG tablet Take 1-2 tablets (325-650 mg) by mouth every 6 hours as needed for mild pain or headaches 60 tablet 3    blood glucose (ACCU-CHEK GUIDE) test strip USE TO TEST BLOOD SUGAR 1 TIMES DAILY 100 strip 2    blood glucose (NO BRAND SPECIFIED) lancets standard Use to test blood sugar 1 times daily or as directed. 100 each 1    blood glucose monitoring (NO BRAND SPECIFIED) meter device kit Use to test blood sugar as directed. 1 kit  0    carbidopa-levodopa (SINEMET)  MG tablet Take 1 tablet by mouth 3 times daily. From neurologist      hydrOXYzine HCl (ATARAX) 10 MG tablet Take 1 tablet (10 mg) by mouth 3 times daily as needed for anxiety (Patient not taking: Reported on 9/16/2024) 30 tablet 3    lisinopril (ZESTRIL) 20 MG tablet Take 1 tablet (20 mg) by mouth daily 90 tablet 3    metFORMIN (GLUCOPHAGE XR) 500 MG 24 hr tablet Take 1 tablet (500 mg) by mouth daily (with dinner) 90 tablet 3    mirtazapine (REMERON) 15 MG tablet Take 1 tablet (15 mg) by mouth at bedtime 90 tablet 3    pantoprazole (PROTONIX) 40 MG EC tablet Take 1 tablet (40 mg) by mouth daily for acid reflux 90 tablet 3    polyethylene glycol (MIRALAX) 17 GM/Dose powder Take 17 g (1 Capful) by mouth daily for constipation 510 g 1    rosuvastatin (CRESTOR) 5 MG tablet Take 1 tablet (5 mg) by mouth daily. for cholesterol 90 tablet 3    tamsulosin (FLOMAX) 0.4 MG capsule Take 1 capsule (0.4 mg) by mouth daily 90 capsule 3       ALLERGIES     Allergies   Allergen Reactions    Amlodipine      Constipation    Metoprolol      Bradycardia       PAST MEDICAL HISTORY:  Past Medical History:   Diagnosis Date    A-fib (H) 01/31/2018    Cancer (H)     skin    CKD (chronic kidney disease)     Diabetes (H) 1/2020    under control    GERD (gastroesophageal reflux disease)     Hernia, abdominal     Hiatal hernia     Hypertension     Mumps     Parkinson's disease without dyskinesia or fluctuating manifestations (H) 9/16/2024    Paroxysmal atrial fibrillation (H) 03/18/2021    past history see note of  dated 3/6/20    A 2-hour episode of atrial fibrillation recorded by the pacemaker in 01/2018    Pericarditis     S/P colon resection        PAST SURGICAL HISTORY:  Past Surgical History:   Procedure Laterality Date    ABDOMEN SURGERY  1993; 2007    colon resection; cholecystectomy    BIOPSY  2022    prostate cancer    CHOLECYSTECTOMY      COLONOSCOPY      COLONOSCOPY N/A 05/06/2021     Procedure: COLONOSCOPY, WITH POLYPECTOMY AND BIOPSY;  Surgeon: Deondre Meek MD;  Location: [facility]    COMBINED COLONOSCOPY, MUCOSAL RESECTION      ?diverticulitis     ESOPHAGOSCOPY, GASTROSCOPY, DUODENOSCOPY (EGD), COMBINED N/A 2016    Procedure: COMBINED ESOPHAGOSCOPY, GASTROSCOPY, DUODENOSCOPY (EGD), BIOPSY SINGLE OR MULTIPLE;  Surgeon: Deondre Meek MD;  Location: [facility]    ESOPHAGOSCOPY, GASTROSCOPY, DUODENOSCOPY (EGD), COMBINED N/A 2022    Procedure: ESOPHAGOGASTRODUODENOSCOPY, WITH BIOPSY;  Surgeon: Deondre Meek MD;  Location: [facility]    ESOPHAGOSCOPY, GASTROSCOPY, DUODENOSCOPY (EGD), DILATATION, COMBINED N/A 2016    Procedure: COMBINED ESOPHAGOSCOPY, GASTROSCOPY, DUODENOSCOPY (EGD), DILATATION;  Surgeon: Deondre Meek MD;  Location: [facility]    ESOPHAGOSCOPY, GASTROSCOPY, DUODENOSCOPY (EGD), DILATATION, COMBINED N/A 2017    Procedure: COMBINED ESOPHAGOSCOPY, GASTROSCOPY, DUODENOSCOPY (EGD), DILATATION;  Surgeon: Deondre Meek MD;  Location: [facility]    NEPHRECTOMY      NEPHRECTOMY RT/LT      right    pace maker      VASECTOMY         FAMILY HISTORY:  Family History   Problem Relation Age of Onset    Hyperlipidemia Mother     Cerebrovascular Disease Father     Prostate Cancer Father     Pulmonary Hypertension Brother     Diabetes Brother     Breast Cancer Sister     Parkinsonism Sister     Colon Cancer No family hx of        SOCIAL HISTORY:  Social History     Socioeconomic History    Marital status:    Tobacco Use    Smoking status: Former     Types: Cigars, Pipe, Dip, chew, snus or snuff     Quit date:      Years since quittin.9    Smokeless tobacco: Never    Tobacco comments:     Intermittent use, when using.  None since    Vaping Use    Vaping status: Never Used   Substance and Sexual Activity    Alcohol use: Not Currently    Drug use: No    Sexual activity: Not Currently     Partners: Female     Birth control/protection:  Male Surgical   Other Topics Concern    Parent/sibling w/ CABG, MI or angioplasty before 65F 55M? No    Special Diet No    Exercise No     Social Drivers of Health     Financial Resource Strain: Low Risk  (3/2/2024)    Financial Resource Strain     Within the past 12 months, have you or your family members you live with been unable to get utilities (heat, electricity) when it was really needed?: No   Food Insecurity: Low Risk  (3/2/2024)    Food Insecurity     Within the past 12 months, did you worry that your food would run out before you got money to buy more?: No     Within the past 12 months, did the food you bought just not last and you didn t have money to get more?: No   Transportation Needs: Low Risk  (3/2/2024)    Transportation Needs     Within the past 12 months, has lack of transportation kept you from medical appointments, getting your medicines, non-medical meetings or appointments, work, or from getting things that you need?: No   Physical Activity: Sufficiently Active (3/2/2024)    Exercise Vital Sign     Days of Exercise per Week: 6 days     Minutes of Exercise per Session: 40 min   Stress: Stress Concern Present (3/2/2024)    [place] [employer] - Occupational Stress Questionnaire     Feeling of Stress : Very much   Social Connections: Unknown (3/2/2024)    Social Connection and Isolation Panel [NHANES]     Frequency of Social Gatherings with Friends and Family: Once a week   Housing Stability: Low Risk  (3/2/2024)    Housing Stability     Do you have housing? : Yes     Are you worried about losing your housing?: No

## 2024-11-18 ENCOUNTER — ANCILLARY PROCEDURE (OUTPATIENT)
Dept: CARDIOLOGY | Facility: CLINIC | Age: 79
End: 2024-11-18
Attending: INTERNAL MEDICINE
Payer: MEDICARE

## 2024-11-18 ENCOUNTER — OFFICE VISIT (OUTPATIENT)
Dept: CARDIOLOGY | Facility: CLINIC | Age: 79
End: 2024-11-18
Payer: MEDICARE

## 2024-11-18 VITALS
HEART RATE: 50 BPM | SYSTOLIC BLOOD PRESSURE: 148 MMHG | BODY MASS INDEX: 27.06 KG/M2 | DIASTOLIC BLOOD PRESSURE: 78 MMHG | WEIGHT: 189 LBS | RESPIRATION RATE: 18 BRPM | HEIGHT: 70 IN

## 2024-11-18 DIAGNOSIS — I49.5 SSS (SICK SINUS SYNDROME) (H): Primary | ICD-10-CM

## 2024-11-18 DIAGNOSIS — Z95.0 CARDIAC PACEMAKER IN SITU: ICD-10-CM

## 2024-11-18 DIAGNOSIS — I49.5 SSS (SICK SINUS SYNDROME) (H): ICD-10-CM

## 2024-11-18 LAB
MDC_IDC_LEAD_CONNECTION_STATUS: NORMAL
MDC_IDC_LEAD_CONNECTION_STATUS: NORMAL
MDC_IDC_LEAD_IMPLANT_DT: NORMAL
MDC_IDC_LEAD_IMPLANT_DT: NORMAL
MDC_IDC_LEAD_LOCATION: NORMAL
MDC_IDC_LEAD_LOCATION: NORMAL
MDC_IDC_LEAD_LOCATION_DETAIL_1: NORMAL
MDC_IDC_LEAD_LOCATION_DETAIL_1: NORMAL
MDC_IDC_LEAD_MFG: NORMAL
MDC_IDC_LEAD_MFG: NORMAL
MDC_IDC_LEAD_MODEL: NORMAL
MDC_IDC_LEAD_MODEL: NORMAL
MDC_IDC_LEAD_POLARITY_TYPE: NORMAL
MDC_IDC_LEAD_POLARITY_TYPE: NORMAL
MDC_IDC_LEAD_SERIAL: NORMAL
MDC_IDC_LEAD_SERIAL: NORMAL
MDC_IDC_MSMT_BATTERY_DTM: NORMAL
MDC_IDC_MSMT_BATTERY_REMAINING_LONGEVITY: 84 MO
MDC_IDC_MSMT_BATTERY_REMAINING_PERCENTAGE: 82 %
MDC_IDC_MSMT_BATTERY_STATUS: NORMAL
MDC_IDC_MSMT_LEADCHNL_RA_IMPEDANCE_VALUE: 784 OHM
MDC_IDC_MSMT_LEADCHNL_RA_PACING_THRESHOLD_AMPLITUDE: 1 V
MDC_IDC_MSMT_LEADCHNL_RA_PACING_THRESHOLD_PULSEWIDTH: 0.4 MS
MDC_IDC_MSMT_LEADCHNL_RV_IMPEDANCE_VALUE: 685 OHM
MDC_IDC_MSMT_LEADCHNL_RV_PACING_THRESHOLD_AMPLITUDE: 0.8 V
MDC_IDC_MSMT_LEADCHNL_RV_PACING_THRESHOLD_PULSEWIDTH: 0.4 MS
MDC_IDC_PG_IMPLANT_DTM: NORMAL
MDC_IDC_PG_MFG: NORMAL
MDC_IDC_PG_MODEL: NORMAL
MDC_IDC_PG_SERIAL: NORMAL
MDC_IDC_PG_TYPE: NORMAL
MDC_IDC_SESS_CLINIC_NAME: NORMAL
MDC_IDC_SESS_DTM: NORMAL
MDC_IDC_SESS_TYPE: NORMAL
MDC_IDC_SET_BRADY_AT_MODE_SWITCH_MODE: NORMAL
MDC_IDC_SET_BRADY_AT_MODE_SWITCH_RATE: 170 {BEATS}/MIN
MDC_IDC_SET_BRADY_LOWRATE: 55 {BEATS}/MIN
MDC_IDC_SET_BRADY_MAX_SENSOR_RATE: 130 {BEATS}/MIN
MDC_IDC_SET_BRADY_MAX_TRACKING_RATE: 130 {BEATS}/MIN
MDC_IDC_SET_BRADY_MODE: NORMAL
MDC_IDC_SET_BRADY_PAV_DELAY_HIGH: 200 MS
MDC_IDC_SET_BRADY_PAV_DELAY_LOW: 300 MS
MDC_IDC_SET_BRADY_SAV_DELAY_HIGH: 200 MS
MDC_IDC_SET_BRADY_SAV_DELAY_LOW: 300 MS
MDC_IDC_SET_LEADCHNL_RA_PACING_AMPLITUDE: 2 V
MDC_IDC_SET_LEADCHNL_RA_PACING_CAPTURE_MODE: NORMAL
MDC_IDC_SET_LEADCHNL_RA_PACING_POLARITY: NORMAL
MDC_IDC_SET_LEADCHNL_RA_PACING_PULSEWIDTH: 0.4 MS
MDC_IDC_SET_LEADCHNL_RA_SENSING_ADAPTATION_MODE: NORMAL
MDC_IDC_SET_LEADCHNL_RA_SENSING_POLARITY: NORMAL
MDC_IDC_SET_LEADCHNL_RA_SENSING_SENSITIVITY: 0.25 MV
MDC_IDC_SET_LEADCHNL_RV_PACING_AMPLITUDE: 1.5 V
MDC_IDC_SET_LEADCHNL_RV_PACING_CAPTURE_MODE: NORMAL
MDC_IDC_SET_LEADCHNL_RV_PACING_POLARITY: NORMAL
MDC_IDC_SET_LEADCHNL_RV_PACING_PULSEWIDTH: 0.4 MS
MDC_IDC_SET_LEADCHNL_RV_SENSING_ADAPTATION_MODE: NORMAL
MDC_IDC_SET_LEADCHNL_RV_SENSING_POLARITY: NORMAL
MDC_IDC_SET_LEADCHNL_RV_SENSING_SENSITIVITY: 1.5 MV
MDC_IDC_SET_ZONE_DETECTION_INTERVAL: 375 MS
MDC_IDC_SET_ZONE_STATUS: NORMAL
MDC_IDC_SET_ZONE_TYPE: NORMAL
MDC_IDC_SET_ZONE_VENDOR_TYPE: NORMAL
MDC_IDC_STAT_AT_BURDEN_PERCENT: 0 %
MDC_IDC_STAT_AT_DTM_END: NORMAL
MDC_IDC_STAT_AT_DTM_START: NORMAL
MDC_IDC_STAT_BRADY_DTM_END: NORMAL
MDC_IDC_STAT_BRADY_DTM_START: NORMAL
MDC_IDC_STAT_BRADY_RA_PERCENT_PACED: 23 %
MDC_IDC_STAT_BRADY_RV_PERCENT_PACED: 0 %
MDC_IDC_STAT_EPISODE_RECENT_COUNT: 0
MDC_IDC_STAT_EPISODE_RECENT_COUNT: 0
MDC_IDC_STAT_EPISODE_RECENT_COUNT: 1
MDC_IDC_STAT_EPISODE_RECENT_COUNT_DTM_END: NORMAL
MDC_IDC_STAT_EPISODE_RECENT_COUNT_DTM_START: NORMAL
MDC_IDC_STAT_EPISODE_TYPE: NORMAL
MDC_IDC_STAT_EPISODE_VENDOR_TYPE: NORMAL
MDC_IDC_STAT_EPISODE_VENDOR_TYPE: NORMAL

## 2024-11-18 NOTE — LETTER
11/18/2024    Zoe Tomas MD  [address]  [facility] [address]    RE: Estrada Oseguera       Dear Colleague,     I had the pleasure of seeing Estrada Oseguera in the [facility].  PHYSICIAN NOTE:  This visit was completed in person at the [facility].      I had the pleasure of seeing Mr. Estrada Oseguera for evaluation of SSS and AF.      Delightful 75-year-old male with the following medical issues:   1.  Symptomatic sinus node dysfunction.  Dual-chamber pacemaker in 11/2016, R-sided implant ([employer]).   2.  2-hour episode of atrial fibrillation recorded by his device in 01/2018. Placed on apixaban. No subsequent AF documentation. After discussion with the patient, AC was stopped in 03/2020. No further documentation of significant AF burden.   3.  PVCs, occasionally symptomatic. Nonsustained VT.   4.  LVEF 50%-55%.   5.  Chronic kidney disease, stage III.  6.  DM type II, on metformin.  7.  Chacon's esophagus.  8.  Parkinson's.   9.  Prostate cancer.     Theo is generally feeling well. He has had concerns regarding his BP over the past year. He visited the ER twice because of elevated BP. He does not monitor his BP at home, it has been several weeks since he last checked it. He takes lisinopril 20 mg daily.    No chest pain, palpitation, unusual dyspnea on exertion. He swims ~1 hour almost every day, there is a swimming pool at his condo place. He enjoys swimming and has no symptoms during physical exertion.      PHYSICAL EXAMINATION:  Vital signs: 148/78 (second measurement), 55, 86 kg, BMI 27  General:  in no apparent distress  ENT/Mouth:  no nasal discharge.  Eyes:  normal conjunctivae.   Neck:  no thyromegaly or lymphadenopathy.  Chest/Lungs:  patient is not dyspneic.  Lungs CTA, without rales or wheezing  Cardiovascular: well healed right pectoral incision, nl JVP, rhythm is regular.  No gallop, murmur or rub.    Abdomen:  no  abdominal distention.   Extremities:  no edema  Skin:  no xanthelasma.    Neurologic:  alert & oriented x 3.  No resting tremor.    Vascular:  1+ carotids without bruits.    DIAGNOSTIC STUDIES (reviewed/interpreted in the clinic today):  Laboratory studies: potassium 4.5, sodium 141, creatinine 1.15, hemoglobin A1c 5.9, hematocrit 48%  PM interrogation (09/2024): estimated battery life 7 years. DDD 55/130 with Ap 23%.  0%.   Device interrogation today with almost identical findings as above. No atrial arrhythmias.      IMPRESSION:  SSS. Well-functioning pacemaker, continue routine device clinic visits.  Atrial fibrillation. No events in several years. He remains off anticoagulation.  Hypertension. Initial SBP was 162 today, after 20 minutes went down to 148 mmHg. I asked him to check his BP 3 times per day for the next 2-3 weeks, create a diary and call my nurse to review numbers.    RECOMMENDATIONS:  Monitor BP at home.  Follow-up with EP KEEGAN in one year.    It was my pleasure seeing this delightful patient.  Please feel free to call with any questions.   Total time spent today, including time for chart review and documentation, was 25 minutes.    The longitudinal plan of care for the diagnosis(es)/condition(s) as documented were addressed during this visit. Due to the added complexity in care, I will continue to support Rich in the subsequent management and with ongoing continuity of care.     Lynn Valle MD, [facility]      (Chart documentation was completed, in part, using Dragon voice-recognition software. The note was reviewed, however grammatical and spelling errors may be present.)      CURRENT MEDICATIONS:  Current Outpatient Medications   Medication Sig Dispense Refill     acetaminophen (TYLENOL) 325 MG tablet Take 1-2 tablets (325-650 mg) by mouth every 6 hours as needed for mild pain or headaches 60 tablet 3     blood glucose (ACCU-CHEK GUIDE) test strip USE TO TEST BLOOD SUGAR 1 TIMES DAILY 100 strip  2     blood glucose (NO BRAND SPECIFIED) lancets standard Use to test blood sugar 1 times daily or as directed. 100 each 1     blood glucose monitoring (NO BRAND SPECIFIED) meter device kit Use to test blood sugar as directed. 1 kit 0     carbidopa-levodopa (SINEMET)  MG tablet Take 1 tablet by mouth 3 times daily. From neurologist       hydrOXYzine HCl (ATARAX) 10 MG tablet Take 1 tablet (10 mg) by mouth 3 times daily as needed for anxiety (Patient not taking: Reported on 9/16/2024) 30 tablet 3     lisinopril (ZESTRIL) 20 MG tablet Take 1 tablet (20 mg) by mouth daily 90 tablet 3     metFORMIN (GLUCOPHAGE XR) 500 MG 24 hr tablet Take 1 tablet (500 mg) by mouth daily (with dinner) 90 tablet 3     mirtazapine (REMERON) 15 MG tablet Take 1 tablet (15 mg) by mouth at bedtime 90 tablet 3     pantoprazole (PROTONIX) 40 MG EC tablet Take 1 tablet (40 mg) by mouth daily for acid reflux 90 tablet 3     polyethylene glycol (MIRALAX) 17 GM/Dose powder Take 17 g (1 Capful) by mouth daily for constipation 510 g 1     rosuvastatin (CRESTOR) 5 MG tablet Take 1 tablet (5 mg) by mouth daily. for cholesterol 90 tablet 3     tamsulosin (FLOMAX) 0.4 MG capsule Take 1 capsule (0.4 mg) by mouth daily 90 capsule 3       ALLERGIES     Allergies   Allergen Reactions     Amlodipine      Constipation     Metoprolol      Bradycardia       PAST MEDICAL HISTORY:  Past Medical History:   Diagnosis Date     A-fib (H) 01/31/2018     Cancer (H)     skin     CKD (chronic kidney disease)      Diabetes (H) 1/2020    under control     GERD (gastroesophageal reflux disease)      Hernia, abdominal      Hiatal hernia      Hypertension      Mumps      Parkinson's disease without dyskinesia or fluctuating manifestations (H) 9/16/2024     Paroxysmal atrial fibrillation (H) 03/18/2021    past history see note of  dated 3/6/20    A 2-hour episode of atrial fibrillation recorded by the pacemaker in 01/2018     Pericarditis      S/P colon resection         PAST SURGICAL HISTORY:  Past Surgical History:   Procedure Laterality Date     ABDOMEN SURGERY  ;     colon resection; cholecystectomy     BIOPSY      prostate cancer     CHOLECYSTECTOMY       COLONOSCOPY       COLONOSCOPY N/A 2021    Procedure: COLONOSCOPY, WITH POLYPECTOMY AND BIOPSY;  Surgeon: Deondre Meek MD;  Location: [facility]     COMBINED COLONOSCOPY, MUCOSAL RESECTION      ?diverticulitis      ESOPHAGOSCOPY, GASTROSCOPY, DUODENOSCOPY (EGD), COMBINED N/A 2016    Procedure: COMBINED ESOPHAGOSCOPY, GASTROSCOPY, DUODENOSCOPY (EGD), BIOPSY SINGLE OR MULTIPLE;  Surgeon: Deondre Meek MD;  Location: [facility]     ESOPHAGOSCOPY, GASTROSCOPY, DUODENOSCOPY (EGD), COMBINED N/A 2022    Procedure: ESOPHAGOGASTRODUODENOSCOPY, WITH BIOPSY;  Surgeon: Deondre Meek MD;  Location: [facility]     ESOPHAGOSCOPY, GASTROSCOPY, DUODENOSCOPY (EGD), DILATATION, COMBINED N/A 2016    Procedure: COMBINED ESOPHAGOSCOPY, GASTROSCOPY, DUODENOSCOPY (EGD), DILATATION;  Surgeon: Deondre Meek MD;  Location: [facility]     ESOPHAGOSCOPY, GASTROSCOPY, DUODENOSCOPY (EGD), DILATATION, COMBINED N/A 2017    Procedure: COMBINED ESOPHAGOSCOPY, GASTROSCOPY, DUODENOSCOPY (EGD), DILATATION;  Surgeon: Deondre Meek MD;  Location: [facility]     NEPHRECTOMY       NEPHRECTOMY RT/LT      right     pace maker       VASECTOMY         FAMILY HISTORY:  Family History   Problem Relation Age of Onset     Hyperlipidemia Mother      Cerebrovascular Disease Father      Prostate Cancer Father      Pulmonary Hypertension Brother      Diabetes Brother      Breast Cancer Sister      Parkinsonism Sister      Colon Cancer No family hx of        SOCIAL HISTORY:  Social History     Socioeconomic History     Marital status:    Tobacco Use     Smoking status: Former     Types: Cigars, Pipe, Dip, chew, snus or snuff     Quit date:      Years since quittin.9     Smokeless tobacco:  Never     Tobacco comments:     Intermittent use, when using.  None since 2005   Vaping Use     Vaping status: Never Used   Substance and Sexual Activity     Alcohol use: Not Currently     Drug use: No     Sexual activity: Not Currently     Partners: Female     Birth control/protection: Male Surgical   Other Topics Concern     Parent/sibling w/ CABG, MI or angioplasty before 65F 55M? No     Special Diet No     Exercise No     Social Drivers of Health     Financial Resource Strain: Low Risk  (3/2/2024)    Financial Resource Strain      Within the past 12 months, have you or your family members you live with been unable to get utilities (heat, electricity) when it was really needed?: No   Food Insecurity: Low Risk  (3/2/2024)    Food Insecurity      Within the past 12 months, did you worry that your food would run out before you got money to buy more?: No      Within the past 12 months, did the food you bought just not last and you didn t have money to get more?: No   Transportation Needs: Low Risk  (3/2/2024)    Transportation Needs      Within the past 12 months, has lack of transportation kept you from medical appointments, getting your medicines, non-medical meetings or appointments, work, or from getting things that you need?: No   Physical Activity: Sufficiently Active (3/2/2024)    Exercise Vital Sign      Days of Exercise per Week: 6 days      Minutes of Exercise per Session: 40 min   Stress: Stress Concern Present (3/2/2024)    [place] [employer] - Occupational Stress Questionnaire      Feeling of Stress : Very much   Social Connections: Unknown (3/2/2024)    Social Connection and Isolation Panel [NHANES]      Frequency of Social Gatherings with Friends and Family: Once a week   Housing Stability: Low Risk  (3/2/2024)    Housing Stability      Do you have housing? : Yes      Are you worried about losing your housing?: No                   Thank you for allowing me to participate in the  care of your patient.      Sincerely,     Lynn Valle MD     [facility]  cc:   Soraya García PA-C  [address]  [place] [address]

## 2024-11-18 NOTE — PROGRESS NOTES
[employer] L331 ACCOLADE (D) Pacemaker Device Check  Patient seen in clinic for device evaluation and iterative programming.   AP: 23 %    : <1 %    Mode: DDD 55 - 130    Underlying Rhythm:  SR  70s- 80s with frequent PVCs  Heart Rate: Good variability with HR's 55 to 110's. Denies any SOB with activity.     Sensing: WNL    Pacing Threshold: WNL    Impedance: WNL    Battery Status: estimated longevity 7 years    Device Site:  Well healed    Atrial Arrhythmia: 0    Ventricular Arrhythmia: 0    Setting Change: None      Care Plan: Scheduled remote checks in 3 months on 2/20/25. Will see Dr Valle in clinic today.       MGilbertRN/PSchultzRN    I have reviewed and interpreted the device interrogation, settings, programming and nurse's summary. The device is functioning within normal device parameters. I agree with the current findings, assessment and plan.

## 2024-12-18 ENCOUNTER — ANCILLARY PROCEDURE (OUTPATIENT)
Dept: CARDIOLOGY | Facility: CLINIC | Age: 79
End: 2024-12-18
Attending: INTERNAL MEDICINE
Payer: MEDICARE

## 2024-12-18 ENCOUNTER — HOSPITAL ENCOUNTER (OUTPATIENT)
Dept: MRI IMAGING | Facility: CLINIC | Age: 79
Discharge: HOME | End: 2024-12-18
Attending: UROLOGY
Payer: MEDICARE

## 2024-12-18 VITALS — HEART RATE: 57 BPM | OXYGEN SATURATION: 93 %

## 2024-12-18 DIAGNOSIS — Z45.02 FITTING AND ADJUSTMENT OF AUTOMATIC IMPLANTABLE CARDIOVERTER-DEFIBRILLATOR: ICD-10-CM

## 2024-12-18 DIAGNOSIS — R97.20 ELEVATED PROSTATE SPECIFIC ANTIGEN (PSA): ICD-10-CM

## 2024-12-18 DIAGNOSIS — Z45.02 FITTING AND ADJUSTMENT OF AUTOMATIC IMPLANTABLE CARDIOVERTER-DEFIBRILLATOR: Primary | ICD-10-CM

## 2024-12-18 DIAGNOSIS — C61 PROSTATE CANCER (H): ICD-10-CM

## 2024-12-18 LAB
MDC_IDC_LEAD_CONNECTION_STATUS: NORMAL
MDC_IDC_LEAD_IMPLANT_DT: NORMAL
MDC_IDC_LEAD_LOCATION: NORMAL
MDC_IDC_LEAD_LOCATION_DETAIL_1: NORMAL
MDC_IDC_LEAD_MFG: NORMAL
MDC_IDC_LEAD_MODEL: NORMAL
MDC_IDC_LEAD_POLARITY_TYPE: NORMAL
MDC_IDC_LEAD_SERIAL: NORMAL
MDC_IDC_MSMT_BATTERY_DTM: NORMAL
MDC_IDC_MSMT_BATTERY_DTM: NORMAL
MDC_IDC_MSMT_BATTERY_REMAINING_LONGEVITY: 84 MO
MDC_IDC_MSMT_BATTERY_REMAINING_LONGEVITY: 84 MO
MDC_IDC_MSMT_BATTERY_REMAINING_PERCENTAGE: 81 %
MDC_IDC_MSMT_BATTERY_REMAINING_PERCENTAGE: 81 %
MDC_IDC_MSMT_BATTERY_STATUS: NORMAL
MDC_IDC_MSMT_BATTERY_STATUS: NORMAL
MDC_IDC_MSMT_LEADCHNL_RA_IMPEDANCE_VALUE: 757 OHM
MDC_IDC_MSMT_LEADCHNL_RA_IMPEDANCE_VALUE: 771 OHM
MDC_IDC_MSMT_LEADCHNL_RA_PACING_THRESHOLD_AMPLITUDE: 0.9 V
MDC_IDC_MSMT_LEADCHNL_RA_PACING_THRESHOLD_AMPLITUDE: 1.1 V
MDC_IDC_MSMT_LEADCHNL_RA_PACING_THRESHOLD_PULSEWIDTH: 0.4 MS
MDC_IDC_MSMT_LEADCHNL_RA_PACING_THRESHOLD_PULSEWIDTH: 0.4 MS
MDC_IDC_MSMT_LEADCHNL_RV_IMPEDANCE_VALUE: 641 OHM
MDC_IDC_MSMT_LEADCHNL_RV_IMPEDANCE_VALUE: 662 OHM
MDC_IDC_MSMT_LEADCHNL_RV_PACING_THRESHOLD_AMPLITUDE: 0.8 V
MDC_IDC_MSMT_LEADCHNL_RV_PACING_THRESHOLD_AMPLITUDE: 1.1 V
MDC_IDC_MSMT_LEADCHNL_RV_PACING_THRESHOLD_PULSEWIDTH: 0.4 MS
MDC_IDC_MSMT_LEADCHNL_RV_PACING_THRESHOLD_PULSEWIDTH: 0.4 MS
MDC_IDC_PG_IMPLANT_DTM: NORMAL
MDC_IDC_PG_IMPLANT_DTM: NORMAL
MDC_IDC_PG_MFG: NORMAL
MDC_IDC_PG_MFG: NORMAL
MDC_IDC_PG_MODEL: NORMAL
MDC_IDC_PG_MODEL: NORMAL
MDC_IDC_PG_SERIAL: NORMAL
MDC_IDC_PG_SERIAL: NORMAL
MDC_IDC_PG_TYPE: NORMAL
MDC_IDC_PG_TYPE: NORMAL
MDC_IDC_SESS_CLINIC_NAME: NORMAL
MDC_IDC_SESS_CLINIC_NAME: NORMAL
MDC_IDC_SESS_DTM: NORMAL
MDC_IDC_SESS_DTM: NORMAL
MDC_IDC_SESS_TYPE: NORMAL
MDC_IDC_SESS_TYPE: NORMAL
MDC_IDC_SET_BRADY_AT_MODE_SWITCH_MODE: NORMAL
MDC_IDC_SET_BRADY_AT_MODE_SWITCH_MODE: NORMAL
MDC_IDC_SET_BRADY_AT_MODE_SWITCH_RATE: 170 {BEATS}/MIN
MDC_IDC_SET_BRADY_AT_MODE_SWITCH_RATE: 170 {BEATS}/MIN
MDC_IDC_SET_BRADY_LOWRATE: 55 {BEATS}/MIN
MDC_IDC_SET_BRADY_LOWRATE: 55 {BEATS}/MIN
MDC_IDC_SET_BRADY_MAX_SENSOR_RATE: 130 {BEATS}/MIN
MDC_IDC_SET_BRADY_MAX_SENSOR_RATE: 130 {BEATS}/MIN
MDC_IDC_SET_BRADY_MAX_TRACKING_RATE: 130 {BEATS}/MIN
MDC_IDC_SET_BRADY_MAX_TRACKING_RATE: 130 {BEATS}/MIN
MDC_IDC_SET_BRADY_MODE: NORMAL
MDC_IDC_SET_BRADY_MODE: NORMAL
MDC_IDC_SET_BRADY_PAV_DELAY_HIGH: 200 MS
MDC_IDC_SET_BRADY_PAV_DELAY_HIGH: 200 MS
MDC_IDC_SET_BRADY_PAV_DELAY_LOW: 300 MS
MDC_IDC_SET_BRADY_PAV_DELAY_LOW: 300 MS
MDC_IDC_SET_BRADY_SAV_DELAY_HIGH: 200 MS
MDC_IDC_SET_BRADY_SAV_DELAY_HIGH: 200 MS
MDC_IDC_SET_BRADY_SAV_DELAY_LOW: 300 MS
MDC_IDC_SET_BRADY_SAV_DELAY_LOW: 300 MS
MDC_IDC_SET_LEADCHNL_RA_PACING_AMPLITUDE: 2 V
MDC_IDC_SET_LEADCHNL_RA_PACING_AMPLITUDE: 2 V
MDC_IDC_SET_LEADCHNL_RA_PACING_CAPTURE_MODE: NORMAL
MDC_IDC_SET_LEADCHNL_RA_PACING_CAPTURE_MODE: NORMAL
MDC_IDC_SET_LEADCHNL_RA_PACING_POLARITY: NORMAL
MDC_IDC_SET_LEADCHNL_RA_PACING_POLARITY: NORMAL
MDC_IDC_SET_LEADCHNL_RA_PACING_PULSEWIDTH: 0.4 MS
MDC_IDC_SET_LEADCHNL_RA_PACING_PULSEWIDTH: 0.4 MS
MDC_IDC_SET_LEADCHNL_RA_SENSING_ADAPTATION_MODE: NORMAL
MDC_IDC_SET_LEADCHNL_RA_SENSING_ADAPTATION_MODE: NORMAL
MDC_IDC_SET_LEADCHNL_RA_SENSING_POLARITY: NORMAL
MDC_IDC_SET_LEADCHNL_RA_SENSING_POLARITY: NORMAL
MDC_IDC_SET_LEADCHNL_RA_SENSING_SENSITIVITY: 0.25 MV
MDC_IDC_SET_LEADCHNL_RA_SENSING_SENSITIVITY: 0.25 MV
MDC_IDC_SET_LEADCHNL_RV_PACING_AMPLITUDE: 1.9 V
MDC_IDC_SET_LEADCHNL_RV_PACING_AMPLITUDE: 3.5 V
MDC_IDC_SET_LEADCHNL_RV_PACING_CAPTURE_MODE: NORMAL
MDC_IDC_SET_LEADCHNL_RV_PACING_CAPTURE_MODE: NORMAL
MDC_IDC_SET_LEADCHNL_RV_PACING_POLARITY: NORMAL
MDC_IDC_SET_LEADCHNL_RV_PACING_POLARITY: NORMAL
MDC_IDC_SET_LEADCHNL_RV_PACING_PULSEWIDTH: 0.4 MS
MDC_IDC_SET_LEADCHNL_RV_PACING_PULSEWIDTH: 0.4 MS
MDC_IDC_SET_LEADCHNL_RV_SENSING_ADAPTATION_MODE: NORMAL
MDC_IDC_SET_LEADCHNL_RV_SENSING_ADAPTATION_MODE: NORMAL
MDC_IDC_SET_LEADCHNL_RV_SENSING_POLARITY: NORMAL
MDC_IDC_SET_LEADCHNL_RV_SENSING_POLARITY: NORMAL
MDC_IDC_SET_LEADCHNL_RV_SENSING_SENSITIVITY: 1.5 MV
MDC_IDC_SET_LEADCHNL_RV_SENSING_SENSITIVITY: 1.5 MV
MDC_IDC_SET_ZONE_DETECTION_INTERVAL: 375 MS
MDC_IDC_SET_ZONE_DETECTION_INTERVAL: 375 MS
MDC_IDC_SET_ZONE_STATUS: NORMAL
MDC_IDC_SET_ZONE_STATUS: NORMAL
MDC_IDC_SET_ZONE_TYPE: NORMAL
MDC_IDC_SET_ZONE_TYPE: NORMAL
MDC_IDC_SET_ZONE_VENDOR_TYPE: NORMAL
MDC_IDC_SET_ZONE_VENDOR_TYPE: NORMAL
MDC_IDC_STAT_AT_BURDEN_PERCENT: 0 %
MDC_IDC_STAT_AT_BURDEN_PERCENT: 0 %
MDC_IDC_STAT_AT_DTM_END: NORMAL
MDC_IDC_STAT_AT_DTM_END: NORMAL
MDC_IDC_STAT_AT_DTM_START: NORMAL
MDC_IDC_STAT_AT_DTM_START: NORMAL
MDC_IDC_STAT_BRADY_DTM_END: NORMAL
MDC_IDC_STAT_BRADY_DTM_END: NORMAL
MDC_IDC_STAT_BRADY_DTM_START: NORMAL
MDC_IDC_STAT_BRADY_DTM_START: NORMAL
MDC_IDC_STAT_BRADY_RA_PERCENT_PACED: 18 %
MDC_IDC_STAT_BRADY_RA_PERCENT_PACED: 18 %
MDC_IDC_STAT_BRADY_RV_PERCENT_PACED: 0 %
MDC_IDC_STAT_BRADY_RV_PERCENT_PACED: 0 %
MDC_IDC_STAT_EPISODE_RECENT_COUNT: 0
MDC_IDC_STAT_EPISODE_RECENT_COUNT: 1
MDC_IDC_STAT_EPISODE_RECENT_COUNT_DTM_END: NORMAL
MDC_IDC_STAT_EPISODE_RECENT_COUNT_DTM_START: NORMAL
MDC_IDC_STAT_EPISODE_TYPE: NORMAL
MDC_IDC_STAT_EPISODE_VENDOR_TYPE: NORMAL

## 2024-12-18 PROCEDURE — 255N000002 HC RX 255 OP 636: Performed by: UROLOGY

## 2024-12-18 PROCEDURE — G1010 CDSM STANSON: HCPCS | Performed by: STUDENT IN AN ORGANIZED HEALTH CARE EDUCATION/TRAINING PROGRAM

## 2024-12-18 PROCEDURE — 93286 PERI-PX EVAL PM/LDLS PM IP: CPT

## 2024-12-18 PROCEDURE — G1010 CDSM STANSON: HCPCS

## 2024-12-18 PROCEDURE — 93286 PERI-PX EVAL PM/LDLS PM IP: CPT | Mod: 26 | Performed by: INTERNAL MEDICINE

## 2024-12-18 PROCEDURE — 72197 MRI PELVIS W/O & W/DYE: CPT | Mod: MG

## 2024-12-18 PROCEDURE — 72197 MRI PELVIS W/O & W/DYE: CPT | Mod: 26 | Performed by: STUDENT IN AN ORGANIZED HEALTH CARE EDUCATION/TRAINING PROGRAM

## 2024-12-18 PROCEDURE — A9585 GADOBUTROL INJECTION: HCPCS | Performed by: UROLOGY

## 2024-12-18 RX ORDER — GADOBUTROL 604.72 MG/ML
10 INJECTION INTRAVENOUS ONCE
Status: COMPLETED | OUTPATIENT
Start: 2024-12-18 | End: 2024-12-18

## 2024-12-18 RX ADMIN — GADOBUTROL 8.5 ML: 604.72 INJECTION INTRAVENOUS at 14:30

## 2024-12-19 ENCOUNTER — DOCUMENTATION ONLY (OUTPATIENT)
Dept: CARDIOLOGY | Facility: CLINIC | Age: 79
End: 2024-12-19
Payer: MEDICARE

## 2025-01-10 ENCOUNTER — ALLIED HEALTH/NURSE VISIT (OUTPATIENT)
Dept: UROLOGY | Facility: CLINIC | Age: 80
End: 2025-01-10
Payer: MEDICARE

## 2025-01-10 DIAGNOSIS — N13.8 BPH WITH OBSTRUCTION/LOWER URINARY TRACT SYMPTOMS: ICD-10-CM

## 2025-01-10 DIAGNOSIS — N40.1 BPH WITH OBSTRUCTION/LOWER URINARY TRACT SYMPTOMS: ICD-10-CM

## 2025-01-10 DIAGNOSIS — R35.0 URINARY FREQUENCY: ICD-10-CM

## 2025-01-10 LAB
ALBUMIN UR-MCNC: NEGATIVE MG/DL
APPEARANCE UR: CLEAR
BILIRUB UR QL STRIP: NEGATIVE
COLOR UR AUTO: YELLOW
GLUCOSE UR STRIP-MCNC: NEGATIVE MG/DL
HGB UR QL STRIP: NEGATIVE
KETONES UR STRIP-MCNC: NEGATIVE MG/DL
LEUKOCYTE ESTERASE UR QL STRIP: NEGATIVE
NITRATE UR QL: NEGATIVE
PH UR STRIP: 6 [PH] (ref 5–7)
SP GR UR STRIP: 1.01 (ref 1–1.03)
UROBILINOGEN UR STRIP-ACNC: 0.2 E.U./DL

## 2025-01-10 PROCEDURE — 87086 URINE CULTURE/COLONY COUNT: CPT

## 2025-01-10 PROCEDURE — 51798 US URINE CAPACITY MEASURE: CPT

## 2025-01-10 PROCEDURE — 81003 URINALYSIS AUTO W/O SCOPE: CPT | Mod: QW

## 2025-01-10 NOTE — PROGRESS NOTES
Estrada Oseguera comes into clinic today for Suspected UTI  at the request of Dr. Billy Ordering Provider for UAUC/PVR.    Patient comes into clinic today with complaint of urinary frequency and also burning with urination. Patient left a urine sample today and urine showed-UA RESULTS:  Recent Labs   Lab Test 01/10/25  1434 03/04/24  0849   COLOR Yellow Yellow   APPEARANCE Clear Clear   URINEGLC Negative Negative   URINEBILI Negative Negative   URINEKETONE Negative Trace*   SG 1.015 1.025   UBLD Negative Trace*   URINEPH 6.0 5.5   PROTEIN Negative Negative   UROBILINOGEN 0.2 0.2   NITRITE Negative Negative   LEUKEST Negative Negative   RBCU  --  0-2   WBCU  --  0-5      Patient's urine was sent for urine culture and he was informed that if in 2-3 days results are positive for infection we will call him. Patient also had a bladder scan today in clinic with showed 187ml.     This service provided today was under the supervising provider of the day Lizette WHITFIELD CNP , who was available if needed.    Julissa Craig LPN    REVIEW OF SYSTEMS:  GEN:  fever,    no chills  RESP: no SOB,   no cough  CVS: no chest pain,   no palpitations  GI: no abdominal pain,   no nausea,   no vomiting,   no constipation,   no diarrhea  : no dysuria,   no frequency  NEURO: no headache,   no dizziness  PSYCH: no depression,   not anxious  Derm : no rash

## 2025-01-11 LAB — BACTERIA UR CULT: NORMAL

## 2025-01-12 ENCOUNTER — HEALTH MAINTENANCE LETTER (OUTPATIENT)
Age: 80
End: 2025-01-12

## 2025-01-13 ENCOUNTER — MYC MEDICAL ADVICE (OUTPATIENT)
Dept: CARDIOLOGY | Facility: CLINIC | Age: 80
End: 2025-01-13
Payer: MEDICARE

## 2025-01-13 DIAGNOSIS — I10 BENIGN HYPERTENSION: ICD-10-CM

## 2025-01-13 LAB — RESIDUAL VOLUME (RV) (EXTERNAL): 187

## 2025-01-13 RX ORDER — LISINOPRIL 40 MG/1
TABLET ORAL
Qty: 135 TABLET | Refills: 2 | Status: SHIPPED | OUTPATIENT
Start: 2025-01-13 | End: 2025-01-15 | Stop reason: DRUGHIGH

## 2025-01-15 ENCOUNTER — TELEPHONE (OUTPATIENT)
Dept: CARDIOLOGY | Facility: CLINIC | Age: 80
End: 2025-01-15
Payer: MEDICARE

## 2025-01-15 DIAGNOSIS — I10 HTN (HYPERTENSION): Primary | ICD-10-CM

## 2025-01-15 RX ORDER — LISINOPRIL 20 MG/1
TABLET ORAL
Qty: 45 TABLET | Refills: 6 | Status: SHIPPED | OUTPATIENT
Start: 2025-01-15

## 2025-01-15 NOTE — TELEPHONE ENCOUNTER
"1/15/25 Fax recd from [facility] for clarification of Lisinopril dosing    Per 1/13 telephone encounter w Dr Valle    BP is too low at times (on lisinopril 40 mg) and he feels it.     Since 40 mg is too much and 20 mg is too little, let's try \"in-between dosing\":  take 20 mg of lisinopril every morning and 10 mg in the evening, and see how that goes.       Updated refill sent  Edi 1055 am  "

## 2025-03-05 SDOH — HEALTH STABILITY: PHYSICAL HEALTH: ON AVERAGE, HOW MANY MINUTES DO YOU ENGAGE IN EXERCISE AT THIS LEVEL?: 50 MIN

## 2025-03-05 SDOH — HEALTH STABILITY: PHYSICAL HEALTH: ON AVERAGE, HOW MANY DAYS PER WEEK DO YOU ENGAGE IN MODERATE TO STRENUOUS EXERCISE (LIKE A BRISK WALK)?: 7 DAYS

## 2025-03-05 ASSESSMENT — SOCIAL DETERMINANTS OF HEALTH (SDOH): HOW OFTEN DO YOU GET TOGETHER WITH FRIENDS OR RELATIVES?: TWICE A WEEK

## 2025-03-06 ENCOUNTER — OFFICE VISIT (OUTPATIENT)
Dept: FAMILY MEDICINE | Facility: CLINIC | Age: 80
End: 2025-03-06
Payer: MEDICARE

## 2025-03-06 VITALS
RESPIRATION RATE: 18 BRPM | BODY MASS INDEX: 26.5 KG/M2 | HEART RATE: 51 BPM | TEMPERATURE: 97.9 F | HEIGHT: 71 IN | OXYGEN SATURATION: 94 % | WEIGHT: 189.3 LBS | DIASTOLIC BLOOD PRESSURE: 65 MMHG | SYSTOLIC BLOOD PRESSURE: 115 MMHG

## 2025-03-06 DIAGNOSIS — Z90.5 S/P NEPHRECTOMY: ICD-10-CM

## 2025-03-06 DIAGNOSIS — N18.31 TYPE 2 DIABETES MELLITUS WITH STAGE 3A CHRONIC KIDNEY DISEASE, WITHOUT LONG-TERM CURRENT USE OF INSULIN (H): ICD-10-CM

## 2025-03-06 DIAGNOSIS — F41.9 ANXIETY: ICD-10-CM

## 2025-03-06 DIAGNOSIS — I42.8 OTHER CARDIOMYOPATHIES (H): ICD-10-CM

## 2025-03-06 DIAGNOSIS — E11.22 TYPE 2 DIABETES MELLITUS WITH STAGE 3A CHRONIC KIDNEY DISEASE, WITHOUT LONG-TERM CURRENT USE OF INSULIN (H): ICD-10-CM

## 2025-03-06 DIAGNOSIS — Z23 NEED FOR SHINGLES VACCINE: ICD-10-CM

## 2025-03-06 DIAGNOSIS — H61.22 IMPACTED CERUMEN OF LEFT EAR: ICD-10-CM

## 2025-03-06 DIAGNOSIS — K22.70 BARRETT'S ESOPHAGUS WITHOUT DYSPLASIA: ICD-10-CM

## 2025-03-06 DIAGNOSIS — Z00.00 ENCOUNTER FOR MEDICARE ANNUAL WELLNESS EXAM: Primary | ICD-10-CM

## 2025-03-06 DIAGNOSIS — G20.A1 PARKINSON'S DISEASE WITHOUT DYSKINESIA OR FLUCTUATING MANIFESTATIONS (H): ICD-10-CM

## 2025-03-06 DIAGNOSIS — E78.5 HYPERLIPIDEMIA, UNSPECIFIED HYPERLIPIDEMIA TYPE: ICD-10-CM

## 2025-03-06 DIAGNOSIS — Z29.11 NEED FOR VACCINATION AGAINST RESPIRATORY SYNCYTIAL VIRUS: ICD-10-CM

## 2025-03-06 DIAGNOSIS — C61 PROSTATE CANCER (H): ICD-10-CM

## 2025-03-06 DIAGNOSIS — Z90.49 S/P COLON RESECTION: ICD-10-CM

## 2025-03-06 DIAGNOSIS — I49.5 SSS (SICK SINUS SYNDROME) (H): ICD-10-CM

## 2025-03-06 DIAGNOSIS — N18.31 STAGE 3A CHRONIC KIDNEY DISEASE (H): ICD-10-CM

## 2025-03-06 LAB
CREAT UR-MCNC: 163 MG/DL
MICROALBUMIN UR-MCNC: <12 MG/L
MICROALBUMIN/CREAT UR: NORMAL MG/G{CREAT}

## 2025-03-06 RX ORDER — ROSUVASTATIN CALCIUM 5 MG/1
5 TABLET, COATED ORAL DAILY
Qty: 90 TABLET | Refills: 3 | Status: SHIPPED | OUTPATIENT
Start: 2025-03-06

## 2025-03-06 ASSESSMENT — PAIN SCALES - GENERAL: PAINLEVEL_OUTOF10: NO PAIN (0)

## 2025-03-06 NOTE — PROGRESS NOTES
Preventive Care Visit  [facility] OLVIN Tomas MD, Internal Medicine  Mar 6, 2025      Rich was seen today for physical.    Diagnoses and all orders for this visit:    Pt was accompanied by wife today    Encounter for Medicare annual wellness exam  Preventive health counseling was also done.  Counseled on healthy eating and physical activity  Last colonoscopy on 5/6/21 and will complete again in 5 years.  Reviewed endoscopy 3/2022 and will complete again in 2 years. Pt advised to contact Dr Meek office to complete another endoscopy.   Pt advised to get RSV vaccine at pharmacy due to high risk   Pt advised to get Shingles vaccine at earliest convenience.   Pt advised to get covid booster shot next month  Pt had an appointment scheduled for 3/20/2025 with me which was canceled and pt will f/up with me in 3-4 months    Type 2 diabetes mellitus with stage 3a chronic kidney disease, without long-term current use of insulin (H)  Takes metformin  Pt advised to receive eye exam at earliest convenience since he has diabetes.  Lab Results   Component Value Date    A1C 6.2 01/17/2025    A1C 5.9 09/16/2024    A1C 6.0 05/06/2024    A1C 6.4 01/12/2024    A1C 6.7 08/22/2023    A1C 6.4 06/26/2021    A1C 6.7 03/18/2021    A1C 6.3 03/16/2020    A1C 5.8 03/13/2019    A1C 6.4 09/21/2018       Stage 3a chronic kidney disease (H)  -     Albumin Random Urine Quantitative with Creat Ratio; Future  -     Albumin Random Urine Quantitative with Creat Ratio    Prostate cancer (H)  Takes Proscar and tamsulosin for TEMITOPE and follows urologist Dr Billy.    Parkinson's disease without dyskinesia or fluctuating manifestations (H)  Takes carbidopa levodopa and tolerates well  Follows with neurology.     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 Pt advised to contact Dr Meek office to complete another endoscopy.     Hyperlipidemia, unspecified hyperlipidemia type  -     rosuvastatin (CRESTOR) 5 MG tablet; Take 1 tablet (5 mg) by mouth daily. for cholesterol  Takes Crestor  LDL Cholesterol Calculated   Date Value Ref Range Status   01/17/2025 61 <100 mg/dL Final   03/18/2021 61 <100 mg/dL Final     Comment:     Desirable:       <100 mg/dl     SSS. Well-functioning pacemaker, continue routine device clinic visits.    Atrial fibrillation. No events in several years. He remains off anticoagulation.    Cardiomyopathy:  Stable follows cardiology    Anxiety  Takes mirtazapine which works well for him    Need for vaccination against respiratory syncytial virus  Pt advised to get RSV vaccine at pharmacy due to high risk   .   Need for shingles vaccine  Pt advised to get Shingles vaccine at earliest convenience    Impacted cerumen of left ear  Cerumen impaction on left ear pt received ear wash    Other  Pt has 1 kidney and I advised that it is important to stay hydrated and avoid NSAID's. Pt informed on the importance on maintaining a healthy kidney.    COUNSELING:  Reviewed preventive health counseling, as reflected in patient instructions  Special attention given to:       Regular exercise       Healthy diet/nutrition       Immunizations       Colon cancer screening       Prostate cancer screening    Subjective   Theo is a 80 year old, presenting for the following:  Physical          HPI  Theo is a 80 year old, presenting for the following:  Physical     Advance Care Planning  Patient has a Health Care Directive on file  Advance care planning document is on file and is current.      3/5/2025   General Health   How would you rate your overall physical health? (!) FAIR   Feel stress (tense, anxious, or unable to sleep) Not at all         3/5/2025   Nutrition   Diet: Diabetic         3/5/2025   Exercise   Days per week of moderate/strenous exercise 7 days    Average minutes spent exercising at this level 50 min         3/5/2025   Social Factors   Frequency of gathering with friends or relatives Twice a week   Worry food won't last until get money to buy more No   Food not last or not have enough money for food? No   Do you have housing? (Housing is defined as stable permanent housing and does not include staying ouside in a car, in a tent, in an abandoned building, in an overnight shelter, or couch-surfing.) No   Are you worried about losing your housing? No   Lack of transportation? No   Unable to get utilities (heat,electricity)? No   Want help with housing or utility concern? No   (!) HOUSING CONCERN PRESENT      3/6/2025   Fall Risk   Gait Speed Test (Document in seconds) 5   Gait Speed Test Interpretation Less than or equal to 5.00 seconds - PASS          3/5/2025   Activities of Daily Living- Home Safety   Needs help with the following daily activites None of the above   Safety concerns in the home None of the above         3/5/2025   Dental   Dentist two times every year? (!) NO         3/5/2025   Hearing Screening   Hearing concerns? (!) IT'S HARDER TO UNDERSTAND WOMEN'S VOICES THAN MEN'S VOICES.         3/5/2025   Driving Risk Screening   Patient/family members have concerns about driving No         3/5/2025   General Alertness/Fatigue Screening   Have you been more tired than usual lately? No         3/5/2025   Urinary Incontinence Screening   Bothered by leaking urine in past 6 months No         3/2/2024   TB Screening   Were you born outside of the [place]? No     Today's PHQ-2 Score:       3/5/2025    11:45 AM   PHQ-2 ( 1999 [employer])   Q1: Little interest or pleasure in doing things 0   Q2: Feeling down, depressed or hopeless 0   PHQ-2 Score 0    Q1: Little interest or pleasure in doing things Not at all   Q2: Feeling down, depressed or hopeless Not at all   PHQ-2 Score 0       Patient-reported         3/5/2025   Substance Use   Alcohol more than 3/day or more  than 7/wk No   Do you have a current opioid prescription? No   How severe/bad is pain from 1 to 10? 2/10   Do you use any other substances recreationally? No     Social History     Tobacco Use    Smoking status: Former     Types: Cigars, Pipe, Dip, chew, snus or snuff     Quit date:      Years since quittin.2    Smokeless tobacco: Never    Tobacco comments:     Intermittent use, when using.  None since    Vaping Use    Vaping status: Never Used   Substance Use Topics    Alcohol use: Not Currently    Drug use: No                 Reviewed and updated as needed this visit by Provider                    Current providers sharing in care for this patient include:  Patient Care Team:  Zoe Tomas MD as PCP - General (Internal Medicine)  Zoe Tomas MD as Assigned PCP  Jono Billy MD as Assigned Surgical Provider  Marcella Florian RD as Diabetes Educator (Dietitian, Registered)  Lynn Valle MD as MD (Cardiovascular Disease)  Soraya García PA-C as Physician Assistant (Cardiovascular Disease)  Lynn Valle MD as Assigned Heart and Vascular Provider    The following health maintenance items are reviewed in Epic and correct as of today:  Health Maintenance   Topic Date Due    ZOSTER IMMUNIZATION (1 of 2) Never done    RSV VACCINE (1 - 1-dose 75+ series) Never done    EYE EXAM  2024    MICROALBUMIN  2025    DIABETIC FOOT EXAM  2025    COVID-19 Vaccine (10 - 2024-25 season) 2025    A1C  2025    BMP  2025    PSA  2025    LIPID  2026    ANNUAL REVIEW OF HM ORDERS  2026    HEMOGLOBIN  2026    MEDICARE ANNUAL WELLNESS VISIT  2026    FALL RISK ASSESSMENT  2026    COLORECTAL CANCER SCREENING  2026    DTAP/TDAP/TD IMMUNIZATION (2 - Td or Tdap) 10/27/2026    ADVANCE CARE PLANNING  2030    PHQ-2 (once per calendar year)  Completed    INFLUENZA VACCINE  Completed    Pneumococcal  "Vaccine: 50+ Years  Completed    URINALYSIS  Completed    HPV IMMUNIZATION  Aged Out    MENINGITIS IMMUNIZATION  Aged Out     Review of Systems  Constitutional, HEENT, cardiovascular, pulmonary, GI, , musculoskeletal, neuro, skin, endocrine and psych systems are negative, except as otherwise noted.     Objective    Exam  /65 (BP Location: Right arm, Patient Position: Sitting, Cuff Size: Adult Regular)   Pulse 51   Temp 97.9  F (36.6  C) (Oral)   Resp 18   Ht 1.803 m (5' 11\")   Wt 85.9 kg (189 lb 4.8 oz)   SpO2 94%   BMI 26.40 kg/m     Estimated body mass index is 26.4 kg/m  as calculated from the following:    Height as of this encounter: 1.803 m (5' 11\").    Weight as of this encounter: 85.9 kg (189 lb 4.8 oz).    Physical Exam  GENERAL: alert and no distress  EYES: Eyes grossly normal to inspection, PERRL and conjunctivae and sclerae normal  HENT: ear canals and TM's normal, nose and mouth without ulcers or lesions Cerumen impaction on left ear pt received ear wash. Slightly deviated nasal septum   NECK: no adenopathy, no asymmetry, masses, or scars  RESP: lungs clear to auscultation - no rales, rhonchi or wheezes  CV: regular rate and rhythm, normal S1 S2, no S3 or S4, no murmur, click or rub, no peripheral edema  ABDOMEN: soft, nontender, no hepatosplenomegaly, no masses and bowel sounds normal  MS: no gross musculoskeletal defects noted, no edema  SKIN: no suspicious lesions or rashes Moles and freckles. Pt sees dermatologist  NEURO: Normal strength and tone, mentation intact and speech normal  PSYCH: mentation appears normal, affect normal/bright  LYMPH: no cervical, supraclavicular, axillary, or inguinal adenopathy         3/6/2025   Mini Cog   Clock Draw Score 2 Normal   3 Item Recall 3 objects recalled   Mini Cog Total Score 5          Labs pending    Signed Electronically by: Zoe Tomas MD  Scribe Disclosure:   Elodia DUKE, am serving as a scribe; to document services personally " performed by Zoe Tomas MD -based on data collection and the provider's statements to me.

## 2025-03-06 NOTE — PATIENT INSTRUCTIONS
Respiratory syncytial virus (RSV) is an important cause of lower respiratory tract disease in older adults.  The CDC recommends RSV as a single dose for adults aged 75+ and adults aged 60-74 who are at an increased risk of severe RSV disease.2       this vaccine prevented RSV-related respiratory infection and lower respiratory tract disease in adults age 60 years and older who received one dose of an AS01E-adjuvanted RSV Prefusion F Protein Vaccine [51].     This infection may cause serious infection like pneumonia in older patients     So it is good idea to get this RSV vaccine from any local pharmacy .      There is a new shingles vaccine available called shingrex  It is a series of 2 shots 2-6 months apart.  Considered more than 90% effective.  Please go to any pharmacy to get the  vaccine    Contact 's office to schedule another endoscopy due to barrette's esophagus    See ophthalmologist yearly for diabetic eye exam.    Avoid OTC NSAID's like ibuprofen, motrin or aleve.  Use tylenol as needed for pain.   Ok to use OTC Voltaren cream for joint pain.  Stay well hydrated     Follow up in 6 months.  Seek sooner medical attention if there is any worsening of symptoms or problems.     Patient Education   Preventive Care Advice   This is general advice given by our system to help you stay healthy. However, your care team may have specific advice just for you. Please talk to your care team about your preventive care needs.  Nutrition  Eat 5 or more servings of fruits and vegetables each day.  Try wheat bread, brown rice and whole grain pasta (instead of white bread, rice, and pasta).  Get enough calcium and vitamin D. Check the label on foods and aim for 100% of the RDA (recommended daily allowance).  Lifestyle  Exercise at least 150 minutes each week  (30 minutes a day, 5 days a week).  Do muscle strengthening activities 2 days a week. These help control your weight and prevent disease.  No smoking.  Wear  sunscreen to prevent skin cancer.  Have a dental exam and cleaning every 6 months.  Yearly exams  See your health care team every year to talk about:  Any changes in your health.  Any medicines your care team has prescribed.  Preventive care, family planning, and ways to prevent chronic diseases.  Shots (vaccines)   HPV shots (up to age 26), if you've never had them before.  Hepatitis B shots (up to age 59), if you've never had them before.  COVID-19 shot: Get this shot when it's due.  Flu shot: Get a flu shot every year.  Tetanus shot: Get a tetanus shot every 10 years.  Pneumococcal, hepatitis A, and RSV shots: Ask your care team if you need these based on your risk.  Shingles shot (for age 50 and up)  General health tests  Diabetes screening:  Starting at age 35, Get screened for diabetes at least every 3 years.  If you are younger than age 35, ask your care team if you should be screened for diabetes.  Cholesterol test: At age 39, start having a cholesterol test every 5 years, or more often if advised.  Bone density scan (DEXA): At age 50, ask your care team if you should have this scan for osteoporosis (brittle bones).  Hepatitis C: Get tested at least once in your life.  STIs (sexually transmitted infections)  Before age 24: Ask your care team if you should be screened for STIs.  After age 24: Get screened for STIs if you're at risk. You are at risk for STIs (including HIV) if:  You are sexually active with more than one person.  You don't use condoms every time.  You or a partner was diagnosed with a sexually transmitted infection.  If you are at risk for HIV, ask about PrEP medicine to prevent HIV.  Get tested for HIV at least once in your life, whether you are at risk for HIV or not.  Cancer screening tests  Cervical cancer screening: If you have a cervix, begin getting regular cervical cancer screening tests starting at age 21.  Breast cancer scan (mammogram): If you've ever had breasts, begin having  regular mammograms starting at age 40. This is a scan to check for breast cancer.  Colon cancer screening: It is important to start screening for colon cancer at age 45.  Have a colonoscopy test every 10 years (or more often if you're at risk) Or, ask your provider about stool tests like a FIT test every year or Cologuard test every 3 years.  To learn more about your testing options, visit:   .  For help making a decision, visit:   https://bit.ly/lw64852.  Prostate cancer screening test: If you have a prostate, ask your care team if a prostate cancer screening test (PSA) at age 55 is right for you.  Lung cancer screening: If you are a current or former smoker ages 50 to 80, ask your care team if ongoing lung cancer screenings are right for you.  For informational purposes only. Not to replace the advice of your health care provider. Copyright   2023 [employer]. All rights reserved. Clinically reviewed by the [facility]. [employer] [id number] - REV 01/24.  Preventing Falls: Care Instructions  Injuries and health problems such as trouble walking or poor eyesight can increase your risk of falling. So can some medicines. But there are things you can do to help prevent falls. You can exercise to get stronger. You can also arrange your home to make it safer.    Talk to your doctor about the medicines you take. Ask if any of them increase the risk of falls and whether they can be changed or stopped.   Try to exercise regularly. It can help improve your strength and balance. This can help lower your risk of falling.         Practice fall safety and prevention.   Wear low-heeled shoes that fit well and give your feet good support. Talk to your doctor if you have foot problems that make this hard.  Carry a cellphone or wear a medical alert device that you can use to call for help.  Use stepladders instead of chairs to reach high objects. Don't climb if you're at risk for falls. Ask for  "help, if needed.  Wear the correct eyeglasses, if you need them.        Make your home safer.   Remove rugs, cords, clutter, and furniture from walkways.  Keep your house well lit. Use night-lights in hallways and bathrooms.  Install and use sturdy handrails on stairways.  Wear nonskid footwear, even inside. Don't walk barefoot or in socks without shoes.        Be safe outside.   Use handrails, curb cuts, and ramps whenever possible.  Keep your hands free by using a shoulder bag or backpack.  Try to walk in well-lit areas. Watch out for uneven ground, changes in pavement, and debris.  Be careful in the winter. Walk on the grass or gravel when sidewalks are slippery. Use de-icer on steps and walkways. Add non-slip devices to shoes.    Put grab bars and nonskid mats in your shower or tub and near the toilet. Try to use a shower chair or bath bench when bathing.   Get into a tub or shower by putting in your weaker leg first. Get out with your strong side first. Have a phone or medical alert device in the bathroom with you.   Where can you learn more?  Go to https://www.[employer].net/patiented  Enter G117 in the search box to learn more about \"Preventing Falls: Care Instructions.\"  Current as of: July 31, 2024  Content Version: 14.3    2024 [employer].   Care instructions adapted under license by your healthcare professional. If you have questions about a medical condition or this instruction, always ask your healthcare professional. [employer] disclaims any warranty or liability for your use of this information.    Hearing Loss: Care Instructions  Overview     Hearing loss is a sudden or slow decrease in how well you hear. It can range from slight to profound. Permanent hearing loss can occur with aging. It also can happen when you are exposed long-term to loud noise. Examples include listening to loud music, riding motorcycles, or being around other loud machines.  Hearing loss can affect your " work and home life. It can make you feel lonely or depressed. You may feel that you have lost your independence. But hearing aids and other devices can help you hear better and feel connected to others.  Follow-up care is a key part of your treatment and safety. Be sure to make and go to all appointments, and call your doctor if you are having problems. It's also a good idea to know your test results and keep a list of the medicines you take.  How can you care for yourself at home?  Avoid loud noises whenever possible. This helps keep your hearing from getting worse.  Always wear hearing protection around loud noises.  Wear a hearing aid as directed.  A professional can help you pick a hearing aid that will work best for you.  You can also get hearing aids over the counter for mild to moderate hearing loss.  Have hearing tests as your doctor suggests. They can show whether your hearing has changed. Your hearing aid may need to be adjusted.  Use other devices as needed. These may include:  Telephone amplifiers and hearing aids that can connect to a television, stereo, radio, or microphone.  Devices that use lights or vibrations. These alert you to the doorbell, a ringing telephone, or a baby monitor.  Television closed-captioning. This shows the words at the bottom of the screen. Most new TVs can do this.  TTY (text telephone). This lets you type messages back and forth on the telephone instead of talking or listening. These devices are also called TDD. When messages are typed on the keyboard, they are sent over the phone line to a receiving TTY. The message is shown on a monitor.  Use text messaging, social media, and email if it is hard for you to communicate by telephone.  Try to learn a listening technique called speechreading. It is not lipreading. You pay attention to people's gestures, expressions, posture, and tone of voice. These clues can help you understand what a person is saying. Face the person you are  "talking to, and have them face you. Make sure the lighting is good. You need to see the other person's face clearly.  Think about counseling if you need help to adjust to your hearing loss.  When should you call for help?  Watch closely for changes in your health, and be sure to contact your doctor if:    You think your hearing is getting worse.     You have new symptoms, such as dizziness or nausea.   Where can you learn more?  Go to https://www.[employer].net/patiented  Enter R798 in the search box to learn more about \"Hearing Loss: Care Instructions.\"  Current as of: September 27, 2023  Content Version: 14.3    2024 [employer].   Care instructions adapted under license by your healthcare professional. If you have questions about a medical condition or this instruction, always ask your healthcare professional. [employer] disclaims any warranty or liability for your use of this information.       "

## 2025-03-06 NOTE — NURSING NOTE
Patient identified using two patient identifiers.  Ear exam showing wax occlusion completed by provider.  Solution: warm water was placed in the left ear(s) via irrigation tool: timur Galindo MA on 3/6/2025 at 11:19 AM  .

## 2025-03-17 ENCOUNTER — TRANSFERRED RECORDS (OUTPATIENT)
Dept: HEALTH INFORMATION MANAGEMENT | Facility: CLINIC | Age: 80
End: 2025-03-17
Payer: MEDICARE

## 2025-03-25 ENCOUNTER — MYC REFILL (OUTPATIENT)
Dept: UROLOGY | Facility: CLINIC | Age: 80
End: 2025-03-25
Payer: MEDICARE

## 2025-03-25 DIAGNOSIS — C61 PROSTATE CANCER (H): ICD-10-CM

## 2025-03-25 DIAGNOSIS — N13.8 BPH WITH OBSTRUCTION/LOWER URINARY TRACT SYMPTOMS: ICD-10-CM

## 2025-03-25 DIAGNOSIS — N40.1 BPH WITH OBSTRUCTION/LOWER URINARY TRACT SYMPTOMS: ICD-10-CM

## 2025-03-25 RX ORDER — FINASTERIDE 5 MG/1
5 TABLET, FILM COATED ORAL DAILY
Qty: 90 TABLET | Refills: 1 | Status: SHIPPED | OUTPATIENT
Start: 2025-03-25

## 2025-04-01 ENCOUNTER — TRANSFERRED RECORDS (OUTPATIENT)
Dept: HEALTH INFORMATION MANAGEMENT | Facility: CLINIC | Age: 80
End: 2025-04-01
Payer: MEDICARE

## 2025-04-24 ENCOUNTER — TRANSFERRED RECORDS (OUTPATIENT)
Dept: HEALTH INFORMATION MANAGEMENT | Facility: CLINIC | Age: 80
End: 2025-04-24
Payer: MEDICARE

## 2025-04-24 LAB — RETINOPATHY: NEGATIVE

## 2025-04-26 ENCOUNTER — HEALTH MAINTENANCE LETTER (OUTPATIENT)
Age: 80
End: 2025-04-26

## 2025-05-24 DIAGNOSIS — N13.8 BPH WITH OBSTRUCTION/LOWER URINARY TRACT SYMPTOMS: ICD-10-CM

## 2025-05-24 DIAGNOSIS — N40.1 BPH WITH OBSTRUCTION/LOWER URINARY TRACT SYMPTOMS: ICD-10-CM

## 2025-05-28 RX ORDER — TAMSULOSIN HYDROCHLORIDE 0.4 MG/1
0.8 CAPSULE ORAL DAILY
Qty: 30 CAPSULE | Refills: 0 | Status: SHIPPED | OUTPATIENT
Start: 2025-05-28

## 2025-06-18 ENCOUNTER — OFFICE VISIT (OUTPATIENT)
Dept: UROLOGY | Facility: CLINIC | Age: 80
End: 2025-06-18
Payer: MEDICARE

## 2025-06-18 VITALS
WEIGHT: 189 LBS | SYSTOLIC BLOOD PRESSURE: 153 MMHG | DIASTOLIC BLOOD PRESSURE: 76 MMHG | BODY MASS INDEX: 26.46 KG/M2 | HEART RATE: 50 BPM | HEIGHT: 71 IN | OXYGEN SATURATION: 98 %

## 2025-06-18 DIAGNOSIS — N13.8 BPH WITH OBSTRUCTION/LOWER URINARY TRACT SYMPTOMS: ICD-10-CM

## 2025-06-18 DIAGNOSIS — N40.1 BPH WITH OBSTRUCTION/LOWER URINARY TRACT SYMPTOMS: ICD-10-CM

## 2025-06-18 DIAGNOSIS — R35.0 URINARY FREQUENCY: Primary | ICD-10-CM

## 2025-06-18 DIAGNOSIS — C61 PROSTATE CANCER (H): ICD-10-CM

## 2025-06-18 LAB
PSA SERPL-MCNC: 3.9 UG/L (ref 0–4)
RESIDUAL VOLUME (RV) (EXTERNAL): NORMAL

## 2025-06-18 PROCEDURE — 84153 ASSAY OF PSA TOTAL: CPT | Performed by: UROLOGY

## 2025-06-18 PROCEDURE — 51798 US URINE CAPACITY MEASURE: CPT | Performed by: UROLOGY

## 2025-06-18 PROCEDURE — 99213 OFFICE O/P EST LOW 20 MIN: CPT | Mod: 25 | Performed by: UROLOGY

## 2025-06-18 PROCEDURE — 3078F DIAST BP <80 MM HG: CPT | Performed by: UROLOGY

## 2025-06-18 PROCEDURE — 36415 COLL VENOUS BLD VENIPUNCTURE: CPT | Performed by: UROLOGY

## 2025-06-18 PROCEDURE — 1126F AMNT PAIN NOTED NONE PRSNT: CPT | Performed by: UROLOGY

## 2025-06-18 PROCEDURE — 3077F SYST BP >= 140 MM HG: CPT | Performed by: UROLOGY

## 2025-06-18 RX ORDER — TAMSULOSIN HYDROCHLORIDE 0.4 MG/1
0.8 CAPSULE ORAL DAILY
Qty: 180 CAPSULE | Refills: 3 | Status: SHIPPED | OUTPATIENT
Start: 2025-06-18 | End: 2026-06-18

## 2025-06-18 RX ORDER — FINASTERIDE 5 MG/1
5 TABLET, FILM COATED ORAL DAILY
Qty: 90 TABLET | Refills: 3 | Status: SHIPPED | OUTPATIENT
Start: 2025-06-18

## 2025-06-18 ASSESSMENT — PAIN SCALES - GENERAL: PAINLEVEL_OUTOF10: NO PAIN (0)

## 2025-06-18 NOTE — PROGRESS NOTES
"[facility]  CHIEF COMPLAINT   It was my pleasure to see Estrada Oseguera who is a 80 year old male for follow-up of prostate cancer on active surveillance with LUTS.      HPI   Estrada Oseguera is a very pleasant 80 year old male     Initially seen 4/7/21:  \"Estrada Oseguera is a 76 year old male who is being seen for evaluation of Elevated PSA      Has been having significant LUTS worsening over the past 6-12 months  Slow and weak stream  Nocturia x3/night  Having daytime frequency   Having some urgency   He is having to do some abdominal straining  No prior acute urinary retention, UTI, or gross hematuria      Also with some Elevated PSA      Father had low grade prostate cancer found at time of death at age 87 from a stroke\"    4/21/21:  Started on tamsulosin 0.4mg (Flomax) at last visit  Follow up to discuss MRI  Notes a slight change with tamsulosin 0.4mg (Flomax)     5/19/21:  Follow-up today to discuss his biopsy results  He did well after the biopsy with no issues  His wife joins him for this call    10/15/21:  Follow-up today to discuss his recent PSA  He has been doing well with no change in symptoms    4/22/2022:   Follow-up today on active surveillance for his prostate cancer  He did run out of his tamsulosin a few weeks ago and noticed the effects of stopping the medication.  This has been refilled  His wife joins him for this visit    11/2/2022:  Doing very well  He ran out of Flomax about a week ago and has noticed a decrease in his urine stream and that it is a little bit harder to start urination  His wife joins for this visit    5/1/2023:  Follow-up today for PSA recheck  He is doing well with stable urine symptoms on Flomax  He does have nocturia 1-2 times per night    5/20/2024:  Has been dealing with HTN and anxiety issues   No changes or issues with urination  Flow is good and strong  He does note some increased frequency  He is overall not very bothered by his urination and is happy with " "the tamsulosin  His wife joins him for this visit    12/20/2024:  Follow-up today to review his updated MRI  He tolerated the MRI adequately, though he did need a rectal catheter    TODAY 6/18/2025:  Follow-up today for annual visit  He has been doing fairly well and feels he is urinating much better over these last few months and starting on finasteride  He is working on managing his Parkinson's    AUASS: 0-3-0-1-0-1-1 = 6  QOL = 1  PVR = 141cc    PHYSICAL EXAM  Patient is a 80 year old  male   Vitals: Blood pressure (!) 153/76, pulse 50, height 1.803 m (5' 11\"), weight 85.7 kg (189 lb), SpO2 98%.  Body mass index is 26.36 kg/m .  General Appearance Adult:   Alert, no acute distress, oriented  HENT: throat/mouth:normal, good dentition  Lungs: no respiratory distress, or pursed lip breathing  Heart: No obvious jugular venous distension present  Neuro: Alert, oriented, speech and mentation normal     PSA PSA Tumor Marker   Latest Ref Rng 0.00 - 4.00 ug/L 0.00 - 6.50 ng/mL   9/21/2018 5.22 (H)     3/13/2019 5.28 (H)     3/16/2020 4.90 (H)     3/18/2021 5.38 (H)     9/20/2021 6.10 (H)     4/22/2022 6.10 (H)     10/21/2022 7.10 (H)     4/25/2023 7.40 (H)     10/30/2023 8.00 (H)     5/6/2024  6.15    11/5/2024 11.40 (H)     Finasteride 5mg (Proscar) 12/20/2025:   PSA   Latest Ref Rng 0.00 - 4.00 ug/L   6/18/2025 3.90          PATHOLOGY:  MR-FUSION URONAV 5/12/21:  FINAL DIAGNOSIS:   A.  Prostate gland, left lateral base, needle biopsy:   - Negative for malignancy.   - Mild chronic inflammation.     B.  Prostate gland, left lateral mid, needle biopsy:   - Negative for malignancy.   - Glandular atrophy.     C.  Prostate gland, left lateral apex, needle biopsy:   - Negative for malignancy.   - Glandular atrophy.     D.  Prostate gland, left base, needle biopsy:   - Prostatic acinar adenocarcinoma.      - [place] score  6 (3+3):      - WHO Grade Group - 1      - Proportion of tissue involved by tumor:  Approximately 33%      " "- Number of tissue cores involved by tumor:  1 (out of 1)      - Angiolymphatic invasion:  Not identified      - Perineural invasion:  Not identified     E.  Prostate gland, left mid, needle biopsy:   - Prostatic acinar adenocarcinoma.      - [place] score  6 (3+3):      - WHO Grade Group - 1      - Proportion of tissue involved by tumor:  Approximately 33%      - Number of tissue cores involved by tumor:  1 (out of 1)      - Angiolymphatic invasion:  Not identified      - Perineural invasion:  Not identified     F.  Prostate gland, left apex, needle biopsy:]   - Negative for malignancy.     G.  Prostate gland, right lateral base, needle biopsy:   - Negative for malignancy.   - Glandular atrophy.     H.  Prostate gland, right lateral mid, needle biopsy:   - A minute focus of atypical small acinar proliferation, suspicious for   malignancy.   - Glandular atrophy.     I.  Prostate gland, right lateral apex, needle biopsy:   - Negative for malignancy.   - Glandular atrophy.     J.  Prostate gland, right base, needle biopsy:   - Negative for malignancy.   - Glandular atrophy.     K.  Prostate gland, right mid, needle biopsy:   - Negative for malignancy.   - Glandular atrophy.     L.  Prostate gland, right apex, needle biopsy:   - Negative for malignancy.   - Glandular atrophy.     M.  Prostate gland., right sided lesion, needle biopsies x 3:   - Prostatic acinar adenocarcinoma.      - [place] score:  6 (3+3)      - WHO Grade Group - 1      - Proportion of tissue involved by tumor:  approximately 40%      - Number of tissue cores involved by tumor: 3 (out of 3)      - Angiolymphatic invasion:  Not identified      - Perineural invasion:  Not identified       IMAGING:  All pertinent imaging reviewed:    All imaging studies reviewed by me.  I personally reviewed these imaging films.  A formal report from radiology will follow.    MRI PROSTATE 4/15/21:  \"FINDINGS:  Size: 6.1 x 4.8 x 4.7 cm; 72 grams  Hemorrhage: Absent "   Peripheral zone: Heterogeneous on T2-weighted images. Suspicious  lesions as detailed below.  Transition zone: Enlarged with BPH changes. Transition zone nodules  which are circumscribed or mostly encapsulated without diffusion  restriction.  PI-RADS 2.  No highly suspicious nodules.     Lesion(s) in rank order of severity (highest score- to lowest score,  then by size)      Lesion 1:  Location: Left apex peripheral zone at the 5 o'clock position relative  to the urethra. Series 4 image 36.   Additional prostate regions involved: None   Size: 6  mm  T2 description: Rounded moderate hypointensity  T2 numerical assessment: 3  DWI description: Focal hypointensity on ADC and hyperintensity on DWI  DWI numerical assessment: 3  DCE assessment: Positive    Prostate margin: Capsular abutment<6 mm with smooth contour   Lesion overall PI-RADS category: 4     Neurovascular bundles: No neurovascular bundle involvement by  malignancy.    Seminal vesicles: No seminal vesicle involvement by malignancy.   Lymph nodes: No lymph node involvement   Bones: No suspicious lesions   Other pelvic organs: Prominent fat in bilateral inguinal canal, left  more than right. Circumferential apparent urinary bladder wall  thickening with trabeculation/sacculations, likely representing post  chronic urinary outflow obstructive change.                                                         IMPRESSION:     1. Based on the most suspicious abnormality, this exam is  characterized as PIRADS 4 - Clinically significant cancer is likely to  be present.  The most suspicious abnormality is located at the left  apex peripheral zone, 5:00 position and there is minimal capsular  abutment with no convincing evidence of extraprostatic extension.  2. No suspicious adenopathy or evidence of pelvic metastases.    CT ABD/PEL 3/12/2024:  FINDINGS:   LOWER CHEST: Partially imaged moderate to large hiatal hernia.  Pacemaker lead terminates in the right ventricle at  the  interventricular septum.     HEPATOBILIARY: Cholecystectomy. Calcified hepatic granuloma. Focal  fatty infiltration adjacent to the falciform ligament.     PANCREAS: No significant mass, duct dilatation, or inflammatory  change.     SPLEEN: Normal size. Multiple calcified splenic granulomas.     ADRENAL GLANDS: No significant nodules.     KIDNEYS/BLADDER: Right nephrectomy. Normal enhancement of the left  kidney without hydronephrosis. Left upper pole renal cyst and  additional too small to characterize hypoattenuating lesions, not  requiring specific follow-up. Trabeculated urinary bladder with  multiple diverticula suggestive of chronic outlet obstruction.     BOWEL: Colonic diverticulosis without focal inflammatory changes.  Normal appendix. No evidence of bowel obstruction.     PELVIC ORGANS: Prostatomegaly. Mildly heterogeneous appearance of the  prostate in this patient with known prostate malignancy.     ADDITIONAL FINDINGS: No ascites. No adenopathy in the abdomen or  pelvis. Mild burden of atherosclerotic disease without abdominal  aortic aneurysm.     MUSCULOSKELETAL: No aggressive appearing osseous lesion. Degenerative  changes of the spine. Postsurgical changes of the ventral abdominal  wall. Moderate-sized fat-containing left inguinal hernia                                                             IMPRESSION:   1.  No acute findings in the abdomen or pelvis for abdominal pain.  2.  Moderate to large hiatal hernia.  3.  Colonic diverticulosis.  4.  Right nephrectomy.  5.  Prostatomegaly with findings of chronic bladder outlet  obstruction. Mildly heterogeneous appearance of the prostate in this  patient with known prostate malignancy.    MRI PROSTATE 12/18/2024:  FINDINGS:  Size: 100 grams  Hemorrhage: Absent  Peripheral zone: Heterogeneous on T2-weighted images. Regions of  mildly decreased signal on ADC or DWI which are best characterized as  PI-RADS 2 without highly suspicious  lesion.  Transition zone: Enlarged with BPH changes. Transition zone nodules  which are circumscribed or mostly encapsulated without diffusion  restriction.  PI-RADS 2.  No highly suspicious nodules.     Neurovascular bundles: No neurovascular bundle involvement by  malignancy.  Seminal vesicles: No seminal vesicle involvement by malignancy.  Lymph nodes: No lymph node involvement  Bones: No suspicious lesions  Other pelvic organs: Multiple small urinary bladder diverticula. Trace  pelvic free fluid. Postoperative changes of the ventral abdominal  wall. Fat-containing left inguinal hernia.                                                      IMPRESSION:  1. Based on the most suspicious abnormality, this exam is  characterized as PIRADS 2 - Clinically significant cancer is unlikely  to be present.    2. No suspicious adenopathy or evidence of pelvic metastases.    ASSESSMENT and PLAN  80-year-old man with elevated PSA and LUTS, now with an MRI showing a PI-RADS 4 lesion and a prostate volume of 72 g.  Status post an MR fusion biopsy with [place] 3+3 = 6 prostate cancer in 3/3 of the target lesions as well as 2/12 of the template biopsy.  On active surveillance with a rising PSA and repeat MRI with only PI-RADS 2 scoring which is reassuring    Prostate cancer  - Again reviewed his PSA history and trend  - I reviewed his prior biopsy results which indicated [place] 3+ = 6 prostate cancer  -His PSA micaela to 11.4 prompting an updated MRI in December 2024.  - I again reviewed the MRI and reviewed these images personally.  I agree with radiologist interpretation.  We discussed that it is very reassuring that this updated MRI was classified as only PI-RADS 2  -We reviewed his PSA change with finasteride which was very reassuring with a drop in PSA down to 3.8  - Follow-up in 1 year with a PSA      BPH with LUTS  -He notes some improvement with voiding following initiation of tamsulosin 0.4 mg daily and now further  improvement with the use of finasteride  - He is also now taking tamsulosin twice daily  -Updated prescription sent to the pharmacy  - Follow-up in 1 year with AUA symptom score and PVR    Chart documentation with Dragon Voice recognition Software. Although reviewed after completion, some words and grammatical errors may remain.     Time spent: 18 minutes spent on the date of the encounter doing chart review, history and exam, documentation and further activities as noted above.    Note copy attestation: the elements have been reviewed, edited as needed, and remain pertinent to today's visit.     Jono Billy MD   Urology  [facility]  [facility] Phone: [phone]  [facility] Phone: [phone]

## 2025-06-18 NOTE — NURSING NOTE
Chief Complaint   Patient presents with    Prostate Cancer    Urinary Frequency    Talk about the psa today   Pvr is 141 ml done with bladder scan  Crescencio Woo, CMA

## 2025-06-18 NOTE — LETTER
"6/18/2025       RE: Estrada Oseguera  [address]  [place] [address]     Dear Colleague,    Thank you for referring your patient, Estrada Oseguera, to the [facility] at [facility]. Please see a copy of my visit note below.    SOUTHDARAFAL  CHIEF COMPLAINT   It was my pleasure to see Estrada Oseguera who is a 80 year old male for follow-up of prostate cancer on active surveillance with LUTS.      HPI   Estrada Oseguera is a very pleasant 80 year old male     Initially seen 4/7/21:  \"Estrada Oseguera is a 76 year old male who is being seen for evaluation of Elevated PSA      Has been having significant LUTS worsening over the past 6-12 months  Slow and weak stream  Nocturia x3/night  Having daytime frequency   Having some urgency   He is having to do some abdominal straining  No prior acute urinary retention, UTI, or gross hematuria      Also with some Elevated PSA      Father had low grade prostate cancer found at time of death at age 87 from a stroke\"    4/21/21:  Started on tamsulosin 0.4mg (Flomax) at last visit  Follow up to discuss MRI  Notes a slight change with tamsulosin 0.4mg (Flomax)     5/19/21:  Follow-up today to discuss his biopsy results  He did well after the biopsy with no issues  His wife joins him for this call    10/15/21:  Follow-up today to discuss his recent PSA  He has been doing well with no change in symptoms    4/22/2022:   Follow-up today on active surveillance for his prostate cancer  He did run out of his tamsulosin a few weeks ago and noticed the effects of stopping the medication.  This has been refilled  His wife joins him for this visit    11/2/2022:  Doing very well  He ran out of Flomax about a week ago and has noticed a decrease in his urine stream and that it is a little bit harder to start urination  His wife joins for this visit    5/1/2023:  Follow-up today for PSA recheck  He is doing " "well with stable urine symptoms on Flomax  He does have nocturia 1-2 times per night    5/20/2024:  Has been dealing with HTN and anxiety issues   No changes or issues with urination  Flow is good and strong  He does note some increased frequency  He is overall not very bothered by his urination and is happy with the tamsulosin  His wife joins him for this visit    12/20/2024:  Follow-up today to review his updated MRI  He tolerated the MRI adequately, though he did need a rectal catheter    TODAY 6/18/2025:  Follow-up today for annual visit  He has been doing fairly well and feels he is urinating much better over these last few months and starting on finasteride  He is working on managing his Parkinson's    AUASS: 0-3-0-1-0-1-1 = 6  QOL = 1  PVR = 141cc    PHYSICAL EXAM  Patient is a 80 year old  male   Vitals: Blood pressure (!) 153/76, pulse 50, height 1.803 m (5' 11\"), weight 85.7 kg (189 lb), SpO2 98%.  Body mass index is 26.36 kg/m .  General Appearance Adult:   Alert, no acute distress, oriented  HENT: throat/mouth:normal, good dentition  Lungs: no respiratory distress, or pursed lip breathing  Heart: No obvious jugular venous distension present  Neuro: Alert, oriented, speech and mentation normal     PSA PSA Tumor Marker   Latest Ref Rng 0.00 - 4.00 ug/L 0.00 - 6.50 ng/mL   9/21/2018 5.22 (H)     3/13/2019 5.28 (H)     3/16/2020 4.90 (H)     3/18/2021 5.38 (H)     9/20/2021 6.10 (H)     4/22/2022 6.10 (H)     10/21/2022 7.10 (H)     4/25/2023 7.40 (H)     10/30/2023 8.00 (H)     5/6/2024  6.15    11/5/2024 11.40 (H)     Finasteride 5mg (Proscar) 12/20/2025:   PSA   Latest Ref Rng 0.00 - 4.00 ug/L   6/18/2025 3.90          PATHOLOGY:  MR-FUSION URONAV 5/12/21:  FINAL DIAGNOSIS:   A.  Prostate gland, left lateral base, needle biopsy:   - Negative for malignancy.   - Mild chronic inflammation.     B.  Prostate gland, left lateral mid, needle biopsy:   - Negative for malignancy.   - Glandular atrophy.     C.  " Prostate gland, left lateral apex, needle biopsy:   - Negative for malignancy.   - Glandular atrophy.     D.  Prostate gland, left base, needle biopsy:   - Prostatic acinar adenocarcinoma.      - Lesley score  6 (3+3):      - WHO Grade Group - 1      - Proportion of tissue involved by tumor:  Approximately 33%      - Number of tissue cores involved by tumor:  1 (out of 1)      - Angiolymphatic invasion:  Not identified      - Perineural invasion:  Not identified     E.  Prostate gland, left mid, needle biopsy:   - Prostatic acinar adenocarcinoma.      - [place] score  6 (3+3):      - WHO Grade Group - 1      - Proportion of tissue involved by tumor:  Approximately 33%      - Number of tissue cores involved by tumor:  1 (out of 1)      - Angiolymphatic invasion:  Not identified      - Perineural invasion:  Not identified     F.  Prostate gland, left apex, needle biopsy:]   - Negative for malignancy.     G.  Prostate gland, right lateral base, needle biopsy:   - Negative for malignancy.   - Glandular atrophy.     H.  Prostate gland, right lateral mid, needle biopsy:   - A minute focus of atypical small acinar proliferation, suspicious for   malignancy.   - Glandular atrophy.     I.  Prostate gland, right lateral apex, needle biopsy:   - Negative for malignancy.   - Glandular atrophy.     J.  Prostate gland, right base, needle biopsy:   - Negative for malignancy.   - Glandular atrophy.     K.  Prostate gland, right mid, needle biopsy:   - Negative for malignancy.   - Glandular atrophy.     L.  Prostate gland, right apex, needle biopsy:   - Negative for malignancy.   - Glandular atrophy.     M.  Prostate gland., right sided lesion, needle biopsies x 3:   - Prostatic acinar adenocarcinoma.      - Lesley score:  6 (3+3)      - WHO Grade Group - 1      - Proportion of tissue involved by tumor:  approximately 40%      - Number of tissue cores involved by tumor: 3 (out of 3)      - Angiolymphatic invasion:  Not identified  "     - Perineural invasion:  Not identified       IMAGING:  All pertinent imaging reviewed:    All imaging studies reviewed by me.  I personally reviewed these imaging films.  A formal report from radiology will follow.    MRI PROSTATE 4/15/21:  \"FINDINGS:  Size: 6.1 x 4.8 x 4.7 cm; 72 grams  Hemorrhage: Absent   Peripheral zone: Heterogeneous on T2-weighted images. Suspicious  lesions as detailed below.  Transition zone: Enlarged with BPH changes. Transition zone nodules  which are circumscribed or mostly encapsulated without diffusion  restriction.  PI-RADS 2.  No highly suspicious nodules.     Lesion(s) in rank order of severity (highest score- to lowest score,  then by size)      Lesion 1:  Location: Left apex peripheral zone at the 5 o'clock position relative  to the urethra. Series 4 image 36.   Additional prostate regions involved: None   Size: 6  mm  T2 description: Rounded moderate hypointensity  T2 numerical assessment: 3  DWI description: Focal hypointensity on ADC and hyperintensity on DWI  DWI numerical assessment: 3  DCE assessment: Positive    Prostate margin: Capsular abutment<6 mm with smooth contour   Lesion overall PI-RADS category: 4     Neurovascular bundles: No neurovascular bundle involvement by  malignancy.    Seminal vesicles: No seminal vesicle involvement by malignancy.   Lymph nodes: No lymph node involvement   Bones: No suspicious lesions   Other pelvic organs: Prominent fat in bilateral inguinal canal, left  more than right. Circumferential apparent urinary bladder wall  thickening with trabeculation/sacculations, likely representing post  chronic urinary outflow obstructive change.                                                         IMPRESSION:     1. Based on the most suspicious abnormality, this exam is  characterized as PIRADS 4 - Clinically significant cancer is likely to  be present.  The most suspicious abnormality is located at the left  apex peripheral zone, 5:00 position " and there is minimal capsular  abutment with no convincing evidence of extraprostatic extension.  2. No suspicious adenopathy or evidence of pelvic metastases.    CT ABD/PEL 3/12/2024:  FINDINGS:   LOWER CHEST: Partially imaged moderate to large hiatal hernia.  Pacemaker lead terminates in the right ventricle at the  interventricular septum.     HEPATOBILIARY: Cholecystectomy. Calcified hepatic granuloma. Focal  fatty infiltration adjacent to the falciform ligament.     PANCREAS: No significant mass, duct dilatation, or inflammatory  change.     SPLEEN: Normal size. Multiple calcified splenic granulomas.     ADRENAL GLANDS: No significant nodules.     KIDNEYS/BLADDER: Right nephrectomy. Normal enhancement of the left  kidney without hydronephrosis. Left upper pole renal cyst and  additional too small to characterize hypoattenuating lesions, not  requiring specific follow-up. Trabeculated urinary bladder with  multiple diverticula suggestive of chronic outlet obstruction.     BOWEL: Colonic diverticulosis without focal inflammatory changes.  Normal appendix. No evidence of bowel obstruction.     PELVIC ORGANS: Prostatomegaly. Mildly heterogeneous appearance of the  prostate in this patient with known prostate malignancy.     ADDITIONAL FINDINGS: No ascites. No adenopathy in the abdomen or  pelvis. Mild burden of atherosclerotic disease without abdominal  aortic aneurysm.     MUSCULOSKELETAL: No aggressive appearing osseous lesion. Degenerative  changes of the spine. Postsurgical changes of the ventral abdominal  wall. Moderate-sized fat-containing left inguinal hernia                                                             IMPRESSION:   1.  No acute findings in the abdomen or pelvis for abdominal pain.  2.  Moderate to large hiatal hernia.  3.  Colonic diverticulosis.  4.  Right nephrectomy.  5.  Prostatomegaly with findings of chronic bladder outlet  obstruction. Mildly heterogeneous appearance of the prostate  in this  patient with known prostate malignancy.    MRI PROSTATE 12/18/2024:  FINDINGS:  Size: 100 grams  Hemorrhage: Absent  Peripheral zone: Heterogeneous on T2-weighted images. Regions of  mildly decreased signal on ADC or DWI which are best characterized as  PI-RADS 2 without highly suspicious lesion.  Transition zone: Enlarged with BPH changes. Transition zone nodules  which are circumscribed or mostly encapsulated without diffusion  restriction.  PI-RADS 2.  No highly suspicious nodules.     Neurovascular bundles: No neurovascular bundle involvement by  malignancy.  Seminal vesicles: No seminal vesicle involvement by malignancy.  Lymph nodes: No lymph node involvement  Bones: No suspicious lesions  Other pelvic organs: Multiple small urinary bladder diverticula. Trace  pelvic free fluid. Postoperative changes of the ventral abdominal  wall. Fat-containing left inguinal hernia.                                                      IMPRESSION:  1. Based on the most suspicious abnormality, this exam is  characterized as PIRADS 2 - Clinically significant cancer is unlikely  to be present.    2. No suspicious adenopathy or evidence of pelvic metastases.    ASSESSMENT and PLAN  80-year-old man with elevated PSA and LUTS, now with an MRI showing a PI-RADS 4 lesion and a prostate volume of 72 g.  Status post an MR fusion biopsy with [place] 3+3 = 6 prostate cancer in 3/3 of the target lesions as well as 2/12 of the template biopsy.  On active surveillance with a rising PSA and repeat MRI with only PI-RADS 2 scoring which is reassuring    Prostate cancer  - Again reviewed his PSA history and trend  - I reviewed his prior biopsy results which indicated Lesley 3+ = 6 prostate cancer  -His PSA micaela to 11.4 prompting an updated MRI in December 2024.  - I again reviewed the MRI and reviewed these images personally.  I agree with radiologist interpretation.  We discussed that it is very reassuring that this updated MRI was  classified as only PI-RADS 2  -We reviewed his PSA change with finasteride which was very reassuring with a drop in PSA down to 3.8  - Follow-up in 1 year with a PSA      BPH with LUTS  -He notes some improvement with voiding following initiation of tamsulosin 0.4 mg daily and now further improvement with the use of finasteride  - He is also now taking tamsulosin twice daily  -Updated prescription sent to the pharmacy  - Follow-up in 1 year with AUA symptom score and PVR    Chart documentation with Dragon Voice recognition Software. Although reviewed after completion, some words and grammatical errors may remain.     Time spent: 18 minutes spent on the date of the encounter doing chart review, history and exam, documentation and further activities as noted above.    Note copy attestation: the elements have been reviewed, edited as needed, and remain pertinent to today's visit.     Jono Billy MD   Urology  [facility]  [facility] Phone: [phone]  [facility] Phone: [phone]        Again, thank you for allowing me to participate in the care of your patient.      Sincerely,    Jono Billy MD

## 2025-08-05 DIAGNOSIS — I10 HTN (HYPERTENSION): ICD-10-CM

## 2025-08-05 RX ORDER — LISINOPRIL 20 MG/1
TABLET ORAL
Qty: 135 TABLET | Refills: 0 | Status: SHIPPED | OUTPATIENT
Start: 2025-08-05

## 2025-08-09 ENCOUNTER — HEALTH MAINTENANCE LETTER (OUTPATIENT)
Age: 80
End: 2025-08-09

## 2025-08-20 ENCOUNTER — APPOINTMENT (OUTPATIENT)
Dept: URBAN - METROPOLITAN AREA CLINIC 256 | Age: 80
Setting detail: DERMATOLOGY
End: 2025-08-20

## 2025-08-20 VITALS — WEIGHT: 190 LBS | HEIGHT: 71 IN

## 2025-08-20 DIAGNOSIS — L57.8 OTHER SKIN CHANGES DUE TO CHRONIC EXPOSURE TO NONIONIZING RADIATION: ICD-10-CM

## 2025-08-20 DIAGNOSIS — D18.0 HEMANGIOMA: ICD-10-CM

## 2025-08-20 DIAGNOSIS — D22 MELANOCYTIC NEVI: ICD-10-CM

## 2025-08-20 DIAGNOSIS — L57.0 ACTINIC KERATOSIS: ICD-10-CM

## 2025-08-20 DIAGNOSIS — Z85.828 PERSONAL HISTORY OF OTHER MALIGNANT NEOPLASM OF SKIN: ICD-10-CM

## 2025-08-20 DIAGNOSIS — L82.0 INFLAMED SEBORRHEIC KERATOSIS: ICD-10-CM

## 2025-08-20 DIAGNOSIS — Z71.89 OTHER SPECIFIED COUNSELING: ICD-10-CM

## 2025-08-20 DIAGNOSIS — L82.1 OTHER SEBORRHEIC KERATOSIS: ICD-10-CM

## 2025-08-20 PROBLEM — D18.01 HEMANGIOMA OF SKIN AND SUBCUTANEOUS TISSUE: Status: ACTIVE | Noted: 2025-08-20

## 2025-08-20 PROBLEM — D22.5 MELANOCYTIC NEVI OF TRUNK: Status: ACTIVE | Noted: 2025-08-20

## 2025-08-20 PROCEDURE — 17000 DESTRUCT PREMALG LESION: CPT | Mod: 59

## 2025-08-20 PROCEDURE — 17003 DESTRUCT PREMALG LES 2-14: CPT | Mod: 59

## 2025-08-20 PROCEDURE — OTHER SUNSCREEN RECOMMENDATIONS: OTHER

## 2025-08-20 PROCEDURE — 99213 OFFICE O/P EST LOW 20 MIN: CPT | Mod: 25

## 2025-08-20 PROCEDURE — OTHER COUNSELING: OTHER

## 2025-08-20 PROCEDURE — OTHER LIQUID NITROGEN: OTHER

## 2025-08-20 PROCEDURE — OTHER MIPS QUALITY: OTHER

## 2025-08-20 PROCEDURE — 17110 DESTRUCT B9 LESION 1-14: CPT

## 2025-08-20 ASSESSMENT — LOCATION DETAILED DESCRIPTION DERM
LOCATION DETAILED: RIGHT MEDIAL FRONTAL SCALP
LOCATION DETAILED: RIGHT SUPERIOR UPPER BACK
LOCATION DETAILED: EPIGASTRIC SKIN
LOCATION DETAILED: LEFT LATERAL TRAPEZIAL NECK
LOCATION DETAILED: RIGHT CENTRAL PARIETAL SCALP
LOCATION DETAILED: RIGHT LATERAL FOREHEAD
LOCATION DETAILED: POSTERIOR MID-PARIETAL SCALP
LOCATION DETAILED: LEFT SUPERIOR LATERAL MALAR CHEEK
LOCATION DETAILED: SUPERIOR LUMBAR SPINE
LOCATION DETAILED: RIGHT SUPERIOR PREAURICULAR CHEEK
LOCATION DETAILED: SUPERIOR THORACIC SPINE
LOCATION DETAILED: LEFT SUPERIOR PARIETAL SCALP
LOCATION DETAILED: RIGHT SUPERIOR CRUS OF ANTIHELIX

## 2025-08-20 ASSESSMENT — LOCATION SIMPLE DESCRIPTION DERM
LOCATION SIMPLE: UPPER BACK
LOCATION SIMPLE: RIGHT EAR
LOCATION SIMPLE: RIGHT FOREHEAD
LOCATION SIMPLE: POSTERIOR NECK
LOCATION SIMPLE: RIGHT SCALP
LOCATION SIMPLE: ABDOMEN
LOCATION SIMPLE: SCALP
LOCATION SIMPLE: RIGHT UPPER BACK
LOCATION SIMPLE: POSTERIOR SCALP
LOCATION SIMPLE: LEFT CHEEK
LOCATION SIMPLE: LOWER BACK
LOCATION SIMPLE: RIGHT CHEEK

## 2025-08-20 ASSESSMENT — LOCATION ZONE DERM
LOCATION ZONE: NECK
LOCATION ZONE: EAR
LOCATION ZONE: FACE
LOCATION ZONE: SCALP
LOCATION ZONE: TRUNK

## (undated) RX ORDER — FENTANYL CITRATE 50 UG/ML
INJECTION, SOLUTION INTRAMUSCULAR; INTRAVENOUS
Status: DISPENSED
Start: 2021-05-06

## (undated) RX ORDER — FENTANYL CITRATE 50 UG/ML
INJECTION, SOLUTION INTRAMUSCULAR; INTRAVENOUS
Status: DISPENSED
Start: 2022-03-03

## (undated) RX ORDER — FENTANYL CITRATE 50 UG/ML
INJECTION, SOLUTION INTRAMUSCULAR; INTRAVENOUS
Status: DISPENSED
Start: 2018-03-22